# Patient Record
Sex: MALE | Race: WHITE | Employment: OTHER
[De-identification: names, ages, dates, MRNs, and addresses within clinical notes are randomized per-mention and may not be internally consistent; named-entity substitution may affect disease eponyms.]

---

## 2017-01-26 PROBLEM — S02.2XXA CLOSED FRACTURE OF NASAL BONE: Status: ACTIVE | Noted: 2017-01-26

## 2017-01-30 ENCOUNTER — TELEPHONE (OUTPATIENT)
Dept: INTERNAL MEDICINE | Facility: CLINIC | Age: 47
End: 2017-01-30

## 2017-04-27 RX ORDER — DIVALPROEX SODIUM 500 MG/1
TABLET, EXTENDED RELEASE ORAL
Qty: 30 TABLET | Refills: 3 | Status: ON HOLD | OUTPATIENT
Start: 2017-04-27 | End: 2018-09-04 | Stop reason: HOSPADM

## 2017-04-30 ENCOUNTER — APPOINTMENT (OUTPATIENT)
Dept: GENERAL RADIOLOGY | Age: 47
End: 2017-04-30
Payer: COMMERCIAL

## 2017-04-30 ENCOUNTER — HOSPITAL ENCOUNTER (EMERGENCY)
Age: 47
Discharge: HOME | End: 2017-04-30
Attending: EMERGENCY MEDICINE
Payer: COMMERCIAL

## 2017-04-30 VITALS
HEART RATE: 84 BPM | RESPIRATION RATE: 16 BRPM | TEMPERATURE: 97.4 F | DIASTOLIC BLOOD PRESSURE: 59 MMHG | SYSTOLIC BLOOD PRESSURE: 104 MMHG | BODY MASS INDEX: 18.65 KG/M2 | HEIGHT: 75 IN | OXYGEN SATURATION: 98 % | WEIGHT: 150 LBS

## 2017-04-30 DIAGNOSIS — S30.0XXA COCCYX CONTUSION, INITIAL ENCOUNTER: Primary | ICD-10-CM
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
## 2017-04-30 PROCEDURE — 99284 EMERGENCY DEPT VISIT MOD MDM: CPT

## 2017-04-30 PROCEDURE — 72100 X-RAY EXAM L-S SPINE 2/3 VWS: CPT

## 2017-04-30 PROCEDURE — 72220 X-RAY EXAM SACRUM TAILBONE: CPT

## 2017-04-30 PROCEDURE — 6370000000 HC RX 637 (ALT 250 FOR IP): Performed by: NURSE PRACTITIONER

## 2017-04-30 RX ORDER — ACETAMINOPHEN 500 MG
1000 TABLET ORAL ONCE
Status: COMPLETED | OUTPATIENT
Start: 2017-04-30 | End: 2017-04-30

## 2017-04-30 RX ORDER — TRAMADOL HYDROCHLORIDE 50 MG/1
50 TABLET ORAL EVERY 6 HOURS PRN
Status: ON HOLD | COMMUNITY
End: 2017-07-19 | Stop reason: HOSPADM

## 2017-04-30 RX ORDER — ACETAMINOPHEN 325 MG/1
650 TABLET ORAL EVERY 6 HOURS PRN
Qty: 20 TABLET | Refills: 0 | Status: ON HOLD | OUTPATIENT
Start: 2017-04-30 | End: 2017-07-18

## 2017-04-30 RX ADMIN — ACETAMINOPHEN 1000 MG: 500 TABLET, FILM COATED ORAL at 19:43

## 2017-04-30 ASSESSMENT — PAIN DESCRIPTION - ORIENTATION: ORIENTATION: OTHER (COMMENT)

## 2017-04-30 ASSESSMENT — PAIN SCALES - GENERAL
PAINLEVEL_OUTOF10: 10
PAINLEVEL_OUTOF10: 9
PAINLEVEL_OUTOF10: 10

## 2017-04-30 ASSESSMENT — PAIN DESCRIPTION - DESCRIPTORS: DESCRIPTORS: ACHING

## 2017-04-30 ASSESSMENT — ENCOUNTER SYMPTOMS
SHORTNESS OF BREATH: 0
NAUSEA: 0
COUGH: 0
TROUBLE SWALLOWING: 0
VOMITING: 0

## 2017-04-30 ASSESSMENT — PAIN DESCRIPTION - LOCATION: LOCATION: COCCYX

## 2017-06-15 ENCOUNTER — HOSPITAL ENCOUNTER (OUTPATIENT)
Age: 47
Discharge: HOME | End: 2017-06-15
Payer: COMMERCIAL

## 2017-06-15 ENCOUNTER — HOSPITAL ENCOUNTER (OUTPATIENT)
Dept: GENERAL RADIOLOGY | Age: 47
Discharge: HOME | End: 2017-06-15
Payer: COMMERCIAL

## 2017-06-15 ENCOUNTER — HOSPITAL ENCOUNTER (OUTPATIENT)
Dept: PAIN MANAGEMENT | Age: 47
Discharge: HOME | End: 2017-06-15
Payer: COMMERCIAL

## 2017-06-15 VITALS
SYSTOLIC BLOOD PRESSURE: 136 MMHG | RESPIRATION RATE: 20 BRPM | BODY MASS INDEX: 18.65 KG/M2 | TEMPERATURE: 98.6 F | DIASTOLIC BLOOD PRESSURE: 78 MMHG | OXYGEN SATURATION: 96 % | HEIGHT: 75 IN | HEART RATE: 77 BPM | WEIGHT: 150 LBS

## 2017-06-15 DIAGNOSIS — R52 PAIN: ICD-10-CM

## 2017-06-15 DIAGNOSIS — M50.30 DEGENERATIVE DISC DISEASE, CERVICAL: ICD-10-CM

## 2017-06-15 DIAGNOSIS — M47.816 OSTEOARTHRITIS OF LUMBAR SPINE, UNSPECIFIED SPINAL OSTEOARTHRITIS COMPLICATION STATUS: ICD-10-CM

## 2017-06-15 DIAGNOSIS — F14.10 COCAINE SUBSTANCE ABUSE (HCC): ICD-10-CM

## 2017-06-15 DIAGNOSIS — G40.409 GENERALIZED TONIC-CLONIC SEIZURE (HCC): Primary | ICD-10-CM

## 2017-06-15 DIAGNOSIS — M51.36 LUMBAR DEGENERATIVE DISC DISEASE: ICD-10-CM

## 2017-06-15 DIAGNOSIS — F11.10 OPIATE ABUSE, EPISODIC (HCC): ICD-10-CM

## 2017-06-15 PROCEDURE — 80307 DRUG TEST PRSMV CHEM ANLYZR: CPT

## 2017-06-15 PROCEDURE — 72220 X-RAY EXAM SACRUM TAILBONE: CPT

## 2017-06-15 PROCEDURE — 99244 OFF/OP CNSLTJ NEW/EST MOD 40: CPT | Performed by: PAIN MEDICINE

## 2017-06-15 PROCEDURE — 99203 OFFICE O/P NEW LOW 30 MIN: CPT

## 2017-06-15 ASSESSMENT — PAIN DESCRIPTION - FREQUENCY: FREQUENCY: CONTINUOUS

## 2017-06-15 ASSESSMENT — ENCOUNTER SYMPTOMS
BLURRED VISION: 0
SHORTNESS OF BREATH: 1
NAUSEA: 1
VOMITING: 1
DOUBLE VISION: 0
BACK PAIN: 1
SPUTUM PRODUCTION: 1

## 2017-06-15 ASSESSMENT — PAIN DESCRIPTION - ONSET: ONSET: ON-GOING

## 2017-06-15 ASSESSMENT — PAIN DESCRIPTION - ORIENTATION: ORIENTATION: RIGHT;LEFT

## 2017-06-15 ASSESSMENT — PAIN DESCRIPTION - PROGRESSION: CLINICAL_PROGRESSION: GRADUALLY WORSENING

## 2017-06-15 ASSESSMENT — PAIN DESCRIPTION - LOCATION: LOCATION: BACK;SHOULDER;COCCYX

## 2017-06-15 ASSESSMENT — PAIN DESCRIPTION - DESCRIPTORS: DESCRIPTORS: ACHING;BURNING;CONSTANT

## 2017-06-15 ASSESSMENT — PAIN SCALES - GENERAL: PAINLEVEL_OUTOF10: 10

## 2017-06-15 ASSESSMENT — PAIN DESCRIPTION - PAIN TYPE: TYPE: CHRONIC PAIN

## 2017-06-16 RX ORDER — DULOXETIN HYDROCHLORIDE 60 MG/1
CAPSULE, DELAYED RELEASE ORAL
Qty: 30 CAPSULE | Refills: 0 | Status: ON HOLD | OUTPATIENT
Start: 2017-06-16 | End: 2018-04-12 | Stop reason: HOSPADM

## 2017-06-19 LAB
6-ACETYLMORPHINE, UR: NOT DETECTED
7-AMINOCLONAZEPAM, URINE: NOT DETECTED
ALPHA-OH-ALPRAZ, URINE: NOT DETECTED
ALPRAZOLAM, URINE: NOT DETECTED
AMPHETAMINES, URINE: NOT DETECTED
BARBITURATES, URINE: NOT DETECTED
BENZOYLECGONINE, UR: PRESENT
BUPRENORPHINE URINE: NOT DETECTED
CARISOPRODOL, UR: NOT DETECTED
CLONAZEPAM, URINE: NOT DETECTED
CODEINE, URINE: PRESENT
CREATININE URINE: 208.1 MG/DL (ref 20–400)
DIAZEPAM, URINE: NOT DETECTED
DRUGS EXPECTED, UR: NORMAL
EER HI RES INTERP UR: NORMAL
ETHYL GLUCURONIDE UR: NOT DETECTED
FENTANYL URINE: NOT DETECTED
HYDROCODONE, URINE: PRESENT
HYDROMORPHONE, URINE: NOT DETECTED
LORAZEPAM, URINE: NOT DETECTED
MARIJUANA METAB, UR: PRESENT
MDA, UR: NOT DETECTED
MDEA, EVE, UR: NOT DETECTED
MDMA URINE: NOT DETECTED
MEPERIDINE METAB, UR: NOT DETECTED
METHADONE, URINE: NOT DETECTED
METHAMPHETAMINE, URINE: NOT DETECTED
METHYLPHENIDATE: NOT DETECTED
MIDAZOLAM, URINE: NOT DETECTED
MORPHINE URINE: PRESENT
NORBUPRENORPHINE, URINE: NOT DETECTED
NORDIAZEPAM, URINE: NOT DETECTED
NORFENTANYL, URINE: NOT DETECTED
NORHYDROCODONE, URINE: PRESENT
NOROXYCODONE, URINE: NOT DETECTED
NOROXYMORPHONE, URINE: NOT DETECTED
OXAZEPAM, URINE: NOT DETECTED
OXYCODONE URINE: NOT DETECTED
OXYMORPHONE, URINE: NOT DETECTED
PAIN MANAGEMENT DRUG PANEL INTERP, URINE: NORMAL
PAIN MGT DRUG PANEL, HI RES, UR: NORMAL
PCP,URINE: NOT DETECTED
PHENTERMINE, UR: NOT DETECTED
PROPOXYPHENE, URINE: NOT DETECTED
TAPENTADOL, URINE: NOT DETECTED
TAPENTADOL-O-SULFATE, URINE: NOT DETECTED
TEMAZEPAM, URINE: NOT DETECTED
TRAMADOL, URINE: NOT DETECTED
ZOLPIDEM, URINE: NOT DETECTED

## 2017-07-17 ENCOUNTER — HOSPITAL ENCOUNTER (OUTPATIENT)
Age: 47
Setting detail: OBSERVATION
Discharge: HOME HEALTH | End: 2017-07-19
Attending: EMERGENCY MEDICINE | Admitting: INTERNAL MEDICINE
Payer: COMMERCIAL

## 2017-07-17 DIAGNOSIS — T40.604A OPIATE OVERDOSE, UNDETERMINED INTENT, INITIAL ENCOUNTER (HCC): ICD-10-CM

## 2017-07-17 DIAGNOSIS — R41.82 ALTERED MENTAL STATUS, UNSPECIFIED: Primary | ICD-10-CM

## 2017-07-17 PROCEDURE — 99285 EMERGENCY DEPT VISIT HI MDM: CPT

## 2017-07-17 PROCEDURE — 93005 ELECTROCARDIOGRAM TRACING: CPT

## 2017-07-18 ENCOUNTER — APPOINTMENT (OUTPATIENT)
Dept: CT IMAGING | Age: 47
End: 2017-07-18
Payer: COMMERCIAL

## 2017-07-18 ENCOUNTER — APPOINTMENT (OUTPATIENT)
Dept: GENERAL RADIOLOGY | Age: 47
End: 2017-07-18
Payer: COMMERCIAL

## 2017-07-18 PROBLEM — F19.10 POLYSUBSTANCE ABUSE (HCC): Status: ACTIVE | Noted: 2017-07-18

## 2017-07-18 PROBLEM — T50.901A DRUG OVERDOSE: Status: ACTIVE | Noted: 2017-07-18

## 2017-07-18 LAB
ABSOLUTE EOS #: 0.2 K/UL (ref 0–0.4)
ABSOLUTE LYMPH #: 3 K/UL (ref 1–4.8)
ABSOLUTE MONO #: 0.6 K/UL (ref 0.1–1.3)
ACETAMINOPHEN LEVEL: <10 UG/ML (ref 10–30)
ALBUMIN SERPL-MCNC: 3.9 G/DL (ref 3.5–5.2)
ALBUMIN/GLOBULIN RATIO: ABNORMAL (ref 1–2.5)
ALP BLD-CCNC: 72 U/L (ref 40–129)
ALT SERPL-CCNC: 11 U/L (ref 5–41)
AMPHETAMINE SCREEN URINE: NEGATIVE
ANION GAP SERPL CALCULATED.3IONS-SCNC: 12 MMOL/L (ref 9–17)
AST SERPL-CCNC: 18 U/L
BARBITURATE SCREEN URINE: NEGATIVE
BASOPHILS # BLD: 1 %
BASOPHILS ABSOLUTE: 0.1 K/UL (ref 0–0.2)
BENZODIAZEPINE SCREEN, URINE: NEGATIVE
BILIRUB SERPL-MCNC: 0.25 MG/DL (ref 0.3–1.2)
BILIRUBIN URINE: NEGATIVE
BUN BLDV-MCNC: 19 MG/DL (ref 6–20)
BUN/CREAT BLD: ABNORMAL (ref 9–20)
BUPRENORPHINE URINE: ABNORMAL
CALCIUM SERPL-MCNC: 9.1 MG/DL (ref 8.6–10.4)
CANNABINOID SCREEN URINE: POSITIVE
CHLORIDE BLD-SCNC: 106 MMOL/L (ref 98–107)
CO2: 22 MMOL/L (ref 20–31)
COCAINE METABOLITE, URINE: NEGATIVE
COLOR: YELLOW
COMMENT UA: NORMAL
CREAT SERPL-MCNC: 0.72 MG/DL (ref 0.7–1.2)
DIFFERENTIAL TYPE: ABNORMAL
EKG ATRIAL RATE: 86 BPM
EKG P AXIS: 63 DEGREES
EKG P-R INTERVAL: 156 MS
EKG Q-T INTERVAL: 338 MS
EKG QRS DURATION: 98 MS
EKG QTC CALCULATION (BAZETT): 404 MS
EKG R AXIS: 73 DEGREES
EKG T AXIS: 55 DEGREES
EKG VENTRICULAR RATE: 86 BPM
EOSINOPHILS RELATIVE PERCENT: 2 %
ETHANOL PERCENT: <0.01 %
ETHANOL: <10 MG/DL
GFR AFRICAN AMERICAN: >60 ML/MIN
GFR NON-AFRICAN AMERICAN: >60 ML/MIN
GFR SERPL CREATININE-BSD FRML MDRD: ABNORMAL ML/MIN/{1.73_M2}
GFR SERPL CREATININE-BSD FRML MDRD: ABNORMAL ML/MIN/{1.73_M2}
GLUCOSE BLD-MCNC: 85 MG/DL (ref 75–110)
GLUCOSE BLD-MCNC: 92 MG/DL (ref 70–99)
GLUCOSE URINE: NEGATIVE
HCT VFR BLD CALC: 44.2 % (ref 41–53)
HEMOGLOBIN: 14.6 G/DL (ref 13.5–17.5)
KETONES, URINE: NEGATIVE
LEUKOCYTE ESTERASE, URINE: NEGATIVE
LYMPHOCYTES # BLD: 27 %
MCH RBC QN AUTO: 28.2 PG (ref 26–34)
MCHC RBC AUTO-ENTMCNC: 33 G/DL (ref 31–37)
MCV RBC AUTO: 85.5 FL (ref 80–100)
MDMA URINE: ABNORMAL
METHADONE SCREEN, URINE: NEGATIVE
METHAMPHETAMINE, URINE: ABNORMAL
MONOCYTES # BLD: 6 %
NITRITE, URINE: NEGATIVE
OPIATES, URINE: POSITIVE
OXYCODONE SCREEN URINE: NEGATIVE
PDW BLD-RTO: 16.2 % (ref 11.5–14.9)
PH UA: 7 (ref 5–8)
PHENCYCLIDINE, URINE: NEGATIVE
PLATELET # BLD: 203 K/UL (ref 150–450)
PLATELET ESTIMATE: ABNORMAL
PMV BLD AUTO: 8.7 FL (ref 6–12)
POTASSIUM SERPL-SCNC: 3.9 MMOL/L (ref 3.7–5.3)
PROPOXYPHENE, URINE: ABNORMAL
PROTEIN UA: NEGATIVE
RBC # BLD: 5.17 M/UL (ref 4.5–5.9)
RBC # BLD: ABNORMAL 10*6/UL
SALICYLATE LEVEL: <1 MG/DL (ref 3–10)
SEG NEUTROPHILS: 64 %
SEGMENTED NEUTROPHILS ABSOLUTE COUNT: 7 K/UL (ref 1.3–9.1)
SODIUM BLD-SCNC: 140 MMOL/L (ref 135–144)
SPECIFIC GRAVITY UA: 1.02 (ref 1–1.03)
TEST INFORMATION: ABNORMAL
TOTAL PROTEIN: 7.3 G/DL (ref 6.4–8.3)
TRICYCLIC ANTIDEP,URINE: POSITIVE
TRICYCLIC ANTIDEPRESSANTS, UR: ABNORMAL
TURBIDITY: CLEAR
URINE HGB: NEGATIVE
UROBILINOGEN, URINE: NORMAL
VALPROIC ACID LEVEL: 44 UG/ML (ref 50–100)
VALPROIC DATE LAST DOSE: ABNORMAL
VALPROIC DOSE AMOUNT: ABNORMAL
VALPROIC TIME LAST DOSE: ABNORMAL
WBC # BLD: 10.9 K/UL (ref 3.5–11)
WBC # BLD: ABNORMAL 10*3/UL

## 2017-07-18 PROCEDURE — G0378 HOSPITAL OBSERVATION PER HR: HCPCS

## 2017-07-18 PROCEDURE — 80307 DRUG TEST PRSMV CHEM ANLYZR: CPT

## 2017-07-18 PROCEDURE — 81003 URINALYSIS AUTO W/O SCOPE: CPT

## 2017-07-18 PROCEDURE — 6360000002 HC RX W HCPCS: Performed by: NURSE PRACTITIONER

## 2017-07-18 PROCEDURE — 85025 COMPLETE CBC W/AUTO DIFF WBC: CPT

## 2017-07-18 PROCEDURE — 96372 THER/PROPH/DIAG INJ SC/IM: CPT

## 2017-07-18 PROCEDURE — 36415 COLL VENOUS BLD VENIPUNCTURE: CPT

## 2017-07-18 PROCEDURE — 71010 XR CHEST PORTABLE: CPT

## 2017-07-18 PROCEDURE — 6370000000 HC RX 637 (ALT 250 FOR IP): Performed by: NURSE PRACTITIONER

## 2017-07-18 PROCEDURE — 99220 PR INITIAL OBSERVATION CARE/DAY 70 MINUTES: CPT | Performed by: INTERNAL MEDICINE

## 2017-07-18 PROCEDURE — 80053 COMPREHEN METABOLIC PANEL: CPT

## 2017-07-18 PROCEDURE — 70450 CT HEAD/BRAIN W/O DYE: CPT

## 2017-07-18 PROCEDURE — G0480 DRUG TEST DEF 1-7 CLASSES: HCPCS

## 2017-07-18 PROCEDURE — 82947 ASSAY GLUCOSE BLOOD QUANT: CPT

## 2017-07-18 PROCEDURE — 80164 ASSAY DIPROPYLACETIC ACD TOT: CPT

## 2017-07-18 PROCEDURE — 2580000003 HC RX 258: Performed by: NURSE PRACTITIONER

## 2017-07-18 PROCEDURE — 1200000000 HC SEMI PRIVATE

## 2017-07-18 RX ORDER — DIVALPROEX SODIUM 500 MG/1
500 TABLET, EXTENDED RELEASE ORAL 2 TIMES DAILY
Status: DISCONTINUED | OUTPATIENT
Start: 2017-07-18 | End: 2017-07-19 | Stop reason: HOSPADM

## 2017-07-18 RX ORDER — HYDROXYZINE HYDROCHLORIDE 25 MG/1
50 TABLET, FILM COATED ORAL NIGHTLY
Status: DISCONTINUED | OUTPATIENT
Start: 2017-07-18 | End: 2017-07-19 | Stop reason: HOSPADM

## 2017-07-18 RX ORDER — ONDANSETRON 2 MG/ML
4 INJECTION INTRAMUSCULAR; INTRAVENOUS EVERY 6 HOURS PRN
Status: DISCONTINUED | OUTPATIENT
Start: 2017-07-18 | End: 2017-07-19 | Stop reason: HOSPADM

## 2017-07-18 RX ORDER — ALBUTEROL SULFATE 2.5 MG/3ML
2.5 SOLUTION RESPIRATORY (INHALATION) EVERY 6 HOURS PRN
Status: DISCONTINUED | OUTPATIENT
Start: 2017-07-18 | End: 2017-07-19 | Stop reason: HOSPADM

## 2017-07-18 RX ORDER — SODIUM CHLORIDE 0.9 % (FLUSH) 0.9 %
10 SYRINGE (ML) INJECTION EVERY 12 HOURS SCHEDULED
Status: DISCONTINUED | OUTPATIENT
Start: 2017-07-18 | End: 2017-07-19 | Stop reason: HOSPADM

## 2017-07-18 RX ORDER — FLUTICASONE PROPIONATE 50 MCG
1 SPRAY, SUSPENSION (ML) NASAL NIGHTLY
Status: DISCONTINUED | OUTPATIENT
Start: 2017-07-18 | End: 2017-07-19 | Stop reason: HOSPADM

## 2017-07-18 RX ORDER — HYDROXYZINE HYDROCHLORIDE 25 MG/1
25 TABLET, FILM COATED ORAL 2 TIMES DAILY
Status: DISCONTINUED | OUTPATIENT
Start: 2017-07-18 | End: 2017-07-19 | Stop reason: HOSPADM

## 2017-07-18 RX ORDER — SODIUM CHLORIDE 0.9 % (FLUSH) 0.9 %
10 SYRINGE (ML) INJECTION PRN
Status: DISCONTINUED | OUTPATIENT
Start: 2017-07-18 | End: 2017-07-19 | Stop reason: HOSPADM

## 2017-07-18 RX ORDER — DULOXETIN HYDROCHLORIDE 30 MG/1
60 CAPSULE, DELAYED RELEASE ORAL DAILY
Status: DISCONTINUED | OUTPATIENT
Start: 2017-07-18 | End: 2017-07-19 | Stop reason: HOSPADM

## 2017-07-18 RX ORDER — SODIUM CHLORIDE 9 MG/ML
INJECTION, SOLUTION INTRAVENOUS CONTINUOUS
Status: DISCONTINUED | OUTPATIENT
Start: 2017-07-18 | End: 2017-07-19 | Stop reason: HOSPADM

## 2017-07-18 RX ORDER — QUETIAPINE FUMARATE 300 MG/1
300 TABLET, FILM COATED ORAL NIGHTLY
Status: DISCONTINUED | OUTPATIENT
Start: 2017-07-18 | End: 2017-07-19 | Stop reason: HOSPADM

## 2017-07-18 RX ORDER — SIMVASTATIN 40 MG
40 TABLET ORAL NIGHTLY
Status: DISCONTINUED | OUTPATIENT
Start: 2017-07-18 | End: 2017-07-19 | Stop reason: HOSPADM

## 2017-07-18 RX ORDER — PANTOPRAZOLE SODIUM 40 MG/1
40 TABLET, DELAYED RELEASE ORAL 2 TIMES DAILY
Status: DISCONTINUED | OUTPATIENT
Start: 2017-07-18 | End: 2017-07-19 | Stop reason: HOSPADM

## 2017-07-18 RX ADMIN — SODIUM CHLORIDE: 9 INJECTION, SOLUTION INTRAVENOUS at 06:58

## 2017-07-18 RX ADMIN — FLUTICASONE PROPIONATE 1 SPRAY: 50 SPRAY, METERED NASAL at 21:55

## 2017-07-18 RX ADMIN — TOPIRAMATE 150 MG: 100 TABLET, FILM COATED ORAL at 21:55

## 2017-07-18 RX ADMIN — HYDROXYZINE HYDROCHLORIDE 50 MG: 25 TABLET, FILM COATED ORAL at 21:54

## 2017-07-18 RX ADMIN — SIMVASTATIN 40 MG: 40 TABLET, FILM COATED ORAL at 21:54

## 2017-07-18 RX ADMIN — PANTOPRAZOLE SODIUM 40 MG: 40 TABLET, DELAYED RELEASE ORAL at 21:54

## 2017-07-18 RX ADMIN — QUETIAPINE FUMARATE 300 MG: 300 TABLET, FILM COATED ORAL at 21:54

## 2017-07-18 RX ADMIN — DIVALPROEX SODIUM 500 MG: 500 TABLET, EXTENDED RELEASE ORAL at 21:54

## 2017-07-18 RX ADMIN — SALINE NASAL SPRAY 1 SPRAY: 1.5 SOLUTION NASAL at 21:55

## 2017-07-18 RX ADMIN — ENOXAPARIN SODIUM 40 MG: 40 INJECTION SUBCUTANEOUS at 10:24

## 2017-07-18 ASSESSMENT — PAIN SCALES - GENERAL: PAINLEVEL_OUTOF10: 0

## 2017-07-19 VITALS
OXYGEN SATURATION: 100 % | SYSTOLIC BLOOD PRESSURE: 117 MMHG | HEIGHT: 75 IN | TEMPERATURE: 98.4 F | DIASTOLIC BLOOD PRESSURE: 64 MMHG | HEART RATE: 75 BPM | BODY MASS INDEX: 19.02 KG/M2 | WEIGHT: 153 LBS | RESPIRATION RATE: 16 BRPM

## 2017-07-19 LAB
ABSOLUTE EOS #: 0.3 K/UL (ref 0–0.4)
ABSOLUTE LYMPH #: 3.2 K/UL (ref 1–4.8)
ABSOLUTE MONO #: 0.6 K/UL (ref 0.1–1.3)
ALBUMIN SERPL-MCNC: 3.4 G/DL (ref 3.5–5.2)
ALBUMIN/GLOBULIN RATIO: ABNORMAL (ref 1–2.5)
ALP BLD-CCNC: 65 U/L (ref 40–129)
ALT SERPL-CCNC: 13 U/L (ref 5–41)
ANION GAP SERPL CALCULATED.3IONS-SCNC: 13 MMOL/L (ref 9–17)
AST SERPL-CCNC: 17 U/L
BASOPHILS # BLD: 2 %
BASOPHILS ABSOLUTE: 0.1 K/UL (ref 0–0.2)
BILIRUB SERPL-MCNC: 0.29 MG/DL (ref 0.3–1.2)
BUN BLDV-MCNC: 14 MG/DL (ref 6–20)
BUN/CREAT BLD: ABNORMAL (ref 9–20)
CALCIUM SERPL-MCNC: 8.4 MG/DL (ref 8.6–10.4)
CHLORIDE BLD-SCNC: 110 MMOL/L (ref 98–107)
CO2: 17 MMOL/L (ref 20–31)
CREAT SERPL-MCNC: 0.58 MG/DL (ref 0.7–1.2)
DIFFERENTIAL TYPE: ABNORMAL
EOSINOPHILS RELATIVE PERCENT: 4 %
GFR AFRICAN AMERICAN: >60 ML/MIN
GFR NON-AFRICAN AMERICAN: >60 ML/MIN
GFR SERPL CREATININE-BSD FRML MDRD: ABNORMAL ML/MIN/{1.73_M2}
GFR SERPL CREATININE-BSD FRML MDRD: ABNORMAL ML/MIN/{1.73_M2}
GLUCOSE BLD-MCNC: 72 MG/DL (ref 70–99)
GLUCOSE BLD-MCNC: 73 MG/DL (ref 75–110)
GLUCOSE BLD-MCNC: 74 MG/DL (ref 75–110)
HCT VFR BLD CALC: 40.2 % (ref 41–53)
HEMOGLOBIN: 12.9 G/DL (ref 13.5–17.5)
LYMPHOCYTES # BLD: 49 %
MCH RBC QN AUTO: 27.9 PG (ref 26–34)
MCHC RBC AUTO-ENTMCNC: 32.2 G/DL (ref 31–37)
MCV RBC AUTO: 86.8 FL (ref 80–100)
MONOCYTES # BLD: 9 %
PDW BLD-RTO: 16.3 % (ref 11.5–14.9)
PLATELET # BLD: 194 K/UL (ref 150–450)
PLATELET ESTIMATE: ABNORMAL
PMV BLD AUTO: 8.5 FL (ref 6–12)
POTASSIUM SERPL-SCNC: 3.7 MMOL/L (ref 3.7–5.3)
RBC # BLD: 4.64 M/UL (ref 4.5–5.9)
RBC # BLD: ABNORMAL 10*6/UL
SEG NEUTROPHILS: 36 %
SEGMENTED NEUTROPHILS ABSOLUTE COUNT: 2.3 K/UL (ref 1.3–9.1)
SODIUM BLD-SCNC: 140 MMOL/L (ref 135–144)
TOTAL PROTEIN: 6.1 G/DL (ref 6.4–8.3)
WBC # BLD: 6.4 K/UL (ref 3.5–11)
WBC # BLD: ABNORMAL 10*3/UL

## 2017-07-19 PROCEDURE — 82947 ASSAY GLUCOSE BLOOD QUANT: CPT

## 2017-07-19 PROCEDURE — 36415 COLL VENOUS BLD VENIPUNCTURE: CPT

## 2017-07-19 PROCEDURE — 6370000000 HC RX 637 (ALT 250 FOR IP): Performed by: NURSE PRACTITIONER

## 2017-07-19 PROCEDURE — 96372 THER/PROPH/DIAG INJ SC/IM: CPT

## 2017-07-19 PROCEDURE — 6360000002 HC RX W HCPCS: Performed by: NURSE PRACTITIONER

## 2017-07-19 PROCEDURE — 99217 PR OBSERVATION CARE DISCHARGE MANAGEMENT: CPT | Performed by: INTERNAL MEDICINE

## 2017-07-19 PROCEDURE — G0378 HOSPITAL OBSERVATION PER HR: HCPCS

## 2017-07-19 PROCEDURE — 2580000003 HC RX 258: Performed by: NURSE PRACTITIONER

## 2017-07-19 PROCEDURE — 85025 COMPLETE CBC W/AUTO DIFF WBC: CPT

## 2017-07-19 PROCEDURE — 80053 COMPREHEN METABOLIC PANEL: CPT

## 2017-07-19 RX ADMIN — PANTOPRAZOLE SODIUM 40 MG: 40 TABLET, DELAYED RELEASE ORAL at 09:58

## 2017-07-19 RX ADMIN — DULOXETINE HYDROCHLORIDE 60 MG: 30 CAPSULE, DELAYED RELEASE ORAL at 09:59

## 2017-07-19 RX ADMIN — HYDROXYZINE HYDROCHLORIDE 25 MG: 25 TABLET, FILM COATED ORAL at 09:59

## 2017-07-19 RX ADMIN — SODIUM CHLORIDE: 9 INJECTION, SOLUTION INTRAVENOUS at 07:20

## 2017-07-19 RX ADMIN — DIVALPROEX SODIUM 500 MG: 500 TABLET, EXTENDED RELEASE ORAL at 09:59

## 2017-07-19 RX ADMIN — ENOXAPARIN SODIUM 40 MG: 40 INJECTION SUBCUTANEOUS at 09:59

## 2017-07-19 RX ADMIN — TOPIRAMATE 150 MG: 100 TABLET, FILM COATED ORAL at 10:02

## 2017-08-30 ENCOUNTER — HOSPITAL ENCOUNTER (INPATIENT)
Age: 47
LOS: 2 days | Discharge: HOME | DRG: 750 | End: 2017-09-01
Attending: EMERGENCY MEDICINE | Admitting: PSYCHIATRY & NEUROLOGY
Payer: COMMERCIAL

## 2017-08-30 DIAGNOSIS — F19.10 POLYSUBSTANCE ABUSE (HCC): ICD-10-CM

## 2017-08-30 DIAGNOSIS — R45.851 SUICIDAL IDEATION: Primary | ICD-10-CM

## 2017-08-30 DIAGNOSIS — F31.9 BIPOLAR 1 DISORDER (HCC): ICD-10-CM

## 2017-08-30 DIAGNOSIS — F32.A DEPRESSION, UNSPECIFIED DEPRESSION TYPE: ICD-10-CM

## 2017-08-30 LAB
ABSOLUTE EOS #: 0.2 K/UL (ref 0–0.4)
ABSOLUTE LYMPH #: 3.1 K/UL (ref 1–4.8)
ABSOLUTE MONO #: 0.7 K/UL (ref 0.1–1.3)
ALBUMIN SERPL-MCNC: 4 G/DL (ref 3.5–5.2)
ALBUMIN/GLOBULIN RATIO: ABNORMAL (ref 1–2.5)
ALP BLD-CCNC: 82 U/L (ref 40–129)
ALT SERPL-CCNC: 20 U/L (ref 5–41)
AMPHETAMINE SCREEN URINE: NEGATIVE
ANION GAP SERPL CALCULATED.3IONS-SCNC: 11 MMOL/L (ref 9–17)
AST SERPL-CCNC: 24 U/L
BARBITURATE SCREEN URINE: NEGATIVE
BASOPHILS # BLD: 1 %
BASOPHILS ABSOLUTE: 0.1 K/UL (ref 0–0.2)
BENZODIAZEPINE SCREEN, URINE: NEGATIVE
BILIRUB SERPL-MCNC: 0.25 MG/DL (ref 0.3–1.2)
BUN BLDV-MCNC: 22 MG/DL (ref 6–20)
BUN/CREAT BLD: ABNORMAL (ref 9–20)
BUPRENORPHINE URINE: ABNORMAL
CALCIUM SERPL-MCNC: 8.8 MG/DL (ref 8.6–10.4)
CANNABINOID SCREEN URINE: POSITIVE
CHLORIDE BLD-SCNC: 107 MMOL/L (ref 98–107)
CO2: 23 MMOL/L (ref 20–31)
COCAINE METABOLITE, URINE: POSITIVE
CREAT SERPL-MCNC: 0.96 MG/DL (ref 0.7–1.2)
DIFFERENTIAL TYPE: ABNORMAL
EOSINOPHILS RELATIVE PERCENT: 2 %
ETHANOL PERCENT: <0.01 %
ETHANOL: <10 MG/DL
GFR AFRICAN AMERICAN: >60 ML/MIN
GFR NON-AFRICAN AMERICAN: >60 ML/MIN
GFR SERPL CREATININE-BSD FRML MDRD: ABNORMAL ML/MIN/{1.73_M2}
GFR SERPL CREATININE-BSD FRML MDRD: ABNORMAL ML/MIN/{1.73_M2}
GLUCOSE BLD-MCNC: 74 MG/DL (ref 75–110)
GLUCOSE BLD-MCNC: 77 MG/DL (ref 75–110)
GLUCOSE BLD-MCNC: 98 MG/DL (ref 70–99)
HCT VFR BLD CALC: 44 % (ref 41–53)
HEMOGLOBIN: 14.5 G/DL (ref 13.5–17.5)
LYMPHOCYTES # BLD: 35 %
MAGNESIUM: 2.4 MG/DL (ref 1.6–2.6)
MCH RBC QN AUTO: 28.5 PG (ref 26–34)
MCHC RBC AUTO-ENTMCNC: 33 G/DL (ref 31–37)
MCV RBC AUTO: 86.3 FL (ref 80–100)
MDMA URINE: ABNORMAL
METHADONE SCREEN, URINE: NEGATIVE
METHAMPHETAMINE, URINE: ABNORMAL
MONOCYTES # BLD: 7 %
OPIATES, URINE: POSITIVE
OXYCODONE SCREEN URINE: NEGATIVE
PDW BLD-RTO: 15.9 % (ref 11.5–14.9)
PHENCYCLIDINE, URINE: NEGATIVE
PLATELET # BLD: 213 K/UL (ref 150–450)
PLATELET ESTIMATE: ABNORMAL
PMV BLD AUTO: 7.9 FL (ref 6–12)
POTASSIUM SERPL-SCNC: 4.4 MMOL/L (ref 3.7–5.3)
PROPOXYPHENE, URINE: ABNORMAL
RBC # BLD: 5.09 M/UL (ref 4.5–5.9)
RBC # BLD: ABNORMAL 10*6/UL
SEG NEUTROPHILS: 55 %
SEGMENTED NEUTROPHILS ABSOLUTE COUNT: 5 K/UL (ref 1.3–9.1)
SODIUM BLD-SCNC: 141 MMOL/L (ref 135–144)
TEST INFORMATION: ABNORMAL
TOTAL PROTEIN: 7.3 G/DL (ref 6.4–8.3)
TRICYCLIC ANTIDEPRESSANTS, UR: ABNORMAL
VALPROIC ACID LEVEL: 67 UG/ML (ref 50–100)
VALPROIC DATE LAST DOSE: NORMAL
VALPROIC DOSE AMOUNT: NORMAL
VALPROIC TIME LAST DOSE: NORMAL
WBC # BLD: 9 K/UL (ref 3.5–11)
WBC # BLD: ABNORMAL 10*3/UL

## 2017-08-30 PROCEDURE — 1240000000 HC EMOTIONAL WELLNESS R&B

## 2017-08-30 PROCEDURE — 36415 COLL VENOUS BLD VENIPUNCTURE: CPT

## 2017-08-30 PROCEDURE — 80307 DRUG TEST PRSMV CHEM ANLYZR: CPT

## 2017-08-30 PROCEDURE — 80164 ASSAY DIPROPYLACETIC ACD TOT: CPT

## 2017-08-30 PROCEDURE — 85025 COMPLETE CBC W/AUTO DIFF WBC: CPT

## 2017-08-30 PROCEDURE — G0480 DRUG TEST DEF 1-7 CLASSES: HCPCS

## 2017-08-30 PROCEDURE — 83735 ASSAY OF MAGNESIUM: CPT

## 2017-08-30 PROCEDURE — 99285 EMERGENCY DEPT VISIT HI MDM: CPT

## 2017-08-30 PROCEDURE — 6370000000 HC RX 637 (ALT 250 FOR IP): Performed by: PSYCHIATRY & NEUROLOGY

## 2017-08-30 PROCEDURE — 80053 COMPREHEN METABOLIC PANEL: CPT

## 2017-08-30 PROCEDURE — 82947 ASSAY GLUCOSE BLOOD QUANT: CPT

## 2017-08-30 RX ORDER — QUETIAPINE FUMARATE 300 MG/1
300 TABLET, FILM COATED ORAL NIGHTLY
Status: DISCONTINUED | OUTPATIENT
Start: 2017-08-30 | End: 2017-09-01 | Stop reason: HOSPADM

## 2017-08-30 RX ORDER — HYDROXYZINE HYDROCHLORIDE 25 MG/1
50 TABLET, FILM COATED ORAL 3 TIMES DAILY PRN
Status: DISCONTINUED | OUTPATIENT
Start: 2017-08-30 | End: 2017-09-01 | Stop reason: HOSPADM

## 2017-08-30 RX ORDER — DEXTROSE MONOHYDRATE 25 G/50ML
12.5 INJECTION, SOLUTION INTRAVENOUS PRN
Status: DISCONTINUED | OUTPATIENT
Start: 2017-08-30 | End: 2017-09-01 | Stop reason: HOSPADM

## 2017-08-30 RX ORDER — LORAZEPAM 1 MG/1
1 TABLET ORAL 2 TIMES DAILY
Status: COMPLETED | OUTPATIENT
Start: 2017-08-30 | End: 2017-09-01

## 2017-08-30 RX ORDER — FLUTICASONE PROPIONATE 50 MCG
1 SPRAY, SUSPENSION (ML) NASAL DAILY
Status: DISCONTINUED | OUTPATIENT
Start: 2017-08-30 | End: 2017-09-01 | Stop reason: HOSPADM

## 2017-08-30 RX ORDER — DEXTROSE MONOHYDRATE 50 MG/ML
100 INJECTION, SOLUTION INTRAVENOUS PRN
Status: DISCONTINUED | OUTPATIENT
Start: 2017-08-30 | End: 2017-09-01 | Stop reason: HOSPADM

## 2017-08-30 RX ORDER — ALBUTEROL SULFATE 2.5 MG/3ML
2.5 SOLUTION RESPIRATORY (INHALATION) EVERY 6 HOURS PRN
Status: DISCONTINUED | OUTPATIENT
Start: 2017-08-30 | End: 2017-09-01 | Stop reason: HOSPADM

## 2017-08-30 RX ORDER — BENZTROPINE MESYLATE 1 MG/ML
2 INJECTION INTRAMUSCULAR; INTRAVENOUS DAILY PRN
Status: DISCONTINUED | OUTPATIENT
Start: 2017-08-30 | End: 2017-09-01 | Stop reason: HOSPADM

## 2017-08-30 RX ORDER — DIPHENOXYLATE HYDROCHLORIDE AND ATROPINE SULFATE 2.5; .025 MG/1; MG/1
1 TABLET ORAL 4 TIMES DAILY PRN
Status: DISCONTINUED | OUTPATIENT
Start: 2017-08-30 | End: 2017-09-01 | Stop reason: HOSPADM

## 2017-08-30 RX ORDER — HALOPERIDOL 5 MG/ML
10 INJECTION INTRAMUSCULAR EVERY 4 HOURS PRN
Status: DISCONTINUED | OUTPATIENT
Start: 2017-08-30 | End: 2017-09-01 | Stop reason: HOSPADM

## 2017-08-30 RX ORDER — NICOTINE 21 MG/24HR
1 PATCH, TRANSDERMAL 24 HOURS TRANSDERMAL DAILY
Status: DISCONTINUED | OUTPATIENT
Start: 2017-08-30 | End: 2017-09-01 | Stop reason: HOSPADM

## 2017-08-30 RX ORDER — ACETAMINOPHEN 325 MG/1
650 TABLET ORAL EVERY 6 HOURS PRN
Status: DISCONTINUED | OUTPATIENT
Start: 2017-08-30 | End: 2017-09-01 | Stop reason: HOSPADM

## 2017-08-30 RX ORDER — TRAMADOL HYDROCHLORIDE 50 MG/1
50 TABLET ORAL EVERY 6 HOURS PRN
Status: DISCONTINUED | OUTPATIENT
Start: 2017-08-30 | End: 2017-08-31

## 2017-08-30 RX ORDER — MAGNESIUM HYDROXIDE/ALUMINUM HYDROXICE/SIMETHICONE 120; 1200; 1200 MG/30ML; MG/30ML; MG/30ML
30 SUSPENSION ORAL 2 TIMES DAILY PRN
Status: DISCONTINUED | OUTPATIENT
Start: 2017-08-30 | End: 2017-09-01 | Stop reason: HOSPADM

## 2017-08-30 RX ORDER — OLANZAPINE 5 MG/1
5 TABLET ORAL
Status: ACTIVE | OUTPATIENT
Start: 2017-08-30 | End: 2017-08-30

## 2017-08-30 RX ORDER — ALBUTEROL SULFATE 90 UG/1
2 AEROSOL, METERED RESPIRATORY (INHALATION) EVERY 4 HOURS PRN
Status: DISCONTINUED | OUTPATIENT
Start: 2017-08-30 | End: 2017-09-01 | Stop reason: HOSPADM

## 2017-08-30 RX ORDER — DULOXETIN HYDROCHLORIDE 60 MG/1
60 CAPSULE, DELAYED RELEASE ORAL DAILY
Status: DISCONTINUED | OUTPATIENT
Start: 2017-08-30 | End: 2017-09-01 | Stop reason: HOSPADM

## 2017-08-30 RX ORDER — NICOTINE POLACRILEX 4 MG
15 LOZENGE BUCCAL PRN
Status: DISCONTINUED | OUTPATIENT
Start: 2017-08-30 | End: 2017-09-01 | Stop reason: HOSPADM

## 2017-08-30 RX ORDER — DIVALPROEX SODIUM 500 MG/1
500 TABLET, EXTENDED RELEASE ORAL 2 TIMES DAILY
Status: DISCONTINUED | OUTPATIENT
Start: 2017-08-30 | End: 2017-09-01 | Stop reason: HOSPADM

## 2017-08-30 RX ORDER — SIMVASTATIN 40 MG
40 TABLET ORAL NIGHTLY
Status: DISCONTINUED | OUTPATIENT
Start: 2017-08-30 | End: 2017-09-01 | Stop reason: HOSPADM

## 2017-08-30 RX ORDER — PANTOPRAZOLE SODIUM 40 MG/1
40 TABLET, DELAYED RELEASE ORAL DAILY
Status: DISCONTINUED | OUTPATIENT
Start: 2017-08-31 | End: 2017-08-31

## 2017-08-30 RX ORDER — TRAZODONE HYDROCHLORIDE 50 MG/1
50 TABLET ORAL NIGHTLY PRN
Status: DISCONTINUED | OUTPATIENT
Start: 2017-08-30 | End: 2017-09-01 | Stop reason: HOSPADM

## 2017-08-30 RX ADMIN — HYDROXYZINE HYDROCHLORIDE 50 MG: 25 TABLET, FILM COATED ORAL at 21:59

## 2017-08-30 RX ADMIN — FLUTICASONE PROPIONATE 1 SPRAY: 50 SPRAY, METERED NASAL at 22:08

## 2017-08-30 RX ADMIN — TOPIRAMATE 150 MG: 100 TABLET, FILM COATED ORAL at 22:00

## 2017-08-30 RX ADMIN — SIMVASTATIN 40 MG: 40 TABLET, FILM COATED ORAL at 22:00

## 2017-08-30 RX ADMIN — TRAMADOL HYDROCHLORIDE 50 MG: 50 TABLET, FILM COATED ORAL at 21:59

## 2017-08-30 RX ADMIN — LORAZEPAM 1 MG: 1 TABLET ORAL at 21:59

## 2017-08-30 RX ADMIN — DULOXETINE HYDROCHLORIDE 60 MG: 60 CAPSULE, DELAYED RELEASE ORAL at 21:59

## 2017-08-30 RX ADMIN — DIVALPROEX SODIUM 500 MG: 500 TABLET, EXTENDED RELEASE ORAL at 21:59

## 2017-08-30 RX ADMIN — QUETIAPINE FUMARATE 300 MG: 300 TABLET, FILM COATED ORAL at 21:59

## 2017-08-30 RX ADMIN — TRAZODONE HYDROCHLORIDE 50 MG: 50 TABLET ORAL at 22:00

## 2017-08-30 ASSESSMENT — PAIN DESCRIPTION - PAIN TYPE
TYPE: CHRONIC PAIN
TYPE: ACUTE PAIN

## 2017-08-30 ASSESSMENT — SLEEP AND FATIGUE QUESTIONNAIRES
DO YOU USE A SLEEP AID: YES
AVERAGE NUMBER OF SLEEP HOURS: 5
DIFFICULTY STAYING ASLEEP: YES
DIFFICULTY ARISING: NO
RESTFUL SLEEP: NO
SLEEP PATTERN: DIFFICULTY FALLING ASLEEP;DISTURBED/INTERRUPTED SLEEP
DIFFICULTY FALLING ASLEEP: YES
DO YOU HAVE DIFFICULTY SLEEPING: YES

## 2017-08-30 ASSESSMENT — PAIN DESCRIPTION - LOCATION
LOCATION: GENERALIZED
LOCATION: GENERALIZED

## 2017-08-30 ASSESSMENT — PAIN SCALES - GENERAL
PAINLEVEL_OUTOF10: 6
PAINLEVEL_OUTOF10: 3
PAINLEVEL_OUTOF10: 10

## 2017-08-30 ASSESSMENT — PAIN DESCRIPTION - DESCRIPTORS: DESCRIPTORS: ACHING;CONSTANT

## 2017-08-30 ASSESSMENT — PAIN DESCRIPTION - FREQUENCY
FREQUENCY: CONTINUOUS
FREQUENCY: CONTINUOUS

## 2017-08-30 ASSESSMENT — LIFESTYLE VARIABLES: HISTORY_ALCOHOL_USE: NO

## 2017-08-30 ASSESSMENT — PAIN DESCRIPTION - PROGRESSION: CLINICAL_PROGRESSION: NOT CHANGED

## 2017-08-30 ASSESSMENT — PAIN DESCRIPTION - ONSET: ONSET: ON-GOING

## 2017-08-30 ASSESSMENT — PATIENT HEALTH QUESTIONNAIRE - PHQ9: SUM OF ALL RESPONSES TO PHQ QUESTIONS 1-9: 25

## 2017-08-31 LAB
GLUCOSE BLD-MCNC: 85 MG/DL (ref 75–110)
GLUCOSE BLD-MCNC: 94 MG/DL (ref 75–110)
GLUCOSE BLD-MCNC: 95 MG/DL (ref 75–110)

## 2017-08-31 PROCEDURE — 99254 IP/OBS CNSLTJ NEW/EST MOD 60: CPT | Performed by: INTERNAL MEDICINE

## 2017-08-31 PROCEDURE — 6370000000 HC RX 637 (ALT 250 FOR IP): Performed by: PSYCHIATRY & NEUROLOGY

## 2017-08-31 PROCEDURE — 6360000002 HC RX W HCPCS: Performed by: PSYCHIATRY & NEUROLOGY

## 2017-08-31 PROCEDURE — 6370000000 HC RX 637 (ALT 250 FOR IP): Performed by: INTERNAL MEDICINE

## 2017-08-31 PROCEDURE — 82947 ASSAY GLUCOSE BLOOD QUANT: CPT

## 2017-08-31 PROCEDURE — 1240000000 HC EMOTIONAL WELLNESS R&B

## 2017-08-31 RX ORDER — PANTOPRAZOLE SODIUM 40 MG/1
40 TABLET, DELAYED RELEASE ORAL
Status: DISCONTINUED | OUTPATIENT
Start: 2017-08-31 | End: 2017-09-01

## 2017-08-31 RX ORDER — ONDANSETRON 4 MG/1
4 TABLET, ORALLY DISINTEGRATING ORAL EVERY 6 HOURS PRN
Status: DISCONTINUED | OUTPATIENT
Start: 2017-08-31 | End: 2017-09-01 | Stop reason: HOSPADM

## 2017-08-31 RX ORDER — TRAMADOL HYDROCHLORIDE 50 MG/1
50 TABLET ORAL 3 TIMES DAILY
Status: DISCONTINUED | OUTPATIENT
Start: 2017-08-31 | End: 2017-09-01 | Stop reason: HOSPADM

## 2017-08-31 RX ADMIN — TRAZODONE HYDROCHLORIDE 50 MG: 50 TABLET ORAL at 21:04

## 2017-08-31 RX ADMIN — TOPIRAMATE 150 MG: 100 TABLET, FILM COATED ORAL at 21:04

## 2017-08-31 RX ADMIN — ONDANSETRON 4 MG: 4 TABLET, ORALLY DISINTEGRATING ORAL at 17:07

## 2017-08-31 RX ADMIN — DIVALPROEX SODIUM 500 MG: 500 TABLET, EXTENDED RELEASE ORAL at 21:04

## 2017-08-31 RX ADMIN — FLUTICASONE PROPIONATE 1 SPRAY: 50 SPRAY, METERED NASAL at 09:17

## 2017-08-31 RX ADMIN — LORAZEPAM 1 MG: 1 TABLET ORAL at 09:16

## 2017-08-31 RX ADMIN — TRAMADOL HYDROCHLORIDE 50 MG: 50 TABLET, FILM COATED ORAL at 14:05

## 2017-08-31 RX ADMIN — SIMVASTATIN 40 MG: 40 TABLET, FILM COATED ORAL at 21:04

## 2017-08-31 RX ADMIN — QUETIAPINE FUMARATE 300 MG: 300 TABLET, FILM COATED ORAL at 21:04

## 2017-08-31 RX ADMIN — INSULIN LISPRO 2 UNITS: 100 INJECTION, SOLUTION INTRAVENOUS; SUBCUTANEOUS at 09:18

## 2017-08-31 RX ADMIN — TOPIRAMATE 150 MG: 100 TABLET, FILM COATED ORAL at 09:16

## 2017-08-31 RX ADMIN — PANTOPRAZOLE SODIUM 40 MG: 40 TABLET, DELAYED RELEASE ORAL at 06:04

## 2017-08-31 RX ADMIN — DIVALPROEX SODIUM 500 MG: 500 TABLET, EXTENDED RELEASE ORAL at 09:16

## 2017-08-31 RX ADMIN — DULOXETINE HYDROCHLORIDE 60 MG: 60 CAPSULE, DELAYED RELEASE ORAL at 09:16

## 2017-08-31 RX ADMIN — LORAZEPAM 1 MG: 1 TABLET ORAL at 21:04

## 2017-08-31 RX ADMIN — DIPHENOXYLATE HYDROCHLORIDE AND ATROPINE SULFATE 1 TABLET: 2.5; .025 TABLET ORAL at 09:24

## 2017-08-31 RX ADMIN — TRAMADOL HYDROCHLORIDE 50 MG: 50 TABLET, FILM COATED ORAL at 21:04

## 2017-08-31 ASSESSMENT — PAIN SCALES - GENERAL
PAINLEVEL_OUTOF10: 8
PAINLEVEL_OUTOF10: 10
PAINLEVEL_OUTOF10: 6
PAINLEVEL_OUTOF10: 2

## 2017-09-01 VITALS
HEIGHT: 75 IN | OXYGEN SATURATION: 96 % | TEMPERATURE: 98.6 F | SYSTOLIC BLOOD PRESSURE: 99 MMHG | BODY MASS INDEX: 19.89 KG/M2 | WEIGHT: 160 LBS | HEART RATE: 73 BPM | DIASTOLIC BLOOD PRESSURE: 57 MMHG | RESPIRATION RATE: 14 BRPM

## 2017-09-01 LAB
ESTIMATED AVERAGE GLUCOSE: 103 MG/DL
GLUCOSE BLD-MCNC: 77 MG/DL (ref 75–110)
GLUCOSE BLD-MCNC: 91 MG/DL (ref 75–110)
HBA1C MFR BLD: 5.2 % (ref 4–6)

## 2017-09-01 PROCEDURE — 6370000000 HC RX 637 (ALT 250 FOR IP): Performed by: PSYCHIATRY & NEUROLOGY

## 2017-09-01 PROCEDURE — 82947 ASSAY GLUCOSE BLOOD QUANT: CPT

## 2017-09-01 PROCEDURE — 36415 COLL VENOUS BLD VENIPUNCTURE: CPT

## 2017-09-01 PROCEDURE — 83036 HEMOGLOBIN GLYCOSYLATED A1C: CPT

## 2017-09-01 RX ORDER — PANTOPRAZOLE SODIUM 40 MG/1
40 TABLET, DELAYED RELEASE ORAL
Status: DISCONTINUED | OUTPATIENT
Start: 2017-09-01 | End: 2017-09-01 | Stop reason: HOSPADM

## 2017-09-01 RX ADMIN — FLUTICASONE PROPIONATE 1 SPRAY: 50 SPRAY, METERED NASAL at 08:35

## 2017-09-01 RX ADMIN — LORAZEPAM 1 MG: 1 TABLET ORAL at 08:34

## 2017-09-01 RX ADMIN — DIVALPROEX SODIUM 500 MG: 500 TABLET, EXTENDED RELEASE ORAL at 08:34

## 2017-09-01 RX ADMIN — PANTOPRAZOLE SODIUM 40 MG: 40 TABLET, DELAYED RELEASE ORAL at 08:34

## 2017-09-01 RX ADMIN — TOPIRAMATE 150 MG: 100 TABLET, FILM COATED ORAL at 08:34

## 2017-09-01 RX ADMIN — TRAMADOL HYDROCHLORIDE 50 MG: 50 TABLET, FILM COATED ORAL at 08:34

## 2017-09-01 RX ADMIN — DULOXETINE HYDROCHLORIDE 60 MG: 60 CAPSULE, DELAYED RELEASE ORAL at 08:34

## 2017-09-01 ASSESSMENT — PAIN SCALES - GENERAL
PAINLEVEL_OUTOF10: 10
PAINLEVEL_OUTOF10: 7

## 2017-09-01 ASSESSMENT — PAIN DESCRIPTION - LOCATION: LOCATION: GENERALIZED

## 2017-09-21 ENCOUNTER — HOSPITAL ENCOUNTER (EMERGENCY)
Age: 47
Discharge: HOME | End: 2017-09-21
Attending: EMERGENCY MEDICINE
Payer: COMMERCIAL

## 2017-09-21 ENCOUNTER — APPOINTMENT (OUTPATIENT)
Dept: CT IMAGING | Age: 47
End: 2017-09-21
Payer: COMMERCIAL

## 2017-09-21 ENCOUNTER — APPOINTMENT (OUTPATIENT)
Dept: GENERAL RADIOLOGY | Age: 47
End: 2017-09-21
Payer: COMMERCIAL

## 2017-09-21 VITALS
TEMPERATURE: 98.4 F | HEART RATE: 69 BPM | SYSTOLIC BLOOD PRESSURE: 104 MMHG | HEIGHT: 75 IN | BODY MASS INDEX: 17.28 KG/M2 | WEIGHT: 139 LBS | OXYGEN SATURATION: 100 % | DIASTOLIC BLOOD PRESSURE: 61 MMHG | RESPIRATION RATE: 16 BRPM

## 2017-09-21 DIAGNOSIS — S09.90XA HEAD INJURY, INITIAL ENCOUNTER: ICD-10-CM

## 2017-09-21 DIAGNOSIS — G89.29 CHRONIC PAIN OF BOTH LOWER EXTREMITIES: ICD-10-CM
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
## 2017-09-21 DIAGNOSIS — M79.604 CHRONIC PAIN OF BOTH LOWER EXTREMITIES: ICD-10-CM

## 2017-09-21 DIAGNOSIS — M79.605 CHRONIC PAIN OF BOTH LOWER EXTREMITIES: ICD-10-CM

## 2017-09-21 PROCEDURE — 99284 EMERGENCY DEPT VISIT MOD MDM: CPT

## 2017-09-21 PROCEDURE — 70450 CT HEAD/BRAIN W/O DYE: CPT

## 2017-09-21 PROCEDURE — 73521 X-RAY EXAM HIPS BI 2 VIEWS: CPT

## 2017-09-21 PROCEDURE — 93970 EXTREMITY STUDY: CPT

## 2017-09-21 RX ORDER — ACETAMINOPHEN 500 MG
1000 TABLET ORAL ONCE
Status: DISCONTINUED | OUTPATIENT
Start: 2017-09-21 | End: 2017-09-21 | Stop reason: HOSPADM

## 2017-09-21 RX ORDER — ACETAMINOPHEN 500 MG
500 TABLET ORAL EVERY 6 HOURS PRN
Qty: 20 TABLET | Refills: 0 | Status: ON HOLD | OUTPATIENT
Start: 2017-09-21 | End: 2017-11-20 | Stop reason: HOSPADM

## 2017-09-21 RX ORDER — TRAMADOL HYDROCHLORIDE 50 MG/1
50 TABLET ORAL ONCE
Status: DISCONTINUED | OUTPATIENT
Start: 2017-09-21 | End: 2017-09-21

## 2017-09-21 ASSESSMENT — PAIN DESCRIPTION - ORIENTATION: ORIENTATION: LEFT

## 2017-09-21 ASSESSMENT — PAIN DESCRIPTION - PAIN TYPE: TYPE: CHRONIC PAIN

## 2017-09-21 ASSESSMENT — PAIN SCALES - GENERAL: PAINLEVEL_OUTOF10: 8

## 2017-09-21 ASSESSMENT — PAIN DESCRIPTION - LOCATION: LOCATION: HIP

## 2017-09-28 ENCOUNTER — APPOINTMENT (OUTPATIENT)
Dept: GENERAL RADIOLOGY | Age: 47
End: 2017-09-28
Payer: COMMERCIAL

## 2017-09-28 ENCOUNTER — HOSPITAL ENCOUNTER (EMERGENCY)
Age: 47
Discharge: HOME | End: 2017-09-28
Attending: EMERGENCY MEDICINE
Payer: COMMERCIAL

## 2017-09-28 ENCOUNTER — APPOINTMENT (OUTPATIENT)
Dept: CT IMAGING | Age: 47
End: 2017-09-28
Payer: COMMERCIAL

## 2017-09-28 VITALS
RESPIRATION RATE: 11 BRPM | WEIGHT: 139 LBS | BODY MASS INDEX: 17.84 KG/M2 | HEART RATE: 70 BPM | OXYGEN SATURATION: 93 % | SYSTOLIC BLOOD PRESSURE: 93 MMHG | DIASTOLIC BLOOD PRESSURE: 61 MMHG | TEMPERATURE: 97.4 F | HEIGHT: 74 IN

## 2017-09-28 DIAGNOSIS — F12.10 MARIJUANA ABUSE: ICD-10-CM

## 2017-09-28 DIAGNOSIS — Z91.199 NONCOMPLIANCE: ICD-10-CM

## 2017-09-28 DIAGNOSIS — R56.9 SEIZURE (HCC): Primary | ICD-10-CM

## 2017-09-28 LAB
ABSOLUTE EOS #: 0.1 K/UL (ref 0–0.4)
ABSOLUTE LYMPH #: 1.7 K/UL (ref 1–4.8)
ABSOLUTE MONO #: 0.5 K/UL (ref 0.1–1.3)
ALBUMIN SERPL-MCNC: 4.1 G/DL (ref 3.5–5.2)
ALBUMIN/GLOBULIN RATIO: ABNORMAL (ref 1–2.5)
ALP BLD-CCNC: 99 U/L (ref 40–129)
ALT SERPL-CCNC: 25 U/L (ref 5–41)
ANION GAP SERPL CALCULATED.3IONS-SCNC: 21 MMOL/L (ref 9–17)
AST SERPL-CCNC: 29 U/L
BASOPHILS # BLD: 0 %
BASOPHILS ABSOLUTE: 0.1 K/UL (ref 0–0.2)
BILIRUB SERPL-MCNC: 0.21 MG/DL (ref 0.3–1.2)
BUN BLDV-MCNC: 10 MG/DL (ref 6–20)
BUN/CREAT BLD: ABNORMAL (ref 9–20)
CALCIUM SERPL-MCNC: 9.6 MG/DL (ref 8.6–10.4)
CHLORIDE BLD-SCNC: 105 MMOL/L (ref 98–107)
CO2: 16 MMOL/L (ref 20–31)
CREAT SERPL-MCNC: 0.82 MG/DL (ref 0.7–1.2)
DIFFERENTIAL TYPE: ABNORMAL
EOSINOPHILS RELATIVE PERCENT: 1 %
ETHANOL PERCENT: <0.01 %
ETHANOL: <10 MG/DL
GFR AFRICAN AMERICAN: >60 ML/MIN
GFR NON-AFRICAN AMERICAN: >60 ML/MIN
GFR SERPL CREATININE-BSD FRML MDRD: ABNORMAL ML/MIN/{1.73_M2}
GFR SERPL CREATININE-BSD FRML MDRD: ABNORMAL ML/MIN/{1.73_M2}
GLUCOSE BLD-MCNC: 132 MG/DL (ref 70–99)
HCT VFR BLD CALC: 49.5 % (ref 41–53)
HEMOGLOBIN: 16.3 G/DL (ref 13.5–17.5)
LYMPHOCYTES # BLD: 13 %
MAGNESIUM: 2.7 MG/DL (ref 1.6–2.6)
MCH RBC QN AUTO: 28.3 PG (ref 26–34)
MCHC RBC AUTO-ENTMCNC: 33 G/DL (ref 31–37)
MCV RBC AUTO: 85.9 FL (ref 80–100)
MONOCYTES # BLD: 4 %
PDW BLD-RTO: 15.9 % (ref 11.5–14.9)
PLATELET # BLD: 221 K/UL (ref 150–450)
PLATELET ESTIMATE: ABNORMAL
PMV BLD AUTO: 8 FL (ref 6–12)
POTASSIUM SERPL-SCNC: 4 MMOL/L (ref 3.7–5.3)
RBC # BLD: 5.76 M/UL (ref 4.5–5.9)
RBC # BLD: ABNORMAL 10*6/UL
SEG NEUTROPHILS: 82 %
SEGMENTED NEUTROPHILS ABSOLUTE COUNT: 10.4 K/UL (ref 1.3–9.1)
SODIUM BLD-SCNC: 142 MMOL/L (ref 135–144)
TOTAL PROTEIN: 8 G/DL (ref 6.4–8.3)
VALPROIC ACID LEVEL: 21 UG/ML (ref 50–100)
VALPROIC DATE LAST DOSE: ABNORMAL
VALPROIC DOSE AMOUNT: ABNORMAL
VALPROIC TIME LAST DOSE: ABNORMAL
WBC # BLD: 12.8 K/UL (ref 3.5–11)
WBC # BLD: ABNORMAL 10*3/UL

## 2017-09-28 PROCEDURE — 73562 X-RAY EXAM OF KNEE 3: CPT

## 2017-09-28 PROCEDURE — 99285 EMERGENCY DEPT VISIT HI MDM: CPT

## 2017-09-28 PROCEDURE — 85025 COMPLETE CBC W/AUTO DIFF WBC: CPT

## 2017-09-28 PROCEDURE — G0480 DRUG TEST DEF 1-7 CLASSES: HCPCS

## 2017-09-28 PROCEDURE — 72125 CT NECK SPINE W/O DYE: CPT

## 2017-09-28 PROCEDURE — 83735 ASSAY OF MAGNESIUM: CPT

## 2017-09-28 PROCEDURE — 80053 COMPREHEN METABOLIC PANEL: CPT

## 2017-09-28 PROCEDURE — 6370000000 HC RX 637 (ALT 250 FOR IP): Performed by: EMERGENCY MEDICINE

## 2017-09-28 PROCEDURE — 72170 X-RAY EXAM OF PELVIS: CPT

## 2017-09-28 PROCEDURE — 80164 ASSAY DIPROPYLACETIC ACD TOT: CPT

## 2017-09-28 PROCEDURE — 73502 X-RAY EXAM HIP UNI 2-3 VIEWS: CPT

## 2017-09-28 PROCEDURE — 36415 COLL VENOUS BLD VENIPUNCTURE: CPT

## 2017-09-28 RX ORDER — DIVALPROEX SODIUM 500 MG/1
500 TABLET, EXTENDED RELEASE ORAL ONCE
Status: COMPLETED | OUTPATIENT
Start: 2017-09-28 | End: 2017-09-28

## 2017-09-28 RX ADMIN — DIVALPROEX SODIUM 500 MG: 500 TABLET, FILM COATED, EXTENDED RELEASE ORAL at 19:50

## 2017-11-01 ENCOUNTER — HOSPITAL ENCOUNTER (INPATIENT)
Age: 47
LOS: 19 days | Discharge: HOME | DRG: 753 | End: 2017-11-20
Attending: EMERGENCY MEDICINE | Admitting: PSYCHIATRY & NEUROLOGY
Payer: COMMERCIAL

## 2017-11-01 DIAGNOSIS — R45.851 SUICIDAL IDEATION: ICD-10-CM

## 2017-11-01 DIAGNOSIS — R25.1 TREMOR: ICD-10-CM

## 2017-11-01 DIAGNOSIS — F32.2 SEVERE SINGLE CURRENT EPISODE OF MAJOR DEPRESSIVE DISORDER, WITHOUT PSYCHOTIC FEATURES (HCC): Primary | ICD-10-CM

## 2017-11-01 LAB
ABSOLUTE EOS #: 0.4 K/UL (ref 0–0.4)
ABSOLUTE IMMATURE GRANULOCYTE: ABNORMAL K/UL (ref 0–0.3)
ABSOLUTE LYMPH #: 3.5 K/UL (ref 1–4.8)
ABSOLUTE MONO #: 0.4 K/UL (ref 0.1–1.3)
ALBUMIN SERPL-MCNC: 3.8 G/DL (ref 3.5–5.2)
ALBUMIN/GLOBULIN RATIO: ABNORMAL (ref 1–2.5)
ALP BLD-CCNC: 94 U/L (ref 40–129)
ALT SERPL-CCNC: 20 U/L (ref 5–41)
ANION GAP SERPL CALCULATED.3IONS-SCNC: 14 MMOL/L (ref 9–17)
AST SERPL-CCNC: 23 U/L
BASOPHILS # BLD: 1 %
BASOPHILS ABSOLUTE: 0.1 K/UL (ref 0–0.2)
BILIRUB SERPL-MCNC: <0.15 MG/DL (ref 0.3–1.2)
BUN BLDV-MCNC: 20 MG/DL (ref 6–20)
BUN/CREAT BLD: ABNORMAL (ref 9–20)
CALCIUM SERPL-MCNC: 9.2 MG/DL (ref 8.6–10.4)
CHLORIDE BLD-SCNC: 106 MMOL/L (ref 98–107)
CO2: 21 MMOL/L (ref 20–31)
CREAT SERPL-MCNC: 0.85 MG/DL (ref 0.7–1.2)
DIFFERENTIAL TYPE: ABNORMAL
EOSINOPHILS RELATIVE PERCENT: 5 %
ETHANOL PERCENT: <0.01 %
ETHANOL: <10 MG/DL
GFR AFRICAN AMERICAN: >60 ML/MIN
GFR NON-AFRICAN AMERICAN: >60 ML/MIN
GFR SERPL CREATININE-BSD FRML MDRD: ABNORMAL ML/MIN/{1.73_M2}
GFR SERPL CREATININE-BSD FRML MDRD: ABNORMAL ML/MIN/{1.73_M2}
GLUCOSE BLD-MCNC: 86 MG/DL (ref 70–99)
HCT VFR BLD CALC: 44 % (ref 41–53)
HEMOGLOBIN: 14.8 G/DL (ref 13.5–17.5)
IMMATURE GRANULOCYTES: ABNORMAL %
LYMPHOCYTES # BLD: 40 %
MAGNESIUM: 2.2 MG/DL (ref 1.6–2.6)
MCH RBC QN AUTO: 28.8 PG (ref 26–34)
MCHC RBC AUTO-ENTMCNC: 33.7 G/DL (ref 31–37)
MCV RBC AUTO: 85.7 FL (ref 80–100)
MONOCYTES # BLD: 5 %
PDW BLD-RTO: 15.4 % (ref 11.5–14.9)
PLATELET # BLD: 229 K/UL (ref 150–450)
PLATELET ESTIMATE: ABNORMAL
PMV BLD AUTO: 7.6 FL (ref 6–12)
POTASSIUM SERPL-SCNC: 3.9 MMOL/L (ref 3.7–5.3)
RBC # BLD: 5.13 M/UL (ref 4.5–5.9)
RBC # BLD: ABNORMAL 10*6/UL
SEG NEUTROPHILS: 49 %
SEGMENTED NEUTROPHILS ABSOLUTE COUNT: 4.4 K/UL (ref 1.3–9.1)
SODIUM BLD-SCNC: 141 MMOL/L (ref 135–144)
TOTAL PROTEIN: 7.3 G/DL (ref 6.4–8.3)
VALPROIC ACID LEVEL: 55 UG/ML (ref 50–125)
VALPROIC DATE LAST DOSE: NORMAL
VALPROIC DOSE AMOUNT: NORMAL
VALPROIC TIME LAST DOSE: NORMAL
WBC # BLD: 8.9 K/UL (ref 3.5–11)
WBC # BLD: ABNORMAL 10*3/UL

## 2017-11-01 PROCEDURE — 80053 COMPREHEN METABOLIC PANEL: CPT

## 2017-11-01 PROCEDURE — 6370000000 HC RX 637 (ALT 250 FOR IP): Performed by: PSYCHIATRY & NEUROLOGY

## 2017-11-01 PROCEDURE — 36415 COLL VENOUS BLD VENIPUNCTURE: CPT

## 2017-11-01 PROCEDURE — 80307 DRUG TEST PRSMV CHEM ANLYZR: CPT

## 2017-11-01 PROCEDURE — 85025 COMPLETE CBC W/AUTO DIFF WBC: CPT

## 2017-11-01 PROCEDURE — G0480 DRUG TEST DEF 1-7 CLASSES: HCPCS

## 2017-11-01 PROCEDURE — 1240000000 HC EMOTIONAL WELLNESS R&B

## 2017-11-01 PROCEDURE — 83735 ASSAY OF MAGNESIUM: CPT

## 2017-11-01 PROCEDURE — 99285 EMERGENCY DEPT VISIT HI MDM: CPT

## 2017-11-01 PROCEDURE — 80164 ASSAY DIPROPYLACETIC ACD TOT: CPT

## 2017-11-01 PROCEDURE — 6370000000 HC RX 637 (ALT 250 FOR IP): Performed by: EMERGENCY MEDICINE

## 2017-11-01 RX ORDER — DIPHENOXYLATE HYDROCHLORIDE AND ATROPINE SULFATE 2.5; .025 MG/1; MG/1
1 TABLET ORAL 4 TIMES DAILY PRN
Status: DISCONTINUED | OUTPATIENT
Start: 2017-11-01 | End: 2017-11-20 | Stop reason: HOSPADM

## 2017-11-01 RX ORDER — HYDROXYZINE HYDROCHLORIDE 25 MG/1
50 TABLET, FILM COATED ORAL NIGHTLY
Status: DISCONTINUED | OUTPATIENT
Start: 2017-11-01 | End: 2017-11-08

## 2017-11-01 RX ORDER — VALPROIC ACID 250 MG/1
250 CAPSULE, LIQUID FILLED ORAL ONCE
Status: COMPLETED | OUTPATIENT
Start: 2017-11-01 | End: 2017-11-01

## 2017-11-01 RX ORDER — SIMVASTATIN 40 MG
40 TABLET ORAL NIGHTLY
Status: DISCONTINUED | OUTPATIENT
Start: 2017-11-01 | End: 2017-11-20 | Stop reason: HOSPADM

## 2017-11-01 RX ORDER — PANTOPRAZOLE SODIUM 40 MG/1
40 TABLET, DELAYED RELEASE ORAL
Status: DISCONTINUED | OUTPATIENT
Start: 2017-11-02 | End: 2017-11-02

## 2017-11-01 RX ORDER — BENZTROPINE MESYLATE 1 MG/ML
2 INJECTION INTRAMUSCULAR; INTRAVENOUS DAILY PRN
Status: DISCONTINUED | OUTPATIENT
Start: 2017-11-01 | End: 2017-11-20 | Stop reason: HOSPADM

## 2017-11-01 RX ORDER — FLUTICASONE PROPIONATE 50 MCG
1 SPRAY, SUSPENSION (ML) NASAL NIGHTLY
Status: DISCONTINUED | OUTPATIENT
Start: 2017-11-01 | End: 2017-11-20 | Stop reason: HOSPADM

## 2017-11-01 RX ORDER — DIVALPROEX SODIUM 500 MG/1
500 TABLET, EXTENDED RELEASE ORAL 2 TIMES DAILY
Status: DISCONTINUED | OUTPATIENT
Start: 2017-11-01 | End: 2017-11-20 | Stop reason: HOSPADM

## 2017-11-01 RX ORDER — NICOTINE 21 MG/24HR
1 PATCH, TRANSDERMAL 24 HOURS TRANSDERMAL DAILY
Status: DISCONTINUED | OUTPATIENT
Start: 2017-11-01 | End: 2017-11-20 | Stop reason: HOSPADM

## 2017-11-01 RX ORDER — HALOPERIDOL 5 MG/ML
10 INJECTION INTRAMUSCULAR EVERY 4 HOURS PRN
Status: DISCONTINUED | OUTPATIENT
Start: 2017-11-01 | End: 2017-11-20 | Stop reason: HOSPADM

## 2017-11-01 RX ORDER — ALBUTEROL SULFATE 90 UG/1
2 AEROSOL, METERED RESPIRATORY (INHALATION) EVERY 6 HOURS PRN
Status: DISCONTINUED | OUTPATIENT
Start: 2017-11-01 | End: 2017-11-20 | Stop reason: HOSPADM

## 2017-11-01 RX ORDER — TRAZODONE HYDROCHLORIDE 50 MG/1
50 TABLET ORAL NIGHTLY PRN
Status: DISCONTINUED | OUTPATIENT
Start: 2017-11-01 | End: 2017-11-09

## 2017-11-01 RX ORDER — ACETAMINOPHEN 325 MG/1
650 TABLET ORAL EVERY 6 HOURS PRN
Status: DISCONTINUED | OUTPATIENT
Start: 2017-11-01 | End: 2017-11-20 | Stop reason: HOSPADM

## 2017-11-01 RX ORDER — LORAZEPAM 1 MG/1
1 TABLET ORAL ONCE
Status: COMPLETED | OUTPATIENT
Start: 2017-11-01 | End: 2017-11-01

## 2017-11-01 RX ORDER — HYDROXYZINE HYDROCHLORIDE 25 MG/1
50 TABLET, FILM COATED ORAL 3 TIMES DAILY PRN
Status: DISCONTINUED | OUTPATIENT
Start: 2017-11-01 | End: 2017-11-20 | Stop reason: HOSPADM

## 2017-11-01 RX ORDER — ACETAMINOPHEN 325 MG/1
650 TABLET ORAL ONCE
Status: COMPLETED | OUTPATIENT
Start: 2017-11-01 | End: 2017-11-01

## 2017-11-01 RX ORDER — IPRATROPIUM BROMIDE AND ALBUTEROL SULFATE 2.5; .5 MG/3ML; MG/3ML
1 SOLUTION RESPIRATORY (INHALATION) EVERY 6 HOURS PRN
Status: DISCONTINUED | OUTPATIENT
Start: 2017-11-01 | End: 2017-11-20 | Stop reason: HOSPADM

## 2017-11-01 RX ORDER — DULOXETIN HYDROCHLORIDE 60 MG/1
60 CAPSULE, DELAYED RELEASE ORAL DAILY
Status: DISCONTINUED | OUTPATIENT
Start: 2017-11-01 | End: 2017-11-20 | Stop reason: HOSPADM

## 2017-11-01 RX ORDER — HYDROXYZINE HYDROCHLORIDE 25 MG/1
25 TABLET, FILM COATED ORAL DAILY
Status: DISCONTINUED | OUTPATIENT
Start: 2017-11-01 | End: 2017-11-08

## 2017-11-01 RX ORDER — QUETIAPINE FUMARATE 300 MG/1
300 TABLET, FILM COATED ORAL NIGHTLY
Status: DISCONTINUED | OUTPATIENT
Start: 2017-11-01 | End: 2017-11-20 | Stop reason: HOSPADM

## 2017-11-01 RX ORDER — MAGNESIUM HYDROXIDE/ALUMINUM HYDROXICE/SIMETHICONE 120; 1200; 1200 MG/30ML; MG/30ML; MG/30ML
30 SUSPENSION ORAL 2 TIMES DAILY PRN
Status: DISCONTINUED | OUTPATIENT
Start: 2017-11-01 | End: 2017-11-20 | Stop reason: HOSPADM

## 2017-11-01 RX ADMIN — HYDROXYZINE HYDROCHLORIDE 50 MG: 25 TABLET, FILM COATED ORAL at 21:16

## 2017-11-01 RX ADMIN — VALPROIC ACID 250 MG: 250 CAPSULE, LIQUID FILLED ORAL at 15:44

## 2017-11-01 RX ADMIN — DIVALPROEX SODIUM 500 MG: 500 TABLET, FILM COATED, EXTENDED RELEASE ORAL at 21:16

## 2017-11-01 RX ADMIN — LORAZEPAM 1 MG: 1 TABLET ORAL at 15:44

## 2017-11-01 RX ADMIN — ACETAMINOPHEN 650 MG: 325 TABLET ORAL at 15:43

## 2017-11-01 RX ADMIN — QUETIAPINE FUMARATE 300 MG: 300 TABLET, FILM COATED ORAL at 21:16

## 2017-11-01 RX ADMIN — SIMVASTATIN 40 MG: 40 TABLET, FILM COATED ORAL at 21:16

## 2017-11-01 RX ADMIN — TOPIRAMATE 150 MG: 100 TABLET, FILM COATED ORAL at 21:16

## 2017-11-01 RX ADMIN — DULOXETINE 60 MG: 60 CAPSULE, DELAYED RELEASE ORAL at 21:16

## 2017-11-01 ASSESSMENT — PAIN DESCRIPTION - PAIN TYPE
TYPE: CHRONIC PAIN
TYPE: CHRONIC PAIN

## 2017-11-01 ASSESSMENT — PAIN SCALES - GENERAL
PAINLEVEL_OUTOF10: 1
PAINLEVEL_OUTOF10: 9
PAINLEVEL_OUTOF10: 0

## 2017-11-01 ASSESSMENT — PAIN DESCRIPTION - LOCATION
LOCATION: NECK
LOCATION: GENERALIZED

## 2017-11-01 NOTE — ED PROVIDER NOTES
Myofacial muscle pain     Osteomyelitis (HCC)     Peripheral neuropathy (HCC)     bilateral feet    Pressure ulcer     Sch[facility]ia ([facility]     Seizures ([facility] [address] [facility]     Sleep apnea     no machine    Spinal stenosis     Substance abuse      SURGICAL HISTORY       Past Surgical History:   Procedure Laterality Date    APPENDECTOMY      FRACTURE SURGERY  01/28/2017    closed reduction nasal fracture    LUMBAR FUSION      SEPTOPLASTY  01/13/2017    STOMACH SURGERY      TURBINOPLASTIES  01/13/2017     CURRENT MEDICATIONS       Previous Medications    ACETAMINOPHEN (TYLENOL) 500 MG TABLET    Take 1 tablet by mouth every 6 hours as needed for Pain    ALBUTEROL (PROVENTIL) (2.5 MG/3ML) 0.083% NEBULIZER SOLUTION    Take 2.5 mg by nebulization every 6 hours as needed for Wheezing    ALBUTEROL SULFATE HFA (PROAIR HFA) 108 (90 BASE) MCG/ACT INHALER    Inhale 2 puffs into the lungs every 4 hours as needed for Wheezing or Shortness of Breath    DIPHENOXYLATE-ATROPINE (LOMOTIL) 2.5-0.025 MG PER TABLET    Take 1 tablet by mouth 4 times daily as needed for Diarrhea    DIVALPROEX (DEPAKOTE ER) 500 MG EXTENDED RELEASE TABLET    TAKE 1 TABLET TWICE A DAY    DULOXETINE (CYMBALTA) 60 MG EXTENDED RELEASE CAPSULE    TAKE 1 CAPSULE BY MOUTH DAILY    FLUTICASONE (FLONASE) 50 MCG/ACT NASAL SPRAY    1 spray by Nasal route nightly    HYDROXYZINE (VISTARIL) 25 MG CAPSULE    Take 25 mg by mouth 2 times daily Indications: (1 BID plus two at HS-entered separately for accuracy)    IPRATROPIUM (ATROVENT) 0.02 % NEBULIZER SOLUTION    Take 0.5 mg by nebulization every 6 hours as needed for Wheezing     NOVOLIN R 100 UNIT/ML INJECTION    Inject into the skin 3 times daily (before meals) Indications: per sliding scale     PANTOPRAZOLE (PROTONIX) 40 MG TABLET    Take 1 tablet by mouth Daily    QUETIAPINE (SEROQUEL) 300 MG TABLET    Take 300 mg by mouth nightly     SIMVASTATIN (ZOCOR) 40 MG TABLET    Take 1 tablet by mouth nightly    SODIUM CHLORIDE (SAM, BABY AYR) 0.65 % NASAL SPRAY    1 spray by Nasal route 4 times daily    TOPIRAMATE (TOPAMAX) 100 MG TABLET    Take 150 mg by mouth 2 times daily Indications: (pt takes one & one-half of a 100 mg tab = 150 mg BID)     ALLERGIES     is allergic to sulfa antibiotics; sulfasalazine; bactrim; levofloxacin; levofloxacin; phenobarbital; sulfamethoxazole-trimethoprim; and sulfur. FAMILY HISTORY     indicated that his mother is alive. He indicated that his father is alive. He indicated that the status of his sister is unknown. He indicated that the status of his maternal uncle is unknown. He indicated that the status of his maternal cousin is unknown. SOCIAL HISTORY      reports that he has been smoking Cigarettes. He has a 17.50 pack-year smoking history. He has never used smokeless tobacco. He reports that he uses drugs, including Marijuana, Cocaine, and Opiates . He reports that he does not drink alcohol. PHYSICAL EXAM     INITIAL VITALS: /80   Pulse 80   Temp 98.2 °F (36.8 °C)   Resp 16   Ht 5' 8\" (1.727 m)   Wt 150 lb (68 kg)   SpO2 99%   BMI 22.81 kg/m²    Physical Exam   Constitutional: He is oriented to person, place, and time. He appears well-developed and well-nourished. No distress. HENT:   Head: Normocephalic and atraumatic. Right Ear: External ear normal.   Left Ear: External ear normal.   Nose: Nose normal.   Eyes: Conjunctivae and EOM are normal. Pupils are equal, round, and reactive to light. Right eye exhibits no discharge. Left eye exhibits no discharge. Neck: Normal range of motion. Neck supple. No tracheal deviation present. Cardiovascular: Normal rate, regular rhythm, normal heart sounds and intact distal pulses. Pulmonary/Chest: Effort normal and breath sounds normal. No stridor. No respiratory distress. He has no wheezes. He has no rales. He exhibits no tenderness. Abdominal: Soft. Bowel sounds are normal. There is no tenderness.  There is no rebound and no guarding. Musculoskeletal: Normal range of motion. He exhibits no edema or tenderness. Neurological: He is alert and oriented to person, place, and time. No cranial nerve deficit. Coordination normal.   Mild paraplegia bilateral lower extremities, chronic per him. Muscle strength 4 out of 5 bilateral lower extremity. Muscle strength 5 out of 5 upper and lower cavities. Soheila Coma Score 15. He has some voluntary stuttering of his speech, with redirection it normalizes. I don't think he is having any stroke symptoms. Skin: Skin is warm and dry. No rash noted. He is not diaphoretic. No erythema. Psychiatric: He has a normal mood and affect. His behavior is normal. Judgment normal.   Depressed, states he is having suicidal thoughts everyday. MEDICAL DECISION MAKING:   MDM  I will give him a small dose of Ativan and tylenol for his irritability and muscle spasms. Ordered a Depakote level. He is medical clear for psychiatry evaluation and likely admission for suicidal thoughts and depression. It sounds like he is not happy with his living situation right now and is triggered an episode of depression and suicidal thoughts. I reviewed some basic labs, no evidence of acute pathology. He is stable for admission to psychiatric unit. Procedures    DIAGNOSTIC RESULTS     LABS: All lab results were reviewed by myself, and all abnormals are listed below. Labs Reviewed   CBC WITH AUTO DIFFERENTIAL - Abnormal; Notable for the following:        Result Value    RDW 15.4 (*)     All other components within normal limits   COMPREHENSIVE METABOLIC PANEL - Abnormal; Notable for the following:      Total Bilirubin <0.15 (*)     All other components within normal limits   VALPROIC ACID LEVEL, TOTAL   MAGNESIUM   ETHANOL   URINE DRUG SCREEN     EMERGENCY DEPARTMENT COURSE:     Vitals:    Vitals:    11/01/17 1440   BP: 127/80   Pulse: 80   Resp: 16   Temp: 98.2 °F (36.8 °C)   SpO2: 99%   Weight: 150 lb (68 kg)

## 2017-11-01 NOTE — BH NOTE
Patient given tobacco quitline number 67996811834 at this time, refusing to call at this time, states \" I just dont want to quit now\"- patient given information as to the dangers of long term tobacco use. Continue to reinforce the importance of tobacco cessation.

## 2017-11-01 NOTE — ED NOTES
Provisional Diagnosis:   Schizoaffective    Psychosocial and Contextual Factors:   Patient has poor social skills. Patient has history of substance abuse. C-SSRS Summary:      Patient: X  Family:   Agency: [facility]      Present Suicidal Behavior:  X    Verbal:  Patient reports suicidal ideations. Attempt: Patient denies attempt. Past Suicidal Behavior: X    Verbal: Patient reports a history of suicidal ideations. Attempt: Patient denies previous attempts. Self-Injurious/Self-Mutilation: Patient denies. Trauma Identified:   Patient denies. Protective Factors:     Patient has housing. Patient has income. Patient has insurance.      Risk Factors:    Patient has limited support system. Patient is non compliant with psych medications. Patient has poor judgment. Patient has legal issues. Clinical Summary:    Patient is 41year old  male that is brought to the ED today via his [address] Patient reports he is feeling suicidal. Patient states \"I feel suicidal every day. \" Patient reports increased stress at home. Patient reports \"my daughter and her boyfriend don't listen. They are pigs. \" Patient reports feeling frustrated and wanting to given up. Patient states he is compliant with his [facility] appointments and medications. Patient denies auditory or visual hallucinations at this time. It is noted patient has a history of poly substance abuse. Patient denies any current drug or alcohol use. Patient reports having an income from social security. Patient denies any legal issues at this time. Patient is voluntary. Level of Care Disposition:    Consulted Dr. Nikki Whitfield and patient to be admitted to the [facility] for safety and stabilization.

## 2017-11-02 LAB
GLUCOSE BLD-MCNC: 113 MG/DL (ref 75–110)
GLUCOSE BLD-MCNC: 124 MG/DL (ref 75–110)
GLUCOSE BLD-MCNC: 92 MG/DL (ref 75–110)

## 2017-11-02 PROCEDURE — 6370000000 HC RX 637 (ALT 250 FOR IP): Performed by: PSYCHIATRY & NEUROLOGY

## 2017-11-02 PROCEDURE — 1240000000 HC EMOTIONAL WELLNESS R&B

## 2017-11-02 PROCEDURE — 82947 ASSAY GLUCOSE BLOOD QUANT: CPT

## 2017-11-02 RX ORDER — PREGABALIN 50 MG/1
200 CAPSULE ORAL 2 TIMES DAILY
Status: DISCONTINUED | OUTPATIENT
Start: 2017-11-02 | End: 2017-11-16

## 2017-11-02 RX ORDER — PANTOPRAZOLE SODIUM 40 MG/1
40 TABLET, DELAYED RELEASE ORAL
Status: DISCONTINUED | OUTPATIENT
Start: 2017-11-02 | End: 2017-11-20 | Stop reason: HOSPADM

## 2017-11-02 RX ORDER — ACETAMINOPHEN AND CODEINE PHOSPHATE 300; 30 MG/1; MG/1
1 TABLET ORAL 3 TIMES DAILY PRN
Status: DISCONTINUED | OUTPATIENT
Start: 2017-11-02 | End: 2017-11-04

## 2017-11-02 RX ADMIN — SALINE NASAL SPRAY 1 SPRAY: 1.5 SOLUTION NASAL at 21:18

## 2017-11-02 RX ADMIN — DIVALPROEX SODIUM 500 MG: 500 TABLET, FILM COATED, EXTENDED RELEASE ORAL at 08:50

## 2017-11-02 RX ADMIN — SIMVASTATIN 40 MG: 40 TABLET, FILM COATED ORAL at 21:15

## 2017-11-02 RX ADMIN — HYDROXYZINE HYDROCHLORIDE 50 MG: 25 TABLET, FILM COATED ORAL at 21:14

## 2017-11-02 RX ADMIN — TOPIRAMATE 150 MG: 100 TABLET, FILM COATED ORAL at 08:50

## 2017-11-02 RX ADMIN — ACETAMINOPHEN AND CODEINE PHOSPHATE 1 TABLET: 300; 30 TABLET ORAL at 14:47

## 2017-11-02 RX ADMIN — FLUTICASONE PROPIONATE 1 SPRAY: 50 SPRAY, METERED NASAL at 21:20

## 2017-11-02 RX ADMIN — PREGABALIN 200 MG: 100 CAPSULE ORAL at 21:15

## 2017-11-02 RX ADMIN — PREGABALIN 200 MG: 100 CAPSULE ORAL at 15:03

## 2017-11-02 RX ADMIN — TOPIRAMATE 150 MG: 100 TABLET, FILM COATED ORAL at 21:15

## 2017-11-02 RX ADMIN — ACETAMINOPHEN AND CODEINE PHOSPHATE 1 TABLET: 300; 30 TABLET ORAL at 22:28

## 2017-11-02 RX ADMIN — HYDROXYZINE HYDROCHLORIDE 25 MG: 25 TABLET, FILM COATED ORAL at 08:51

## 2017-11-02 RX ADMIN — PANTOPRAZOLE SODIUM 40 MG: 40 TABLET, DELAYED RELEASE ORAL at 08:51

## 2017-11-02 RX ADMIN — QUETIAPINE FUMARATE 300 MG: 300 TABLET, FILM COATED ORAL at 21:15

## 2017-11-02 RX ADMIN — DULOXETINE 60 MG: 60 CAPSULE, DELAYED RELEASE ORAL at 08:51

## 2017-11-02 RX ADMIN — DIVALPROEX SODIUM 500 MG: 500 TABLET, FILM COATED, EXTENDED RELEASE ORAL at 21:15

## 2017-11-02 ASSESSMENT — PAIN SCALES - GENERAL
PAINLEVEL_OUTOF10: 2
PAINLEVEL_OUTOF10: 8
PAINLEVEL_OUTOF10: 10

## 2017-11-02 ASSESSMENT — SLEEP AND FATIGUE QUESTIONNAIRES
DO YOU HAVE DIFFICULTY SLEEPING: NO
AVERAGE NUMBER OF SLEEP HOURS: 8
DO YOU USE A SLEEP AID: NO

## 2017-11-02 ASSESSMENT — LIFESTYLE VARIABLES
HISTORY_ALCOHOL_USE: NO
HISTORY_ALCOHOL_USE: YES

## 2017-11-02 ASSESSMENT — PAIN DESCRIPTION - LOCATION: LOCATION: BACK;KNEE;HIP

## 2017-11-02 ASSESSMENT — PAIN DESCRIPTION - PAIN TYPE: TYPE: CHRONIC PAIN

## 2017-11-02 ASSESSMENT — PATIENT HEALTH QUESTIONNAIRE - PHQ9: SUM OF ALL RESPONSES TO PHQ QUESTIONS 1-9: 9

## 2017-11-02 ASSESSMENT — PAIN DESCRIPTION - ORIENTATION: ORIENTATION: LOWER

## 2017-11-02 NOTE — FLOWSHEET NOTE
11/02/17 1604   Encounter Summary   Services provided to: Patient   Referral/Consult From: [facility] (11-2-17)   Complexity of Encounter Low   Length of Encounter 15 minutes   Spiritual/Uatsdin   Type Ritual   Intervention Anointing   Sacraments   Sacrament of Sick-Anointing Anointed  (Fr [facility] 11-2-17)

## 2017-11-02 NOTE — PLAN OF CARE
Problem: Altered Mood, Depressive Behavior  Goal: LTG-Able to verbalize acceptance of life and situations over which he or she has no control  Outcome: Ongoing  Psychoeducation Group Note    Date: 11/2/17  Start Time: 1430  End Time: 1500    Number Participants in Group:  8    Goal:  Patient will demonstrate increased interpersonal interaction. Topic:  Walking/stretching    Discipline Responsible:   OT  AT  [facility]. X RT  Other       Participation Level:     None  Minimal   x Active Listener x Interactive    Monopolizing         Participation Quality:  x Appropriate  Inappropriate   x       Attentive        Intrusive   x       Sharing        Resistant          Supportive        Lethargic       Affective:   x Congruent  Incongruent  Blunted  Flat    Constricted  Anxious  Elated  Angry    Labile  Depressed  Other  Bright       Cognitive:  x Alert  Oriented PPTP     Concentration x G  F  P   Attention Span x G  F  P   Short-Term Memory  G  F  P   Long-Term Memory  G  F  P   ProblemSolving/  Decision Making x G  F  P   Ability to Process  Information x G  F  P      Contributing Factors             Delusional             Hallucinating             Flight of Ideas             Other:       Modes of Intervention:   Education  Support  Exploration    Clarifying x Problem Solving x Confrontation   x Socialization  Limit Setting  Reality Testing    Activity x Movement x Media    Other:            Response to Learning:  x Able to verbalize current knowledge/experience   x Able to verbalize/acknowledge new learning    Able to retain information    Capable of insight   x Able to change behavior   x Progressing to goal    Other:        Comments: pt attended group and participated.

## 2017-11-02 NOTE — PLAN OF CARE
Problem: Falls - Risk of  Goal: Absence of falls  Outcome: Ongoing  Pt remains free of falls and verbalizes understanding of individual fall risks. Pt wearing non skid footwear and encouraged to seek out staff for any assistance needed. Problem: Pain:  Goal: Pain level will decrease  Pain level will decrease   Outcome: Ongoing  Pain medications verified with Sandor Golden and ordered. Pt has chronic pain. Goal: Control of acute pain  Control of acute pain   Outcome: Ongoing      Problem: Altered Mood, Depressive Behavior  Goal: LTG-Able to verbalize acceptance of life and situations over which he or she has no control  Outcome: Ongoing  Pt is flat irritable at times. Improved mood over admission. More polite. Pt cooperative. Denies suicidal or homicidal ideations.    Goal: STG-Able to verbalize suicidal ideations  Outcome: Ongoing

## 2017-11-02 NOTE — PLAN OF CARE
Problem: Altered Mood, Depressive Behavior  Goal: LTG-Able to verbalize acceptance of life and situations over which he or she has no control  Outcome: Ongoing  Therapeutic Recreation Refusal  Pt did not participate in community meeting/goals at 0900 despite staff encouragement.

## 2017-11-02 NOTE — PLAN OF CARE
Problem: Falls - Risk of  Goal: Absence of falls  Outcome: Ongoing  Pt has been in bed this shift; very lethargic. Q 15 min checks per Unit Policy maintained for pt safety. Problem: Pain:  Goal: Pain level will decrease  Pain level will decrease   Outcome: Ongoing  Pt is very lethargic. Awakens for very brief intervals and refuses to answer any questions.

## 2017-11-02 NOTE — BH NOTE
PSYCHOEDUCATION GROUP NOTE    Date: 11/2/17  Start Time: 1600  End Time: 1630    Number Participants in Group:  4    Goal:  Patient will demonstrate increased interpersonal interaction   Topic: relaxation    Discipline Responsible:   OT  AT   X Nsg.  RT MHP Other       Participation Level:     None  Minimal   X Active Listener  Interactive    Monopolizing         Participation Quality:  X Appropriate  Inappropriate          Attentive        Intrusive          Sharing        Resistant          Supportive        Lethargic       Affective:   X Congruent  Incongruent  Blunted  Flat    Constricted  Anxious  Elated  Angry    Labile  Depressed  Other         Cognitive:  X Alert  Oriented PPTP     Concentration X G  F  P   Attention Span X G  F  P   Short-Term Memory X G  F  P   Long-Term Memory X G  F  P   ProblemSolving/  Decision Making X G  F  P   Ability to Process  Information X G  F  P      Contributing Factors             Delusional             Hallucinating             Flight of Ideas             Other:       Modes of Intervention:   Education  Support  Exploration    Clarifying  Problem Solving  Confrontation    Socialization  Limit Setting  Reality Testing   X Activity  Movement  Media    Other:            Response to Learning:  X Able to verbalize current knowledge/experience    Able to verbalize/acknowledge new learning    Able to retain information    Capable of insight    Able to change behavior    Progressing to goal    Other:        Comments:

## 2017-11-02 NOTE — PLAN OF CARE
Problem: Altered Mood, Depressive Behavior  Goal: LTG-Able to verbalize acceptance of life and situations over which he or she has no control  Outcome: Ongoing  [facility]  Initial Interdisciplinary Treatment Plan NO      Original treatment plan Date & Time: 8:02am 11/2/17    Admission Type:  Admission Type: Voluntary    Reason for admission:   Reason for Admission: Homicidal ideations towards daughter boyfriend    Estimated Length of Stay:  5-7days  Estimated Discharge Date: to be determined by physician    PATIENT STRENGTHS:  Patient Strengths:Strengths: Other (insurance)  Patient Strengths and Limitations:Limitations: Inappropriate/potentially harmful leisure interests  Addictive Behavior: Addictive Behavior  In the past 3 months, have you felt or has someone told you that you have a problem with:  : None  Do you have a history of Chemical Use?: No  Do you have a history of Alcohol Use?: No  Do you have a history of Street Drug Abuse?: Yes  Histroy of Prescripton Drug Abuse?: No  Medical Problems:  Past Medical History:   Diagnosis Date    Anxiety     Arthritis     osteoarthritis    Bipolar disorder ([address]     Bronchitis, chronic (HCC)     Chronic back pain     Chronic pain     Claustrophobia     COPD (chronic obstructive pulmonary disease) ([address]     Depression     Diabetes mellitus ([address]     Emphysema of lung ([address]     History of irregular heartbeat     Hyperlipidemia     Liver disease     MRSA (methicillin resistant staph aureus) culture positive     Myofacial muscle pain     Osteomyelitis ([address]     Peripheral neuropathy (HCC)     bilateral feet    Pressure ulcer     Schizophrenia ([address]     Seizures ([address]     Sleep apnea     no machine    Spinal stenosis     Substance abuse      Status EXAM:Status and Exam  Normal: No  Facial Expression: Avoids Gaze, Flat  Affect: Blunt  Level of Consciousness: Lethargic  Mood:Normal: No  Mood: Other (Comment) (lethargic)  Motor Activity:Normal: No  Motor Activity: Decreased, Unusual Posture/Gait (pt spent the shift resting quietly with eyes closed in bed)  Interview Behavior: Uncooperative/Withdrawn  Preception: [place] to Person, [place] to Place, [place] to Time, [place] to Situation  Attention:Normal:  (unable to assess d/t pt being very lethargic)  Thought Content:Normal:  (unable to assess d/t pt being very lethargic)  Hallucinations: Unable to assess  Delusions:  (unable to assess d/t pt being very lethargic)  Memory:Normal:  (unable to assess d/t pt being very lethargic)  Insight and Judgment: No  Insight and Judgment: Poor Judgment, Poor Insight  Present Suicidal Ideation: Other(See comment) (unable to assess d/t pt being very lethargic)  Present Homicidal Ideation: Other(See comment) (unable to assess d/t pt being very lethargic)    EDUCATION:   Learner Progress Toward Treatment Goals: reviewed group plans and strategies for care    Method:group therapy, medication compliance, individualized assessments and care planning    Outcome: needs reinforcement    PATIENT GOALS: to be discussed with patient within 72 hours    PLAN/TREATMENT RECOMMENDATIONS:     continue group therapy , medications compliance, goal setting, individualized assessments and care, continue to monitor pt on unit      SHORT-TERM GOALS:   Time frame for Short-Term Goals: 5-7 days    LONG-TERM GOALS:  Time frame for Long-Term Goals: 6 months  Members Present in Team Meeting: See Gabe Suarez, MSW, LSW    Goal: STG-Able to verbalize suicidal ideations  Outcome: Ongoing

## 2017-11-03 LAB
AMPHETAMINE SCREEN URINE: NEGATIVE
BARBITURATE SCREEN URINE: NEGATIVE
BENZODIAZEPINE SCREEN, URINE: NEGATIVE
BUPRENORPHINE URINE: ABNORMAL
CANNABINOID SCREEN URINE: POSITIVE
COCAINE METABOLITE, URINE: NEGATIVE
GLUCOSE BLD-MCNC: 69 MG/DL (ref 75–110)
GLUCOSE BLD-MCNC: 77 MG/DL (ref 75–110)
MDMA URINE: ABNORMAL
METHADONE SCREEN, URINE: NEGATIVE
METHAMPHETAMINE, URINE: ABNORMAL
OPIATES, URINE: POSITIVE
OXYCODONE SCREEN URINE: NEGATIVE
PHENCYCLIDINE, URINE: NEGATIVE
PROPOXYPHENE, URINE: ABNORMAL
TEST INFORMATION: ABNORMAL
TRICYCLIC ANTIDEPRESSANTS, UR: ABNORMAL

## 2017-11-03 PROCEDURE — 80307 DRUG TEST PRSMV CHEM ANLYZR: CPT

## 2017-11-03 PROCEDURE — 82947 ASSAY GLUCOSE BLOOD QUANT: CPT

## 2017-11-03 PROCEDURE — 1240000000 HC EMOTIONAL WELLNESS R&B

## 2017-11-03 PROCEDURE — 6370000000 HC RX 637 (ALT 250 FOR IP): Performed by: PSYCHIATRY & NEUROLOGY

## 2017-11-03 RX ORDER — BACLOFEN 20 MG/1
20 TABLET ORAL 2 TIMES DAILY
Status: ON HOLD | COMMUNITY
End: 2018-04-12 | Stop reason: HOSPADM

## 2017-11-03 RX ORDER — BACLOFEN 20 MG/1
40 TABLET ORAL NIGHTLY
Status: ON HOLD | COMMUNITY
End: 2017-11-20 | Stop reason: HOSPADM

## 2017-11-03 RX ADMIN — DULOXETINE 60 MG: 60 CAPSULE, DELAYED RELEASE ORAL at 08:21

## 2017-11-03 RX ADMIN — TOPIRAMATE 150 MG: 100 TABLET, FILM COATED ORAL at 22:35

## 2017-11-03 RX ADMIN — ACETAMINOPHEN AND CODEINE PHOSPHATE 1 TABLET: 300; 30 TABLET ORAL at 09:34

## 2017-11-03 RX ADMIN — DIVALPROEX SODIUM 500 MG: 500 TABLET, FILM COATED, EXTENDED RELEASE ORAL at 08:18

## 2017-11-03 RX ADMIN — ACETAMINOPHEN AND CODEINE PHOSPHATE 1 TABLET: 300; 30 TABLET ORAL at 22:41

## 2017-11-03 RX ADMIN — ACETAMINOPHEN 650 MG: 325 TABLET, FILM COATED ORAL at 12:39

## 2017-11-03 RX ADMIN — ALUMINUM HYDROXIDE, MAGNESIUM HYDROXIDE, AND SIMETHICONE 30 ML: 200; 200; 20 SUSPENSION ORAL at 20:06

## 2017-11-03 RX ADMIN — SIMVASTATIN 40 MG: 40 TABLET, FILM COATED ORAL at 22:36

## 2017-11-03 RX ADMIN — DIVALPROEX SODIUM 500 MG: 500 TABLET, FILM COATED, EXTENDED RELEASE ORAL at 22:36

## 2017-11-03 RX ADMIN — TOPIRAMATE 150 MG: 100 TABLET, FILM COATED ORAL at 08:36

## 2017-11-03 RX ADMIN — SALINE NASAL SPRAY 1 SPRAY: 1.5 SOLUTION NASAL at 08:37

## 2017-11-03 RX ADMIN — SALINE NASAL SPRAY 1 SPRAY: 1.5 SOLUTION NASAL at 22:35

## 2017-11-03 RX ADMIN — HYDROXYZINE HYDROCHLORIDE 25 MG: 25 TABLET, FILM COATED ORAL at 08:21

## 2017-11-03 RX ADMIN — HYDROXYZINE HYDROCHLORIDE 50 MG: 25 TABLET, FILM COATED ORAL at 22:36

## 2017-11-03 RX ADMIN — FLUTICASONE PROPIONATE 1 SPRAY: 50 SPRAY, METERED NASAL at 22:35

## 2017-11-03 RX ADMIN — TRAZODONE HYDROCHLORIDE 50 MG: 50 TABLET ORAL at 22:36

## 2017-11-03 RX ADMIN — PANTOPRAZOLE SODIUM 40 MG: 40 TABLET, DELAYED RELEASE ORAL at 08:21

## 2017-11-03 RX ADMIN — PREGABALIN 200 MG: 100 CAPSULE ORAL at 22:41

## 2017-11-03 RX ADMIN — QUETIAPINE FUMARATE 300 MG: 300 TABLET, FILM COATED ORAL at 22:36

## 2017-11-03 RX ADMIN — PREGABALIN 200 MG: 100 CAPSULE ORAL at 08:19

## 2017-11-03 RX ADMIN — ACETAMINOPHEN AND CODEINE PHOSPHATE 1 TABLET: 300; 30 TABLET ORAL at 17:32

## 2017-11-03 ASSESSMENT — PAIN DESCRIPTION - PAIN TYPE
TYPE: CHRONIC PAIN
TYPE: CHRONIC PAIN
TYPE: ACUTE PAIN
TYPE: ACUTE PAIN
TYPE: CHRONIC PAIN

## 2017-11-03 ASSESSMENT — PAIN DESCRIPTION - LOCATION
LOCATION: OTHER (COMMENT)
LOCATION: GENERALIZED

## 2017-11-03 ASSESSMENT — PAIN SCALES - GENERAL
PAINLEVEL_OUTOF10: 10
PAINLEVEL_OUTOF10: 2
PAINLEVEL_OUTOF10: 9
PAINLEVEL_OUTOF10: 10
PAINLEVEL_OUTOF10: 9
PAINLEVEL_OUTOF10: 8
PAINLEVEL_OUTOF10: 3
PAINLEVEL_OUTOF10: 10
PAINLEVEL_OUTOF10: 5
PAINLEVEL_OUTOF10: 8

## 2017-11-03 NOTE — H&P
apparent focal sensory deficits. No motor deficits, muscle strength equal Adarsh. No facial droop (CN VII), tongue protrudes centrally (CN XII), no slurring of the speech (CN X). CN I and IX not tested.             PROVISIONAL DIAGNOSES:      Principal Problem:    Bipolar disorder [facility])  Active Problems:    Polysubstance abuse      Consuelo Veloz PA-C on 11/3/2017 at 3:27 PM

## 2017-11-03 NOTE — PLAN OF CARE
Problem: Altered Mood, Depressive Behavior  Goal: LTG-Able to verbalize acceptance of life and situations over which he or she has no control  Outcome: Ongoing  PSYCHOEDUCATION GROUP NOTE    Date: 11/3/17  Start Time: 1430  End Time: 1520    Number Participants in Group:  8/14    Goal:  Patient will demonstrate increased interpersonal interaction   Topic: Self Awareness/Socialization/Goal Setting     Discipline Responsible:   OT  AT  [facility]. x RT MHP Other       Participation Level:     None  Minimal   x Active Listener x Interactive    Monopolizing         Participation Quality:  x Appropriate  Inappropriate   x       Attentive        Intrusive   x       Sharing        Resistant   x       Supportive        Lethargic       Affective:   x Congruent  Incongruent  Blunted  Flat    Constricted  Anxious  Elated  Angry    Labile  Depressed  Other         Cognitive:  x Alert x Oriented PPTP     Concentration x G  F  P   Attention Span x G  F  P   Short-Term Memory x G  F  P   Long-Term Memory x G  F  P   ProblemSolving/  Decision Making  G x F  P   Ability to Process  Information  G x F  P      Contributing Factors             Delusional             Hallucinating             Flight of Ideas             Other:       Modes of Intervention:  x Education x Support x Exploration   x Clarifying x Problem Solving  Confrontation   x Socialization  Limit Setting x Reality Testing    Activity  Movement  Media    Other:            Response to Learning:  x Able to verbalize current knowledge/experience   x Able to verbalize/acknowledge new learning    Able to retain information   x Capable of insight    Able to change behavior   x Progressing to goal    Other:        Comments:

## 2017-11-03 NOTE — PLAN OF CARE
Problem: Pain:  Goal: Pain level will decrease  Pain level will decrease   Outcome: Ongoing  PRN pain medication given per request. States pain is better when taking pain medications.

## 2017-11-03 NOTE — PLAN OF CARE
Problem: Altered Mood, Depressive Behavior  Goal: LTG-Able to verbalize acceptance of life and situations over which he or she has no control  Outcome: Ongoing  PSYCHOEDUCATION GROUP NOTE    Date: 11/3/17  Start Time: 1100  End Time: 1130    Number Participants in Group:  8/16    Goal:  Patient will demonstrate increased interpersonal interaction   Topic: Problem Solving/Socialization    Discipline Responsible:   OT  AT  [facility]. x RT MHP Other       Participation Level:     None  Minimal   x Active Listener x Interactive    Monopolizing         Participation Quality:  x Appropriate  Inappropriate   x       Attentive        Intrusive          Sharing        Resistant          Supportive        Lethargic       Affective:   x Congruent  Incongruent  Blunted  Flat    Constricted  Anxious  Elated  Angry    Labile  Depressed  Other         Cognitive:  x Alert x Oriented PPTP     Concentration x G  F  P   Attention Span x G  F  P   Short-Term Memory x G  F  P   Long-Term Memory x G  F  P   ProblemSolving/  Decision Making x G  F  P   Ability to Process  Information x G  F  P      Contributing Factors             Delusional             Hallucinating             Flight of Ideas             Other:       Modes of Intervention:  x Education x Support x Exploration    Clarifying x Problem Solving  Confrontation   x Socialization  Limit Setting x Reality Testing    Activity  Movement x Media    Other:            Response to Learning:  x Able to verbalize current knowledge/experience    Able to verbalize/acknowledge new learning    Able to retain information    Capable of insight    Able to change behavior   x Progressing to goal    Other:        Comments:

## 2017-11-03 NOTE — PLAN OF CARE
Problem: Altered Mood, Depressive Behavior  Goal: LTG-Able to verbalize acceptance of life and situations over which he or she has no control  Outcome: Ongoing  Psychotherapy Group Note    Date: 11/3/17  Start Time: 1000  End Time: 1045    Number Participants in Group:  9    Goal:  Patient will demonstrate increased interpersonal interaction   Topic: [facility]    Discipline Responsible:   OT  AT x SW  Nsg.  RT  Other       Participation Level:     None  Minimal   x Active Listener x Interactive    Monopolizing         Participation Quality:  x Appropriate  Inappropriate   x       Attentive        Intrusive   x       Sharing        Resistant   x       Supportive        Lethargic       Affective:   x Congruent  Incongruent  Blunted  Flat    Constricted  Anxious  Elated  Angry    Labile  Depressed  Other         Cognitive:  x Alert x Oriented PPTP     Concentration  G x F  P   Attention Span  G x F  P   Short-Term Memory  G x F  P   Long-Term Memory  G x F  P   ProblemSolving/  Decision Making  G x F  P   Ability to Process  Information  G x F  P      Contributing Factors             Delusional             Hallucinating             Flight of Ideas             Other:       Modes of Intervention:  x Education x Support  Exploration   x Clarifying x Problem Solving  Confrontation    Socialization x Limit Setting  Reality Testing    Activity  Movement  Media    Other:            Response to Learning:  x Able to verbalize current knowledge/experience   x Able to verbalize/acknowledge new learning    Able to retain information   x Capable of insight    Able to change behavior    Progressing to goal    Other:        Comments:

## 2017-11-04 LAB
ALBUMIN SERPL-MCNC: 4.1 G/DL (ref 3.5–5.2)
ALBUMIN/GLOBULIN RATIO: ABNORMAL (ref 1–2.5)
ALP BLD-CCNC: 81 U/L (ref 40–129)
ALT SERPL-CCNC: 18 U/L (ref 5–41)
AST SERPL-CCNC: 20 U/L
BILIRUB SERPL-MCNC: 0.24 MG/DL (ref 0.3–1.2)
BILIRUBIN DIRECT: 0.09 MG/DL
BILIRUBIN, INDIRECT: 0.15 MG/DL (ref 0–1)
GLOBULIN: ABNORMAL G/DL (ref 1.5–3.8)
GLUCOSE BLD-MCNC: 91 MG/DL (ref 75–110)
GLUCOSE BLD-MCNC: 94 MG/DL (ref 75–110)
TOTAL PROTEIN: 7.3 G/DL (ref 6.4–8.3)

## 2017-11-04 PROCEDURE — 99254 IP/OBS CNSLTJ NEW/EST MOD 60: CPT | Performed by: INTERNAL MEDICINE

## 2017-11-04 PROCEDURE — 6370000000 HC RX 637 (ALT 250 FOR IP): Performed by: PSYCHIATRY & NEUROLOGY

## 2017-11-04 PROCEDURE — 82947 ASSAY GLUCOSE BLOOD QUANT: CPT

## 2017-11-04 PROCEDURE — 80076 HEPATIC FUNCTION PANEL: CPT

## 2017-11-04 PROCEDURE — 83036 HEMOGLOBIN GLYCOSYLATED A1C: CPT

## 2017-11-04 PROCEDURE — 6360000002 HC RX W HCPCS: Performed by: PSYCHIATRY & NEUROLOGY

## 2017-11-04 PROCEDURE — 36415 COLL VENOUS BLD VENIPUNCTURE: CPT

## 2017-11-04 PROCEDURE — 1240000000 HC EMOTIONAL WELLNESS R&B

## 2017-11-04 RX ORDER — BUPRENORPHINE HYDROCHLORIDE AND NALOXONE HYDROCHLORIDE DIHYDRATE 2; .5 MG/1; MG/1
1 TABLET SUBLINGUAL 2 TIMES DAILY
Status: DISCONTINUED | OUTPATIENT
Start: 2017-11-04 | End: 2017-11-06

## 2017-11-04 RX ADMIN — HYDROXYZINE HYDROCHLORIDE 50 MG: 25 TABLET, FILM COATED ORAL at 21:16

## 2017-11-04 RX ADMIN — FLUTICASONE PROPIONATE 1 SPRAY: 50 SPRAY, METERED NASAL at 21:15

## 2017-11-04 RX ADMIN — PREGABALIN 200 MG: 100 CAPSULE ORAL at 21:17

## 2017-11-04 RX ADMIN — SALINE NASAL SPRAY 1 SPRAY: 1.5 SOLUTION NASAL at 13:03

## 2017-11-04 RX ADMIN — TOPIRAMATE 150 MG: 100 TABLET, FILM COATED ORAL at 21:16

## 2017-11-04 RX ADMIN — HYDROXYZINE HYDROCHLORIDE 50 MG: 25 TABLET, FILM COATED ORAL at 14:13

## 2017-11-04 RX ADMIN — HYDROXYZINE HYDROCHLORIDE 25 MG: 25 TABLET, FILM COATED ORAL at 08:48

## 2017-11-04 RX ADMIN — ACETAMINOPHEN 650 MG: 325 TABLET, FILM COATED ORAL at 21:25

## 2017-11-04 RX ADMIN — ALUMINUM HYDROXIDE, MAGNESIUM HYDROXIDE, AND SIMETHICONE 30 ML: 200; 200; 20 SUSPENSION ORAL at 12:18

## 2017-11-04 RX ADMIN — SIMVASTATIN 40 MG: 40 TABLET, FILM COATED ORAL at 21:16

## 2017-11-04 RX ADMIN — SALINE NASAL SPRAY 1 SPRAY: 1.5 SOLUTION NASAL at 21:15

## 2017-11-04 RX ADMIN — PREGABALIN 200 MG: 100 CAPSULE ORAL at 08:47

## 2017-11-04 RX ADMIN — PANTOPRAZOLE SODIUM 40 MG: 40 TABLET, DELAYED RELEASE ORAL at 08:48

## 2017-11-04 RX ADMIN — DULOXETINE 60 MG: 60 CAPSULE, DELAYED RELEASE ORAL at 08:48

## 2017-11-04 RX ADMIN — QUETIAPINE FUMARATE 300 MG: 300 TABLET, FILM COATED ORAL at 21:16

## 2017-11-04 RX ADMIN — TOPIRAMATE 150 MG: 100 TABLET, FILM COATED ORAL at 08:48

## 2017-11-04 RX ADMIN — DIVALPROEX SODIUM 500 MG: 500 TABLET, FILM COATED, EXTENDED RELEASE ORAL at 21:25

## 2017-11-04 RX ADMIN — BUPRENORPHINE AND NALOXONE 1 TABLET: 2; .5 TABLET SUBLINGUAL at 15:31

## 2017-11-04 RX ADMIN — DIVALPROEX SODIUM 500 MG: 500 TABLET, FILM COATED, EXTENDED RELEASE ORAL at 08:49

## 2017-11-04 RX ADMIN — TRAZODONE HYDROCHLORIDE 50 MG: 50 TABLET ORAL at 21:16

## 2017-11-04 RX ADMIN — ACETAMINOPHEN AND CODEINE PHOSPHATE 1 TABLET: 300; 30 TABLET ORAL at 08:48

## 2017-11-04 RX ADMIN — DIPHENOXYLATE HYDROCHLORIDE AND ATROPINE SULFATE 1 TABLET: 2.5; .025 TABLET ORAL at 18:16

## 2017-11-04 RX ADMIN — ACETAMINOPHEN 650 MG: 325 TABLET, FILM COATED ORAL at 14:15

## 2017-11-04 ASSESSMENT — PAIN SCALES - GENERAL
PAINLEVEL_OUTOF10: 4
PAINLEVEL_OUTOF10: 4
PAINLEVEL_OUTOF10: 8
PAINLEVEL_OUTOF10: 9
PAINLEVEL_OUTOF10: 9
PAINLEVEL_OUTOF10: 6
PAINLEVEL_OUTOF10: 2

## 2017-11-04 ASSESSMENT — PAIN DESCRIPTION - PAIN TYPE: TYPE: CHRONIC PAIN

## 2017-11-04 ASSESSMENT — PAIN DESCRIPTION - LOCATION: LOCATION: PELVIS;HIP;LEG

## 2017-11-04 NOTE — CARE COORDINATION
Discharge Planning:    [facility] -  Linked to [facility]. Doesn't know who cm is. D/C location -  Own Residence. Transport -   [employer]. Pharmacy - [address] [place] [address] -  \"Meds to beds. \"    Insurance -     PRIMARY INSURANCE   Payor: Scot Winchester Plan:     Group Number: [id number] Insurance Type: INDEMNITY   Subscriber Name: Weston Benedict Subscriber : 1970   Subscriber ID: [id number]       Pat.  Rel. to Subscriber: Self

## 2017-11-04 NOTE — PLAN OF CARE
Problem: Falls - Risk of  Goal: Absence of falls  Outcome: Ongoing      Problem: Altered Mood, Depressive Behavior  Goal: LTG-Able to verbalize acceptance of life and situations over which he or she has no control  Outcome: Ongoing    Goal: STG-Able to verbalize suicidal ideations  Outcome: Ongoing  Denies SI and remains free from harm att. Continues HI towards daughters BF, remains calm and cooperative on unit. Pt remains free from fall att, proper use of WC.

## 2017-11-04 NOTE — PLAN OF CARE
Problem: Altered Mood, Depressive Behavior  Goal: LTG-Able to verbalize acceptance of life and situations over which he or she has no control  Outcome: Ongoing  [facility]  Day 3 Interdisciplinary Treatment Plan NOTE    Review Date & Time: 9:52am 11/4/17    Admission Type:   Admission Type: Voluntary    Reason for admission:  Reason for Admission: Homicidal ideations towards daughter boyfriend  Estimated Length of Stay:  5-7 days  Estimated Discharge Date Update:   to be determined by physician    PATIENT STRENGTHS:  Patient Strengths:Strengths: Other (insurance)  Patient Strengths and Limitations:Limitations: Multiple barriers to leisure interests, External locus of control, Difficult relationships / poor social skills, Difficulty problem solving/relies on others to help solve problems, Inappropriate/potentially harmful leisure interests  Addictive Behavior:Addictive Behavior  In the past 3 months, have you felt or has someone told you that you have a problem with:  : None  Do you have a history of Chemical Use?: Yes  Do you have a history of Alcohol Use?: Yes  Do you have a history of Street Drug Abuse?: Yes  Histroy of Prescripton Drug Abuse?: Yes  Medical Problems:  Past Medical History:   Diagnosis Date    Anxiety     Arthritis     osteoarthritis    Bipolar disorder ([address]     Bronchitis, chronic (HCC)     Chronic back pain     Chronic pain     Claustrophobia     COPD (chronic obstructive pulmonary disease) ([address]     Depression     Diabetes mellitus ([address]     Emphysema of lung ([address]     History of irregular heartbeat     Hyperlipidemia     Liver disease     MRSA (methicillin resistant staph aureus) culture positive     Myofacial muscle pain     Osteomyelitis ([address]     Peripheral neuropathy ([address]     bilateral feet    Pressure ulcer     Schizophrenia ([address]     Seizures ([address]     Sleep apnea     no machine    Spinal stenosis     Substance abuse        Risk:  Fall RiskTotal: recommendations of this team, Reviewed group plan and strategies, Reviewed signs, symptoms and risk of self harm and violent behavior, Reviewed goals and plan of care    Method:  small group, individual verbal education    Outcome:   Verbalized by patient but needs reinforcement to obtain goals    PATIENT GOALS:  Short term: To learn better coping skills. Long term: To maintain suboxone treatment.     PLAN/TREATMENT RECOMMENDATIONS UPDATE:  continue with group therapies, increased socialization, continue planning for after discharge goals, continue with medication compliance    SHORT-TERM GOALS UPDATE:   Time frame for Short-Term Goals:  5-7 days    LONG-TERM GOALS UPDATE:   Time frame for Long-Term Goals:  6 months    Members Present in Team Meeting:   See signature sheet  TONYA Goncalves, LYDIA    Goal: STG-Able to verbalize suicidal ideations  Outcome: Ongoing

## 2017-11-04 NOTE — CONSULTS
Sulfur    Social History:   reports that he has been smoking Cigarettes. He has a 26.25 pack-year smoking history. He has never used smokeless tobacco. He reports that he uses drugs, including Marijuana and Opiates . He reports that he does not drink alcohol. Family History: family history includes Coronary Art Dis in his maternal uncle; Diabetes in his mother; Seizures in his maternal cousin and sister. REVIEW OF SYSTEMS:    · Constitutional: Negative for weight loss  · Eyes: Negative for visual changes, diplopia, scleral icterus. · ENT: Negative for Headaches, hearing loss, vertigo, mouth sores, sore throat. · Cardiovascular: Negative for lightheadedness/orthostatic symptoms ,chest pain, dyspnea on exertion, palpitations or loss of consciousness. · Respiratory: Negative for cough or wheezing, sputum production, hemoptysis, pleuritic pain. · Gastrointestinal: Negative for nausea/vomiting, change in bowel habits, abdominal pain, dysphagia/appetite loss, hematemesis, blood in stools. · Genitourinary:Negative for change in bladder habits, dysuria, trouble voiding, hematuria. · Musculoskeletal:pos Negative for gait disturbance, weakness, joint complaints. · Integumentary: Negative for rash, pruritis. · Neurological: Negative for headache, dizziness, change in muscle strength, numbness/tingling, change in gait, balance, coordination,   · Endocrine: negative for temperature intolerance, excessive thirst, fluid intake, or urination, tremor. · Hematologic/Lymphatic: negative for abnormal bruising or bleeding, blood clots, swollen lymph nodes. · Allergic/Immunologic: negative for nasal congestion, pruritis, hives. PHYSICAL EXAM:    /65   Pulse 89   Temp 98 °F (36.7 °C) (Oral)   Resp 14   Ht 5' 8\" (1.727 m)   Wt 150 lb (68 kg)   SpO2 100%   BMI 22.81 kg/m²      · General appearance: well nourished  · HEENT: Head: Normocephalic, no lesions, without obvious abnormality.   · Lungs: clear to Patient Active Problem List   Diagnosis    Anxiety    [facility]    Myofacial muscle pain    Chronic pain    Spinal stenosis    Hyperlipidemia    Depression    COPD (chronic obstructive pulmonary disease) (HCC)    Bronchitis, chronic (HCC)    Emphysema of lung (HCC)    Bipolar disorder (HCC)    Chronic back pain    Schizophrenia (HCC)    Fibromyalgia    Diabetes mellitus (HCC)    Onychomycosis    Pain in lower limb    Disc disease, degenerative, cervical    Cervical stenosis of spine    Generalized tonic-clonic seizure ([facility]    Superficial laceration of scalp    Altered mental status    Decubitus ulcer of coccyx, stage 2    Cocaine substance abuse    Opiate abuse, episodic    Closed fracture of nasal bone    Drug overdose    Polysubstance abuse    dm2      bs low at times     not on any meds        copd    baseline   cont same rx     PLAN:   check lft   peptide c  hba1c     cont home meds       MD ROSARIO Alvarado[address], [address] [place] [address].    Phone [phone]   Fax: [phone]  Answering Service: [phone]

## 2017-11-04 NOTE — PLAN OF CARE
Problem: Altered Mood, Depressive Behavior  Goal: LTG-Able to verbalize acceptance of life and situations over which he or she has no control  Outcome: Ongoing  PSYCHOTHERAPY GROUP NOTE    Date: 11/4/17  Start Time: 1000  End Time: 1040    Number Participants in Group:  10    Goal:  Patient will demonstrate increased interpersonal interaction   Topic: boundaries    Discipline Responsible:   OT  AT x SW  Nsg.  RT MHP Other       Participation Level:     None  Minimal   x Active Listener  Interactive    Monopolizing         Participation Quality:   Appropriate  Inappropriate          Attentive        Intrusive   x       Sharing        Resistant          Supportive        Lethargic       Affective:   x Congruent  Incongruent  Blunted  Flat    Constricted  Anxious  Elated  Angry    Labile  Depressed  Other         Cognitive:  x Alert x Oriented PPTP     Concentration  G x F  P   Attention Span  G x F  P   Short-Term Memory  G  F  P   Long-Term Memory  G  F  P   ProblemSolving/  Decision Making  G  F x P   Ability to Process  Information x G  F  P      Contributing Factors             Delusional             Hallucinating             Flight of Ideas             Other:       Modes of Intervention:  x Education x Support x Exploration    Clarifying  Problem Solving  Confrontation   x Socialization  Limit Setting  Reality Testing    Activity  Movement  Media    Other:            Response to Learning:   Able to verbalize current knowledge/experience    Able to verbalize/acknowledge new learning    Able to retain information    Capable of insight    Able to change behavior   x Progressing to goal    Other:        Comments:

## 2017-11-04 NOTE — PLAN OF CARE
Problem: Falls - Risk of  Goal: Absence of falls  Pt remains free of falls and verbalizes understanding of individual fall risks. Pt wearing non skid footwear and encouraged to seek out staff for any assistance needed. Problem: Altered Mood, Depressive Behavior  Goal: LTG-Able to verbalize acceptance of life and situations over which he or she has no control  Patient is admitting to depression, anxiety, and AH/VH/TH. Denies SI and HI at this time. Cooperative and medication compliant. Irritable at times, up to desk frequently for medication for pain. Patient safety maintained through Q15 min and random safety checks.    Goal: STG-Able to verbalize suicidal ideations  Outcome: Ongoing

## 2017-11-05 LAB
ESTIMATED AVERAGE GLUCOSE: 105 MG/DL
GLUCOSE BLD-MCNC: 108 MG/DL (ref 75–110)
GLUCOSE BLD-MCNC: 89 MG/DL (ref 75–110)
HBA1C MFR BLD: 5.3 % (ref 4–6)

## 2017-11-05 PROCEDURE — 1240000000 HC EMOTIONAL WELLNESS R&B

## 2017-11-05 PROCEDURE — 6360000002 HC RX W HCPCS: Performed by: PSYCHIATRY & NEUROLOGY

## 2017-11-05 PROCEDURE — 82947 ASSAY GLUCOSE BLOOD QUANT: CPT

## 2017-11-05 PROCEDURE — 6370000000 HC RX 637 (ALT 250 FOR IP): Performed by: PSYCHIATRY & NEUROLOGY

## 2017-11-05 PROCEDURE — 99232 SBSQ HOSP IP/OBS MODERATE 35: CPT | Performed by: INTERNAL MEDICINE

## 2017-11-05 RX ADMIN — SALINE NASAL SPRAY 1 SPRAY: 1.5 SOLUTION NASAL at 08:23

## 2017-11-05 RX ADMIN — DIVALPROEX SODIUM 500 MG: 500 TABLET, FILM COATED, EXTENDED RELEASE ORAL at 08:25

## 2017-11-05 RX ADMIN — TOPIRAMATE 150 MG: 100 TABLET, FILM COATED ORAL at 21:19

## 2017-11-05 RX ADMIN — BUPRENORPHINE AND NALOXONE 1 TABLET: 2; .5 TABLET SUBLINGUAL at 08:25

## 2017-11-05 RX ADMIN — PREGABALIN 200 MG: 100 CAPSULE ORAL at 08:25

## 2017-11-05 RX ADMIN — PANTOPRAZOLE SODIUM 40 MG: 40 TABLET, DELAYED RELEASE ORAL at 08:24

## 2017-11-05 RX ADMIN — DIVALPROEX SODIUM 500 MG: 500 TABLET, FILM COATED, EXTENDED RELEASE ORAL at 21:20

## 2017-11-05 RX ADMIN — QUETIAPINE FUMARATE 300 MG: 300 TABLET, FILM COATED ORAL at 21:19

## 2017-11-05 RX ADMIN — ACETAMINOPHEN 650 MG: 325 TABLET, FILM COATED ORAL at 16:45

## 2017-11-05 RX ADMIN — HYDROXYZINE HYDROCHLORIDE 25 MG: 25 TABLET, FILM COATED ORAL at 08:25

## 2017-11-05 RX ADMIN — TOPIRAMATE 150 MG: 100 TABLET, FILM COATED ORAL at 08:24

## 2017-11-05 RX ADMIN — SIMVASTATIN 40 MG: 40 TABLET, FILM COATED ORAL at 21:20

## 2017-11-05 RX ADMIN — BUPRENORPHINE AND NALOXONE 1 TABLET: 2; .5 TABLET SUBLINGUAL at 21:19

## 2017-11-05 RX ADMIN — SALINE NASAL SPRAY 1 SPRAY: 1.5 SOLUTION NASAL at 21:24

## 2017-11-05 RX ADMIN — FLUTICASONE PROPIONATE 1 SPRAY: 50 SPRAY, METERED NASAL at 21:24

## 2017-11-05 RX ADMIN — HYDROXYZINE HYDROCHLORIDE 50 MG: 25 TABLET, FILM COATED ORAL at 13:12

## 2017-11-05 RX ADMIN — TRAZODONE HYDROCHLORIDE 50 MG: 50 TABLET ORAL at 21:20

## 2017-11-05 RX ADMIN — ACETAMINOPHEN 650 MG: 325 TABLET, FILM COATED ORAL at 08:28

## 2017-11-05 RX ADMIN — PREGABALIN 200 MG: 100 CAPSULE ORAL at 21:24

## 2017-11-05 RX ADMIN — DULOXETINE 60 MG: 60 CAPSULE, DELAYED RELEASE ORAL at 08:24

## 2017-11-05 RX ADMIN — HYDROXYZINE HYDROCHLORIDE 50 MG: 25 TABLET, FILM COATED ORAL at 21:19

## 2017-11-05 ASSESSMENT — PAIN DESCRIPTION - LOCATION: LOCATION: GENERALIZED

## 2017-11-05 ASSESSMENT — PAIN SCALES - GENERAL
PAINLEVEL_OUTOF10: 10
PAINLEVEL_OUTOF10: 2
PAINLEVEL_OUTOF10: 4
PAINLEVEL_OUTOF10: 10
PAINLEVEL_OUTOF10: 3
PAINLEVEL_OUTOF10: 3
PAINLEVEL_OUTOF10: 4
PAINLEVEL_OUTOF10: 10

## 2017-11-05 ASSESSMENT — PAIN DESCRIPTION - PAIN TYPE: TYPE: ACUTE PAIN

## 2017-11-05 NOTE — PLAN OF CARE
Problem: Altered Mood, Depressive Behavior  Goal: LTG-Able to verbalize acceptance of life and situations over which he or she has no control  Outcome: Ongoing  PSYCHOEDUCATION GROUP NOTE    Date: 11/5/17  Start Time: 1000  End Time: 1040    Number Participants in Group:  10    Goal:  Patient will demonstrate increased interpersonal interaction   Topic: automatic thoughts    Discipline Responsible:   OT  AT x SW  Nsg.  RT MHP Other       Participation Level:     None  Minimal   x Active Listener x Interactive    Monopolizing         Participation Quality:   Appropriate  Inappropriate          Attentive        Intrusive   x       Sharing        Resistant          Supportive        Lethargic       Affective:   x Congruent  Incongruent  Blunted  Flat    Constricted  Anxious  Elated  Angry    Labile  Depressed  Other         Cognitive:  x Alert x Oriented PPTP     Concentration  G x F  P   Attention Span x G  F  P   Short-Term Memory  G  F  P   Long-Term Memory  G  F  P   ProblemSolving/  Decision Making  G x F  P   Ability to Process  Information  G x F  P      Contributing Factors             Delusional             Hallucinating             Flight of Ideas             Other:       Modes of Intervention:  x Education x Support x Exploration   x Clarifying x Problem Solving  Confrontation   x Socialization  Limit Setting x Reality Testing   x Activity  Movement  Media    Other:            Response to Learning:  x Able to verbalize current knowledge/experience   x Able to verbalize/acknowledge new learning    Able to retain information    Capable of insight    Able to change behavior    Progressing to goal    Other:        Comments:

## 2017-11-05 NOTE — PLAN OF CARE
Problem: Altered Mood, Depressive Behavior  Goal: LTG-Able to verbalize acceptance of life and situations over which he or she has no control  Outcome: Ongoing  Patient lack coping skills to increased stressors in life and is not accepting of life situations. Patient is MC/BC this shift.

## 2017-11-05 NOTE — BH NOTE
SAFETY GROUP NOTE     Date: 11/5/17              Start Time: 1600                    End Time: 1645     Number Participants in Group:  15/15     Goal:  Patient will demonstrate increased interpersonal interaction   Topic: [employer]      Discipline Responsible:    OT   AT   [facility]. x RT MHP Other         Participation Level:                   None   Minimal    Active Listener x Interactive     Monopolizing             Participation Quality:  x Appropriate   Inappropriate          Attentive         Intrusive           Sharing         Resistant           Supportive         Lethargic         Affective:          x Congruent   Incongruent   Blunted   Flat     Constricted   Anxious   Elated   Angry     Labile   Depressed   Other             Cognitive:  x Alert x Oriented PPTP      Concentration x G   F   P   Attention Span x G   F   P   Short-Term Memory x G   F   P   Long-Term Memory x G   F   P   ProblemSolving/  Decision Making x G   F   P   Ability to Process  Information x G   F   P        Contributing Factors              Delusional              Hallucinating              Flight of Ideas              Other:         Modes of Intervention:  x Education  Support  Exploration     Clarifying  Problem Solving   Confrontation    Socialization  x Limit Setting  Reality Testing     Activity   Movement   Media     Other:                  Response to Learning:    Able to verbalize current knowledge/experience     Able to verbalize/acknowledge new learning     Able to retain information     Capable of insight     Able to change behavior   x Progressing to goal     Other:          Comments:

## 2017-11-05 NOTE — PLAN OF CARE
Problem: Falls - Risk of  Goal: Absence of falls  Pt remains free of falls and verbalizes understanding of individual fall risks. Pt wearing non skid footwear and encouraged to seek out staff for any assistance needed. Problem: Altered Mood, Depressive Behavior  Goal: LTG-Able to verbalize acceptance of life and situations over which he or she has no control  Patient is admitting to depression, anxiety, and AH/VH/TH. Denies SI and HI at this time. Cooperative and medication compliant. Irritable at times, up to desk frequently for medication for pain. Poor ADLs, patient reports poor sleep. Appetite is normal. Patient safety maintained through Q15 min and random safety checks.    Goal: STG-Able to verbalize suicidal ideations  Outcome: Ongoing

## 2017-11-05 NOTE — PLAN OF CARE
Problem: Altered Mood, Depressive Behavior  Goal: LTG-Able to verbalize acceptance of life and situations over which he or she has no control  Outcome: Ongoing  Pt did not attend RT group at 1330 d/t resting in room despite staff invitation to attend.

## 2017-11-06 LAB
GLUCOSE BLD-MCNC: 108 MG/DL (ref 75–110)
GLUCOSE BLD-MCNC: 115 MG/DL (ref 75–110)
GLUCOSE BLD-MCNC: 82 MG/DL (ref 75–110)

## 2017-11-06 PROCEDURE — 6360000002 HC RX W HCPCS: Performed by: PSYCHIATRY & NEUROLOGY

## 2017-11-06 PROCEDURE — 6370000000 HC RX 637 (ALT 250 FOR IP): Performed by: PSYCHIATRY & NEUROLOGY

## 2017-11-06 PROCEDURE — 82947 ASSAY GLUCOSE BLOOD QUANT: CPT

## 2017-11-06 PROCEDURE — 1240000000 HC EMOTIONAL WELLNESS R&B

## 2017-11-06 RX ORDER — BUPRENORPHINE HYDROCHLORIDE AND NALOXONE HYDROCHLORIDE DIHYDRATE 2; .5 MG/1; MG/1
2 TABLET SUBLINGUAL 3 TIMES DAILY
Status: DISCONTINUED | OUTPATIENT
Start: 2017-11-06 | End: 2017-11-08

## 2017-11-06 RX ORDER — BUPRENORPHINE HYDROCHLORIDE AND NALOXONE HYDROCHLORIDE DIHYDRATE 2; .5 MG/1; MG/1
1 TABLET SUBLINGUAL ONCE
Status: DISCONTINUED | OUTPATIENT
Start: 2017-11-06 | End: 2017-11-10

## 2017-11-06 RX ADMIN — HYDROXYZINE HYDROCHLORIDE 50 MG: 25 TABLET, FILM COATED ORAL at 12:54

## 2017-11-06 RX ADMIN — PREGABALIN 200 MG: 100 CAPSULE ORAL at 08:29

## 2017-11-06 RX ADMIN — FLUTICASONE PROPIONATE 1 SPRAY: 50 SPRAY, METERED NASAL at 21:22

## 2017-11-06 RX ADMIN — DIVALPROEX SODIUM 500 MG: 500 TABLET, FILM COATED, EXTENDED RELEASE ORAL at 21:24

## 2017-11-06 RX ADMIN — QUETIAPINE FUMARATE 300 MG: 300 TABLET, FILM COATED ORAL at 21:23

## 2017-11-06 RX ADMIN — ACETAMINOPHEN 650 MG: 325 TABLET, FILM COATED ORAL at 08:47

## 2017-11-06 RX ADMIN — BUPRENORPHINE HYDROCHLORIDE AND NALOXONE HYDROCHLORIDE DIHYDRATE 2 TABLET: 2; .5 TABLET SUBLINGUAL at 14:37

## 2017-11-06 RX ADMIN — PANTOPRAZOLE SODIUM 40 MG: 40 TABLET, DELAYED RELEASE ORAL at 08:29

## 2017-11-06 RX ADMIN — PREGABALIN 200 MG: 100 CAPSULE ORAL at 21:22

## 2017-11-06 RX ADMIN — DIPHENOXYLATE HYDROCHLORIDE AND ATROPINE SULFATE 1 TABLET: 2.5; .025 TABLET ORAL at 08:47

## 2017-11-06 RX ADMIN — DIPHENOXYLATE HYDROCHLORIDE AND ATROPINE SULFATE 1 TABLET: 2.5; .025 TABLET ORAL at 21:27

## 2017-11-06 RX ADMIN — TOPIRAMATE 150 MG: 100 TABLET, FILM COATED ORAL at 21:22

## 2017-11-06 RX ADMIN — SALINE NASAL SPRAY 1 SPRAY: 1.5 SOLUTION NASAL at 08:31

## 2017-11-06 RX ADMIN — DULOXETINE 60 MG: 60 CAPSULE, DELAYED RELEASE ORAL at 08:30

## 2017-11-06 RX ADMIN — TRAZODONE HYDROCHLORIDE 50 MG: 50 TABLET ORAL at 21:24

## 2017-11-06 RX ADMIN — BUPRENORPHINE AND NALOXONE 1 TABLET: 2; .5 TABLET SUBLINGUAL at 08:31

## 2017-11-06 RX ADMIN — HYDROXYZINE HYDROCHLORIDE 25 MG: 25 TABLET, FILM COATED ORAL at 08:30

## 2017-11-06 RX ADMIN — DIVALPROEX SODIUM 500 MG: 500 TABLET, FILM COATED, EXTENDED RELEASE ORAL at 08:31

## 2017-11-06 RX ADMIN — BUPRENORPHINE HYDROCHLORIDE AND NALOXONE HYDROCHLORIDE DIHYDRATE 2 TABLET: 2; .5 TABLET SUBLINGUAL at 21:23

## 2017-11-06 RX ADMIN — SIMVASTATIN 40 MG: 40 TABLET, FILM COATED ORAL at 21:24

## 2017-11-06 RX ADMIN — ACETAMINOPHEN 650 MG: 325 TABLET, FILM COATED ORAL at 21:27

## 2017-11-06 RX ADMIN — TOPIRAMATE 150 MG: 100 TABLET, FILM COATED ORAL at 08:30

## 2017-11-06 RX ADMIN — HYDROXYZINE HYDROCHLORIDE 50 MG: 25 TABLET, FILM COATED ORAL at 21:23

## 2017-11-06 RX ADMIN — MAGNESIUM HYDROXIDE 10 ML: 400 SUSPENSION ORAL at 22:03

## 2017-11-06 RX ADMIN — SALINE NASAL SPRAY 1 SPRAY: 1.5 SOLUTION NASAL at 21:22

## 2017-11-06 ASSESSMENT — PAIN SCALES - GENERAL
PAINLEVEL_OUTOF10: 7
PAINLEVEL_OUTOF10: 3
PAINLEVEL_OUTOF10: 2
PAINLEVEL_OUTOF10: 1
PAINLEVEL_OUTOF10: 1
PAINLEVEL_OUTOF10: 9
PAINLEVEL_OUTOF10: 10
PAINLEVEL_OUTOF10: 2
PAINLEVEL_OUTOF10: 10
PAINLEVEL_OUTOF10: 2

## 2017-11-06 ASSESSMENT — PAIN DESCRIPTION - PAIN TYPE: TYPE: ACUTE PAIN

## 2017-11-06 ASSESSMENT — PAIN DESCRIPTION - LOCATION: LOCATION: GENERALIZED

## 2017-11-06 NOTE — PLAN OF CARE
Problem: Altered Mood, Depressive Behavior  Goal: LTG-Able to verbalize acceptance of life and situations over which he or she has no control  Outcome: Ongoing  PSYCHOEDUCATION GROUP NOTE    Date: November 6, 2017  Start Time: 0900  End Time: 0930    Number Participants in Group:  10/16    Goal:  Patient will demonstrate increased interpersonal interaction   Topic: Community Meeting/Stretching     Discipline Responsible:   OT  AT  [facility]. x RT MHP Other       Participation Level:     None  Minimal   x Active Listener x Interactive    Monopolizing         Participation Quality:  x Appropriate  Inappropriate   x       Attentive        Intrusive   x       Sharing        Resistant          Supportive        Lethargic       Affective:   x Congruent  Incongruent  Blunted  Flat    Constricted  Anxious  Elated  Angry    Labile  Depressed  Other         Cognitive:  x Alert x Oriented PPTP     Concentration x G  F  P   Attention Span x G  F  P   Short-Term Memory x G  F  P   Long-Term Memory x G  F  P   ProblemSolving/  Decision Making x G  F  P   Ability to Process  Information x G  F  P      Contributing Factors             Delusional             Hallucinating             Flight of Ideas             Other:       Modes of Intervention:  x Education  Support  Exploration   x Clarifying x Problem Solving  Confrontation   x Socialization  Limit Setting x Reality Testing   x Activity x Movement x Media    Other:            Response to Learning:   Able to verbalize current knowledge/experience    Able to verbalize/acknowledge new learning    Able to retain information    Capable of insight    Able to change behavior   x Progressing to goal    Other:        Comments:

## 2017-11-06 NOTE — PLAN OF CARE
Problem: Falls - Risk of  Goal: Absence of falls  Outcome: Ongoing  Pt remains free of falls and verbalizes understanding of individual fall risks. Wearing nonskid footwear and encouraged to seek out staff if assistance needed. Problem: Altered Mood, Depressive Behavior  Goal: LTG-Able to verbalize acceptance of life and situations over which he or she has no control  Outcome: Ongoing  Pt encouraged to attend unit programming and interact with peers and staff to increase acceptance.   Goal: STG-Able to verbalize suicidal ideations  Outcome: Ongoing  Pt denies SI.

## 2017-11-06 NOTE — PLAN OF CARE
Problem: Altered Mood, Depressive Behavior  Goal: LTG-Able to verbalize acceptance of life and situations over which he or she has no control  Outcome: Ongoing  PSYCHOEDUCATION GROUP NOTE    Date: November 6, 2017  Start Time: 1330  End Time: 1400    Number Participants in Group:  9/13    Goal:  Patient will demonstrate increased interpersonal interaction   Topic: Therapy Goals    Discipline Responsible:   OT  AT  [facility]. x RT MHP Other       Participation Level:     None  Minimal   x Active Listener x Interactive    Monopolizing         Participation Quality:  x Appropriate  Inappropriate   x       Attentive        Intrusive   x       Sharing        Resistant          Supportive        Lethargic       Affective:   x Congruent  Incongruent  Blunted  Flat    Constricted  Anxious  Elated  Angry    Labile  Depressed  Other         Cognitive:  x Alert x Oriented PPTP     Concentration x G  F  P   Attention Span x G  F  P   Short-Term Memory x G  F  P   Long-Term Memory x G  F  P   ProblemSolving/  Decision Making x G  F  P   Ability to Process  Information x G  F  P      Contributing Factors             Delusional             Hallucinating             Flight of Ideas             Other:       Modes of Intervention:  x Education  Support  Exploration    Clarifying x Problem Solving  Confrontation   x Socialization  Limit Setting x Reality Testing   x Activity  Movement  Media    Other:            Response to Learning:   Able to verbalize current knowledge/experience    Able to verbalize/acknowledge new learning    Able to retain information    Capable of insight    Able to change behavior   x Progressing to goal    Other:        Comments:

## 2017-11-06 NOTE — PLAN OF CARE
Problem: Altered Mood, Depressive Behavior  Goal: LTG-Able to verbalize acceptance of life and situations over which he or she has no control  Outcome: Ongoing  PSYCHOEDUCATION GROUP NOTE    Date: 11/6/17  Start Time: 1430  End Time: 1505    Number Participants in Group:  7/14    Goal:  Patient will demonstrate increased interpersonal interaction   Topic: Self Awareness/Socialization    Discipline Responsible:   OT  AT    Ns. x RT MHP Other       Participation Level:     None  Minimal   x Active Listener x Interactive    Monopolizing         Participation Quality:  x Appropriate  Inappropriate   x       Attentive        Intrusive          Sharing        Resistant          Supportive        Lethargic       Affective:   x Congruent  Incongruent  Blunted  Flat    Constricted  Anxious  Elated  Angry    Labile  Depressed  Other         Cognitive:  x Alert x Oriented PPTP     Concentration x G  F  P   Attention Span x G  F  P   Short-Term Memory x G  F  P   Long-Term Memory x G  F  P   ProblemSolving/  Decision Making x G  F  P   Ability to Process  Information x G  F  P      Contributing Factors             Delusional             Hallucinating             Flight of Ideas             Other:       Modes of Intervention:   Education x Support x Exploration    Clarifying x Problem Solving  Confrontation   x Socialization  Limit Setting x Reality Testing    Activity  Movement  Media    Other:            Response to Learning:   Able to verbalize current knowledge/experience    Able to verbalize/acknowledge new learning    Able to retain information    Capable of insight    Able to change behavior   x Progressing to goal    Other:        Comments:

## 2017-11-07 LAB
GLUCOSE BLD-MCNC: 74 MG/DL (ref 75–110)
GLUCOSE BLD-MCNC: 78 MG/DL (ref 75–110)

## 2017-11-07 PROCEDURE — 6360000002 HC RX W HCPCS: Performed by: PSYCHIATRY & NEUROLOGY

## 2017-11-07 PROCEDURE — 1240000000 HC EMOTIONAL WELLNESS R&B

## 2017-11-07 PROCEDURE — 82947 ASSAY GLUCOSE BLOOD QUANT: CPT

## 2017-11-07 PROCEDURE — 6370000000 HC RX 637 (ALT 250 FOR IP): Performed by: PSYCHIATRY & NEUROLOGY

## 2017-11-07 RX ADMIN — QUETIAPINE FUMARATE 300 MG: 300 TABLET, FILM COATED ORAL at 21:07

## 2017-11-07 RX ADMIN — HYDROXYZINE HYDROCHLORIDE 25 MG: 25 TABLET, FILM COATED ORAL at 08:54

## 2017-11-07 RX ADMIN — SALINE NASAL SPRAY 1 SPRAY: 1.5 SOLUTION NASAL at 08:53

## 2017-11-07 RX ADMIN — DIVALPROEX SODIUM 500 MG: 500 TABLET, FILM COATED, EXTENDED RELEASE ORAL at 08:55

## 2017-11-07 RX ADMIN — HYDROXYZINE HYDROCHLORIDE 50 MG: 25 TABLET, FILM COATED ORAL at 13:30

## 2017-11-07 RX ADMIN — PREGABALIN 200 MG: 100 CAPSULE ORAL at 08:54

## 2017-11-07 RX ADMIN — DIVALPROEX SODIUM 500 MG: 500 TABLET, FILM COATED, EXTENDED RELEASE ORAL at 21:07

## 2017-11-07 RX ADMIN — TOPIRAMATE 150 MG: 100 TABLET, FILM COATED ORAL at 08:54

## 2017-11-07 RX ADMIN — SIMVASTATIN 40 MG: 40 TABLET, FILM COATED ORAL at 21:07

## 2017-11-07 RX ADMIN — ACETAMINOPHEN 650 MG: 325 TABLET, FILM COATED ORAL at 09:00

## 2017-11-07 RX ADMIN — TRAZODONE HYDROCHLORIDE 50 MG: 50 TABLET ORAL at 21:07

## 2017-11-07 RX ADMIN — BUPRENORPHINE HYDROCHLORIDE AND NALOXONE HYDROCHLORIDE DIHYDRATE 2 TABLET: 2; .5 TABLET SUBLINGUAL at 21:07

## 2017-11-07 RX ADMIN — ACETAMINOPHEN 650 MG: 325 TABLET, FILM COATED ORAL at 21:10

## 2017-11-07 RX ADMIN — DULOXETINE 60 MG: 60 CAPSULE, DELAYED RELEASE ORAL at 08:54

## 2017-11-07 RX ADMIN — BUPRENORPHINE HYDROCHLORIDE AND NALOXONE HYDROCHLORIDE DIHYDRATE 2 TABLET: 2; .5 TABLET SUBLINGUAL at 08:54

## 2017-11-07 RX ADMIN — TOPIRAMATE 150 MG: 100 TABLET, FILM COATED ORAL at 21:07

## 2017-11-07 RX ADMIN — HYDROXYZINE HYDROCHLORIDE 50 MG: 25 TABLET, FILM COATED ORAL at 21:07

## 2017-11-07 RX ADMIN — FLUTICASONE PROPIONATE 1 SPRAY: 50 SPRAY, METERED NASAL at 21:17

## 2017-11-07 RX ADMIN — PANTOPRAZOLE SODIUM 40 MG: 40 TABLET, DELAYED RELEASE ORAL at 08:55

## 2017-11-07 RX ADMIN — BUPRENORPHINE HYDROCHLORIDE AND NALOXONE HYDROCHLORIDE DIHYDRATE 2 TABLET: 2; .5 TABLET SUBLINGUAL at 13:30

## 2017-11-07 RX ADMIN — PREGABALIN 200 MG: 100 CAPSULE ORAL at 21:10

## 2017-11-07 ASSESSMENT — PAIN DESCRIPTION - ONSET: ONSET: ON-GOING

## 2017-11-07 ASSESSMENT — PAIN SCALES - GENERAL
PAINLEVEL_OUTOF10: 5
PAINLEVEL_OUTOF10: 10
PAINLEVEL_OUTOF10: 2
PAINLEVEL_OUTOF10: 3
PAINLEVEL_OUTOF10: 3
PAINLEVEL_OUTOF10: 2
PAINLEVEL_OUTOF10: 10
PAINLEVEL_OUTOF10: 8
PAINLEVEL_OUTOF10: 5
PAINLEVEL_OUTOF10: 3

## 2017-11-07 ASSESSMENT — PAIN DESCRIPTION - PROGRESSION: CLINICAL_PROGRESSION: NOT CHANGED

## 2017-11-07 ASSESSMENT — PAIN DESCRIPTION - LOCATION: LOCATION: GENERALIZED

## 2017-11-07 ASSESSMENT — PAIN DESCRIPTION - PAIN TYPE: TYPE: CHRONIC PAIN

## 2017-11-07 ASSESSMENT — PAIN DESCRIPTION - DESCRIPTORS: DESCRIPTORS: CONSTANT;ACHING;SHARP;SHOOTING

## 2017-11-07 ASSESSMENT — PAIN DESCRIPTION - FREQUENCY: FREQUENCY: CONTINUOUS

## 2017-11-07 NOTE — PLAN OF CARE
Problem: Altered Mood, Depressive Behavior  Goal: LTG-Able to verbalize acceptance of life and situations over which he or she has no control  Outcome: Ongoing  PSYCHOEDUCATION GROUP NOTE    Date: November 7, 2017  Start Time: 1430  End Time: 1510    Number Participants in Group:  9/11    Goal:  Patient will demonstrate increased interpersonal interaction   Topic: Countering Negative Thoughts/Positive Thinking     Discipline Responsible:   OT  AT    Ns. x RT MHP Other       Participation Level:     None  Minimal   x Active Listener x Interactive    Monopolizing         Participation Quality:  x Appropriate  Inappropriate   x       Attentive        Intrusive   x       Sharing        Resistant          Supportive        Lethargic       Affective:   x Congruent  Incongruent  Blunted  Flat    Constricted  Anxious  Elated  Angry    Labile  Depressed  Other         Cognitive:  x Alert x Oriented PPTP     Concentration x G  F  P   Attention Span x G  F  P   Short-Term Memory x G  F  P   Long-Term Memory x G  F  P   ProblemSolving/  Decision Making x G  F  P   Ability to Process  Information x G  F  P      Contributing Factors             Delusional             Hallucinating             Flight of Ideas             Other:       Modes of Intervention:  x Education  Support  Exploration    Clarifying x Problem Solving  Confrontation   x Socialization  Limit Setting x Reality Testing   x Activity  Movement  Media    Other:            Response to Learning:   Able to verbalize current knowledge/experience    Able to verbalize/acknowledge new learning    Able to retain information    Capable of insight    Able to change behavior   x Progressing to goal    Other:        Comments:

## 2017-11-07 NOTE — PLAN OF CARE
Problem: Altered Mood, Depressive Behavior  Goal: LTG-Able to verbalize acceptance of life and situations over which he or she has no control  Outcome: Ongoing  PSYCHOEDUCATION GROUP NOTE    Date: November 7, 2017  Start Time: 0900  End Time: 0930    Number Participants in Group:  9/15    Goal:  Patient will demonstrate increased interpersonal interaction   Topic: Community Meeting/Stretching     Discipline Responsible:   OT  AT  [facility]. x RT MHP Other       Participation Level:     None x Minimal   x Active Listener  Interactive    Monopolizing         Participation Quality:  x Appropriate  Inappropriate          Attentive        Intrusive   x       Sharing        Resistant          Supportive        Lethargic       Affective:   x Congruent  Incongruent  Blunted  Flat    Constricted  Anxious  Elated  Angry    Labile  Depressed  Other         Cognitive:  x Alert x Oriented PPTP     Concentration x G  F  P   Attention Span x G  F  P   Short-Term Memory x G  F  P   Long-Term Memory x G  F  P   ProblemSolving/  Decision Making x G  F  P   Ability to Process  Information x G  F  P      Contributing Factors             Delusional             Hallucinating             Flight of Ideas             Other:       Modes of Intervention:  x Education  Support  Exploration   x Clarifying x Problem Solving  Confrontation   x Socialization  Limit Setting x Reality Testing   x Activity x Movement x Media    Other:            Response to Learning:   Able to verbalize current knowledge/experience    Able to verbalize/acknowledge new learning    Able to retain information    Capable of insight    Able to change behavior   x Progressing to goal    Other:        Comments:

## 2017-11-07 NOTE — PLAN OF CARE
Problem: Falls - Risk of  Goal: Absence of falls  Outcome: Ongoing  Pt remains free of falls and verbalizes understanding of individual fall risks. Pt wearing non skid footwear and encouraged to seek out staff for any assistance needed.

## 2017-11-07 NOTE — PLAN OF CARE
Problem: Pain:  Goal: Pain level will decrease  Pain level will decrease   Outcome: Met This Shift  PRN pain medication given per requested. Reports decreased pain levels with the administration of the pain medication.

## 2017-11-07 NOTE — PLAN OF CARE
Problem: Altered Mood, Depressive Behavior  Goal: LTG-Able to verbalize acceptance of life and situations over which he or she has no control  Outcome: Ongoing  Pt declined to attend psychotherapy at 1000 am despite encouragement.   Pt offered 1:1.

## 2017-11-08 PROCEDURE — 1240000000 HC EMOTIONAL WELLNESS R&B

## 2017-11-08 PROCEDURE — 6370000000 HC RX 637 (ALT 250 FOR IP): Performed by: PSYCHIATRY & NEUROLOGY

## 2017-11-08 PROCEDURE — 6360000002 HC RX W HCPCS: Performed by: PSYCHIATRY & NEUROLOGY

## 2017-11-08 RX ORDER — BUPRENORPHINE AND NALOXONE 2; .5 MG/1; MG/1
2 FILM, SOLUBLE BUCCAL; SUBLINGUAL 2 TIMES DAILY
Status: DISCONTINUED | OUTPATIENT
Start: 2017-11-08 | End: 2017-11-10

## 2017-11-08 RX ORDER — DIPHENHYDRAMINE HCL 25 MG
25 TABLET ORAL 3 TIMES DAILY PRN
Status: DISCONTINUED | OUTPATIENT
Start: 2017-11-08 | End: 2017-11-20 | Stop reason: HOSPADM

## 2017-11-08 RX ADMIN — FLUTICASONE PROPIONATE 1 SPRAY: 50 SPRAY, METERED NASAL at 20:49

## 2017-11-08 RX ADMIN — SIMVASTATIN 40 MG: 40 TABLET, FILM COATED ORAL at 20:48

## 2017-11-08 RX ADMIN — BUPRENORPHINE HYDROCHLORIDE AND NALOXONE HYDROCHLORIDE DIHYDRATE 2 TABLET: 2; .5 TABLET SUBLINGUAL at 09:00

## 2017-11-08 RX ADMIN — PREGABALIN 200 MG: 100 CAPSULE ORAL at 09:00

## 2017-11-08 RX ADMIN — DIVALPROEX SODIUM 500 MG: 500 TABLET, FILM COATED, EXTENDED RELEASE ORAL at 09:00

## 2017-11-08 RX ADMIN — ACETAMINOPHEN 650 MG: 325 TABLET, FILM COATED ORAL at 09:00

## 2017-11-08 RX ADMIN — HYDROXYZINE HYDROCHLORIDE 50 MG: 25 TABLET, FILM COATED ORAL at 20:47

## 2017-11-08 RX ADMIN — HYDROXYZINE HYDROCHLORIDE 25 MG: 25 TABLET, FILM COATED ORAL at 09:00

## 2017-11-08 RX ADMIN — QUETIAPINE FUMARATE 300 MG: 300 TABLET, FILM COATED ORAL at 20:48

## 2017-11-08 RX ADMIN — DIPHENHYDRAMINE HCL 25 MG: 25 TABLET ORAL at 13:15

## 2017-11-08 RX ADMIN — TOPIRAMATE 150 MG: 100 TABLET, FILM COATED ORAL at 20:48

## 2017-11-08 RX ADMIN — TOPIRAMATE 150 MG: 100 TABLET, FILM COATED ORAL at 09:00

## 2017-11-08 RX ADMIN — DULOXETINE 60 MG: 60 CAPSULE, DELAYED RELEASE ORAL at 09:00

## 2017-11-08 RX ADMIN — TRAZODONE HYDROCHLORIDE 50 MG: 50 TABLET ORAL at 20:48

## 2017-11-08 RX ADMIN — DIPHENHYDRAMINE HCL 25 MG: 25 TABLET ORAL at 20:51

## 2017-11-08 RX ADMIN — PANTOPRAZOLE SODIUM 40 MG: 40 TABLET, DELAYED RELEASE ORAL at 09:00

## 2017-11-08 RX ADMIN — BUPRENORPHINE HYDROCHLORIDE, NALOXONE HYDROCHLORIDE 4 MG: 2; .5 FILM, SOLUBLE BUCCAL; SUBLINGUAL at 21:39

## 2017-11-08 RX ADMIN — ACETAMINOPHEN 650 MG: 325 TABLET, FILM COATED ORAL at 17:28

## 2017-11-08 RX ADMIN — SALINE NASAL SPRAY 1 SPRAY: 1.5 SOLUTION NASAL at 08:59

## 2017-11-08 RX ADMIN — PREGABALIN 200 MG: 100 CAPSULE ORAL at 20:47

## 2017-11-08 RX ADMIN — DIVALPROEX SODIUM 500 MG: 500 TABLET, FILM COATED, EXTENDED RELEASE ORAL at 20:47

## 2017-11-08 ASSESSMENT — PAIN SCALES - GENERAL
PAINLEVEL_OUTOF10: 5
PAINLEVEL_OUTOF10: 2
PAINLEVEL_OUTOF10: 2
PAINLEVEL_OUTOF10: 9
PAINLEVEL_OUTOF10: 10

## 2017-11-08 ASSESSMENT — PAIN DESCRIPTION - LOCATION: LOCATION: HIP

## 2017-11-08 ASSESSMENT — PAIN DESCRIPTION - ORIENTATION: ORIENTATION: RIGHT;LEFT

## 2017-11-08 NOTE — PLAN OF CARE
Problem: Altered Mood, Depressive Behavior  Goal: LTG-Able to verbalize acceptance of life and situations over which he or she has no control  Outcome: Ongoing  PSYCHOEDUCATION GROUP NOTE    Date: November 8, 2017  Start Time: 0900  End Time: 0930    Number Participants in Group:  9/15    Goal:  Patient will demonstrate increased interpersonal interaction   Topic: Community Meeting/Stretching     Discipline Responsible:   OT  AT  [facility]. x RT MHP Other       Participation Level:     None  Minimal   x Active Listener x Interactive    Monopolizing         Participation Quality:  x Appropriate  Inappropriate   x       Attentive        Intrusive   x       Sharing        Resistant          Supportive        Lethargic       Affective:   x Congruent  Incongruent  Blunted  Flat    Constricted  Anxious  Elated  Angry    Labile  Depressed  Other         Cognitive:  x Alert x Oriented PPTP     Concentration x G  F  P   Attention Span x G  F  P   Short-Term Memory x G  F  P   Long-Term Memory x G  F  P   ProblemSolving/  Decision Making x G  F  P   Ability to Process  Information x G  F  P      Contributing Factors             Delusional             Hallucinating             Flight of Ideas             Other:       Modes of Intervention:  x Education  Support  Exploration   x Clarifying x Problem Solving  Confrontation   x Socialization  Limit Setting x Reality Testing   x Activity x Movement  Media    Other:            Response to Learning:   Able to verbalize current knowledge/experience    Able to verbalize/acknowledge new learning    Able to retain information    Capable of insight    Able to change behavior   x Progressing to goal    Other:        Comments:

## 2017-11-08 NOTE — PLAN OF CARE
Problem: Altered Mood, Depressive Behavior  Goal: LTG-Able to verbalize acceptance of life and situations over which he or she has no control  Outcome: Ongoing  PSYCHOEDUCATION GROUP NOTE    Date: November 8, 2017  Start Time: 1100  End Time: 1140    Number Participants in Group:  11/15    Goal:  Patient will demonstrate increased interpersonal interaction   Topic: Creative Expression/Relaxation     Discipline Responsible:   OT  AT    Ns. x RT MHP Other       Participation Level:     None x Minimal    Active Listener  Interactive    Monopolizing         Participation Quality:   Appropriate  Inappropriate          Attentive        Intrusive          Sharing x       Resistant          Supportive        Lethargic       Affective:    Congruent  Incongruent x Blunted  Flat    Constricted  Anxious  Elated  Angry    Labile  Depressed  Other         Cognitive:  x Alert x Oriented PPTP     Concentration  G x F  P   Attention Span  G x F  P   Short-Term Memory  G  F  P   Long-Term Memory  G  F  P   ProblemSolving/  Decision Making  G  F  P   Ability to Process  Information  G  F  P      Contributing Factors             Delusional             Hallucinating             Flight of Ideas             Other:       Modes of Intervention:   Education  Support  Exploration    Clarifying x Problem Solving  Confrontation   x Socialization  Limit Setting x Reality Testing   x Activity  Movement x Media    Other:            Response to Learning:   Able to verbalize current knowledge/experience    Able to verbalize/acknowledge new learning    Able to retain information    Capable of insight    Able to change behavior   x Progressing to goal    Other:        Comments:

## 2017-11-08 NOTE — CARE COORDINATION
PSYCHOEDUCATION GROUP NOTE    Date: 11/8/2017  Start Time: 10am  End Time: 1045am    Number Participants in Group:  10    Goal:  Patient will demonstrate increased interpersonal interaction   Topic: Psychotherapy/Defense Mechanisms    Discipline Responsible:   OT  AT X SW  Nsg.  RT MHP Other       Participation Level:     None  Minimal   X Active Listener X Interactive    Monopolizing         Participation Quality:  X Appropriate  Inappropriate   X       Attentive        Intrusive   X       Sharing        Resistant   X       Supportive        Lethargic       Affective:   X Congruent  Incongruent  Blunted X Flat    Constricted  Anxious  Elated  Angry    Labile X Depressed  Other         Cognitive:   Alert X Oriented PPTP     Concentration  G X F  P   Attention Span  G X F  P   Short-Term Memory  G X F  P   Long-Term Memory  G X F  P   ProblemSolving/  Decision Making  G X F  P   Ability to Process  Information  G X F  P      Contributing Factors             Delusional             Hallucinating             Flight of Ideas             Other:       Modes of Intervention:  X Education X Support X Exploration   X Clarifying  Problem Solving  Confrontation    Socialization  Limit Setting  Reality Testing    Activity  Movement  Media    Other:            Response to Learning:  X Able to verbalize current knowledge/experience    Able to verbalize/acknowledge new learning   X Able to retain information    Capable of insight    Able to change behavior   X Progressing to goal    Other:        Comments:

## 2017-11-09 PROCEDURE — 6370000000 HC RX 637 (ALT 250 FOR IP): Performed by: PSYCHIATRY & NEUROLOGY

## 2017-11-09 PROCEDURE — 1240000000 HC EMOTIONAL WELLNESS R&B

## 2017-11-09 PROCEDURE — 6360000002 HC RX W HCPCS: Performed by: PSYCHIATRY & NEUROLOGY

## 2017-11-09 RX ORDER — TRAZODONE HYDROCHLORIDE 50 MG/1
50 TABLET ORAL ONCE
Status: COMPLETED | OUTPATIENT
Start: 2017-11-09 | End: 2017-11-09

## 2017-11-09 RX ORDER — TRAZODONE HYDROCHLORIDE 100 MG/1
100 TABLET ORAL NIGHTLY PRN
Status: DISCONTINUED | OUTPATIENT
Start: 2017-11-10 | End: 2017-11-20 | Stop reason: HOSPADM

## 2017-11-09 RX ADMIN — TRAZODONE HYDROCHLORIDE 50 MG: 50 TABLET ORAL at 22:23

## 2017-11-09 RX ADMIN — PANTOPRAZOLE SODIUM 40 MG: 40 TABLET, DELAYED RELEASE ORAL at 08:26

## 2017-11-09 RX ADMIN — ACETAMINOPHEN 650 MG: 325 TABLET, FILM COATED ORAL at 15:02

## 2017-11-09 RX ADMIN — TOPIRAMATE 150 MG: 100 TABLET, FILM COATED ORAL at 21:05

## 2017-11-09 RX ADMIN — TRAZODONE HYDROCHLORIDE 50 MG: 50 TABLET ORAL at 21:05

## 2017-11-09 RX ADMIN — HYDROXYZINE HYDROCHLORIDE 50 MG: 25 TABLET, FILM COATED ORAL at 21:05

## 2017-11-09 RX ADMIN — DIPHENOXYLATE HYDROCHLORIDE AND ATROPINE SULFATE 1 TABLET: 2.5; .025 TABLET ORAL at 22:23

## 2017-11-09 RX ADMIN — PREGABALIN 200 MG: 100 CAPSULE ORAL at 21:05

## 2017-11-09 RX ADMIN — QUETIAPINE FUMARATE 300 MG: 300 TABLET, FILM COATED ORAL at 21:05

## 2017-11-09 RX ADMIN — DIPHENHYDRAMINE HCL 25 MG: 25 TABLET ORAL at 21:05

## 2017-11-09 RX ADMIN — BUPRENORPHINE HYDROCHLORIDE, NALOXONE HYDROCHLORIDE 4 MG: 2; .5 FILM, SOLUBLE BUCCAL; SUBLINGUAL at 21:05

## 2017-11-09 RX ADMIN — DIVALPROEX SODIUM 500 MG: 500 TABLET, FILM COATED, EXTENDED RELEASE ORAL at 08:26

## 2017-11-09 RX ADMIN — PREGABALIN 200 MG: 100 CAPSULE ORAL at 08:39

## 2017-11-09 RX ADMIN — SIMVASTATIN 40 MG: 40 TABLET, FILM COATED ORAL at 21:05

## 2017-11-09 RX ADMIN — DIVALPROEX SODIUM 500 MG: 500 TABLET, FILM COATED, EXTENDED RELEASE ORAL at 21:05

## 2017-11-09 RX ADMIN — TOPIRAMATE 150 MG: 100 TABLET, FILM COATED ORAL at 08:26

## 2017-11-09 RX ADMIN — DIPHENHYDRAMINE HCL 25 MG: 25 TABLET ORAL at 15:02

## 2017-11-09 RX ADMIN — DULOXETINE 60 MG: 60 CAPSULE, DELAYED RELEASE ORAL at 08:26

## 2017-11-09 RX ADMIN — FLUTICASONE PROPIONATE 1 SPRAY: 50 SPRAY, METERED NASAL at 21:05

## 2017-11-09 RX ADMIN — BUPRENORPHINE HYDROCHLORIDE, NALOXONE HYDROCHLORIDE 4 MG: 2; .5 FILM, SOLUBLE BUCCAL; SUBLINGUAL at 08:27

## 2017-11-09 ASSESSMENT — PAIN DESCRIPTION - DESCRIPTORS
DESCRIPTORS: CONSTANT
DESCRIPTORS: CONSTANT

## 2017-11-09 ASSESSMENT — PAIN DESCRIPTION - LOCATION
LOCATION: BACK
LOCATION: BACK

## 2017-11-09 ASSESSMENT — PAIN DESCRIPTION - FREQUENCY
FREQUENCY: CONTINUOUS
FREQUENCY: CONTINUOUS

## 2017-11-09 ASSESSMENT — PAIN SCALES - GENERAL
PAINLEVEL_OUTOF10: 6
PAINLEVEL_OUTOF10: 2
PAINLEVEL_OUTOF10: 5
PAINLEVEL_OUTOF10: 9
PAINLEVEL_OUTOF10: 4

## 2017-11-09 NOTE — BH NOTE
PSYCHOEDUCATION GROUP NOTE    Date: 11/09/17  Start Time: 4:00pm  End Time: 4:35pm    Number Participants in Group:  12    Goal:  Patient will demonstrate increased interpersonal interaction   Topic: Self Sabotage    Discipline Responsible:   OT  AT     X Nsg.  RT MHP Other       Participation Level:     None  Minimal     X Active Listener   X Interactive    Monopolizing         Participation Quality:    X Appropriate  Inappropriate          Attentive        Intrusive     X       Sharing        Resistant     X       Supportive        Lethargic       Affective:    Congruent  Incongruent  Blunted  Flat    Constricted  Anxious  Elated  Angry    Labile  Depressed  Other         Cognitive:    X Alert  Oriented PPTP     Concentration   X G  F  P   Attention Span   X G  F  P   Short-Term Memory   X G  F  P   Long-Term Memory   X G  F  P   ProblemSolving/  Decision Making  G   X F  P   Ability to Process  Information  G   X F  P      Contributing Factors             Delusional             Hallucinating             Flight of Ideas             Other:       Modes of Intervention:    X Education  Support  Exploration    Clarifying   X Problem Solving  Confrontation     X Socialization  Limit Setting  Reality Testing    Activity  Movement  Media    Other:            Response to Learning:    X Able to verbalize current knowledge/experience     X Able to verbalize/acknowledge new learning    Able to retain information     X Capable of insight    Able to change behavior     X Progressing to goal    Other:        Comments: progressing toward goal.  Active participant and listener.

## 2017-11-09 NOTE — FLOWSHEET NOTE
*Patient participated in the [facility]       11/09/17 1415   Encounter Summary   Services provided to: Patient   Referral/Consult From: Rounding   Continue Visiting (11/9/17)   Complexity of Encounter Low   Length of Encounter 30 minutes   Routine   Type Follow up   Assessment Calm   Intervention Active listening   Outcome Receptive

## 2017-11-09 NOTE — PLAN OF CARE
RiskTotal: 62  Jack Scale Jack Scale Score: 20  BVC Total: 0    Change in scores:  No. Changes to plan of Care:  No    Status EXAM:   Status and Exam  Normal: No  Facial Expression: Expressionless, Flat  Affect: Blunt  Level of Consciousness: Alert  Mood:Normal: No  Mood: Depressed, Anxious  Motor Activity:Normal: No  Motor Activity: Unusual Posture/Gait  Interview Behavior: Cooperative  Preception: [place] to Person, Gaylin Maser to Time, [place] to Place, [place] to Situation  Attention:Normal: No  Attention: Distractible  Thought Processes: Blocking  Thought Content:Normal: No  Thought Content: Poverty of Content, Preoccupations  Hallucinations: Auditory (Comment)  Delusions: No  Memory:Normal: Yes  Memory: Poor Recent, Poor Remote  Insight and Judgment: No  Insight and Judgment: Poor Judgment, Poor Insight  Present Suicidal Ideation: Yes (no plan)  Present Homicidal Ideation: No    Daily Assessment Last Entry:   Daily Sleep (WDL): Within Defined Limits         Patient Currently in Pain: Yes  Daily Nutrition (WDL): Within Defined Limits    Patient Monitoring:  Frequency of Checks: 4 times per hour, close    Psychiatric Symptoms:   Depression Symptoms  Depression Symptoms: Feelings of helplessness, Feelings of hopelessess  Anxiety Symptoms  Anxiety Symptoms: Generalized  [place] Symptoms  [place] Symptoms: No problems reported or observed. Psychosis Symptoms  Delusion Type: No problems reported or observed.     Suicide Risk CSSR-S:  1) Within the past month, have you wished you were dead or wished you could go to sleep and not wake up? : YES  2) Within the past month, have you actually had any thoughts of killing yourself? : NO  6)  Have you ever done anything, started to do anything, or prepared to do anything to end your life?: NO  Change in result:  none  Change in plan of care:  none    EDUCATION:   Learner Progress Toward Treatment Goals:   Reviewed results and recommendations of this team, reviewed group plan and strategies, reviewed signs, symptoms and risk of self harm and violent behavior, reviewed goals and plan of care. Method:  individual education, small group, verbal education. Outcome:    Pt verbalized understanding, but needs reinforcement to obtain goals. PATIENT GOALS:  Short term: To learn how to control emotions. Long term: To adhere to AOD and Mental health treatment. PLAN/TREATMENT RECOMMENDATIONS UPDATE:   Continue with group therapies, education of coping skills   Continue to monitor patient on unit. Medications provided/medication compliance by patient. Continue for plans to obtain long term goals after discharge.     SHORT-TERM GOALS UPDATE:  Time frame for Short-Term Goals:  8-14days     LONG-TERM GOALS UPDATE:  Time frame for Long-Term Goals:  6 months    Members Present in Team Meeting:   See signature sheet  Magui Garcia, TONYA, LYDIA    Goal: STG-Able to verbalize suicidal ideations  Outcome: Ongoing

## 2017-11-09 NOTE — PLAN OF CARE
Problem: Altered Mood, Depressive Behavior  Goal: LTG-Able to verbalize acceptance of life and situations over which he or she has no control  Outcome: Ongoing  Pt did not attend RT group at 1430 d/t meeting with the .

## 2017-11-09 NOTE — PLAN OF CARE
Problem: Altered Mood, Depressive Behavior  Goal: LTG-Able to verbalize acceptance of life and situations over which he or she has no control  Outcome: Ongoing  PSYCHOEDUCATION GROUP NOTE    Date: 11/9/17  Start Time: 1000  End Time: 1040    Number Participants in Group:  12    Goal:  Patient will demonstrate increased interpersonal interaction   Topic: Goal setting, behavior modification, internal vs external locus of control    Discipline Responsible:   OT  AT x SW  Nsg.  RT MHP Other       Participation Level:     None  Minimal   x Active Listener x Interactive    Monopolizing         Participation Quality:   Appropriate  Inappropriate          Attentive        Intrusive   x       Sharing        Resistant          Supportive        Lethargic       Affective:   x Congruent  Incongruent  Blunted  Flat    Constricted  Anxious  Elated  Angry    Labile  Depressed  Other         Cognitive:  x Alert x Oriented PPTP     Concentration  G x F  P   Attention Span x G  F  P   Short-Term Memory  G  F  P   Long-Term Memory  G  F  P   ProblemSolving/  Decision Making  G x F  P   Ability to Process  Information  G x F  P      Contributing Factors             Delusional             Hallucinating             Flight of Ideas             Other:       Modes of Intervention:  x Education x Support x Exploration   x Clarifying  Problem Solving  Confrontation   x Socialization  Limit Setting  Reality Testing    Activity  Movement  Media    Other:            Response to Learning:  x Able to verbalize current knowledge/experience   x Able to verbalize/acknowledge new learning    Able to retain information    Capable of insight   x Able to change behavior   x Progressing to goal    Other:        Comments:

## 2017-11-09 NOTE — PLAN OF CARE
Problem: Altered Mood, Depressive Behavior  Goal: LTG-Able to verbalize acceptance of life and situations over which he or she has no control  Outcome: Ongoing  PSYCHOEDUCATION GROUP NOTE    Date: 11/9/17  Start Time: 1100  End Time: 1130    Number Participants in Group:  10/16    Goal:  Patient will demonstrate increased interpersonal interaction   Topic: Problem Solving/Socialization    Discipline Responsible:   OT  AT  [facility]. x RT MHP Other       Participation Level:     None  Minimal   x Active Listener x Interactive    Monopolizing         Participation Quality:  x Appropriate  Inappropriate   x       Attentive        Intrusive          Sharing        Resistant          Supportive        Lethargic       Affective:   x Congruent  Incongruent  Blunted  Flat    Constricted  Anxious  Elated  Angry    Labile  Depressed  Other         Cognitive:  x Alert x Oriented PPTP     Concentration x G  F  P   Attention Span x G  F  P   Short-Term Memory x G  F  P   Long-Term Memory x G  F  P   ProblemSolving/  Decision Making x G  F  P   Ability to Process  Information x G  F  P      Contributing Factors             Delusional             Hallucinating             Flight of Ideas             Other:       Modes of Intervention:  x Education x Support x Exploration    Clarifying x Problem Solving  Confrontation   x Socialization  Limit Setting x Reality Testing    Activity  Movement x Media    Other:            Response to Learning:  x Able to verbalize current knowledge/experience   x Able to verbalize/acknowledge new learning    Able to retain information    Capable of insight    Able to change behavior   x Progressing to goal    Other:        Comments:

## 2017-11-09 NOTE — PLAN OF CARE
Problem: Falls - Risk of  Goal: Absence of falls  Outcome: Ongoing  Patient remains free from falls, using wheelchair appropriately at this time. Problem: Pain:  Goal: Pain level will decrease  Pain level will decrease   Outcome: Ongoing  Patient denies pain at this time. Problem: Altered Mood, Depressive Behavior  Goal: LTG-Able to verbalize acceptance of life and situations over which he or she has no control  Outcome: Ongoing  Patient admits to si \"off and on\", admits to voices \"off and on\", medication compliant, pleasant with staff, out in dayroom, remains safe on 15 min checks. Goal: STG-Able to verbalize suicidal ideations  Outcome: Ongoing  Patient admits to si \"off and on\", admits to voices \"off and on\", medication compliant, pleasant with staff, out in dayroom, remains safe on 15 min checks.

## 2017-11-10 LAB — GLUCOSE BLD-MCNC: 84 MG/DL (ref 75–110)

## 2017-11-10 PROCEDURE — 6360000002 HC RX W HCPCS: Performed by: PSYCHIATRY & NEUROLOGY

## 2017-11-10 PROCEDURE — 6370000000 HC RX 637 (ALT 250 FOR IP): Performed by: PSYCHIATRY & NEUROLOGY

## 2017-11-10 PROCEDURE — 82947 ASSAY GLUCOSE BLOOD QUANT: CPT

## 2017-11-10 PROCEDURE — 1240000000 HC EMOTIONAL WELLNESS R&B

## 2017-11-10 RX ORDER — BUPRENORPHINE AND NALOXONE 2; .5 MG/1; MG/1
2 FILM, SOLUBLE BUCCAL; SUBLINGUAL 3 TIMES DAILY
Status: DISCONTINUED | OUTPATIENT
Start: 2017-11-10 | End: 2017-11-16

## 2017-11-10 RX ADMIN — PANTOPRAZOLE SODIUM 40 MG: 40 TABLET, DELAYED RELEASE ORAL at 09:13

## 2017-11-10 RX ADMIN — DIVALPROEX SODIUM 500 MG: 500 TABLET, FILM COATED, EXTENDED RELEASE ORAL at 09:13

## 2017-11-10 RX ADMIN — BUPRENORPHINE HYDROCHLORIDE, NALOXONE HYDROCHLORIDE 4 MG: 2; .5 FILM, SOLUBLE BUCCAL; SUBLINGUAL at 20:49

## 2017-11-10 RX ADMIN — SALINE NASAL SPRAY 1 SPRAY: 1.5 SOLUTION NASAL at 09:12

## 2017-11-10 RX ADMIN — ACETAMINOPHEN 650 MG: 325 TABLET, FILM COATED ORAL at 20:49

## 2017-11-10 RX ADMIN — TRAZODONE HYDROCHLORIDE 100 MG: 100 TABLET ORAL at 20:49

## 2017-11-10 RX ADMIN — BUPRENORPHINE HYDROCHLORIDE, NALOXONE HYDROCHLORIDE 4 MG: 2; .5 FILM, SOLUBLE BUCCAL; SUBLINGUAL at 09:12

## 2017-11-10 RX ADMIN — HYDROXYZINE HYDROCHLORIDE 50 MG: 25 TABLET, FILM COATED ORAL at 20:49

## 2017-11-10 RX ADMIN — DIVALPROEX SODIUM 500 MG: 500 TABLET, FILM COATED, EXTENDED RELEASE ORAL at 20:49

## 2017-11-10 RX ADMIN — ACETAMINOPHEN 650 MG: 325 TABLET, FILM COATED ORAL at 09:18

## 2017-11-10 RX ADMIN — QUETIAPINE FUMARATE 300 MG: 300 TABLET, FILM COATED ORAL at 20:50

## 2017-11-10 RX ADMIN — TOPIRAMATE 150 MG: 100 TABLET, FILM COATED ORAL at 09:13

## 2017-11-10 RX ADMIN — TOPIRAMATE 150 MG: 100 TABLET, FILM COATED ORAL at 20:49

## 2017-11-10 RX ADMIN — DULOXETINE 60 MG: 60 CAPSULE, DELAYED RELEASE ORAL at 09:13

## 2017-11-10 RX ADMIN — SIMVASTATIN 40 MG: 40 TABLET, FILM COATED ORAL at 20:54

## 2017-11-10 RX ADMIN — PREGABALIN 200 MG: 100 CAPSULE ORAL at 09:12

## 2017-11-10 RX ADMIN — PREGABALIN 200 MG: 100 CAPSULE ORAL at 20:53

## 2017-11-10 RX ADMIN — DIPHENHYDRAMINE HCL 25 MG: 25 TABLET ORAL at 20:50

## 2017-11-10 ASSESSMENT — PAIN SCALES - GENERAL
PAINLEVEL_OUTOF10: 10
PAINLEVEL_OUTOF10: 3
PAINLEVEL_OUTOF10: 3
PAINLEVEL_OUTOF10: 1
PAINLEVEL_OUTOF10: 2

## 2017-11-10 ASSESSMENT — PAIN DESCRIPTION - LOCATION
LOCATION: GENERALIZED
LOCATION: GENERALIZED

## 2017-11-10 ASSESSMENT — PAIN DESCRIPTION - PAIN TYPE
TYPE: CHRONIC PAIN
TYPE: CHRONIC PAIN

## 2017-11-10 NOTE — PLAN OF CARE
Problem: Falls - Risk of  Goal: Absence of falls  Outcome: Ongoing  Patient remains free from falls, uses wheelchair to ambulate. Problem: Pain:  Goal: Pain level will decrease  Pain level will decrease   Outcome: Ongoing  Patient denies pain at this time. Problem: Altered Mood, Depressive Behavior  Goal: LTG-Able to verbalize acceptance of life and situations over which he or she has no control  Outcome: Ongoing  Patient admits to SI and voices, states its getting worse now because he hasnt been sleeping well, medication compliant, remains safe on 15 min checks. Goal: STG-Able to verbalize suicidal ideations  Outcome: Ongoing  Patient admits to SI and voices, states its getting worse now because he hasnt been sleeping well, medication compliant, remains safe on 15 min checks.

## 2017-11-11 LAB — GLUCOSE BLD-MCNC: 108 MG/DL (ref 75–110)

## 2017-11-11 PROCEDURE — 6360000002 HC RX W HCPCS: Performed by: PSYCHIATRY & NEUROLOGY

## 2017-11-11 PROCEDURE — 82947 ASSAY GLUCOSE BLOOD QUANT: CPT

## 2017-11-11 PROCEDURE — 6370000000 HC RX 637 (ALT 250 FOR IP): Performed by: PSYCHIATRY & NEUROLOGY

## 2017-11-11 PROCEDURE — 1240000000 HC EMOTIONAL WELLNESS R&B

## 2017-11-11 RX ADMIN — QUETIAPINE FUMARATE 300 MG: 300 TABLET, FILM COATED ORAL at 20:54

## 2017-11-11 RX ADMIN — DULOXETINE 60 MG: 60 CAPSULE, DELAYED RELEASE ORAL at 09:10

## 2017-11-11 RX ADMIN — FLUTICASONE PROPIONATE 1 SPRAY: 50 SPRAY, METERED NASAL at 20:54

## 2017-11-11 RX ADMIN — PREGABALIN 200 MG: 100 CAPSULE ORAL at 20:55

## 2017-11-11 RX ADMIN — SALINE NASAL SPRAY 1 SPRAY: 1.5 SOLUTION NASAL at 09:09

## 2017-11-11 RX ADMIN — TRAZODONE HYDROCHLORIDE 100 MG: 100 TABLET ORAL at 20:55

## 2017-11-11 RX ADMIN — DIPHENHYDRAMINE HCL 25 MG: 25 TABLET ORAL at 14:31

## 2017-11-11 RX ADMIN — PREGABALIN 200 MG: 100 CAPSULE ORAL at 09:10

## 2017-11-11 RX ADMIN — DIPHENOXYLATE HYDROCHLORIDE AND ATROPINE SULFATE 1 TABLET: 2.5; .025 TABLET ORAL at 14:31

## 2017-11-11 RX ADMIN — BUPRENORPHINE HYDROCHLORIDE, NALOXONE HYDROCHLORIDE 4 MG: 2; .5 FILM, SOLUBLE BUCCAL; SUBLINGUAL at 09:11

## 2017-11-11 RX ADMIN — DIVALPROEX SODIUM 500 MG: 500 TABLET, FILM COATED, EXTENDED RELEASE ORAL at 20:54

## 2017-11-11 RX ADMIN — DIVALPROEX SODIUM 500 MG: 500 TABLET, FILM COATED, EXTENDED RELEASE ORAL at 09:10

## 2017-11-11 RX ADMIN — SIMVASTATIN 40 MG: 40 TABLET, FILM COATED ORAL at 20:55

## 2017-11-11 RX ADMIN — SALINE NASAL SPRAY 1 SPRAY: 1.5 SOLUTION NASAL at 14:34

## 2017-11-11 RX ADMIN — TOPIRAMATE 150 MG: 100 TABLET, FILM COATED ORAL at 11:40

## 2017-11-11 RX ADMIN — TOPIRAMATE 150 MG: 100 TABLET, FILM COATED ORAL at 20:55

## 2017-11-11 RX ADMIN — BUPRENORPHINE HYDROCHLORIDE, NALOXONE HYDROCHLORIDE 4 MG: 2; .5 FILM, SOLUBLE BUCCAL; SUBLINGUAL at 20:54

## 2017-11-11 RX ADMIN — ACETAMINOPHEN 650 MG: 325 TABLET, FILM COATED ORAL at 14:31

## 2017-11-11 RX ADMIN — PANTOPRAZOLE SODIUM 40 MG: 40 TABLET, DELAYED RELEASE ORAL at 09:10

## 2017-11-11 RX ADMIN — DIPHENOXYLATE HYDROCHLORIDE AND ATROPINE SULFATE 1 TABLET: 2.5; .025 TABLET ORAL at 20:55

## 2017-11-11 RX ADMIN — DIPHENHYDRAMINE HCL 25 MG: 25 TABLET ORAL at 20:55

## 2017-11-11 RX ADMIN — BUPRENORPHINE HYDROCHLORIDE, NALOXONE HYDROCHLORIDE 4 MG: 2; .5 FILM, SOLUBLE BUCCAL; SUBLINGUAL at 14:21

## 2017-11-11 ASSESSMENT — PAIN DESCRIPTION - LOCATION
LOCATION: GENERALIZED
LOCATION: GENERALIZED

## 2017-11-11 ASSESSMENT — PAIN DESCRIPTION - PAIN TYPE
TYPE: CHRONIC PAIN
TYPE: CHRONIC PAIN

## 2017-11-11 ASSESSMENT — PAIN DESCRIPTION - DESCRIPTORS: DESCRIPTORS: CONSTANT

## 2017-11-11 ASSESSMENT — PAIN SCALES - GENERAL
PAINLEVEL_OUTOF10: 10
PAINLEVEL_OUTOF10: 8
PAINLEVEL_OUTOF10: 10
PAINLEVEL_OUTOF10: 3
PAINLEVEL_OUTOF10: 5

## 2017-11-11 ASSESSMENT — PAIN DESCRIPTION - FREQUENCY: FREQUENCY: CONTINUOUS

## 2017-11-11 ASSESSMENT — PAIN DESCRIPTION - ONSET: ONSET: ON-GOING

## 2017-11-11 NOTE — PLAN OF CARE
Problem: Altered Mood, Depressive Behavior  Goal: STG-Able to verbalize suicidal ideations  Outcome: Ongoing  Continues with fleeting SI agrees to seek out staff if feels he will harm self. Pt remains  free from harm att. Out to day room and social with select few. Remains  free from falls att.

## 2017-11-11 NOTE — BH NOTE
PSYCHOEDUCATION GROUP NOTE    Date: 11/11/17   Start Time: 4:00  End Time: 4:30    Number Participants in Group:  6    Goal:  Patient will demonstrate increased interpersonal interaction   Topic: relaxation    Discipline Responsible:   OT  AT   x Nsg.  RT MHP Other       Participation Level:     None  Minimal   x Active Listener  Interactive    Monopolizing         Participation Quality:  x Appropriate  Inappropriate          Attentive        Intrusive          Sharing        Resistant          Supportive        Lethargic       Affective:   x Congruent  Incongruent  Blunted  Flat    Constricted  Anxious  Elated  Angry    Labile  Depressed  Other         Cognitive:   Alert  Oriented PPTP   x  Concentration x G  F  P   Attention Span x G  F  P   Short-Term Memory x G  F  P   Long-Term Memory x G  F  P   ProblemSolving/  Decision Making x G  F  P   Ability to Process  Information x G  F  P      Contributing Factors             Delusional             Hallucinating             Flight of Ideas             Other:       Modes of Intervention:  x Education  Support  Exploration    Clarifying  Problem Solving  Confrontation    Socialization  Limit Setting  Reality Testing    Activity  Movement  Media    Other:            Response to Learning:  x Able to verbalize current knowledge/experience    Able to verbalize/acknowledge new learning    Able to retain information    Capable of insight    Able to change behavior    Progressing to goal    Other:        Comments:

## 2017-11-11 NOTE — PLAN OF CARE
Problem: Altered Mood, Depressive Behavior  Goal: LTG-Able to verbalize acceptance of life and situations over which he or she has no control  Outcome: Ongoing  Psycho Education Group Note    Date: 11/11/17  Start Time: 1000  End Time: 1045    Number Participants in Group:  7    Goal:  Patient will demonstrate increased interpersonal interaction   Topic: [place] [facility]    Discipline Responsible:   OT  AT x SW  Nsg.  RT  Other       Participation Level:     None  Minimal   x Active Listener x Interactive    Monopolizing         Participation Quality:  x Appropriate  Inappropriate   x       Attentive        Intrusive   x       Sharing        Resistant   x       Supportive        Lethargic       Affective:   x Congruent  Incongruent  Blunted  Flat    Constricted  Anxious  Elated  Angry    Labile  Depressed  Other         Cognitive:  x Alert x Oriented PPTP     Concentration  G x F  P   Attention Span  G x F  P   Short-Term Memory  G x F  P   Long-Term Memory  G x F  P   ProblemSolving/  Decision Making  G x F  P   Ability to Process  Information  G x F  P      Contributing Factors             Delusional             Hallucinating             Flight of Ideas             Other:       Modes of Intervention:   Education x Support  Exploration    Clarifying  Problem Solving  Confrontation    Socialization  Limit Setting  Reality Testing    Activity  Movement  Media    Other:            Response to Learning:  x Able to verbalize current knowledge/experience    Able to verbalize/acknowledge new learning    Able to retain information    Capable of insight    Able to change behavior    Progressing to goal    Other:        Comments:

## 2017-11-12 PROCEDURE — 6370000000 HC RX 637 (ALT 250 FOR IP): Performed by: PSYCHIATRY & NEUROLOGY

## 2017-11-12 PROCEDURE — 1240000000 HC EMOTIONAL WELLNESS R&B

## 2017-11-12 PROCEDURE — 6360000002 HC RX W HCPCS: Performed by: PSYCHIATRY & NEUROLOGY

## 2017-11-12 RX ADMIN — FLUTICASONE PROPIONATE 1 SPRAY: 50 SPRAY, METERED NASAL at 21:11

## 2017-11-12 RX ADMIN — DIPHENOXYLATE HYDROCHLORIDE AND ATROPINE SULFATE 1 TABLET: 2.5; .025 TABLET ORAL at 09:03

## 2017-11-12 RX ADMIN — QUETIAPINE FUMARATE 300 MG: 300 TABLET, FILM COATED ORAL at 21:12

## 2017-11-12 RX ADMIN — PREGABALIN 200 MG: 100 CAPSULE ORAL at 21:11

## 2017-11-12 RX ADMIN — ACETAMINOPHEN 650 MG: 325 TABLET, FILM COATED ORAL at 15:15

## 2017-11-12 RX ADMIN — HYDROXYZINE HYDROCHLORIDE 50 MG: 25 TABLET, FILM COATED ORAL at 19:25

## 2017-11-12 RX ADMIN — SALINE NASAL SPRAY 1 SPRAY: 1.5 SOLUTION NASAL at 09:02

## 2017-11-12 RX ADMIN — DIVALPROEX SODIUM 500 MG: 500 TABLET, FILM COATED, EXTENDED RELEASE ORAL at 09:03

## 2017-11-12 RX ADMIN — DIVALPROEX SODIUM 500 MG: 500 TABLET, FILM COATED, EXTENDED RELEASE ORAL at 21:13

## 2017-11-12 RX ADMIN — ALUMINUM HYDROXIDE, MAGNESIUM HYDROXIDE, AND SIMETHICONE 30 ML: 200; 200; 20 SUSPENSION ORAL at 19:25

## 2017-11-12 RX ADMIN — TRAZODONE HYDROCHLORIDE 100 MG: 100 TABLET ORAL at 21:12

## 2017-11-12 RX ADMIN — PANTOPRAZOLE SODIUM 40 MG: 40 TABLET, DELAYED RELEASE ORAL at 09:03

## 2017-11-12 RX ADMIN — BUPRENORPHINE HYDROCHLORIDE, NALOXONE HYDROCHLORIDE 4 MG: 2; .5 FILM, SOLUBLE BUCCAL; SUBLINGUAL at 21:13

## 2017-11-12 RX ADMIN — DIPHENOXYLATE HYDROCHLORIDE AND ATROPINE SULFATE 1 TABLET: 2.5; .025 TABLET ORAL at 21:11

## 2017-11-12 RX ADMIN — TOPIRAMATE 150 MG: 100 TABLET, FILM COATED ORAL at 21:12

## 2017-11-12 RX ADMIN — BUPRENORPHINE HYDROCHLORIDE, NALOXONE HYDROCHLORIDE 4 MG: 2; .5 FILM, SOLUBLE BUCCAL; SUBLINGUAL at 15:09

## 2017-11-12 RX ADMIN — PREGABALIN 200 MG: 100 CAPSULE ORAL at 09:03

## 2017-11-12 RX ADMIN — SIMVASTATIN 40 MG: 40 TABLET, FILM COATED ORAL at 21:12

## 2017-11-12 RX ADMIN — TOPIRAMATE 150 MG: 100 TABLET, FILM COATED ORAL at 09:03

## 2017-11-12 RX ADMIN — BUPRENORPHINE HYDROCHLORIDE, NALOXONE HYDROCHLORIDE 4 MG: 2; .5 FILM, SOLUBLE BUCCAL; SUBLINGUAL at 09:05

## 2017-11-12 RX ADMIN — DULOXETINE 60 MG: 60 CAPSULE, DELAYED RELEASE ORAL at 09:18

## 2017-11-12 RX ADMIN — DIPHENHYDRAMINE HCL 25 MG: 25 TABLET ORAL at 21:13

## 2017-11-12 ASSESSMENT — PAIN DESCRIPTION - LOCATION
LOCATION: BACK
LOCATION: BACK

## 2017-11-12 ASSESSMENT — PAIN DESCRIPTION - PAIN TYPE: TYPE: CHRONIC PAIN

## 2017-11-12 ASSESSMENT — PAIN SCALES - GENERAL
PAINLEVEL_OUTOF10: 3
PAINLEVEL_OUTOF10: 7
PAINLEVEL_OUTOF10: 10
PAINLEVEL_OUTOF10: 3
PAINLEVEL_OUTOF10: 3
PAINLEVEL_OUTOF10: 7
PAINLEVEL_OUTOF10: 9

## 2017-11-12 ASSESSMENT — PAIN DESCRIPTION - DESCRIPTORS: DESCRIPTORS: CONSTANT

## 2017-11-12 ASSESSMENT — PAIN DESCRIPTION - ORIENTATION: ORIENTATION: RIGHT;LEFT

## 2017-11-12 NOTE — PLAN OF CARE
Problem: Falls - Risk of  Goal: Absence of falls  Outcome: Ongoing  Patient remains free from falls at this time, uses wheelchair appropriately, lethargic at this time. Problem: Pain:  Goal: Pain level will decrease  Pain level will decrease   Outcome: Ongoing  Patient admits to pain all over, will give prn pain medication with night meds. Problem: Altered Mood, Depressive Behavior  Goal: LTG-Able to verbalize acceptance of life and situations over which he or she has no control  Outcome: Ongoing  Patient admits to SI off and on, voices off and on, lethargic upon approach d/t new medication, patient states sleep is improving but he doesn't feel any better, remains safe on 15 min checks. Goal: STG-Able to verbalize suicidal ideations  Outcome: Ongoing  Patient admits to SI off and on, voices off and on, lethargic upon approach d/t new medication, patient states sleep is improving but he doesn't feel any better, remains safe on 15 min checks.

## 2017-11-12 NOTE — PLAN OF CARE
Problem: Altered Mood, Depressive Behavior  Goal: LTG-Able to verbalize acceptance of life and situations over which he or she has no control  Outcome: Ongoing  PSYCHOEDUCATION GROUP NOTE    Date: November 12, 2017  Start Time: 0900  End Time: 0945    Number Participants in Group:  7/10    Goal:  Patient will demonstrate increased interpersonal interaction   Topic: Community Meeting/Trivia     Discipline Responsible:   OT  AT  [facility]. x RT MHP Other       Participation Level:     None x Minimal    Active Listener  Interactive    Monopolizing         Participation Quality:   Appropriate  Inappropriate          Attentive        Intrusive          Sharing x       Resistant          Supportive        Lethargic       Affective:    Congruent  Incongruent  Blunted x Flat    Constricted  Anxious  Elated  Angry    Labile  Depressed  Other         Cognitive:  x Alert x Oriented PPTP     Concentration  G  F x P   Attention Span  G  F x P   Short-Term Memory  G x F  P   Long-Term Memory  G x F  P   ProblemSolving/  Decision Making  G x F  P   Ability to Process  Information  G x F  P      Contributing Factors             Delusional             Hallucinating             Flight of Ideas             Other:       Modes of Intervention:  x Education  Support  Exploration   x Clarifying x Problem Solving  Confrontation   x Socialization  Limit Setting x Reality Testing   x Activity  Movement  Media    Other:            Response to Learning:   Able to verbalize current knowledge/experience    Able to verbalize/acknowledge new learning    Able to retain information    Capable of insight    Able to change behavior   x Progressing to goal    Other:        Comments:

## 2017-11-12 NOTE — PLAN OF CARE
Problem: Altered Mood, Depressive Behavior  Goal: LTG-Able to verbalize acceptance of life and situations over which he or she has no control  Outcome: Ongoing  PSYCHOEDUCATION GROUP NOTE    Date: November 12, 2017  Start Time: 1330  End Time: 7987    Number Participants in Group:  6/10    Goal:  Patient will demonstrate increased interpersonal interaction   Topic: Maslow's Hierarchy of Needs/Creative Expression     Discipline Responsible:   OT  AT  [facility]. x RT MHP Other       Participation Level:     None  Minimal   x Active Listener x Interactive    Monopolizing         Participation Quality:  x Appropriate  Inappropriate   x       Attentive        Intrusive   x       Sharing        Resistant          Supportive        Lethargic       Affective:   x Congruent  Incongruent  Blunted  Flat    Constricted  Anxious  Elated  Angry    Labile  Depressed  Other         Cognitive:  x Alert x Oriented PPTP     Concentration x G  F  P   Attention Span x G  F  P   Short-Term Memory x G  F  P   Long-Term Memory x G  F  P   ProblemSolving/  Decision Making x G  F  P   Ability to Process  Information x G  F  P      Contributing Factors             Delusional             Hallucinating             Flight of Ideas             Other:       Modes of Intervention:  x Education  Support  Exploration    Clarifying x Problem Solving  Confrontation   x Socialization  Limit Setting x Reality Testing   x Activity  Movement  Media    Other:            Response to Learning:   Able to verbalize current knowledge/experience    Able to verbalize/acknowledge new learning    Able to retain information    Capable of insight    Able to change behavior   x Progressing to goal    Other:        Comments:

## 2017-11-12 NOTE — PLAN OF CARE
Problem: Falls - Risk of  Goal: Absence of falls  Outcome: Ongoing  Pt remains free of falls and verbalizes understanding of individual fall risks. Pt wearing non skid footwear and encouraged to seek out staff for any assistance needed. Problem: Pain:  Goal: Pain level will decrease  Pain level will decrease   Outcome: Ongoing  Utilize ordered medications for pain. Goal: Control of acute pain  Control of acute pain   Outcome: Ongoing      Problem: Altered Mood, Depressive Behavior  Goal: LTG-Able to verbalize acceptance of life and situations over which he or she has no control  Outcome: Ongoing  Pt is very flat admits to suicidal ideations. Admits to voices states \" always\" when asked. No self harm noted. Pt is evasive and does not elaborate on his issues.    Goal: STG-Able to verbalize suicidal ideations  Outcome: Ongoing

## 2017-11-12 NOTE — PLAN OF CARE
Problem: Altered Mood, Depressive Behavior  Goal: LTG-Able to verbalize acceptance of life and situations over which he or she has no control  Outcome: Ongoing  Psycho Education Group Note    Date: 11/12/17  Start Time: 1000  End Time: 1045    Number Participants in Group:  6    Goal:  Patient will demonstrate increased interpersonal interaction   Topic: [place] Psycho Education Group    Discipline Responsible:   OT  AT X SW  Nsg.  RT  Other       Participation Level:     None  Minimal    Active Listener x Interactive    Monopolizing         Participation Quality:  x Appropriate  Inappropriate   x       Attentive        Intrusive          Sharing        Resistant          Supportive        Lethargic       Affective:   x Congruent  Incongruent  Blunted  Flat    Constricted  Anxious  Elated  Angry    Labile  Depressed  Other         Cognitive:  x Alert x Oriented PPTP     Concentration  G x F  P   Attention Span  G x F  P   Short-Term Memory  G x F  P   Long-Term Memory  G x F  P   ProblemSolving/  Decision Making  G x F  P   Ability to Process  Information  G x F  P      Contributing Factors             Delusional             Hallucinating             Flight of Ideas             Other:       Modes of Intervention:   Education  Support  Exploration    Clarifying  Problem Solving  Confrontation    Socialization  Limit Setting  Reality Testing   x Activity: Question based game  Movement  Media    Other:            Response to Learning:  x Able to verbalize current knowledge/experience    Able to verbalize/acknowledge new learning   x Able to retain information   x Capable of insight    Able to change behavior    Progressing to goal    Other:        Comments:

## 2017-11-13 LAB — GLUCOSE BLD-MCNC: 97 MG/DL (ref 75–110)

## 2017-11-13 PROCEDURE — 1240000000 HC EMOTIONAL WELLNESS R&B

## 2017-11-13 PROCEDURE — 6360000002 HC RX W HCPCS: Performed by: PSYCHIATRY & NEUROLOGY

## 2017-11-13 PROCEDURE — 82947 ASSAY GLUCOSE BLOOD QUANT: CPT

## 2017-11-13 PROCEDURE — 6370000000 HC RX 637 (ALT 250 FOR IP): Performed by: PSYCHIATRY & NEUROLOGY

## 2017-11-13 RX ADMIN — BUPRENORPHINE HYDROCHLORIDE, NALOXONE HYDROCHLORIDE 4 MG: 2; .5 FILM, SOLUBLE BUCCAL; SUBLINGUAL at 09:15

## 2017-11-13 RX ADMIN — QUETIAPINE FUMARATE 300 MG: 300 TABLET, FILM COATED ORAL at 21:26

## 2017-11-13 RX ADMIN — DIVALPROEX SODIUM 500 MG: 500 TABLET, FILM COATED, EXTENDED RELEASE ORAL at 09:15

## 2017-11-13 RX ADMIN — PREGABALIN 200 MG: 100 CAPSULE ORAL at 09:14

## 2017-11-13 RX ADMIN — SALINE NASAL SPRAY 1 SPRAY: 1.5 SOLUTION NASAL at 15:47

## 2017-11-13 RX ADMIN — BUPRENORPHINE HYDROCHLORIDE, NALOXONE HYDROCHLORIDE 4 MG: 2; .5 FILM, SOLUBLE BUCCAL; SUBLINGUAL at 15:46

## 2017-11-13 RX ADMIN — BUPRENORPHINE HYDROCHLORIDE, NALOXONE HYDROCHLORIDE 4 MG: 2; .5 FILM, SOLUBLE BUCCAL; SUBLINGUAL at 21:26

## 2017-11-13 RX ADMIN — SIMVASTATIN 40 MG: 40 TABLET, FILM COATED ORAL at 21:26

## 2017-11-13 RX ADMIN — ACETAMINOPHEN 650 MG: 325 TABLET, FILM COATED ORAL at 21:28

## 2017-11-13 RX ADMIN — TOPIRAMATE 150 MG: 100 TABLET, FILM COATED ORAL at 09:14

## 2017-11-13 RX ADMIN — DIPHENHYDRAMINE HCL 25 MG: 25 TABLET ORAL at 21:26

## 2017-11-13 RX ADMIN — SALINE NASAL SPRAY 1 SPRAY: 1.5 SOLUTION NASAL at 09:14

## 2017-11-13 RX ADMIN — TOPIRAMATE 150 MG: 100 TABLET, FILM COATED ORAL at 21:25

## 2017-11-13 RX ADMIN — DULOXETINE 60 MG: 60 CAPSULE, DELAYED RELEASE ORAL at 09:15

## 2017-11-13 RX ADMIN — TRAZODONE HYDROCHLORIDE 100 MG: 100 TABLET ORAL at 21:26

## 2017-11-13 RX ADMIN — PANTOPRAZOLE SODIUM 40 MG: 40 TABLET, DELAYED RELEASE ORAL at 09:15

## 2017-11-13 RX ADMIN — HYDROXYZINE HYDROCHLORIDE 50 MG: 25 TABLET, FILM COATED ORAL at 21:26

## 2017-11-13 RX ADMIN — DIVALPROEX SODIUM 500 MG: 500 TABLET, FILM COATED, EXTENDED RELEASE ORAL at 21:26

## 2017-11-13 RX ADMIN — PREGABALIN 200 MG: 100 CAPSULE ORAL at 21:26

## 2017-11-13 RX ADMIN — FLUTICASONE PROPIONATE 1 SPRAY: 50 SPRAY, METERED NASAL at 21:25

## 2017-11-13 RX ADMIN — DIPHENOXYLATE HYDROCHLORIDE AND ATROPINE SULFATE 1 TABLET: 2.5; .025 TABLET ORAL at 22:46

## 2017-11-13 ASSESSMENT — PAIN DESCRIPTION - PAIN TYPE: TYPE: CHRONIC PAIN

## 2017-11-13 ASSESSMENT — PAIN DESCRIPTION - LOCATION: LOCATION: BACK

## 2017-11-13 ASSESSMENT — PAIN SCALES - GENERAL
PAINLEVEL_OUTOF10: 10
PAINLEVEL_OUTOF10: 7
PAINLEVEL_OUTOF10: 6
PAINLEVEL_OUTOF10: 10

## 2017-11-13 NOTE — PLAN OF CARE
Problem: Altered Mood, Depressive Behavior  Goal: LTG-Able to verbalize acceptance of life and situations over which he or she has no control  Outcome: Ongoing  Pt did not attend RT group at 1100 d/t resting in room despite staff invitation to attend.

## 2017-11-14 LAB — GLUCOSE BLD-MCNC: 112 MG/DL (ref 75–110)

## 2017-11-14 PROCEDURE — 6370000000 HC RX 637 (ALT 250 FOR IP): Performed by: PSYCHIATRY & NEUROLOGY

## 2017-11-14 PROCEDURE — 82947 ASSAY GLUCOSE BLOOD QUANT: CPT

## 2017-11-14 PROCEDURE — 6360000002 HC RX W HCPCS: Performed by: PSYCHIATRY & NEUROLOGY

## 2017-11-14 PROCEDURE — 1240000000 HC EMOTIONAL WELLNESS R&B

## 2017-11-14 RX ADMIN — ACETAMINOPHEN 650 MG: 325 TABLET, FILM COATED ORAL at 21:10

## 2017-11-14 RX ADMIN — SIMVASTATIN 40 MG: 40 TABLET, FILM COATED ORAL at 21:11

## 2017-11-14 RX ADMIN — DIVALPROEX SODIUM 500 MG: 500 TABLET, FILM COATED, EXTENDED RELEASE ORAL at 21:10

## 2017-11-14 RX ADMIN — DIPHENHYDRAMINE HCL 25 MG: 25 TABLET ORAL at 21:12

## 2017-11-14 RX ADMIN — HYDROXYZINE HYDROCHLORIDE 50 MG: 25 TABLET, FILM COATED ORAL at 08:32

## 2017-11-14 RX ADMIN — SALINE NASAL SPRAY 1 SPRAY: 1.5 SOLUTION NASAL at 21:12

## 2017-11-14 RX ADMIN — DIVALPROEX SODIUM 500 MG: 500 TABLET, FILM COATED, EXTENDED RELEASE ORAL at 08:33

## 2017-11-14 RX ADMIN — TRAZODONE HYDROCHLORIDE 100 MG: 100 TABLET ORAL at 21:12

## 2017-11-14 RX ADMIN — BUPRENORPHINE HYDROCHLORIDE, NALOXONE HYDROCHLORIDE 4 MG: 2; .5 FILM, SOLUBLE BUCCAL; SUBLINGUAL at 21:10

## 2017-11-14 RX ADMIN — QUETIAPINE FUMARATE 300 MG: 300 TABLET, FILM COATED ORAL at 21:12

## 2017-11-14 RX ADMIN — TOPIRAMATE 150 MG: 100 TABLET, FILM COATED ORAL at 21:10

## 2017-11-14 RX ADMIN — TOPIRAMATE 150 MG: 100 TABLET, FILM COATED ORAL at 08:32

## 2017-11-14 RX ADMIN — PANTOPRAZOLE SODIUM 40 MG: 40 TABLET, DELAYED RELEASE ORAL at 08:32

## 2017-11-14 RX ADMIN — HYDROXYZINE HYDROCHLORIDE 50 MG: 25 TABLET, FILM COATED ORAL at 21:10

## 2017-11-14 RX ADMIN — DULOXETINE 60 MG: 60 CAPSULE, DELAYED RELEASE ORAL at 08:32

## 2017-11-14 RX ADMIN — BUPRENORPHINE HYDROCHLORIDE, NALOXONE HYDROCHLORIDE 4 MG: 2; .5 FILM, SOLUBLE BUCCAL; SUBLINGUAL at 08:34

## 2017-11-14 RX ADMIN — PREGABALIN 200 MG: 100 CAPSULE ORAL at 21:09

## 2017-11-14 RX ADMIN — FLUTICASONE PROPIONATE 1 SPRAY: 50 SPRAY, METERED NASAL at 21:13

## 2017-11-14 RX ADMIN — BUPRENORPHINE HYDROCHLORIDE, NALOXONE HYDROCHLORIDE 4 MG: 2; .5 FILM, SOLUBLE BUCCAL; SUBLINGUAL at 14:36

## 2017-11-14 RX ADMIN — PREGABALIN 200 MG: 100 CAPSULE ORAL at 08:33

## 2017-11-14 ASSESSMENT — PAIN SCALES - GENERAL
PAINLEVEL_OUTOF10: 3
PAINLEVEL_OUTOF10: 0
PAINLEVEL_OUTOF10: 0
PAINLEVEL_OUTOF10: 10
PAINLEVEL_OUTOF10: 2

## 2017-11-14 ASSESSMENT — PAIN DESCRIPTION - PAIN TYPE: TYPE: CHRONIC PAIN

## 2017-11-14 ASSESSMENT — PAIN DESCRIPTION - LOCATION: LOCATION: GENERALIZED

## 2017-11-14 NOTE — PLAN OF CARE
Problem: Falls - Risk of  Goal: Absence of falls  Outcome: Ongoing  This patient remains free from falls, non- skid socks on and room is free from clutter. Problem: Altered Mood, Depressive Behavior  Goal: STG-Able to verbalize suicidal ideations  Outcome: Ongoing  During one on one conversation this patient denies having any thoughts of wanting to harm self or others at this time. This patient admits that he always has voices that are quiet, doesn't understand them. This patient is attending select groups through out the day and is social with select peers. This patient has no plans for discharge as of yet. This patient has been sleeping well at night and is having no issues with eating meals. Until discharge this patient will remain on 15 min checks per unit policy to maintain safety.

## 2017-11-14 NOTE — PLAN OF CARE
Problem: Altered Mood, Depressive Behavior  Goal: LTG-Able to verbalize acceptance of life and situations over which he or she has no control  Outcome: Ongoing  PSYCHOEDUCATION GROUP NOTE    Date: 11/14/17  Start Time: 1000  End Time: 1045    Number Participants in Group:  6    Goal:  Patient will demonstrate increased interpersonal interaction   Topic: Strengths base    Discipline Responsible:   OT  AT x SW  Nsg.  RT MHP Other       Participation Level:     None  Minimal   x Active Listener x Interactive    Monopolizing         Participation Quality:   Appropriate  Inappropriate   x       Attentive        Intrusive   x       Sharing        Resistant   x       Supportive        Lethargic       Affective:   x Congruent  Incongruent  Blunted  Flat    Constricted  Anxious  Elated  Angry    Labile  Depressed  Other         Cognitive:  x Alert x Oriented PPTP     Concentration x G  F  P   Attention Span x G  F  P   Short-Term Memory  G  F  P   Long-Term Memory  G  F  P   ProblemSolving/  Decision Making x G  F  P   Ability to Process  Information x G  F  P      Contributing Factors             Delusional             Hallucinating             Flight of Ideas             Other:       Modes of Intervention:  x Education x Support x Exploration   x Clarifying x Problem Solving  Confrontation   x Socialization  Limit Setting  Reality Testing    Activity  Movement  Media    Other:            Response to Learning:  x Able to verbalize current knowledge/experience   x Able to verbalize/acknowledge new learning    Able to retain information   x Capable of insight    Able to change behavior   x Progressing to goal    Other:        Comments:

## 2017-11-14 NOTE — FLOWSHEET NOTE
*patient participated in spirituality group       11/14/17 4019   Encounter Summary   Services provided to: Patient   Referral/Consult From: Rounding   Continue Visiting (11/14/17)   Complexity of Encounter Low   Length of Encounter 30 minutes   Routine   Type Follow up   Assessment Calm   Intervention Active listening   Outcome Receptive

## 2017-11-14 NOTE — PLAN OF CARE
Problem: Altered Mood, Depressive Behavior  Goal: LTG-Able to verbalize acceptance of life and situations over which he or she has no control  Outcome: Ongoing  PSYCHOEDUCATION GROUP NOTE    Date: 11/14/17  Start Time: 1100  End Time: 1135    Number Participants in Group:  9/13    Goal:  Patient will demonstrate increased interpersonal interaction   Topic: Leisure Awareness/Socialization     Discipline Responsible:   OT  AT    Ns. x RT MHP Other       Participation Level:     None  Minimal   x Active Listener x Interactive    Monopolizing         Participation Quality:  x Appropriate  Inappropriate   x       Attentive        Intrusive          Sharing        Resistant          Supportive        Lethargic       Affective:    Congruent  Incongruent  Blunted  Flat   x Constricted  Anxious  Elated  Angry    Labile  Depressed  Other         Cognitive:  x Alert x Oriented PPTP     Concentration x G  F  P   Attention Span x G  F  P   Short-Term Memory x G  F  P   Long-Term Memory x G  F  P   ProblemSolving/  Decision Making  G x F  P   Ability to Process  Information  G x F  P      Contributing Factors             Delusional             Hallucinating             Flight of Ideas             Other:       Modes of Intervention:   Education x Support x Exploration    Clarifying x Problem Solving  Confrontation   x Socialization  Limit Setting x Reality Testing   x Activity  Movement  Media    Other:            Response to Learning:   Able to verbalize current knowledge/experience    Able to verbalize/acknowledge new learning    Able to retain information    Capable of insight    Able to change behavior   x Progressing to goal    Other:        Comments:

## 2017-11-14 NOTE — PLAN OF CARE
Problem: Altered Mood, Depressive Behavior  Goal: LTG-Able to verbalize acceptance of life and situations over which he or she has no control  Outcome: Ongoing  1:1 with pt x ten minutes. Pt encouraged to attend unit programming and interact with peers and staff. Pt also encouraged to tend to hygiene and ADLs. Pt encouraged to discuss feelings with staff and feedback and reassurance provided. Patient admits to hearing voices. Patient does not appear to be responding to internal stimuli at this time. Goal: STG-Able to verbalize suicidal ideations  Outcome: Ongoing  Pt denies thoughts of self harm and is agreeable to seeking out should thoughts of self harm arise. Safe environment maintained. Q15 minute checks for safety cont per unit policy. Will cont to monitor for safety and provides support and reassurance as needed.

## 2017-11-15 LAB — GLUCOSE BLD-MCNC: 81 MG/DL (ref 75–110)

## 2017-11-15 PROCEDURE — 6370000000 HC RX 637 (ALT 250 FOR IP): Performed by: PSYCHIATRY & NEUROLOGY

## 2017-11-15 PROCEDURE — 82947 ASSAY GLUCOSE BLOOD QUANT: CPT

## 2017-11-15 PROCEDURE — 6360000002 HC RX W HCPCS: Performed by: PSYCHIATRY & NEUROLOGY

## 2017-11-15 PROCEDURE — 1240000000 HC EMOTIONAL WELLNESS R&B

## 2017-11-15 RX ADMIN — DIVALPROEX SODIUM 500 MG: 500 TABLET, FILM COATED, EXTENDED RELEASE ORAL at 20:55

## 2017-11-15 RX ADMIN — DIVALPROEX SODIUM 500 MG: 500 TABLET, FILM COATED, EXTENDED RELEASE ORAL at 09:29

## 2017-11-15 RX ADMIN — PREGABALIN 200 MG: 100 CAPSULE ORAL at 09:29

## 2017-11-15 RX ADMIN — DULOXETINE 60 MG: 60 CAPSULE, DELAYED RELEASE ORAL at 09:29

## 2017-11-15 RX ADMIN — HYDROXYZINE HYDROCHLORIDE 50 MG: 25 TABLET, FILM COATED ORAL at 21:16

## 2017-11-15 RX ADMIN — BUPRENORPHINE HYDROCHLORIDE, NALOXONE HYDROCHLORIDE 4 MG: 2; .5 FILM, SOLUBLE BUCCAL; SUBLINGUAL at 14:57

## 2017-11-15 RX ADMIN — SALINE NASAL SPRAY 1 SPRAY: 1.5 SOLUTION NASAL at 20:54

## 2017-11-15 RX ADMIN — QUETIAPINE FUMARATE 300 MG: 300 TABLET, FILM COATED ORAL at 20:55

## 2017-11-15 RX ADMIN — TOPIRAMATE 150 MG: 100 TABLET, FILM COATED ORAL at 09:29

## 2017-11-15 RX ADMIN — FLUTICASONE PROPIONATE 1 SPRAY: 50 SPRAY, METERED NASAL at 20:54

## 2017-11-15 RX ADMIN — TOPIRAMATE 150 MG: 100 TABLET, FILM COATED ORAL at 20:55

## 2017-11-15 RX ADMIN — SIMVASTATIN 40 MG: 40 TABLET, FILM COATED ORAL at 20:55

## 2017-11-15 RX ADMIN — BUPRENORPHINE HYDROCHLORIDE, NALOXONE HYDROCHLORIDE 4 MG: 2; .5 FILM, SOLUBLE BUCCAL; SUBLINGUAL at 09:29

## 2017-11-15 RX ADMIN — SALINE NASAL SPRAY 1 SPRAY: 1.5 SOLUTION NASAL at 09:28

## 2017-11-15 RX ADMIN — PANTOPRAZOLE SODIUM 40 MG: 40 TABLET, DELAYED RELEASE ORAL at 09:29

## 2017-11-15 RX ADMIN — SALINE NASAL SPRAY 1 SPRAY: 1.5 SOLUTION NASAL at 14:57

## 2017-11-15 RX ADMIN — BUPRENORPHINE HYDROCHLORIDE, NALOXONE HYDROCHLORIDE 4 MG: 2; .5 FILM, SOLUBLE BUCCAL; SUBLINGUAL at 20:55

## 2017-11-15 RX ADMIN — PREGABALIN 200 MG: 100 CAPSULE ORAL at 20:55

## 2017-11-15 RX ADMIN — DIPHENHYDRAMINE HCL 25 MG: 25 TABLET ORAL at 21:16

## 2017-11-15 ASSESSMENT — PAIN DESCRIPTION - PAIN TYPE
TYPE: CHRONIC PAIN
TYPE: CHRONIC PAIN

## 2017-11-15 ASSESSMENT — PAIN DESCRIPTION - LOCATION
LOCATION: GENERALIZED
LOCATION: GENERALIZED

## 2017-11-15 ASSESSMENT — PAIN SCALES - GENERAL
PAINLEVEL_OUTOF10: 10
PAINLEVEL_OUTOF10: 0
PAINLEVEL_OUTOF10: 3
PAINLEVEL_OUTOF10: 0

## 2017-11-15 NOTE — PLAN OF CARE
Problem: Altered Mood, Depressive Behavior  Goal: LTG-Able to verbalize acceptance of life and situations over which he or she has no control  Outcome: Ongoing  Patient admits to feelings of depression and anxiety, as well as audio hallucinations of lots of voices. Denies any suicidal/homicidal ideations or visual hallucinations. Patient attends select groups but stayed mostly in his room.  Spontaneous safety and 15 minute checks maintained  Goal: STG-Able to verbalize suicidal ideations  Outcome: Ongoing

## 2017-11-15 NOTE — PLAN OF CARE
Problem: Altered Mood, Depressive Behavior  Goal: LTG-Able to verbalize acceptance of life and situations over which he or she has no control  Outcome: Ongoing  Pt did not participate in cognitive skills group at 1330 despite staff encouragement to attend.

## 2017-11-15 NOTE — PLAN OF CARE
Problem: Falls - Risk of  Goal: Absence of falls  Outcome: Ongoing  Pt able to operate assistive device properly; pt is aware of limitations and calls for help when needed; staff continues to provide safety, checking on pt per protocol and randomly; mc + bc. Problem: Pain:  Goal: Pain level will decrease  Pain level will decrease   Outcome: Ongoing  Pt admits to pain at level 3 and manageable; pt will remain pain free or manageable as staff continues to monitor and car, medicating when necessary; pt is med compliant. Problem: Altered Mood, Depressive Behavior  Goal: STG-Able to verbalize suicidal ideations  Outcome: Ongoing  Pt denies SI, admits only to A/H, which was not present at time of assessment; pt stated not feeling too well today, but cannot elaborate; pt is pleasant, calm, cooperative and med compliant.

## 2017-11-16 PROCEDURE — 1240000000 HC EMOTIONAL WELLNESS R&B

## 2017-11-16 PROCEDURE — 6370000000 HC RX 637 (ALT 250 FOR IP): Performed by: PSYCHIATRY & NEUROLOGY

## 2017-11-16 RX ORDER — BUPRENORPHINE AND NALOXONE 4; 1 MG/1; MG/1
1 FILM, SOLUBLE BUCCAL; SUBLINGUAL 2 TIMES DAILY
Qty: 30 EACH | Refills: 1 | Status: SHIPPED | OUTPATIENT
Start: 2017-11-16 | End: 2017-11-17 | Stop reason: HOSPADM

## 2017-11-16 RX ORDER — BUPRENORPHINE AND NALOXONE 4; 1 MG/1; MG/1
1 FILM, SOLUBLE BUCCAL; SUBLINGUAL 2 TIMES DAILY
Status: DISCONTINUED | OUTPATIENT
Start: 2017-11-16 | End: 2017-11-17

## 2017-11-16 RX ADMIN — DULOXETINE 60 MG: 60 CAPSULE, DELAYED RELEASE ORAL at 08:36

## 2017-11-16 RX ADMIN — PREGABALIN 200 MG: 100 CAPSULE ORAL at 08:38

## 2017-11-16 RX ADMIN — DIPHENHYDRAMINE HCL 25 MG: 25 TABLET ORAL at 21:21

## 2017-11-16 RX ADMIN — FLUTICASONE PROPIONATE 1 SPRAY: 50 SPRAY, METERED NASAL at 21:22

## 2017-11-16 RX ADMIN — TRAZODONE HYDROCHLORIDE 100 MG: 100 TABLET ORAL at 21:22

## 2017-11-16 RX ADMIN — QUETIAPINE FUMARATE 300 MG: 300 TABLET, FILM COATED ORAL at 21:22

## 2017-11-16 RX ADMIN — PANTOPRAZOLE SODIUM 40 MG: 40 TABLET, DELAYED RELEASE ORAL at 08:36

## 2017-11-16 RX ADMIN — HYDROXYZINE HYDROCHLORIDE 50 MG: 25 TABLET, FILM COATED ORAL at 21:22

## 2017-11-16 RX ADMIN — DIVALPROEX SODIUM 500 MG: 500 TABLET, FILM COATED, EXTENDED RELEASE ORAL at 08:36

## 2017-11-16 RX ADMIN — TOPIRAMATE 150 MG: 100 TABLET, FILM COATED ORAL at 08:36

## 2017-11-16 RX ADMIN — DIPHENOXYLATE HYDROCHLORIDE AND ATROPINE SULFATE 1 TABLET: 2.5; .025 TABLET ORAL at 21:21

## 2017-11-16 RX ADMIN — TOPIRAMATE 150 MG: 100 TABLET, FILM COATED ORAL at 21:21

## 2017-11-16 RX ADMIN — SIMVASTATIN 40 MG: 40 TABLET, FILM COATED ORAL at 21:22

## 2017-11-16 RX ADMIN — BUPRENORPHINE HYDROCHLORIDE, NALOXONE HYDROCHLORIDE 1 FILM: 4; 1 FILM, SOLUBLE BUCCAL; SUBLINGUAL at 21:22

## 2017-11-16 RX ADMIN — DIVALPROEX SODIUM 500 MG: 500 TABLET, FILM COATED, EXTENDED RELEASE ORAL at 21:22

## 2017-11-16 ASSESSMENT — PAIN SCALES - GENERAL
PAINLEVEL_OUTOF10: 9
PAINLEVEL_OUTOF10: 2

## 2017-11-16 NOTE — PLAN OF CARE
Problem: Altered Mood, Depressive Behavior  Goal: LTG-Able to verbalize acceptance of life and situations over which he or she has no control  Outcome: Ongoing  Pt did not attend RT group at 1430 d/t resting in room despite staff invitation to attend.

## 2017-11-17 PROCEDURE — 6360000002 HC RX W HCPCS: Performed by: PSYCHIATRY & NEUROLOGY

## 2017-11-17 PROCEDURE — 6370000000 HC RX 637 (ALT 250 FOR IP): Performed by: PSYCHIATRY & NEUROLOGY

## 2017-11-17 PROCEDURE — 1240000000 HC EMOTIONAL WELLNESS R&B

## 2017-11-17 RX ORDER — BUPRENORPHINE HYDROCHLORIDE AND NALOXONE HYDROCHLORIDE DIHYDRATE 2; .5 MG/1; MG/1
2 TABLET SUBLINGUAL 2 TIMES DAILY
Status: DISCONTINUED | OUTPATIENT
Start: 2017-11-17 | End: 2017-11-17

## 2017-11-17 RX ORDER — BUPRENORPHINE HYDROCHLORIDE AND NALOXONE HYDROCHLORIDE DIHYDRATE 2; .5 MG/1; MG/1
2 TABLET SUBLINGUAL 2 TIMES DAILY
Status: DISCONTINUED | OUTPATIENT
Start: 2017-11-17 | End: 2017-11-20 | Stop reason: HOSPADM

## 2017-11-17 RX ORDER — BUPRENORPHINE HYDROCHLORIDE AND NALOXONE HYDROCHLORIDE DIHYDRATE 2; .5 MG/1; MG/1
2 TABLET SUBLINGUAL 2 TIMES DAILY
Qty: 28 TABLET | Refills: 1 | Status: ON HOLD | OUTPATIENT
Start: 2017-11-17 | End: 2019-04-23 | Stop reason: HOSPADM

## 2017-11-17 RX ADMIN — DIVALPROEX SODIUM 500 MG: 500 TABLET, FILM COATED, EXTENDED RELEASE ORAL at 20:44

## 2017-11-17 RX ADMIN — PANTOPRAZOLE SODIUM 40 MG: 40 TABLET, DELAYED RELEASE ORAL at 09:13

## 2017-11-17 RX ADMIN — TOPIRAMATE 150 MG: 100 TABLET, FILM COATED ORAL at 09:13

## 2017-11-17 RX ADMIN — HYDROXYZINE HYDROCHLORIDE 50 MG: 25 TABLET, FILM COATED ORAL at 20:45

## 2017-11-17 RX ADMIN — TOPIRAMATE 150 MG: 100 TABLET, FILM COATED ORAL at 20:45

## 2017-11-17 RX ADMIN — SIMVASTATIN 40 MG: 40 TABLET, FILM COATED ORAL at 20:45

## 2017-11-17 RX ADMIN — SALINE NASAL SPRAY 1 SPRAY: 1.5 SOLUTION NASAL at 09:12

## 2017-11-17 RX ADMIN — DIVALPROEX SODIUM 500 MG: 500 TABLET, FILM COATED, EXTENDED RELEASE ORAL at 09:13

## 2017-11-17 RX ADMIN — TRAZODONE HYDROCHLORIDE 100 MG: 100 TABLET ORAL at 20:45

## 2017-11-17 RX ADMIN — BUPRENORPHINE HYDROCHLORIDE AND NALOXONE HYDROCHLORIDE DIHYDRATE 2 TABLET: 2; .5 TABLET SUBLINGUAL at 20:45

## 2017-11-17 RX ADMIN — FLUTICASONE PROPIONATE 1 SPRAY: 50 SPRAY, METERED NASAL at 20:52

## 2017-11-17 RX ADMIN — DULOXETINE 60 MG: 60 CAPSULE, DELAYED RELEASE ORAL at 09:13

## 2017-11-17 RX ADMIN — HYDROXYZINE HYDROCHLORIDE 50 MG: 25 TABLET, FILM COATED ORAL at 09:13

## 2017-11-17 RX ADMIN — DIPHENHYDRAMINE HCL 25 MG: 25 TABLET ORAL at 20:45

## 2017-11-17 RX ADMIN — SALINE NASAL SPRAY 1 SPRAY: 1.5 SOLUTION NASAL at 14:29

## 2017-11-17 RX ADMIN — SALINE NASAL SPRAY 1 SPRAY: 1.5 SOLUTION NASAL at 17:59

## 2017-11-17 RX ADMIN — BUPRENORPHINE HYDROCHLORIDE, NALOXONE HYDROCHLORIDE 1 FILM: 4; 1 FILM, SOLUBLE BUCCAL; SUBLINGUAL at 09:14

## 2017-11-17 RX ADMIN — QUETIAPINE FUMARATE 300 MG: 300 TABLET, FILM COATED ORAL at 20:44

## 2017-11-17 RX ADMIN — DIPHENHYDRAMINE HCL 25 MG: 25 TABLET ORAL at 09:22

## 2017-11-17 ASSESSMENT — PAIN SCALES - GENERAL: PAINLEVEL_OUTOF10: 10

## 2017-11-17 NOTE — PLAN OF CARE
Problem: Altered Mood, Depressive Behavior  Goal: LTG-Able to verbalize acceptance of life and situations over which he or she has no control  Outcome: Ongoing  PSYCHOEDUCATION GROUP NOTE    Date: November 17, 2017  Start Time: 1430  End Time: 0966    Number Participants in Group:  10/16    Goal:  Patient will demonstrate increased interpersonal interaction   Topic: Self-Esteem    Discipline Responsible:   OT  AT  [facility]. x RT MHP Other       Participation Level:     None  Minimal   x Active Listener x Interactive    Monopolizing         Participation Quality:  x Appropriate  Inappropriate   x       Attentive        Intrusive   x       Sharing        Resistant          Supportive        Lethargic       Affective:   x Congruent  Incongruent  Blunted  Flat    Constricted  Anxious  Elated  Angry    Labile  Depressed  Other         Cognitive:  x Alert x Oriented PPTP     Concentration x G  F  P   Attention Span x G  F  P   Short-Term Memory x G  F  P   Long-Term Memory x G  F  P   ProblemSolving/  Decision Making x G  F  P   Ability to Process  Information x G  F  P      Contributing Factors             Delusional             Hallucinating             Flight of Ideas             Other:       Modes of Intervention:  x Education  Support  Exploration    Clarifying x Problem Solving  Confrontation   x Socialization  Limit Setting x Reality Testing   x Activity  Movement  Media    Other:            Response to Learning:   Able to verbalize current knowledge/experience    Able to verbalize/acknowledge new learning    Able to retain information    Capable of insight    Able to change behavior   x Progressing to goal    Other:        Comments:

## 2017-11-17 NOTE — PLAN OF CARE
Problem: Altered Mood, Depressive Behavior  Goal: LTG-Able to verbalize acceptance of life and situations over which he or she has no control  Outcome: Ongoing  Pt did not attend Therapeutic Recreation at 1100 d/t resting in room despite staff invitation to attend.

## 2017-11-17 NOTE — PLAN OF CARE
Problem: Falls - Risk of  Goal: Absence of falls  Outcome: Ongoing  Pt has remained safe and free from harm. Pt has been without falls. Problem: Altered Mood, Depressive Behavior  Goal: STG-Able to verbalize suicidal ideations  Outcome: Ongoing  Pt denies any thoughts of harmful thoughts at this time. Pt has been med compliant. Out in milieu for brief intervals. Safety checks Q 15 min and at random intervals.

## 2017-11-17 NOTE — PLAN OF CARE
Problem: Altered Mood, Depressive Behavior  Goal: LTG-Able to verbalize acceptance of life and situations over which he or she has no control  Outcome: Ongoing  PSYCHOEDUCATION GROUP NOTE    Date: November 17, 2017  Start Time: 1330  End Time: 4744    Number Participants in Group:  9/14    Goal:  Patient will demonstrate increased interpersonal interaction   Topic: Happiness/Coping Skills/Self-Awareness     Discipline Responsible:   OT  AT    Ns. x RT MHP Other       Participation Level:     None  Minimal   x Active Listener x Interactive    Monopolizing         Participation Quality:  x Appropriate  Inappropriate   x       Attentive        Intrusive   x       Sharing        Resistant          Supportive        Lethargic       Affective:   x Congruent  Incongruent  Blunted  Flat    Constricted  Anxious  Elated  Angry    Labile  Depressed  Other         Cognitive:  x Alert x Oriented PPTP     Concentration  G x F  P   Attention Span  G x F  P   Short-Term Memory  G x F  P   Long-Term Memory  G x F  P   ProblemSolving/  Decision Making  G x F  P   Ability to Process  Information  G x F  P      Contributing Factors             Delusional             Hallucinating             Flight of Ideas             Other:       Modes of Intervention:   Education  Support  Exploration    Clarifying x Problem Solving  Confrontation   x Socialization  Limit Setting x Reality Testing   x Activity  Movement  Media    Other:            Response to Learning:   Able to verbalize current knowledge/experience    Able to verbalize/acknowledge new learning    Able to retain information    Capable of insight    Able to change behavior   x Progressing to goal    Other:        Comments:

## 2017-11-18 PROCEDURE — 1240000000 HC EMOTIONAL WELLNESS R&B

## 2017-11-18 PROCEDURE — 6370000000 HC RX 637 (ALT 250 FOR IP): Performed by: PSYCHIATRY & NEUROLOGY

## 2017-11-18 PROCEDURE — 6360000002 HC RX W HCPCS: Performed by: PSYCHIATRY & NEUROLOGY

## 2017-11-18 RX ADMIN — QUETIAPINE FUMARATE 300 MG: 300 TABLET, FILM COATED ORAL at 22:03

## 2017-11-18 RX ADMIN — TOPIRAMATE 150 MG: 100 TABLET, FILM COATED ORAL at 22:03

## 2017-11-18 RX ADMIN — HYDROXYZINE HYDROCHLORIDE 50 MG: 25 TABLET, FILM COATED ORAL at 22:02

## 2017-11-18 RX ADMIN — BUPRENORPHINE HYDROCHLORIDE AND NALOXONE HYDROCHLORIDE DIHYDRATE 2 TABLET: 2; .5 TABLET SUBLINGUAL at 09:25

## 2017-11-18 RX ADMIN — FLUTICASONE PROPIONATE 1 SPRAY: 50 SPRAY, METERED NASAL at 22:01

## 2017-11-18 RX ADMIN — DULOXETINE 60 MG: 60 CAPSULE, DELAYED RELEASE ORAL at 09:19

## 2017-11-18 RX ADMIN — PANTOPRAZOLE SODIUM 40 MG: 40 TABLET, DELAYED RELEASE ORAL at 09:19

## 2017-11-18 RX ADMIN — BUPRENORPHINE HYDROCHLORIDE AND NALOXONE HYDROCHLORIDE DIHYDRATE 2 TABLET: 2; .5 TABLET SUBLINGUAL at 22:04

## 2017-11-18 RX ADMIN — DIPHENHYDRAMINE HCL 25 MG: 25 TABLET ORAL at 22:04

## 2017-11-18 RX ADMIN — TRAZODONE HYDROCHLORIDE 100 MG: 100 TABLET ORAL at 22:04

## 2017-11-18 RX ADMIN — DIVALPROEX SODIUM 500 MG: 500 TABLET, FILM COATED, EXTENDED RELEASE ORAL at 09:19

## 2017-11-18 RX ADMIN — SALINE NASAL SPRAY 1 SPRAY: 1.5 SOLUTION NASAL at 14:00

## 2017-11-18 RX ADMIN — SALINE NASAL SPRAY 1 SPRAY: 1.5 SOLUTION NASAL at 17:30

## 2017-11-18 RX ADMIN — SIMVASTATIN 40 MG: 40 TABLET, FILM COATED ORAL at 22:04

## 2017-11-18 RX ADMIN — TOPIRAMATE 150 MG: 100 TABLET, FILM COATED ORAL at 09:19

## 2017-11-18 RX ADMIN — SALINE NASAL SPRAY 1 SPRAY: 1.5 SOLUTION NASAL at 09:19

## 2017-11-18 RX ADMIN — SALINE NASAL SPRAY 1 SPRAY: 1.5 SOLUTION NASAL at 22:00

## 2017-11-18 RX ADMIN — DIVALPROEX SODIUM 500 MG: 500 TABLET, FILM COATED, EXTENDED RELEASE ORAL at 22:03

## 2017-11-18 ASSESSMENT — PAIN SCALES - GENERAL
PAINLEVEL_OUTOF10: 9
PAINLEVEL_OUTOF10: 3
PAINLEVEL_OUTOF10: 3

## 2017-11-19 VITALS
WEIGHT: 150 LBS | RESPIRATION RATE: 14 BRPM | DIASTOLIC BLOOD PRESSURE: 61 MMHG | HEIGHT: 68 IN | BODY MASS INDEX: 22.73 KG/M2 | SYSTOLIC BLOOD PRESSURE: 101 MMHG | OXYGEN SATURATION: 97 % | HEART RATE: 64 BPM | TEMPERATURE: 98.3 F

## 2017-11-19 PROCEDURE — 1240000000 HC EMOTIONAL WELLNESS R&B

## 2017-11-19 PROCEDURE — 6370000000 HC RX 637 (ALT 250 FOR IP): Performed by: PSYCHIATRY & NEUROLOGY

## 2017-11-19 PROCEDURE — 6360000002 HC RX W HCPCS: Performed by: PSYCHIATRY & NEUROLOGY

## 2017-11-19 RX ADMIN — TOPIRAMATE 150 MG: 100 TABLET, FILM COATED ORAL at 09:38

## 2017-11-19 RX ADMIN — DIPHENHYDRAMINE HCL 25 MG: 25 TABLET ORAL at 21:07

## 2017-11-19 RX ADMIN — TOPIRAMATE 150 MG: 100 TABLET, FILM COATED ORAL at 21:07

## 2017-11-19 RX ADMIN — DIVALPROEX SODIUM 500 MG: 500 TABLET, FILM COATED, EXTENDED RELEASE ORAL at 21:07

## 2017-11-19 RX ADMIN — SIMVASTATIN 40 MG: 40 TABLET, FILM COATED ORAL at 21:07

## 2017-11-19 RX ADMIN — BUPRENORPHINE HYDROCHLORIDE AND NALOXONE HYDROCHLORIDE DIHYDRATE 2 TABLET: 2; .5 TABLET SUBLINGUAL at 09:38

## 2017-11-19 RX ADMIN — PANTOPRAZOLE SODIUM 40 MG: 40 TABLET, DELAYED RELEASE ORAL at 09:38

## 2017-11-19 RX ADMIN — SALINE NASAL SPRAY 1 SPRAY: 1.5 SOLUTION NASAL at 14:51

## 2017-11-19 RX ADMIN — DIVALPROEX SODIUM 500 MG: 500 TABLET, FILM COATED, EXTENDED RELEASE ORAL at 09:38

## 2017-11-19 RX ADMIN — QUETIAPINE FUMARATE 300 MG: 300 TABLET, FILM COATED ORAL at 21:07

## 2017-11-19 RX ADMIN — SALINE NASAL SPRAY 1 SPRAY: 1.5 SOLUTION NASAL at 09:41

## 2017-11-19 RX ADMIN — DIPHENOXYLATE HYDROCHLORIDE AND ATROPINE SULFATE 1 TABLET: 2.5; .025 TABLET ORAL at 21:17

## 2017-11-19 RX ADMIN — BUPRENORPHINE HYDROCHLORIDE AND NALOXONE HYDROCHLORIDE DIHYDRATE 2 TABLET: 2; .5 TABLET SUBLINGUAL at 21:17

## 2017-11-19 RX ADMIN — FLUTICASONE PROPIONATE 1 SPRAY: 50 SPRAY, METERED NASAL at 21:10

## 2017-11-19 RX ADMIN — HYDROXYZINE HYDROCHLORIDE 50 MG: 25 TABLET, FILM COATED ORAL at 09:38

## 2017-11-19 RX ADMIN — SALINE NASAL SPRAY 1 SPRAY: 1.5 SOLUTION NASAL at 21:10

## 2017-11-19 RX ADMIN — DULOXETINE 60 MG: 60 CAPSULE, DELAYED RELEASE ORAL at 09:38

## 2017-11-19 RX ADMIN — TRAZODONE HYDROCHLORIDE 100 MG: 100 TABLET ORAL at 21:07

## 2017-11-19 ASSESSMENT — PAIN - FUNCTIONAL ASSESSMENT
PAIN_FUNCTIONAL_ASSESSMENT: 0-10
PAIN_FUNCTIONAL_ASSESSMENT: 0-10

## 2017-11-19 ASSESSMENT — PAIN DESCRIPTION - DESCRIPTORS
DESCRIPTORS: CONSTANT;ACHING;DISCOMFORT;NAGGING
DESCRIPTORS: ACHING;CONSTANT;DISCOMFORT;NAGGING

## 2017-11-19 ASSESSMENT — PAIN SCALES - GENERAL
PAINLEVEL_OUTOF10: 10
PAINLEVEL_OUTOF10: 9

## 2017-11-20 PROBLEM — F11.20 OPIOID DEPENDENCE (HCC): Status: ACTIVE | Noted: 2017-07-18

## 2017-11-20 PROCEDURE — 6360000002 HC RX W HCPCS: Performed by: PSYCHIATRY & NEUROLOGY

## 2017-11-20 PROCEDURE — 6370000000 HC RX 637 (ALT 250 FOR IP): Performed by: PSYCHIATRY & NEUROLOGY

## 2017-11-20 RX ORDER — PANTOPRAZOLE SODIUM 40 MG/1
40 TABLET, DELAYED RELEASE ORAL DAILY
Qty: 30 TABLET | Refills: 3 | Status: ON HOLD | OUTPATIENT
Start: 2017-11-20 | End: 2018-04-12 | Stop reason: HOSPADM

## 2017-11-20 RX ORDER — TRAZODONE HYDROCHLORIDE 100 MG/1
100 TABLET ORAL NIGHTLY PRN
Qty: 15 TABLET | Refills: 0 | Status: ON HOLD | OUTPATIENT
Start: 2017-11-20 | End: 2018-04-12 | Stop reason: HOSPADM

## 2017-11-20 RX ADMIN — PANTOPRAZOLE SODIUM 40 MG: 40 TABLET, DELAYED RELEASE ORAL at 08:27

## 2017-11-20 RX ADMIN — DULOXETINE 60 MG: 60 CAPSULE, DELAYED RELEASE ORAL at 08:26

## 2017-11-20 RX ADMIN — HYDROXYZINE HYDROCHLORIDE 50 MG: 25 TABLET, FILM COATED ORAL at 08:26

## 2017-11-20 RX ADMIN — SALINE NASAL SPRAY 1 SPRAY: 1.5 SOLUTION NASAL at 08:31

## 2017-11-20 RX ADMIN — TOPIRAMATE 150 MG: 100 TABLET, FILM COATED ORAL at 08:26

## 2017-11-20 RX ADMIN — DIVALPROEX SODIUM 500 MG: 500 TABLET, FILM COATED, EXTENDED RELEASE ORAL at 08:26

## 2017-11-20 RX ADMIN — BUPRENORPHINE HYDROCHLORIDE AND NALOXONE HYDROCHLORIDE DIHYDRATE 2 TABLET: 2; .5 TABLET SUBLINGUAL at 08:26

## 2017-11-20 ASSESSMENT — PAIN - FUNCTIONAL ASSESSMENT: PAIN_FUNCTIONAL_ASSESSMENT: 0-10

## 2017-11-20 ASSESSMENT — PAIN SCALES - GENERAL: PAINLEVEL_OUTOF10: 9

## 2017-11-20 NOTE — BH NOTE
Patient given tobacco quitline number of [id number] at this time, but refuses to call. Stated \"I'm not ready to quit\". Patient given information as to the dangers of long term tobacco use. Educated and encouraged tobacco cessation.

## 2017-11-20 NOTE — BH NOTE
[facility]  Discharge Note    Pt discharged with followings belongings:   Dentures: None  Vision - Corrective Lenses: Glasses  Hearing Aid: None  Jewelry: None  Body Piercings Removed: N/A  Clothing: Pants, Shirt, Sweater, Footwear, Jacket / coat  Were All Patient Medications Collected?: Not Applicable  Other Valuables: Cell phone, Money (Comment), Wallet, Wheelchair ($5.00)   Valuables sent home with patient. Valuables retrieved from safe, Security envelope number:  [id number] and returned to patient. Patient left department with Departure Mode: By self, In ambulance via Mobility at Departure: Wheelchair, discharged to Discharged to: Private Residence. Patient education on aftercare instructions: yes  Information faxed to [facility] by RN Patient verbalize understanding of AVS:  yes. Status EXAM upon discharge:  Status and Exam  Normal: No  Facial Expression: Flat  Affect: Blunt  Level of Consciousness: Alert  Mood:Normal: No  Mood: Anxious, Depressed, Helpless  Motor Activity:Normal: No  Motor Activity: Decreased  Interview Behavior: Cooperative, Evasive  Preception: [place] to Person, [place] to Time, [place] to Place, [place] to Situation  Attention:Normal: No  Attention: Distractible  Thought Processes: Blocking  Thought Content:Normal: No  Thought Content: Preoccupations  Hallucinations:  Auditory (Comment)  Delusions: No  Memory:Normal: No  Memory: Poor Recent, Poor Remote  Insight and Judgment: No  Insight and Judgment: Poor Judgment, Unmotivated, Poor Insight  Present Suicidal Ideation: No  Present Homicidal Ideation: No    Dion Bae RN

## 2017-11-20 NOTE — PLAN OF CARE
Problem: Altered Mood, Depressive Behavior  Goal: LTG-Able to verbalize acceptance of life and situations over which he or she has no control  Outcome: Ongoing  Pt did not attend [employer] d/t resting in room despite staff invitation to attend.

## 2017-11-20 NOTE — DISCHARGE INSTR - PHARMACY
Filled and sent with patient; continued/resumed meds called into [facility] for delivery

## 2017-11-20 NOTE — PLAN OF CARE
Problem: Altered Mood, Depressive Behavior  Goal: LTG-Able to verbalize acceptance of life and situations over which he or she has no control  Outcome: Ongoing  Patient observed with sad/depressed mood and flat affect. Withdrawn and aloof of peers. Anxious and med focused. 1:1 interaction provided. Patient observed thought blocking and response lag. Denies SI, but admits to non specific audio hallucinations. Discussed ways to work through hallucinations. Patient receptive. Unkempt appearance. ADL's encouraged. Writer educated patient on positive coping skills and relaxation techniques. Encouraged to utilize in times of need. Med education provided. Discussed the benefits of inpatient treatment and programming expectations. Encouraged to attend groups and socialize with peers, but patient declines. Discussed the importance of compliance upon discharge. Patient agrees. Patient is med and meal compliant. Remains in behavior control. Remains free from harm and safety maintained. Will continue to monitor and intervene as needed.     Goal: STG-Able to verbalize suicidal ideations  Outcome: Ongoing

## 2017-11-20 NOTE — CARE COORDINATION
Name: Buzz Barrera    : 1970    Discharge Date: 2017    Primary Auth/Cert #: [id number]    Discharge Medications:      Medication List      START taking these medications    buprenorphine-naloxone 2-0.5 MG Subl  Commonly known as:  SUBOXONE  Place 2 tablets under the tongue 2 times daily . Notes to patient:  For opiate addiction     traZODone 100 MG tablet  Commonly known as:  DESYREL  Take 1 tablet by mouth nightly as needed for Sleep  Notes to patient:  For sleep        CHANGE how you take these medications    albuterol (2.5 MG/3ML) 0.083% nebulizer solution  Commonly known as:  PROVENTIL  What changed:  Another medication with the same name was removed. Continue taking this medication, and follow the directions you see here. Notes to patient: For wheezing     baclofen 20 MG tablet  Commonly known as:  LIORESAL  What changed:  Another medication with the same name was removed. Continue taking this medication, and follow the directions you see here. Notes to patient: For muscle spasms      pantoprazole 40 MG tablet  Commonly known as:  PROTONIX  Take 1 tablet by mouth Daily  What changed:  when to take this  Notes to patient:  For acid reflux        CONTINUE taking these medications    diphenoxylate-atropine 2.5-0.025 MG per tablet  Commonly known as:  LOMOTIL  Notes to patient:  For diarrhea     divalproex 500 MG extended release tablet  Commonly known as:  DEPAKOTE ER  TAKE 1 TABLET TWICE A DAY  Notes to patient: For mood/seizure      DULoxetine 60 MG extended release capsule  Commonly known as:  CYMBALTA  TAKE 1 CAPSULE BY MOUTH DAILY  Notes to patient: For mood     fluticasone 50 MCG/ACT nasal spray  Commonly known as:  FLONASE  Notes to patient:  For allergies     ipratropium 0.02 % nebulizer solution  Commonly known as:  ATROVENT  Notes to patient:   For wheezing     NOVOLIN R 100 UNIT/ML injection  Generic drug:  insulin regular  Notes to patient:  For diabetes control     QUEtiapine 300 MG tablet  Commonly known as:  SEROQUEL  Notes to patient: For mood     simvastatin 40 MG tablet  Commonly known as:  ZOCOR  Take 1 tablet by mouth nightly  Notes to patient:  For high cholesterol      sodium chloride 0.65 % nasal spray  Commonly known as:  OCEAN, BABY AYR  Notes to patient:  For allergies      topiramate 100 MG tablet  Commonly known as:  TOPAMAX  Notes to patient:   For mood        STOP taking these medications    acetaminophen 500 MG tablet  Commonly known as:  AMINOFEN  Notes to patient:  For pain     hydrOXYzine 25 MG capsule  Commonly known as:  VISTARIL  Notes to patient:  For anxiety           Where to Get Your Medications      These medications were sent to [facility], [address] [facility], [place] [address]    Phone:  [phone]   · pantoprazole 40 MG tablet     You can get these medications from any pharmacy    Bring a paper prescription for each of these medications  · buprenorphine-naloxone 2-0.5 MG Subl  · traZODone 100 MG tablet      Pharmacy Instructions:      Filled and sent with patient; continued/resumed meds called into [facility] for delivery                     Follow Up Appointment: Kenji Colorado CNP  [address] [facility] [address]  [id number] [place] [address] [address]  [phone]    Go on 11/22/2017  To begin/continue services with the physical health clinic at 10:00am.    [address]  [phone]    Go on 11/29/2017  To begin/continue services with Nurse Reba Martinez at 12:00pm.    [facility]  [facility] [address] [facility]  [phone]  Go on 12/5/2017  To begin/continue services with the therapist at [address]  [phone]    Go on 11/21/2017  To begin/continue services for the walk in suboxone clinic at

## 2017-11-20 NOTE — DISCHARGE SUMMARY
Psychiatry Discharge Summary        DSM- DIAGNOSIS:     (Axis I): Bipolar disorder ([address]   Opioid  Dependence     Axis II:  none  Axis III:  Past Medical History:   Diagnosis Date    Anxiety     Arthritis     osteoarthritis    Bipolar disorder (HCC)     Bronchitis, chronic (HCC)     Chronic back pain     Chronic pain     Claustrophobia     COPD (chronic obstructive pulmonary disease) (HCC)     Depression     Diabetes mellitus (HCC)     Emphysema of lung (HCC)     History of irregular heartbeat     Hyperlipidemia     Liver disease     MRSA (methicillin resistant staph aureus) culture positive     Myofacial muscle pain     Osteomyelitis (HCC)     Peripheral neuropathy (HCC)     bilateral feet    Pressure ulcer     Schizophrenia (HCC)     Seizures (HCC)     Sleep apnea     no machine    Spinal stenosis     Substance abuse       Stressors (Axis IV):  Severity of stressors is moderate  Source of stressors:  health issues and other psychological and environmental problems        Procedures: none    Hospital Course and significant findings      Steven Gregory is a 41 y.o. male. was admitted for inpatient psychiatric treatment with symptoms including hopelessness,depressed mood and suicide ideations, racing thoughts and   mood swings. Patient admitted to - no hallucinations. During this hospitalization acute psychiatric symptoms were stabilized with supportive psychotherapy and medication management. Pt was also involved in therapeutic activities and psychotherapy  groups to improve coping skills. At the time of discharge, patient denied  suicidal and homicidal ideations,depressed mood and hopelessness, no evidence of delusions. Patient denied - auditory and visual hallucinations.       MENTAL STATUS EXAMINATION AT TIME OF DISCHARGE    Behavior and Cooperation: Cooperative  Appearance: stated age     Eye Contact: good  Mood:  stable,  Thought process Organized  Suicide ideation: other care provider to review them with you. Where to Get Your Medications      You can get these medications from any pharmacy    Bring a paper prescription for each of these medications  · buprenorphine-naloxone 2-0.5 MG Subl             Follow Up: Follow up with [facility]/PCP/PSYCHIATRIST within 2 weeks.   Patrice Conrad CNP  [facility] [address]          [address]  [phone]    Go on 11/22/2017  To begin/continue services with the physical health clinic at 10:00am.    [address] [address]  [phone]    Go on 11/20/2017  To begin/continue services with Erin Caro the Nurse at 10:00am.    [address]  [phone]  Go on 12/5/2017  To begin/continue services with the therapist at [facility]  [phone]    Go on 11/21/2017  To begin/continue services for the walk in suboxone clinic at 12:30pm.      Jb Mosqueda MD  Psychiatrist

## 2017-11-20 NOTE — PROGRESS NOTES
No PRN aerosol treatment requested
Psychiatric Admission Note    Referred by ED       Chief Complaint;  suicidal intentions and thoughts of hurting his daughters boy friend, brought him to ED    HX of present illness[de-identified]    This   41year old  male that is brought to the ED today via his [address] Patient reports he is feeling suicidal. Patient states \"I feel suicidal every day. \" Patient reports increased stress at home. Patient reports \"my daughter and her boyfriend don't listen. They are pigs. \" Patient reports feeling frustrated and wanting to given up. Patient states he is compliant with his [facility] appointments and medications. Patient denies auditory or visual hallucinations at this time. It is noted patient has a history of poly substance abuse. Patient denies any current drug or alcohol use. Patient reports having an income from social security. Patient denies any legal issues          Ojse Miguel Claudia is a 41 y.o. male who presented with general plan to harm self. . The patient also presents with feelings of being down, depressed or hopelessness. , sleep disturbance difficulty getting to sleep and psychomotor changes  agitation. racing thoughts and mood swings, and no hallucinations were present. Allergies:  Sulfa antibiotics; Sulfasalazine; Bactrim; Levofloxacin; Levofloxacin; Phenobarbital; Sulfamethoxazole-trimethoprim; and Sulfur       History of Substance Abuse:    Patient did not  present with substance use, being negative for  alcohol, marijuana, cocaine and narcotics . Past Psychiatric History:     Patient admits to past psychiatric treatment. Family History of psychiatric disorders:    Family history: negative mood disorders.         Medical History:     Past Medical History:   Diagnosis Date    Anxiety     Arthritis     osteoarthritis    Bipolar disorder (HCC)     Bronchitis, chronic (HCC)     Chronic back pain     Chronic pain     Claustrophobia     COPD (chronic obstructive pulmonary
Psychiatry Progress  Note     CHIEF  COMPLAINT     Bipolar disorder ([facility]        SUBJECT MILDRED AND OBJECT MILDRED: Pt is working on relapse prevention and sobriety and exploring options with SW for sober living    Romaine Valente is presenting with persistant depressed moods and less depressed mood than yesterday moods fluctuating. with no hallucinations and thoughts are goal directed. The symptoms still   present are marked in severity    . ASSESSMENT AND PLAN    Symptoms of illness and medications  discussed with pt and support provided. Progress of treatment discussed with staff     Encouraged to participate in activies and groups. His   Bipolar disorder ([facility]  is gradually improving.  And thinking about going back to living with daughter      Luis Moon Buprenorphine HCl-Naloxone HCl  2 Film Sublingual TID    pantoprazole  40 mg Oral Daily with breakfast    pregabalin  200 mg Oral BID    nicotine  1 patch Transdermal Daily    QUEtiapine  300 mg Oral Nightly    divalproex  500 mg Oral BID    DULoxetine  60 mg Oral Daily    fluticasone  1 spray Each Nare Nightly    simvastatin  40 mg Oral Nightly    sodium chloride  1 spray Each Nare 4x Daily    topiramate  150 mg Oral BID       traZODone, diphenhydrAMINE, acetaminophen, haloperidol lactate, benztropine mesylate, magnesium hydroxide, aluminum & magnesium hydroxide-simethicone, hydrOXYzine, ipratropium-albuterol, albuterol sulfate HFA, diphenoxylate-atropine, ipratropium       Giovanni Byrne MD  Psychiatry
Psychiatry Progress  Note     CHIEF  COMPLAINT     Bipolar disorder ([facility]        SUBJECT MILDRED AND OBJECT MILDRED: pt reports has not talked to his daughter yet with whome he lives,admits to feeling hopeless,low energy, suicidal thoughts    Kayden Troy is presenting with persistant depressed moods moods fluctuating. with no hallucinations and thoughts are goal directed. The symptoms still   present are marked in severity    . ASSESSMENT AND PLAN    Symptoms of illness and medications  discussed with pt and support provided. Progress of treatment discussed with staff     Encouraged to participate in activies and groups. His   Bipolar disorder ([facility]  is gradually improving Pt is working on relapse prevention and sobriety and exploring options with SW for sober living.        Buprenorphine HCl-Naloxone HCl  2 Film Sublingual TID    pantoprazole  40 mg Oral Daily with breakfast    pregabalin  200 mg Oral BID    nicotine  1 patch Transdermal Daily    QUEtiapine  300 mg Oral Nightly    divalproex  500 mg Oral BID    DULoxetine  60 mg Oral Daily    fluticasone  1 spray Each Nare Nightly    simvastatin  40 mg Oral Nightly    sodium chloride  1 spray Each Nare 4x Daily    topiramate  150 mg Oral BID       traZODone, diphenhydrAMINE, acetaminophen, haloperidol lactate, benztropine mesylate, magnesium hydroxide, aluminum & magnesium hydroxide-simethicone, hydrOXYzine, ipratropium-albuterol, albuterol sulfate HFA, diphenoxylate-atropine, ipratropium       Tamica Valles MD  Psychiatry
Psychiatry Progress  Note     CHIEF  COMPLAINT     Bipolar disorder ([facility]        SUBJECT MILDRED AND OBJECT MILDRED:    Titus Schilling is presenting with persistant depressed moods and less agitation than yesterday moods fluctuating. with no hallucinations and thoughts are circumstantial. The symptoms still   present are marked in severity    . ASSESSMENT AND PLAN    Symptoms of illness and medications  discussed with pt and support provided. Progress of treatment discussed with staff     Encouraged to participate in activies and groups. His   Bipolar disorder ([facility]  is gradually improving.        buprenorphine-naloxone  1 tablet Sublingual Once    buprenorphine-naloxone  2 tablet Sublingual TID    pantoprazole  40 mg Oral Daily with breakfast    pregabalin  200 mg Oral BID    nicotine  1 patch Transdermal Daily    QUEtiapine  300 mg Oral Nightly    divalproex  500 mg Oral BID    DULoxetine  60 mg Oral Daily    fluticasone  1 spray Each Nare Nightly    hydrOXYzine  25 mg Oral Daily    hydrOXYzine  50 mg Oral Nightly    simvastatin  40 mg Oral Nightly    sodium chloride  1 spray Each Nare 4x Daily    topiramate  150 mg Oral BID       acetaminophen, haloperidol lactate, traZODone, benztropine mesylate, magnesium hydroxide, aluminum & magnesium hydroxide-simethicone, hydrOXYzine, ipratropium-albuterol, albuterol sulfate HFA, diphenoxylate-atropine, ipratropium       Denver Batista MD  Psychiatry
Psychiatry Progress  Note     CHIEF  COMPLAINT     Bipolar disorder ([facility]        SUBJECT MILDRED AND OBJECT MILDRED:    Dahlia Ballard is presenting with persistant depressed moods and less depressed mood than yesterday moods fluctuating. with no hallucinations and thoughts are goal directed. The symptoms still   present are marked in severity    . ASSESSMENT AND PLAN    Symptoms of illness and medications  discussed with pt and support provided. Progress of treatment discussed with staff     Encouraged to participate in activies and groups. His   Bipolar disorder ([facility]  is gradually improving. Pt is working on relapse prevention and sobriety and exploring options with SW for sober living       Buprenorphine HCl-Naloxone HCl  2 Film Sublingual TID    pantoprazole  40 mg Oral Daily with breakfast    pregabalin  200 mg Oral BID    nicotine  1 patch Transdermal Daily    QUEtiapine  300 mg Oral Nightly    divalproex  500 mg Oral BID    DULoxetine  60 mg Oral Daily    fluticasone  1 spray Each Nare Nightly    simvastatin  40 mg Oral Nightly    sodium chloride  1 spray Each Nare 4x Daily    topiramate  150 mg Oral BID       traZODone, diphenhydrAMINE, acetaminophen, haloperidol lactate, benztropine mesylate, magnesium hydroxide, aluminum & magnesium hydroxide-simethicone, hydrOXYzine, ipratropium-albuterol, albuterol sulfate HFA, diphenoxylate-atropine, ipratropium       Jb Mosqueda MD  Psychiatry
Psychiatry Progress  Note     CHIEF  COMPLAINT     Bipolar disorder ([facility] [address]        SUBJECT MILDRED AND OBJECT MILDRED:     Delta Sin is presenting with persistant depressed moods and less depressed mood than yesterday moods fluctuating. with no hallucinations and thoughts are goal directed. The symptoms still   present are moderate in severity    . ASSESSMENT AND PLAN    Symptoms of illness and medications  discussed with pt and support provided. Progress of treatment discussed with staff     Encouraged to participate in activies and groups. His   Bipolar disorder ([facility]  is gradually improving Pt is working on relapse prevention and sobriety .        buprenorphine-naloxone  2 tablet Sublingual BID    pantoprazole  40 mg Oral Daily with breakfast    nicotine  1 patch Transdermal Daily    QUEtiapine  300 mg Oral Nightly    divalproex  500 mg Oral BID    DULoxetine  60 mg Oral Daily    fluticasone  1 spray Each Nare Nightly    simvastatin  40 mg Oral Nightly    sodium chloride  1 spray Each Nare 4x Daily    topiramate  150 mg Oral BID       traZODone, diphenhydrAMINE, acetaminophen, haloperidol lactate, benztropine mesylate, magnesium hydroxide, aluminum & magnesium hydroxide-simethicone, hydrOXYzine, ipratropium-albuterol, albuterol sulfate HFA, diphenoxylate-atropine, ipratropium       Michael Montgomery MD  Psychiatry
Psychiatry Progress  Note     CHIEF  COMPLAINT     B[facility] ([facility]        SUBJECT MILDRED AND OBJECT MILDRED:    Dahlia Ballard is presenting with  no suicidal thoughts and less depressed mood than yesterday moods stabilizing. with no hallucinations and thoughts are goal directed. The symptoms still   present are moderate in severity    . ASSESSMENT AND PLAN    Symptoms of illness and medications  discussed with pt and support provided. Progress of treatment discussed with staff     Encouraged to participate in activies and groups. His   Bipolar disorder ([facility]  is stable. Plan to discharge tomorrow discussed.        buprenorphine-naloxone  2 tablet Sublingual BID    pantoprazole  40 mg Oral Daily with breakfast    nicotine  1 patch Transdermal Daily    QUEtiapine  300 mg Oral Nightly    divalproex  500 mg Oral BID    DULoxetine  60 mg Oral Daily    fluticasone  1 spray Each Nare Nightly    simvastatin  40 mg Oral Nightly    sodium chloride  1 spray Each Nare 4x Daily    topiramate  150 mg Oral BID       traZODone, diphenhydrAMINE, acetaminophen, haloperidol lactate, benztropine mesylate, magnesium hydroxide, aluminum & magnesium hydroxide-simethicone, hydrOXYzine, ipratropium-albuterol, albuterol sulfate HFA, diphenoxylate-atropine, ipratropium       Jb Mosqueda MD  Psychiatry
Psychiatry Progress  Note     CHIEF  COMPLAINT     [facility] ([facility]        SUBJECT MILDRED AND OBJECT MILDRED:  Still has withdrawals from opioids,will increase dose of suboxone    Tracey Pickering is presenting with persistant depressed moods and less agitation than yesterday moods fluctuating. with no hallucinations and thoughts are goal directed. The symptoms still   present are marked in severity    . ASSESSMENT AND PLAN    Symptoms of illness and medications  discussed with pt and support provided. Progress of treatment discussed with staff     Encouraged to participate in activies and groups. His   Bipolar disorder ([facility]  is unchanged.        buprenorphine-naloxone  1 tablet Sublingual Once    buprenorphine-naloxone  2 tablet Sublingual TID    pantoprazole  40 mg Oral Daily with breakfast    pregabalin  200 mg Oral BID    nicotine  1 patch Transdermal Daily    QUEtiapine  300 mg Oral Nightly    divalproex  500 mg Oral BID    DULoxetine  60 mg Oral Daily    fluticasone  1 spray Each Nare Nightly    hydrOXYzine  25 mg Oral Daily    hydrOXYzine  50 mg Oral Nightly    simvastatin  40 mg Oral Nightly    sodium chloride  1 spray Each Nare 4x Daily    topiramate  150 mg Oral BID       acetaminophen, haloperidol lactate, traZODone, benztropine mesylate, magnesium hydroxide, aluminum & magnesium hydroxide-simethicone, hydrOXYzine, ipratropium-albuterol, albuterol sulfate HFA, diphenoxylate-atropine, ipratropium       Kenrick Jose MD  Psychiatry
Psychiatry Progress  Note     CHIEF  COMPLAINT     [facility] ([facility]        SUBJECT MILDRED AND OBJECT MILDRED:    Alfredo Trejo is presenting with persistant depressed moods and less depressed mood than yesterday moods fluctuating. with no hallucinations and thoughts are goal directed. The symptoms still   present are marked in severity    . ASSESSMENT AND PLAN    Symptoms of illness and medications  discussed with pt and support provided. Progress of treatment discussed with staff     Encouraged to participate in activies and groups. His   Bipolar disorder ([facility]  is gradually improving.        Buprenorphine HCl-Naloxone HCl  2 Film Sublingual TID    pantoprazole  40 mg Oral Daily with breakfast    pregabalin  200 mg Oral BID    nicotine  1 patch Transdermal Daily    QUEtiapine  300 mg Oral Nightly    divalproex  500 mg Oral BID    DULoxetine  60 mg Oral Daily    fluticasone  1 spray Each Nare Nightly    simvastatin  40 mg Oral Nightly    sodium chloride  1 spray Each Nare 4x Daily    topiramate  150 mg Oral BID       traZODone, diphenhydrAMINE, acetaminophen, haloperidol lactate, benztropine mesylate, magnesium hydroxide, aluminum & magnesium hydroxide-simethicone, hydrOXYzine, ipratropium-albuterol, albuterol sulfate HFA, diphenoxylate-atropine, ipratropium       Jimmy MD Stephanie  Psychiatry
Pt BS clear no aerosol rx given
RT will be notified if Aerosol Tx or Assessment of pt is needed at anytime throughout shift. MDI can be ordered, if needed, per RT and will be given at RN discretion.
(unable to assess d/t pt being very lethargic)  Hallucinations: Unable to assess  Delusions:  (unable to assess d/t pt being very lethargic)  Memory:Normal:  (unable to assess d/t pt being very lethargic)  Insight and Judgment: No  Insight and Judgment: Poor Judgment, Poor Insight  Present Suicidal Ideation: Other(See comment) (unable to assess d/t pt being very lethargic)  Present Homicidal Ideation: Other(See comment) (unable to assess d/t pt being very lethargic)    Tobacco Screening:  Practical Counseling, on admission, rachel X, if applicable and completed (first 3 are required if patient doesn't refuse):            ( )  Recognizing danger situations (included triggers and roadblocks)                    ( )  Coping skills (new ways to manage stress, exercise, relaxation techniques, changing routine, distraction)                                                           ( )  Basic information about quitting (benefits of quitting, techniques in how to quit, available resources  ( ) Referral for counseling faxed to [facility]                                           (x ) Patient refused counseling  ( ) Patient has not smoked in the last 30 days      Pt admitted with followings belongings:  Dentures: None  Vision - Corrective Lenses: Glasses  Hearing Aid: None  Jewelry: None  Body Piercings Removed: N/A  Clothing: Pants, Shirt, Sweater, Footwear, Jacket / coat  Were All Patient Medications Collected?: Not Applicable  Other Valuables: Cell phone, Money (Comment), Wallet, Wheelchair ($5.00)     Valuables placed in safe in security envelope, number: [id number]. Patient's home medications were reviewed by MD.  Patient oriented to surroundings and program expectations and copy of patient rights given. Received admission packet: yes. Consents reviewed, signed in only. Patient verbalize understanding: yes.     Patient education on precautions: yes                    Solomon Stafford, RN
Sulfamethoxazole-trimethoprim; and Sulfur    Social History:   reports that he has been smoking Cigarettes. He has a 26.25 pack-year smoking history. He has never used smokeless tobacco. He reports that he uses drugs, including Marijuana and Opiates . He reports that he does not drink alcohol. Family History: family history includes Coronary Art Dis in his maternal uncle; Diabetes in his mother; Seizures in his maternal cousin and sister. REVIEW OF SYSTEMS:    · Constitutional: Negative for weight loss  · Eyes: Negative for visual changes, diplopia, scleral icterus. · ENT: Negative for Headaches, hearing loss, vertigo, mouth sores, sore throat. · Cardiovascular: Negative for lightheadedness/orthostatic symptoms ,chest pain, dyspnea on exertion, palpitations or loss of consciousness. · Respiratory: Negative for cough or wheezing, sputum production, hemoptysis, pleuritic pain. · Gastrointestinal: Negative for nausea/vomiting, change in bowel habits, abdominal pain, dysphagia/appetite loss, hematemesis, blood in stools. · Genitourinary:Negative for change in bladder habits, dysuria, trouble voiding, hematuria. · Musculoskeletal:pos Negative for gait disturbance, weakness, joint complaints. · Integumentary: Negative for rash, pruritis. · Neurological: Negative for headache, dizziness, change in muscle strength, numbness/tingling, change in gait, balance, coordination,   · Endocrine: negative for temperature intolerance, excessive thirst, fluid intake, or urination, tremor. · Hematologic/Lymphatic: negative for abnormal bruising or bleeding, blood clots, swollen lymph nodes. · Allergic/Immunologic: negative for nasal congestion, pruritis, hives.       PHYSICAL EXAM:    BP (!) 97/54   Pulse 68   Temp 98.2 °F (36.8 °C) (Oral)   Resp 14   Ht 5' 8\" (1.727 m)   Wt 150 lb (68 kg)   SpO2 100%   BMI 22.81 kg/m²      · General appearance: well nourished  · HEENT: Head: Normocephalic, no lesions, without

## 2017-12-07 ENCOUNTER — HOSPITAL ENCOUNTER (OUTPATIENT)
Age: 47
Discharge: HOME | End: 2017-12-07
Payer: COMMERCIAL

## 2017-12-07 LAB
ABSOLUTE EOS #: 0.38 K/UL (ref 0–0.44)
ABSOLUTE IMMATURE GRANULOCYTE: <0.03 K/UL (ref 0–0.3)
ABSOLUTE LYMPH #: 3.97 K/UL (ref 1.1–3.7)
ABSOLUTE MONO #: 0.81 K/UL (ref 0.1–1.2)
ALBUMIN SERPL-MCNC: 3.7 G/DL (ref 3.5–5.2)
ALBUMIN/GLOBULIN RATIO: 1.2 (ref 1–2.5)
ALP BLD-CCNC: 80 U/L (ref 40–129)
ALT SERPL-CCNC: 16 U/L (ref 5–41)
ANION GAP SERPL CALCULATED.3IONS-SCNC: 12 MMOL/L (ref 9–17)
AST SERPL-CCNC: 16 U/L
BASOPHILS # BLD: 1 % (ref 0–2)
BASOPHILS ABSOLUTE: 0.08 K/UL (ref 0–0.2)
BILIRUB SERPL-MCNC: 0.18 MG/DL (ref 0.3–1.2)
BUN BLDV-MCNC: 9 MG/DL (ref 6–20)
BUN/CREAT BLD: ABNORMAL (ref 9–20)
CALCIUM SERPL-MCNC: 8.7 MG/DL (ref 8.6–10.4)
CHLORIDE BLD-SCNC: 104 MMOL/L (ref 98–107)
CHOLESTEROL/HDL RATIO: 3.7
CHOLESTEROL: 119 MG/DL
CO2: 26 MMOL/L (ref 20–31)
CREAT SERPL-MCNC: 0.88 MG/DL (ref 0.7–1.2)
DIFFERENTIAL TYPE: ABNORMAL
EKG ATRIAL RATE: 72 BPM
EKG P AXIS: 66 DEGREES
EKG P-R INTERVAL: 154 MS
EKG Q-T INTERVAL: 372 MS
EKG QRS DURATION: 94 MS
EKG QTC CALCULATION (BAZETT): 407 MS
EKG R AXIS: 80 DEGREES
EKG T AXIS: 70 DEGREES
EKG VENTRICULAR RATE: 72 BPM
EOSINOPHILS RELATIVE PERCENT: 4 % (ref 1–4)
GFR AFRICAN AMERICAN: >60 ML/MIN
GFR NON-AFRICAN AMERICAN: >60 ML/MIN
GFR SERPL CREATININE-BSD FRML MDRD: ABNORMAL ML/MIN/{1.73_M2}
GFR SERPL CREATININE-BSD FRML MDRD: ABNORMAL ML/MIN/{1.73_M2}
GLUCOSE BLD-MCNC: 75 MG/DL (ref 70–99)
HCT VFR BLD CALC: 44.1 % (ref 40.7–50.3)
HDLC SERPL-MCNC: 32 MG/DL
HEMOGLOBIN: 14.9 G/DL (ref 13–17)
IMMATURE GRANULOCYTES: 0 %
LDL CHOLESTEROL: 61 MG/DL (ref 0–130)
LYMPHOCYTES # BLD: 42 % (ref 24–43)
MCH RBC QN AUTO: 29 PG (ref 25.2–33.5)
MCHC RBC AUTO-ENTMCNC: 33.8 G/DL (ref 28.4–34.8)
MCV RBC AUTO: 86 FL (ref 82.6–102.9)
MONOCYTES # BLD: 9 % (ref 3–12)
PDW BLD-RTO: 14.7 % (ref 11.8–14.4)
PLATELET # BLD: 221 K/UL (ref 138–453)
PLATELET ESTIMATE: ABNORMAL
PMV BLD AUTO: 9.9 FL (ref 8.1–13.5)
POTASSIUM SERPL-SCNC: 3.4 MMOL/L (ref 3.7–5.3)
RBC # BLD: 5.13 M/UL (ref 4.21–5.77)
RBC # BLD: ABNORMAL 10*6/UL
SEG NEUTROPHILS: 44 % (ref 36–65)
SEGMENTED NEUTROPHILS ABSOLUTE COUNT: 4.18 K/UL (ref 1.5–8.1)
SODIUM BLD-SCNC: 142 MMOL/L (ref 135–144)
TOTAL PROTEIN: 6.9 G/DL (ref 6.4–8.3)
TRIGL SERPL-MCNC: 128 MG/DL
VLDLC SERPL CALC-MCNC: ABNORMAL MG/DL (ref 1–30)
WBC # BLD: 9.4 K/UL (ref 3.5–11.3)
WBC # BLD: ABNORMAL 10*3/UL

## 2017-12-07 PROCEDURE — 80061 LIPID PANEL: CPT

## 2017-12-07 PROCEDURE — 80053 COMPREHEN METABOLIC PANEL: CPT

## 2017-12-07 PROCEDURE — 36415 COLL VENOUS BLD VENIPUNCTURE: CPT

## 2017-12-07 PROCEDURE — 85025 COMPLETE CBC W/AUTO DIFF WBC: CPT

## 2017-12-07 PROCEDURE — 84481 FREE ASSAY (FT-3): CPT

## 2017-12-07 PROCEDURE — 93005 ELECTROCARDIOGRAM TRACING: CPT

## 2017-12-07 PROCEDURE — 87389 HIV-1 AG W/HIV-1&-2 AB AG IA: CPT

## 2017-12-07 PROCEDURE — 84443 ASSAY THYROID STIM HORMONE: CPT

## 2017-12-07 PROCEDURE — 84439 ASSAY OF FREE THYROXINE: CPT

## 2017-12-07 PROCEDURE — 80074 ACUTE HEPATITIS PANEL: CPT

## 2017-12-08 LAB
HAV IGM SER IA-ACNC: NONREACTIVE
HEPATITIS B CORE IGM ANTIBODY: NONREACTIVE
HEPATITIS B SURFACE ANTIGEN: NONREACTIVE
HEPATITIS C ANTIBODY: REACTIVE
HIV AG/AB: NONREACTIVE
T3 FREE: 3.17 PG/ML (ref 2.02–4.43)
THYROXINE, FREE: 1.4 NG/DL (ref 0.93–1.7)
TSH SERPL DL<=0.05 MIU/L-ACNC: 1.12 MIU/L (ref 0.3–5)

## 2017-12-12 ENCOUNTER — HOSPITAL ENCOUNTER (EMERGENCY)
Age: 47
Discharge: HOME | End: 2017-12-12
Attending: EMERGENCY MEDICINE
Payer: COMMERCIAL

## 2017-12-12 ENCOUNTER — APPOINTMENT (OUTPATIENT)
Dept: CT IMAGING | Age: 47
End: 2017-12-12
Payer: COMMERCIAL

## 2017-12-12 ENCOUNTER — APPOINTMENT (OUTPATIENT)
Dept: GENERAL RADIOLOGY | Age: 47
End: 2017-12-12
Payer: COMMERCIAL

## 2017-12-12 VITALS
SYSTOLIC BLOOD PRESSURE: 92 MMHG | OXYGEN SATURATION: 93 % | TEMPERATURE: 97.2 F | WEIGHT: 150 LBS | DIASTOLIC BLOOD PRESSURE: 63 MMHG | RESPIRATION RATE: 16 BRPM | BODY MASS INDEX: 22.73 KG/M2 | HEIGHT: 68 IN | HEART RATE: 76 BPM

## 2017-12-12 DIAGNOSIS — R41.82 ALTERED MENTAL STATUS, UNSPECIFIED ALTERED MENTAL STATUS TYPE: Primary | ICD-10-CM

## 2017-12-12 LAB
ABSOLUTE EOS #: 0.46 K/UL (ref 0–0.44)
ABSOLUTE IMMATURE GRANULOCYTE: <0.03 K/UL (ref 0–0.3)
ABSOLUTE LYMPH #: 3.22 K/UL (ref 1.1–3.7)
ABSOLUTE MONO #: 0.56 K/UL (ref 0.1–1.2)
ACETAMINOPHEN LEVEL: <10 UG/ML (ref 10–30)
ALBUMIN SERPL-MCNC: 3.4 G/DL (ref 3.5–5.2)
ALBUMIN/GLOBULIN RATIO: 1 (ref 1–2.5)
ALLEN TEST: ABNORMAL
ALP BLD-CCNC: 79 U/L (ref 40–129)
ALT SERPL-CCNC: 19 U/L (ref 5–41)
ANION GAP SERPL CALCULATED.3IONS-SCNC: 11 MMOL/L (ref 9–17)
ANION GAP: 7 MMOL/L (ref 7–16)
AST SERPL-CCNC: 23 U/L
BASOPHILS # BLD: 1 % (ref 0–2)
BASOPHILS ABSOLUTE: 0.08 K/UL (ref 0–0.2)
BILIRUB SERPL-MCNC: 0.24 MG/DL (ref 0.3–1.2)
BUN BLDV-MCNC: 8 MG/DL (ref 6–20)
BUN/CREAT BLD: ABNORMAL (ref 9–20)
CALCIUM SERPL-MCNC: 8.2 MG/DL (ref 8.6–10.4)
CHLORIDE BLD-SCNC: 104 MMOL/L (ref 98–107)
CO2: 25 MMOL/L (ref 20–31)
CREAT SERPL-MCNC: 0.93 MG/DL (ref 0.7–1.2)
DIFFERENTIAL TYPE: ABNORMAL
EKG ATRIAL RATE: 73 BPM
EKG P AXIS: 66 DEGREES
EKG P-R INTERVAL: 152 MS
EKG Q-T INTERVAL: 376 MS
EKG QRS DURATION: 92 MS
EKG QTC CALCULATION (BAZETT): 414 MS
EKG R AXIS: 77 DEGREES
EKG T AXIS: 70 DEGREES
EKG VENTRICULAR RATE: 73 BPM
EOSINOPHILS RELATIVE PERCENT: 6 % (ref 1–4)
ETHANOL PERCENT: <0.01 %
ETHANOL: <10 MG/DL
FIO2: ABNORMAL
GFR AFRICAN AMERICAN: >60 ML/MIN
GFR NON-AFRICAN AMERICAN: >60 ML/MIN
GFR NON-AFRICAN AMERICAN: >60 ML/MIN
GFR SERPL CREATININE-BSD FRML MDRD: >60 ML/MIN
GFR SERPL CREATININE-BSD FRML MDRD: ABNORMAL ML/MIN/{1.73_M2}
GFR SERPL CREATININE-BSD FRML MDRD: ABNORMAL ML/MIN/{1.73_M2}
GFR SERPL CREATININE-BSD FRML MDRD: NORMAL ML/MIN/{1.73_M2}
GLUCOSE BLD-MCNC: 90 MG/DL (ref 74–100)
GLUCOSE BLD-MCNC: 92 MG/DL (ref 70–99)
HCO3 VENOUS: 27.8 MMOL/L (ref 22–29)
HCT VFR BLD CALC: 44.9 % (ref 40.7–50.3)
HEMOGLOBIN: 14.2 G/DL (ref 13–17)
IMMATURE GRANULOCYTES: 0 %
LIPASE: 10 U/L (ref 13–60)
LYMPHOCYTES # BLD: 44 % (ref 24–43)
MCH RBC QN AUTO: 28.2 PG (ref 25.2–33.5)
MCHC RBC AUTO-ENTMCNC: 31.6 G/DL (ref 28.4–34.8)
MCV RBC AUTO: 89.1 FL (ref 82.6–102.9)
MODE: ABNORMAL
MONOCYTES # BLD: 8 % (ref 3–12)
NEGATIVE BASE EXCESS, VEN: ABNORMAL (ref 0–2)
O2 DEVICE/FLOW/%: ABNORMAL
O2 SAT, VEN: 40 % (ref 60–85)
PATIENT TEMP: ABNORMAL
PCO2, VEN: 55 MM HG (ref 41–51)
PDW BLD-RTO: 14.7 % (ref 11.8–14.4)
PH VENOUS: 7.31 (ref 7.32–7.43)
PLATELET # BLD: 165 K/UL (ref 138–453)
PLATELET ESTIMATE: ABNORMAL
PMV BLD AUTO: 9.5 FL (ref 8.1–13.5)
PO2, VEN: 25.6 MM HG (ref 30–50)
POC CHLORIDE: 108 MMOL/L (ref 98–107)
POC CREATININE: 0.94 MG/DL (ref 0.51–1.19)
POC HEMATOCRIT: 45 % (ref 41–53)
POC HEMOGLOBIN: 15.2 G/DL (ref 13.5–17.5)
POC IONIZED CALCIUM: 1.14 MMOL/L (ref 1.15–1.33)
POC LACTIC ACID: 1.01 MMOL/L (ref 0.56–1.39)
POC PCO2 TEMP: ABNORMAL MM HG
POC PH TEMP: ABNORMAL
POC PO2 TEMP: ABNORMAL MM HG
POC POTASSIUM: 3 MMOL/L (ref 3.5–4.5)
POC SODIUM: 143 MMOL/L (ref 138–146)
POSITIVE BASE EXCESS, VEN: 0 (ref 0–3)
POTASSIUM SERPL-SCNC: 3.1 MMOL/L (ref 3.7–5.3)
RBC # BLD: 5.04 M/UL (ref 4.21–5.77)
RBC # BLD: ABNORMAL 10*6/UL
SALICYLATE LEVEL: <1 MG/DL (ref 3–10)
SAMPLE SITE: ABNORMAL
SEG NEUTROPHILS: 41 % (ref 36–65)
SEGMENTED NEUTROPHILS ABSOLUTE COUNT: 3.06 K/UL (ref 1.5–8.1)
SODIUM BLD-SCNC: 140 MMOL/L (ref 135–144)
TOTAL CO2, VENOUS: 30 MMOL/L (ref 23–30)
TOTAL PROTEIN: 6.7 G/DL (ref 6.4–8.3)
TOXIC TRICYCLIC SC,BLOOD: NEGATIVE
VALPROIC ACID LEVEL: 34 UG/ML (ref 50–125)
VALPROIC DATE LAST DOSE: ABNORMAL
VALPROIC DOSE AMOUNT: ABNORMAL
VALPROIC TIME LAST DOSE: ABNORMAL
WBC # BLD: 7.4 K/UL (ref 3.5–11.3)
WBC # BLD: ABNORMAL 10*3/UL

## 2017-12-12 PROCEDURE — 80307 DRUG TEST PRSMV CHEM ANLYZR: CPT

## 2017-12-12 PROCEDURE — 85025 COMPLETE CBC W/AUTO DIFF WBC: CPT

## 2017-12-12 PROCEDURE — 82435 ASSAY OF BLOOD CHLORIDE: CPT

## 2017-12-12 PROCEDURE — 82565 ASSAY OF CREATININE: CPT

## 2017-12-12 PROCEDURE — 71020 XR CHEST STANDARD TWO VW: CPT

## 2017-12-12 PROCEDURE — 93005 ELECTROCARDIOGRAM TRACING: CPT

## 2017-12-12 PROCEDURE — 85014 HEMATOCRIT: CPT

## 2017-12-12 PROCEDURE — G0384 LEV 5 HOSP TYPE B ED VISIT: HCPCS

## 2017-12-12 PROCEDURE — 84295 ASSAY OF SERUM SODIUM: CPT

## 2017-12-12 PROCEDURE — 80053 COMPREHEN METABOLIC PANEL: CPT

## 2017-12-12 PROCEDURE — 83605 ASSAY OF LACTIC ACID: CPT

## 2017-12-12 PROCEDURE — 82947 ASSAY GLUCOSE BLOOD QUANT: CPT

## 2017-12-12 PROCEDURE — 84132 ASSAY OF SERUM POTASSIUM: CPT

## 2017-12-12 PROCEDURE — 6370000000 HC RX 637 (ALT 250 FOR IP): Performed by: EMERGENCY MEDICINE

## 2017-12-12 PROCEDURE — 82803 BLOOD GASES ANY COMBINATION: CPT

## 2017-12-12 PROCEDURE — 83690 ASSAY OF LIPASE: CPT

## 2017-12-12 PROCEDURE — 80201 ASSAY OF TOPIRAMATE: CPT

## 2017-12-12 PROCEDURE — G0480 DRUG TEST DEF 1-7 CLASSES: HCPCS

## 2017-12-12 PROCEDURE — 82330 ASSAY OF CALCIUM: CPT

## 2017-12-12 PROCEDURE — 70450 CT HEAD/BRAIN W/O DYE: CPT

## 2017-12-12 PROCEDURE — 80164 ASSAY DIPROPYLACETIC ACD TOT: CPT

## 2017-12-12 RX ORDER — POTASSIUM CHLORIDE 20 MEQ/1
40 TABLET, EXTENDED RELEASE ORAL ONCE
Status: COMPLETED | OUTPATIENT
Start: 2017-12-12 | End: 2017-12-12

## 2017-12-12 RX ORDER — 0.9 % SODIUM CHLORIDE 0.9 %
1000 INTRAVENOUS SOLUTION INTRAVENOUS ONCE
Status: DISCONTINUED | OUTPATIENT
Start: 2017-12-12 | End: 2017-12-12

## 2017-12-12 RX ORDER — DIVALPROEX SODIUM 500 MG/1
1000 TABLET, EXTENDED RELEASE ORAL ONCE
Status: COMPLETED | OUTPATIENT
Start: 2017-12-12 | End: 2017-12-12

## 2017-12-12 RX ADMIN — POTASSIUM CHLORIDE 40 MEQ: 20 TABLET, EXTENDED RELEASE ORAL at 13:04

## 2017-12-12 RX ADMIN — DIVALPROEX SODIUM 1000 MG: 500 TABLET, EXTENDED RELEASE ORAL at 13:04

## 2017-12-12 NOTE — ED PROVIDER NOTES
[facility]  ED  Emergency Department Encounter  Emergency Medicine Resident     Pt Name: Dain Fitzpatrick MRN: [id number]  Birthdate 1970  Date of evaluation: 12/12/17  PCP:  Sophie Dailey       Chief Complaint   Patient presents with    Altered Mental Status       HISTORY OF PRESENT ILLNESS  (Location/Symptom, Timing/Onset, Context/Setting, Quality, Duration, Modifying Factors, Severity.)      Dain Fitzpatrick is a 41 y.o. male who presents Via EMS from a detox center for altered mentation. Patient is not cooperative and answering questions, and becomes agitated when continue to ask questions. Per nursing will stop acting out periodically and then resume when healthcare providers in the room. Patient has a history of drug abuse, which  Patient is currently in detox. Patient is alert to time, place, and person, is in no acute distress maintaining adequate oxygenation with normal vital signs, except for slight hypotension. Patient does appear malnourished and cachectic. Patient does have a significant past medical history for anxiety, seizures, COPD, and several psychiatric disorders, as well as drug abuse. PAST MEDICAL / SURGICAL / SOCIAL / FAMILY HISTORY      has a past medical history of Anxiety; Arthritis; Bipolar disorder ([address] Bronchitis, chronic ([address] Chronic back pain; Chronic pain; Claustrophobia; COPD (chronic obstructive pulmonary disease) ([address] Depression; Diabetes mellitus ([address] Emphysema of lung ([address] History of irregular heartbeat; Hyperlipidemia; Liver disease; MRSA (methicillin resistant staph aureus) culture positive; Myofacial muscle pain; Osteomyelitis ([address] Peripheral neuropathy ([address] Pressure ulcer; Schizophrenia ([address] Seizures ([address] Sleep apnea; Spinal stenosis; and Substance abuse.     has a past surgical history that includes Appendectomy; Stomach surgery; lumbar fusion; Septoplasty (01/13/2017);  Turbinoplasties (01/13/2017); and fracture surgery (01/28/2017). Social History     Social History    Marital status:      Spouse name: N/A    Number of children: N/A    Years of education: N/A     Occupational History    Not on file. Social History Main Topics    Smoking status: Current Every Day Smoker     Packs/day: 0.75     Years: 35.00     Types: Cigarettes    Smokeless tobacco: Never Used    Alcohol use No    Drug use: Yes     Types: Marijuana, Opiates     Sexual activity: No     Other Topics Concern    Not on file     Social History Narrative    No narrative on file       Family History   Problem Relation Age of Onset    Diabetes Mother      many family members with DM    Seizures Sister     Coronary Art Dis Maternal Uncle     Seizures Maternal Cousin        Allergies:  Sulfa antibiotics; Sulfasalazine; Bactrim; Levofloxacin; Levofloxacin; Phenobarbital; Sulfamethoxazole-trimethoprim; and Sulfur    Home Medications:  Prior to Admission medications    Medication Sig Start Date End Date Taking? Authorizing Provider   traZODone (DESYREL) 100 MG tablet Take 1 tablet by mouth nightly as needed for Sleep 11/20/17   Jena Johns MD   pantoprazole (PROTONIX) 40 MG tablet Take 1 tablet by mouth Daily 11/20/17   Jena Johns MD   buprenorphine-naloxone (SUBOXONE) 2-0.5 MG SUBL Place 2 tablets under the tongue 2 times daily .  11/17/17   Kolby Werner MD   baclofen (LIORESAL) 20 MG tablet Take 20 mg by mouth 2 times daily at 0800 and 1400    Historical Provider, MD   DULoxetine (CYMBALTA) 60 MG extended release capsule TAKE 1 CAPSULE BY MOUTH DAILY 6/16/17   Colten Foster MD   divalproex (DEPAKOTE ER) 500 MG extended release tablet TAKE 1 TABLET TWICE A DAY 4/27/17   Colten Foster MD   diphenoxylate-atropine (LOMOTIL) 2.5-0.025 MG per tablet Take 1 tablet by mouth 4 times daily as needed for Diarrhea    Historical Provider, MD   albuterol (PROVENTIL) (2.5 MG/3ML) 0.083% nebulizer solution Take 2.5 mg by Time last dose NOT REPORTED    Hemoglobin and hematocrit, blood   Result Value Ref Range    POC Hemoglobin 15.2 13.5 - 17.5 g/dL    POC Hematocrit 45 41 - 53 %   SODIUM (POC)   Result Value Ref Range    POC Sodium 143 138 - 146 mmol/L   POTASSIUM (POC)   Result Value Ref Range    POC Potassium 3.0 (L) 3.5 - 4.5 mmol/L   CHLORIDE (POC)   Result Value Ref Range    POC Chloride 108 (H) 98 - 107 mmol/L   CALCIUM, IONIC (POC)   Result Value Ref Range    POC Ionized Calcium 1.14 (L) 1.15 - 1.33 mmol/L   Venous Blood Gas, POC   Result Value Ref Range    pH, Bra[place] 7.312 (L) 7.320 - 7.430    pCO2, [place] 55.0 (H) 41.0 - 51.0 mm Hg    pO2, [place] 25.6 (L) 30.0 - 50.0 mm Hg    HCO3, Venous 27.8 22.0 - 29.0 mmol/L    Total CO2, Venous 30 23.0 - 30.0 mmol/L    Negative Base Excess, Bra[place] NOT REPORTED 0.0 - 2.0    Positive Base Excess, Braeden 0 0.0 - 3.0    O2 Sat, [place] 40 (L) 60.0 - 85.0 %    O2 Device/Flow/% NOT REPORTED     Jhoan Test NOT REPORTED     Sample Site NOT REPORTED     Mode NOT REPORTED     FIO2 NOT REPORTED     Pt Temp NOT REPORTED     POC pH Temp NOT REPORTED     POC pCO2 Temp NOT REPORTED mm Hg    POC pO2 Temp NOT REPORTED mm Hg   Creatinine W/GFR Point of Care   Result Value Ref Range    POC Creatinine 0.94 0.51 - 1.19 mg/dL    GFR Comment >60 >60 mL/min    GFR Non-African American >60 >60 mL/min    GFR Comment         Lactic Acid, POC   Result Value Ref Range    POC Lactic Acid 1.01 0.56 - 1.39 mmol/L   POCT Glucose   Result Value Ref Range    POC Glucose 90 74 - 100 mg/dL   Anion Gap (Calc) POC   Result Value Ref Range    Anion Gap 7 7 - 16 mmol/L   EKG 12 Lead   Result Value Ref Range    Ventricular Rate 73 BPM    Atrial Rate 73 BPM    P-R Interval 152 ms    QRS Duration 92 ms    Q-T Interval 376 ms    QTc Calculation (Bazett) 414 ms    P Axis 66 degrees    R Axis 77 degrees    T Axis 70 degrees       IMPRESSION/EMERGENCY DEPARTMENT COURSE:    Plan is to do an altered mental status workup including CT head, chest x-ray, CBC, CMP, lipase, serum tox screen, JA, urinalysis, i-STAT, and administer fluids and reevaluate. Will also obtain Depakote and topiramate levels. Initial laboratory workup demonstrated hypokalemia, we will replace with oral potassium. Patient's Depakote level was low, we will also send here in the emergency department. Remaining lab work was unremarkable, including no leukocytosis, no lactic acidosis, normal glucose, mild respiratory acidosis due to patient's underlying COPD. Patient refuses/was unable to urinate and refused Valderrama/straight cath. Head CT and cxr unremarkable. On reevaluation patient is alert and continues to remain oriented and is not altered. Patient to be discharged back to the detox facility and will need follow-up with his primary care physician and be sure to take his antiepileptic medications as instructed. Patient stable for discharge at this time back to detox. ED Course        RADIOLOGY:  Xr Chest Standard (2 Vw)    Result Date: 12/12/2017  EXAMINATION: TWO VIEWS OF THE CHEST 12/12/2017 11:56 am COMPARISON: 07/18/2017 HISTORY: ORDERING SYSTEM PROVIDED HISTORY: ams TECHNOLOGIST PROVIDED HISTORY: Reason for exam:->ams FINDINGS: Frontal and lateral views of the chest are submitted for review. The cardiac silhouette is normal in size. Lung parenchyma is clear without focal airspace consolidation, sizeable pleural effusion, or pneumothorax. Trachea is midline. Osseous structures and soft tissues are grossly intact. No acute cardiopulmonary pathology. Ct Head Wo Contrast    Result Date: 12/12/2017  EXAMINATION: CT OF THE HEAD WITHOUT CONTRAST  12/12/2017 12:35 pm TECHNIQUE: CT of the head was performed without the administration of intravenous contrast. Dose modulation, iterative reconstruction, and/or weight based adjustment of the mA/kV was utilized to reduce the radiation dose to as low as reasonably achievable.  COMPARISON: Head CT from 09/21/2017 HISTORY: ORDERING SYSTEM PROVIDED HISTORY: ams TECHNOLOGIST PROVIDED HISTORY: Has a \"code stroke\" or \"stroke alert\" been called? ->No FINDINGS: BRAIN/VENTRICLES: There is no acute intracranial hemorrhage, mass effect or midline shift. No abnormal extra-axial fluid collection. The gray-white differentiation is maintained without evidence of an acute infarct. There is no evidence of hydrocephalus. ORBITS: The visualized portion of the orbits demonstrate no acute abnormality. SINUSES: The visualized paranasal sinuses and mastoid air cells demonstrate no acute abnormality. SOFT TISSUES/SKULL:  No acute abnormality of the visualized skull or soft tissues. No CT evidence for acute intracranial abnormality. EKG  EKG Interpretation    Interpreted by me    Rhythm: normal sinus   Rate: normal-73  Axis: normal  Ectopy: none  Conduction: normal  ST Segments: Nonspecific tenderness  T Waves: no acute change  Q Waves: none    Clinical Impression: Nonspecific EKG, no definite signs of ischemia, no significant change when compared EKG done on December 7, 2017    All EKG's are interpreted by the Emergency Department Physician who either signs or Co-signs this chart in the absence of a cardiologist.      PROCEDURES:  None    CONSULTS:  IP CONSULT TO SOCIAL WORK    CRITICAL CARE:  None    FINAL IMPRESSION      1.  Altered mental status, unspecified altered mental status type          DISPOSITION / PLAN     DISPOSITION     PATIENT REFERRED TO:  Jihan Moseley  [address] [facility]  [phone]    Schedule an appointment as soon as possible for a visit       [facility]  [address] [facility] [address] [phone]  Go to   If symptoms worsen      DISCHARGE MEDICATIONS:  Discharge Medication List as of 12/12/2017  1:52 PM          Letty Gallo DO  Emergency Medicine Resident    (Please note that portions of this note were completed with a voice recognition program.  Efforts were made to edit the dictations but occasionally words are mis-transcribed.)     Emma Nash,   12/12/17 1949

## 2017-12-12 NOTE — ED NOTES
Pt to ED via EMS alert and oriented x4 from detox center. Pt was reported to have not been \"acting himself\". Upon arrival pt is answering all questions appropriately. When asked the same questions again pt started acting like he didn't know the answer to the questions despite already answering them correctly. When asked if pt was HI or SI pt stated \"not yet\". Pt asked to give yes or no answer and stated \"no\" to both SI HI. Pt states he has generalized body pains, and then when asked to rate pain he asked \"what pain\". For resident pt refused to answer questions. Writer back to bedside for work up. Pt resting with eyes closed and not responding to verbal stimuli. Pt given sternal rub and immediately woke and began cussing at [facility]. Pt asked why he refused to answer or respond to Resident's assessment. Pt stated \"because he was asking a lot of questions and I didn't feel like answering him\". NAD. Pt denies drug or alcohol use.  Will continue to monitor      Errol Diaz RN  12/12/17 [id number]

## 2017-12-12 NOTE — ED NOTES
Bed: 38  Expected date:   Expected time:   Means of arrival:   Comments:  [facility] [address] Kamille Lundy, RN  12/12/17 [id number]

## 2017-12-13 LAB — TOPIRAMATE LEVEL: 8.4 UG/ML (ref 5–20)

## 2017-12-21 ENCOUNTER — HOSPITAL ENCOUNTER (OUTPATIENT)
Age: 47
Setting detail: SPECIMEN
Discharge: HOME | End: 2017-12-21
Payer: COMMERCIAL

## 2017-12-21 LAB
ALBUMIN SERPL-MCNC: 3.8 G/DL (ref 3.5–5.2)
ALBUMIN/GLOBULIN RATIO: 1 (ref 1–2.5)
ALP BLD-CCNC: 81 U/L (ref 40–129)
ALT SERPL-CCNC: 15 U/L (ref 5–41)
ANION GAP SERPL CALCULATED.3IONS-SCNC: 14 MMOL/L (ref 9–17)
AST SERPL-CCNC: 21 U/L
BILIRUB SERPL-MCNC: 0.23 MG/DL (ref 0.3–1.2)
BUN BLDV-MCNC: 13 MG/DL (ref 6–20)
BUN/CREAT BLD: ABNORMAL (ref 9–20)
CALCIUM SERPL-MCNC: 8.9 MG/DL (ref 8.6–10.4)
CHLORIDE BLD-SCNC: 107 MMOL/L (ref 98–107)
CO2: 22 MMOL/L (ref 20–31)
CREAT SERPL-MCNC: 0.83 MG/DL (ref 0.7–1.2)
GFR AFRICAN AMERICAN: >60 ML/MIN
GFR NON-AFRICAN AMERICAN: >60 ML/MIN
GFR SERPL CREATININE-BSD FRML MDRD: ABNORMAL ML/MIN/{1.73_M2}
GFR SERPL CREATININE-BSD FRML MDRD: ABNORMAL ML/MIN/{1.73_M2}
GLUCOSE BLD-MCNC: 75 MG/DL (ref 70–99)
MAGNESIUM: 2.2 MG/DL (ref 1.6–2.6)
POTASSIUM SERPL-SCNC: 4.2 MMOL/L (ref 3.7–5.3)
SODIUM BLD-SCNC: 143 MMOL/L (ref 135–144)
TOTAL PROTEIN: 7.5 G/DL (ref 6.4–8.3)

## 2017-12-27 LAB
DIRECT EXAM: 12
DIRECT EXAM: ABNORMAL
Lab: ABNORMAL
SPECIMEN DESCRIPTION: ABNORMAL
STATUS: ABNORMAL

## 2018-02-17 ENCOUNTER — HOSPITAL ENCOUNTER (EMERGENCY)
Age: 48
Discharge: HOME | End: 2018-02-17
Attending: EMERGENCY MEDICINE
Payer: COMMERCIAL

## 2018-02-17 ENCOUNTER — APPOINTMENT (OUTPATIENT)
Dept: CT IMAGING | Age: 48
End: 2018-02-17
Payer: COMMERCIAL

## 2018-02-17 ENCOUNTER — APPOINTMENT (OUTPATIENT)
Dept: GENERAL RADIOLOGY | Age: 48
End: 2018-02-17
Payer: COMMERCIAL

## 2018-02-17 VITALS
OXYGEN SATURATION: 95 % | HEART RATE: 72 BPM | DIASTOLIC BLOOD PRESSURE: 71 MMHG | SYSTOLIC BLOOD PRESSURE: 112 MMHG | RESPIRATION RATE: 16 BRPM | HEIGHT: 72 IN | BODY MASS INDEX: 20.32 KG/M2 | TEMPERATURE: 98.7 F | WEIGHT: 150 LBS

## 2018-02-17 DIAGNOSIS — S01.112A LACERATION OF LEFT EYEBROW, INITIAL ENCOUNTER: Primary | ICD-10-CM

## 2018-02-17 DIAGNOSIS — S09.90XA INJURY OF HEAD, INITIAL ENCOUNTER: ICD-10-CM

## 2018-02-17 LAB
ABSOLUTE EOS #: 0.3 K/UL (ref 0–0.4)
ABSOLUTE IMMATURE GRANULOCYTE: ABNORMAL K/UL (ref 0–0.3)
ABSOLUTE LYMPH #: 2.4 K/UL (ref 1–4.8)
ABSOLUTE MONO #: 0.6 K/UL (ref 0.1–1.3)
ALBUMIN SERPL-MCNC: 3.8 G/DL (ref 3.5–5.2)
ALBUMIN/GLOBULIN RATIO: ABNORMAL (ref 1–2.5)
ALP BLD-CCNC: 86 U/L (ref 40–129)
ALT SERPL-CCNC: 18 U/L (ref 5–41)
AMPHETAMINE SCREEN URINE: NEGATIVE
ANION GAP SERPL CALCULATED.3IONS-SCNC: 12 MMOL/L
AST SERPL-CCNC: 24 U/L
BARBITURATE SCREEN URINE: NEGATIVE
BASOPHILS # BLD: 1 % (ref 0–2)
BASOPHILS ABSOLUTE: 0.1 K/UL (ref 0–0.2)
BENZODIAZEPINE SCREEN, URINE: NEGATIVE
BILIRUB SERPL-MCNC: 0.21 MG/DL (ref 0.3–1.2)
BILIRUBIN DIRECT: 0.09 MG/DL
BILIRUBIN, INDIRECT: 0.12 MG/DL (ref 0–1)
BUN BLDV-MCNC: 15 MG/DL (ref 6–20)
BUN/CREAT BLD: ABNORMAL (ref 9–20)
BUPRENORPHINE URINE: NORMAL
CALCIUM SERPL-MCNC: 8.9 MG/DL (ref 8.6–10.4)
CANNABINOID SCREEN URINE: NEGATIVE
CHLORIDE BLD-SCNC: 108 MMOL/L (ref 98–107)
CO2: 25 MMOL/L (ref 20–31)
COCAINE METABOLITE, URINE: NEGATIVE
CREAT SERPL-MCNC: 0.78 MG/DL (ref 0.7–1.2)
DIFFERENTIAL TYPE: ABNORMAL
EOSINOPHILS RELATIVE PERCENT: 4 % (ref 0–4)
ETHANOL PERCENT: <0.01 %
ETHANOL: <10 MG/DL
GFR AFRICAN AMERICAN: >60 ML/MIN
GFR NON-AFRICAN AMERICAN: >60 ML/MIN
GFR SERPL CREATININE-BSD FRML MDRD: ABNORMAL ML/MIN/{1.73_M2}
GFR SERPL CREATININE-BSD FRML MDRD: ABNORMAL ML/MIN/{1.73_M2}
GLOBULIN: ABNORMAL G/DL (ref 1.5–3.8)
GLUCOSE BLD-MCNC: 78 MG/DL (ref 70–99)
HCT VFR BLD CALC: 43.3 % (ref 41–53)
HEMOGLOBIN: 14 G/DL (ref 13.5–17.5)
IMMATURE GRANULOCYTES: ABNORMAL %
LIPASE: 16 U/L (ref 13–60)
LYMPHOCYTES # BLD: 32 % (ref 24–44)
MCH RBC QN AUTO: 28.9 PG (ref 26–34)
MCHC RBC AUTO-ENTMCNC: 32.4 G/DL (ref 31–37)
MCV RBC AUTO: 89 FL (ref 80–100)
MDMA URINE: NORMAL
METHADONE SCREEN, URINE: NEGATIVE
METHAMPHETAMINE, URINE: NORMAL
MONOCYTES # BLD: 9 % (ref 1–7)
NRBC AUTOMATED: ABNORMAL PER 100 WBC
OPIATES, URINE: NEGATIVE
OXYCODONE SCREEN URINE: NEGATIVE
PDW BLD-RTO: 14.8 % (ref 11.5–14.9)
PHENCYCLIDINE, URINE: NEGATIVE
PLATELET # BLD: 200 K/UL (ref 150–450)
PLATELET ESTIMATE: ABNORMAL
PMV BLD AUTO: 8.1 FL (ref 6–12)
POTASSIUM SERPL-SCNC: 4.3 MMOL/L (ref 3.7–5.3)
PROPOXYPHENE, URINE: NORMAL
RBC # BLD: 4.86 M/UL (ref 4.5–5.9)
RBC # BLD: ABNORMAL 10*6/UL
SEG NEUTROPHILS: 54 % (ref 36–66)
SEGMENTED NEUTROPHILS ABSOLUTE COUNT: 4.1 K/UL (ref 1.3–9.1)
SODIUM BLD-SCNC: 145 MMOL/L (ref 135–144)
TEST INFORMATION: NORMAL
TOTAL CK: 102 U/L (ref 39–308)
TOTAL PROTEIN: 6.8 G/DL (ref 6.4–8.3)
TRICYCLIC ANTIDEPRESSANTS, UR: NORMAL
WBC # BLD: 7.5 K/UL (ref 3.5–11)
WBC # BLD: ABNORMAL 10*3/UL

## 2018-02-17 PROCEDURE — 82550 ASSAY OF CK (CPK): CPT

## 2018-02-17 PROCEDURE — 2580000003 HC RX 258: Performed by: EMERGENCY MEDICINE

## 2018-02-17 PROCEDURE — 80048 BASIC METABOLIC PNL TOTAL CA: CPT

## 2018-02-17 PROCEDURE — G0480 DRUG TEST DEF 1-7 CLASSES: HCPCS

## 2018-02-17 PROCEDURE — 80076 HEPATIC FUNCTION PANEL: CPT

## 2018-02-17 PROCEDURE — 72125 CT NECK SPINE W/O DYE: CPT

## 2018-02-17 PROCEDURE — 85025 COMPLETE CBC W/AUTO DIFF WBC: CPT

## 2018-02-17 PROCEDURE — 70450 CT HEAD/BRAIN W/O DYE: CPT

## 2018-02-17 PROCEDURE — 12013 RPR F/E/E/N/L/M 2.6-5.0 CM: CPT

## 2018-02-17 PROCEDURE — 83690 ASSAY OF LIPASE: CPT

## 2018-02-17 PROCEDURE — 36415 COLL VENOUS BLD VENIPUNCTURE: CPT

## 2018-02-17 PROCEDURE — 99285 EMERGENCY DEPT VISIT HI MDM: CPT

## 2018-02-17 PROCEDURE — 2500000003 HC RX 250 WO HCPCS: Performed by: EMERGENCY MEDICINE

## 2018-02-17 PROCEDURE — 80307 DRUG TEST PRSMV CHEM ANLYZR: CPT

## 2018-02-17 PROCEDURE — 71045 X-RAY EXAM CHEST 1 VIEW: CPT

## 2018-02-17 RX ORDER — 0.9 % SODIUM CHLORIDE 0.9 %
1000 INTRAVENOUS SOLUTION INTRAVENOUS ONCE
Status: COMPLETED | OUTPATIENT
Start: 2018-02-17 | End: 2018-02-17

## 2018-02-17 RX ORDER — ACETAMINOPHEN 325 MG/1
650 TABLET ORAL EVERY 6 HOURS PRN
Qty: 41 TABLET | Refills: 0 | Status: SHIPPED | OUTPATIENT
Start: 2018-02-17 | End: 2018-07-09 | Stop reason: ALTCHOICE

## 2018-02-17 RX ORDER — LIDOCAINE HYDROCHLORIDE 10 MG/ML
20 INJECTION, SOLUTION INFILTRATION; PERINEURAL ONCE
Status: COMPLETED | OUTPATIENT
Start: 2018-02-17 | End: 2018-02-17

## 2018-02-17 RX ORDER — ACETAMINOPHEN 325 MG/1
650 TABLET ORAL EVERY 6 HOURS PRN
Qty: 40 TABLET | Refills: 0 | Status: SHIPPED | OUTPATIENT
Start: 2018-02-17 | End: 2018-02-17

## 2018-02-17 RX ADMIN — SODIUM CHLORIDE 1000 ML: 9 INJECTION, SOLUTION INTRAVENOUS at 15:20

## 2018-02-17 RX ADMIN — LIDOCAINE HYDROCHLORIDE 20 ML: 10 INJECTION, SOLUTION INFILTRATION; PERINEURAL at 17:00

## 2018-02-17 RX ADMIN — SODIUM CHLORIDE 1000 ML: 9 INJECTION, SOLUTION INTRAVENOUS at 14:13

## 2018-02-17 NOTE — ED NOTES
Pt is brought to this ER by [facility] with c/o AMS and laceration to his lt eyebrow. Pt has chronic twitches, and he states he fell when he had a twitch. FSBS = 119 per [facility]. Pt reportedly came from a group home where he is a resident. Pt arrives PMS x 4 intact, eupneic, and PWD. Lt eyebrow lac is not bleeding.      Lisa Pickering RN  02/17/18 5722

## 2018-02-18 NOTE — ED NOTES
Patient discharged by ambulance, [facility], for transportation to private residence.        Jairo Chisholm RN  02/17/18 0799

## 2018-04-06 ENCOUNTER — APPOINTMENT (OUTPATIENT)
Dept: GENERAL RADIOLOGY | Age: 48
DRG: 750 | End: 2018-04-06
Payer: COMMERCIAL

## 2018-04-06 ENCOUNTER — HOSPITAL ENCOUNTER (OUTPATIENT)
Age: 48
Setting detail: OBSERVATION
Discharge: PSYCHIATRIC OR SUBSTANCE USE FACILITY | DRG: 750 | End: 2018-04-07
Attending: EMERGENCY MEDICINE | Admitting: INTERNAL MEDICINE
Payer: COMMERCIAL

## 2018-04-06 ENCOUNTER — APPOINTMENT (OUTPATIENT)
Dept: CT IMAGING | Age: 48
DRG: 750 | End: 2018-04-06
Payer: COMMERCIAL

## 2018-04-06 DIAGNOSIS — E72.20 HYPERAMMONEMIA (HCC): ICD-10-CM

## 2018-04-06 DIAGNOSIS — R41.82 ALTERED MENTAL STATUS, UNSPECIFIED ALTERED MENTAL STATUS TYPE: Primary | ICD-10-CM

## 2018-04-06 LAB
EKG ATRIAL RATE: 67 BPM
EKG P AXIS: 66 DEGREES
EKG P-R INTERVAL: 156 MS
EKG Q-T INTERVAL: 368 MS
EKG QRS DURATION: 92 MS
EKG QTC CALCULATION (BAZETT): 388 MS
EKG R AXIS: 75 DEGREES
EKG T AXIS: 58 DEGREES
EKG VENTRICULAR RATE: 67 BPM

## 2018-04-06 PROCEDURE — 71045 X-RAY EXAM CHEST 1 VIEW: CPT

## 2018-04-06 PROCEDURE — 99285 EMERGENCY DEPT VISIT HI MDM: CPT

## 2018-04-06 RX ORDER — 0.9 % SODIUM CHLORIDE 0.9 %
1000 INTRAVENOUS SOLUTION INTRAVENOUS ONCE
Status: COMPLETED | OUTPATIENT
Start: 2018-04-07 | End: 2018-04-07

## 2018-04-07 ENCOUNTER — APPOINTMENT (OUTPATIENT)
Dept: CT IMAGING | Age: 48
DRG: 750 | End: 2018-04-07
Payer: COMMERCIAL

## 2018-04-07 ENCOUNTER — HOSPITAL ENCOUNTER (INPATIENT)
Age: 48
LOS: 5 days | Discharge: HOME | DRG: 750 | End: 2018-04-12
Attending: PSYCHIATRY & NEUROLOGY | Admitting: PSYCHIATRY & NEUROLOGY
Payer: COMMERCIAL

## 2018-04-07 VITALS
TEMPERATURE: 98.3 F | HEIGHT: 75 IN | DIASTOLIC BLOOD PRESSURE: 62 MMHG | OXYGEN SATURATION: 99 % | BODY MASS INDEX: 20.7 KG/M2 | SYSTOLIC BLOOD PRESSURE: 94 MMHG | HEART RATE: 77 BPM | WEIGHT: 166.45 LBS | RESPIRATION RATE: 17 BRPM

## 2018-04-07 PROBLEM — F33.3 MAJOR PSYCHOTIC DEPRESSION, RECURRENT (HCC): Status: ACTIVE | Noted: 2018-04-07

## 2018-04-07 PROBLEM — T88.7XXA NON-DOSE-RELATED ADVERSE REACTION TO MEDICATION: Status: ACTIVE | Noted: 2018-04-07

## 2018-04-07 PROBLEM — F25.9 SCHIZOAFFECTIVE DISORDER (HCC): Status: ACTIVE | Noted: 2018-04-07

## 2018-04-07 PROBLEM — G82.20 PARAPLEGIA (HCC): Status: ACTIVE | Noted: 2018-04-07

## 2018-04-07 LAB
-: ABNORMAL
ABSOLUTE EOS #: 0.3 K/UL (ref 0–0.4)
ABSOLUTE IMMATURE GRANULOCYTE: ABNORMAL K/UL (ref 0–0.3)
ABSOLUTE LYMPH #: 3.1 K/UL (ref 1–4.8)
ABSOLUTE MONO #: 0.7 K/UL (ref 0.1–1.3)
ACETAMINOPHEN LEVEL: <10 UG/ML (ref 10–30)
ACETAMINOPHEN LEVEL: NORMAL UG/ML
ALBUMIN SERPL-MCNC: 3.1 G/DL (ref 3.5–5.2)
ALBUMIN SERPL-MCNC: 3.6 G/DL (ref 3.5–5.2)
ALBUMIN/GLOBULIN RATIO: ABNORMAL (ref 1–2.5)
ALBUMIN/GLOBULIN RATIO: ABNORMAL (ref 1–2.5)
ALP BLD-CCNC: 56 U/L (ref 40–129)
ALP BLD-CCNC: 66 U/L (ref 40–129)
ALT SERPL-CCNC: 16 U/L (ref 5–41)
ALT SERPL-CCNC: 18 U/L (ref 5–41)
AMMONIA: 76 UMOL/L (ref 16–60)
AMMONIA: 85 UMOL/L (ref 16–60)
AMORPHOUS: ABNORMAL
AMPHETAMINE SCREEN URINE: NEGATIVE
ANION GAP SERPL CALCULATED.3IONS-SCNC: 11 MMOL/L (ref 9–17)
AST SERPL-CCNC: 21 U/L
AST SERPL-CCNC: 22 U/L
BACTERIA: ABNORMAL
BARBITURATE SCREEN URINE: NEGATIVE
BASOPHILS # BLD: 1 % (ref 0–2)
BASOPHILS ABSOLUTE: 0.1 K/UL (ref 0–0.2)
BENZODIAZEPINE SCREEN, URINE: NEGATIVE
BILIRUB SERPL-MCNC: 0.19 MG/DL (ref 0.3–1.2)
BILIRUB SERPL-MCNC: 0.23 MG/DL (ref 0.3–1.2)
BILIRUBIN DIRECT: 0.08 MG/DL
BILIRUBIN URINE: ABNORMAL
BILIRUBIN, INDIRECT: 0.11 MG/DL (ref 0–1)
BUN BLDV-MCNC: 21 MG/DL (ref 6–20)
BUN/CREAT BLD: ABNORMAL (ref 9–20)
BUPRENORPHINE URINE: NORMAL
CALCIUM SERPL-MCNC: 8.9 MG/DL (ref 8.6–10.4)
CANNABINOID SCREEN URINE: NEGATIVE
CASTS UA: ABNORMAL /LPF
CHLORIDE BLD-SCNC: 106 MMOL/L (ref 98–107)
CHP ED QC CHECK: NORMAL
CO2: 24 MMOL/L (ref 20–31)
COCAINE METABOLITE, URINE: NEGATIVE
COLOR: ABNORMAL
COMMENT UA: ABNORMAL
CREAT SERPL-MCNC: 0.88 MG/DL (ref 0.7–1.2)
CRYSTALS, UA: ABNORMAL /HPF
DIFFERENTIAL TYPE: ABNORMAL
EOSINOPHILS RELATIVE PERCENT: 4 % (ref 0–4)
EPITHELIAL CELLS UA: ABNORMAL /HPF
ETHANOL PERCENT: <0.01 %
ETHANOL PERCENT: NORMAL %
ETHANOL: <10 MG/DL
ETHANOL: NORMAL MG/DL
GFR AFRICAN AMERICAN: >60 ML/MIN
GFR NON-AFRICAN AMERICAN: >60 ML/MIN
GFR SERPL CREATININE-BSD FRML MDRD: ABNORMAL ML/MIN/{1.73_M2}
GFR SERPL CREATININE-BSD FRML MDRD: ABNORMAL ML/MIN/{1.73_M2}
GLOBULIN: ABNORMAL G/DL (ref 1.5–3.8)
GLUCOSE BLD-MCNC: 111 MG/DL
GLUCOSE BLD-MCNC: 111 MG/DL (ref 75–110)
GLUCOSE BLD-MCNC: 81 MG/DL (ref 75–110)
GLUCOSE BLD-MCNC: 83 MG/DL (ref 75–110)
GLUCOSE BLD-MCNC: 88 MG/DL (ref 70–99)
GLUCOSE URINE: NEGATIVE
HCT VFR BLD CALC: 44.2 % (ref 41–53)
HEMOGLOBIN: 14.4 G/DL (ref 13.5–17.5)
IMMATURE GRANULOCYTES: ABNORMAL %
KETONES, URINE: ABNORMAL
LEUKOCYTE ESTERASE, URINE: ABNORMAL
LYMPHOCYTES # BLD: 37 % (ref 24–44)
MAGNESIUM: 2.2 MG/DL (ref 1.6–2.6)
MCH RBC QN AUTO: 28.9 PG (ref 26–34)
MCHC RBC AUTO-ENTMCNC: 32.6 G/DL (ref 31–37)
MCV RBC AUTO: 88.5 FL (ref 80–100)
MDMA URINE: NORMAL
METHADONE SCREEN, URINE: NEGATIVE
METHAMPHETAMINE, URINE: NORMAL
MONOCYTES # BLD: 8 % (ref 1–7)
MUCUS: ABNORMAL
NITRITE, URINE: NEGATIVE
NRBC AUTOMATED: ABNORMAL PER 100 WBC
OPIATES, URINE: NEGATIVE
OTHER OBSERVATIONS UA: ABNORMAL
OXYCODONE SCREEN URINE: NEGATIVE
PDW BLD-RTO: 15.3 % (ref 11.5–14.9)
PH UA: 8 (ref 5–8)
PHENCYCLIDINE, URINE: NEGATIVE
PLATELET # BLD: 194 K/UL (ref 150–450)
PLATELET ESTIMATE: ABNORMAL
PMV BLD AUTO: 7.8 FL (ref 6–12)
POTASSIUM SERPL-SCNC: 4 MMOL/L (ref 3.7–5.3)
PREALBUMIN: 18 MG/DL (ref 20–40)
PROPOXYPHENE, URINE: NORMAL
PROTEIN UA: POSITIVE
RBC # BLD: 5 M/UL (ref 4.5–5.9)
RBC # BLD: ABNORMAL 10*6/UL
RBC UA: ABNORMAL /HPF
RENAL EPITHELIAL, UA: ABNORMAL /HPF
SALICYLATE LEVEL: <1 MG/DL (ref 3–10)
SALICYLATE LEVEL: NORMAL MG/DL
SEG NEUTROPHILS: 50 % (ref 36–66)
SEGMENTED NEUTROPHILS ABSOLUTE COUNT: 4.3 K/UL (ref 1.3–9.1)
SODIUM BLD-SCNC: 141 MMOL/L (ref 135–144)
SPECIFIC GRAVITY UA: 1.02 (ref 1–1.03)
TEST INFORMATION: NORMAL
TOTAL PROTEIN: 5.9 G/DL (ref 6.4–8.3)
TOTAL PROTEIN: 6.4 G/DL (ref 6.4–8.3)
TRICHOMONAS: ABNORMAL
TRICYCLIC ANTIDEP,URINE: NEGATIVE
TRICYCLIC ANTIDEPRESSANTS, UR: NORMAL
TURBIDITY: ABNORMAL
URINE HGB: NEGATIVE
UROBILINOGEN, URINE: NORMAL
VALPROIC ACID LEVEL: 35 UG/ML (ref 50–125)
VALPROIC DATE LAST DOSE: ABNORMAL
VALPROIC DOSE AMOUNT: ABNORMAL
VALPROIC TIME LAST DOSE: ABNORMAL
WBC # BLD: 8.5 K/UL (ref 3.5–11)
WBC # BLD: ABNORMAL 10*3/UL
WBC UA: ABNORMAL /HPF
YEAST: ABNORMAL

## 2018-04-07 PROCEDURE — 6370000000 HC RX 637 (ALT 250 FOR IP): Performed by: PSYCHIATRY & NEUROLOGY

## 2018-04-07 PROCEDURE — 1240000000 HC EMOTIONAL WELLNESS R&B

## 2018-04-07 PROCEDURE — 83036 HEMOGLOBIN GLYCOSYLATED A1C: CPT

## 2018-04-07 PROCEDURE — 85025 COMPLETE CBC W/AUTO DIFF WBC: CPT

## 2018-04-07 PROCEDURE — 83735 ASSAY OF MAGNESIUM: CPT

## 2018-04-07 PROCEDURE — 82140 ASSAY OF AMMONIA: CPT

## 2018-04-07 PROCEDURE — G0378 HOSPITAL OBSERVATION PER HR: HCPCS

## 2018-04-07 PROCEDURE — 84134 ASSAY OF PREALBUMIN: CPT

## 2018-04-07 PROCEDURE — 6370000000 HC RX 637 (ALT 250 FOR IP): Performed by: INTERNAL MEDICINE

## 2018-04-07 PROCEDURE — 99223 1ST HOSP IP/OBS HIGH 75: CPT | Performed by: INTERNAL MEDICINE

## 2018-04-07 PROCEDURE — 2580000003 HC RX 258: Performed by: EMERGENCY MEDICINE

## 2018-04-07 PROCEDURE — 81001 URINALYSIS AUTO W/SCOPE: CPT

## 2018-04-07 PROCEDURE — 80307 DRUG TEST PRSMV CHEM ANLYZR: CPT

## 2018-04-07 PROCEDURE — 87086 URINE CULTURE/COLONY COUNT: CPT

## 2018-04-07 PROCEDURE — 70450 CT HEAD/BRAIN W/O DYE: CPT

## 2018-04-07 PROCEDURE — 6360000002 HC RX W HCPCS: Performed by: INTERNAL MEDICINE

## 2018-04-07 PROCEDURE — 6360000002 HC RX W HCPCS: Performed by: PSYCHIATRY & NEUROLOGY

## 2018-04-07 PROCEDURE — 80053 COMPREHEN METABOLIC PANEL: CPT

## 2018-04-07 PROCEDURE — 93005 ELECTROCARDIOGRAM TRACING: CPT

## 2018-04-07 PROCEDURE — 80076 HEPATIC FUNCTION PANEL: CPT

## 2018-04-07 PROCEDURE — 82947 ASSAY GLUCOSE BLOOD QUANT: CPT

## 2018-04-07 PROCEDURE — 80164 ASSAY DIPROPYLACETIC ACD TOT: CPT

## 2018-04-07 PROCEDURE — 96372 THER/PROPH/DIAG INJ SC/IM: CPT

## 2018-04-07 PROCEDURE — G0480 DRUG TEST DEF 1-7 CLASSES: HCPCS

## 2018-04-07 PROCEDURE — 36415 COLL VENOUS BLD VENIPUNCTURE: CPT

## 2018-04-07 RX ORDER — ACETAMINOPHEN 325 MG/1
650 TABLET ORAL EVERY 4 HOURS PRN
Status: DISCONTINUED | OUTPATIENT
Start: 2018-04-07 | End: 2018-04-12 | Stop reason: HOSPADM

## 2018-04-07 RX ORDER — BUPRENORPHINE HYDROCHLORIDE AND NALOXONE HYDROCHLORIDE DIHYDRATE 2; .5 MG/1; MG/1
2 TABLET SUBLINGUAL 2 TIMES DAILY
Status: CANCELLED | OUTPATIENT
Start: 2018-04-07

## 2018-04-07 RX ORDER — SODIUM CHLORIDE 0.9 % (FLUSH) 0.9 %
10 SYRINGE (ML) INJECTION EVERY 12 HOURS SCHEDULED
Status: DISCONTINUED | OUTPATIENT
Start: 2018-04-07 | End: 2018-04-07 | Stop reason: HOSPADM

## 2018-04-07 RX ORDER — DIVALPROEX SODIUM 500 MG/1
500 TABLET, EXTENDED RELEASE ORAL 2 TIMES DAILY
Status: CANCELLED | OUTPATIENT
Start: 2018-04-07

## 2018-04-07 RX ORDER — TRAZODONE HYDROCHLORIDE 50 MG/1
50 TABLET ORAL NIGHTLY PRN
Status: DISCONTINUED | OUTPATIENT
Start: 2018-04-07 | End: 2018-04-09

## 2018-04-07 RX ORDER — ACETAMINOPHEN 325 MG/1
650 TABLET ORAL EVERY 6 HOURS PRN
Status: CANCELLED | OUTPATIENT
Start: 2018-04-07

## 2018-04-07 RX ORDER — TRAZODONE HYDROCHLORIDE 100 MG/1
100 TABLET ORAL NIGHTLY PRN
Status: DISCONTINUED | OUTPATIENT
Start: 2018-04-07 | End: 2018-04-07 | Stop reason: HOSPADM

## 2018-04-07 RX ORDER — NICOTINE POLACRILEX 4 MG
15 LOZENGE BUCCAL PRN
Status: DISCONTINUED | OUTPATIENT
Start: 2018-04-07 | End: 2018-04-07 | Stop reason: HOSPADM

## 2018-04-07 RX ORDER — ALBUTEROL SULFATE 2.5 MG/3ML
2.5 SOLUTION RESPIRATORY (INHALATION) EVERY 6 HOURS PRN
Status: DISCONTINUED | OUTPATIENT
Start: 2018-04-07 | End: 2018-04-07 | Stop reason: HOSPADM

## 2018-04-07 RX ORDER — ACETAMINOPHEN 325 MG/1
650 TABLET ORAL EVERY 4 HOURS PRN
Status: DISCONTINUED | OUTPATIENT
Start: 2018-04-07 | End: 2018-04-07

## 2018-04-07 RX ORDER — SODIUM CHLORIDE 0.9 % (FLUSH) 0.9 %
10 SYRINGE (ML) INJECTION PRN
Status: DISCONTINUED | OUTPATIENT
Start: 2018-04-07 | End: 2018-04-07 | Stop reason: HOSPADM

## 2018-04-07 RX ORDER — DIVALPROEX SODIUM 500 MG/1
500 TABLET, EXTENDED RELEASE ORAL 2 TIMES DAILY
Status: DISCONTINUED | OUTPATIENT
Start: 2018-04-07 | End: 2018-04-07 | Stop reason: HOSPADM

## 2018-04-07 RX ORDER — LORAZEPAM 2 MG/ML
1 INJECTION INTRAMUSCULAR EVERY 4 HOURS PRN
Status: DISCONTINUED | OUTPATIENT
Start: 2018-04-07 | End: 2018-04-12 | Stop reason: HOSPADM

## 2018-04-07 RX ORDER — NICOTINE 21 MG/24HR
1 PATCH, TRANSDERMAL 24 HOURS TRANSDERMAL DAILY
Status: DISCONTINUED | OUTPATIENT
Start: 2018-04-07 | End: 2018-04-07 | Stop reason: HOSPADM

## 2018-04-07 RX ORDER — DULOXETIN HYDROCHLORIDE 60 MG/1
60 CAPSULE, DELAYED RELEASE ORAL DAILY
Status: CANCELLED | OUTPATIENT
Start: 2018-04-08

## 2018-04-07 RX ORDER — DEXTROSE AND SODIUM CHLORIDE 5; .45 G/100ML; G/100ML
INJECTION, SOLUTION INTRAVENOUS CONTINUOUS
Status: DISCONTINUED | OUTPATIENT
Start: 2018-04-07 | End: 2018-04-07 | Stop reason: HOSPADM

## 2018-04-07 RX ORDER — DEXTROSE MONOHYDRATE 50 MG/ML
100 INJECTION, SOLUTION INTRAVENOUS PRN
Status: DISCONTINUED | OUTPATIENT
Start: 2018-04-07 | End: 2018-04-07 | Stop reason: HOSPADM

## 2018-04-07 RX ORDER — BUPRENORPHINE AND NALOXONE 8; 2 MG/1; MG/1
1 FILM, SOLUBLE BUCCAL; SUBLINGUAL DAILY
Status: DISCONTINUED | OUTPATIENT
Start: 2018-04-07 | End: 2018-04-12 | Stop reason: HOSPADM

## 2018-04-07 RX ORDER — NICOTINE POLACRILEX 4 MG
15 LOZENGE BUCCAL PRN
Status: CANCELLED | OUTPATIENT
Start: 2018-04-07

## 2018-04-07 RX ORDER — DEXTROSE MONOHYDRATE 25 G/50ML
12.5 INJECTION, SOLUTION INTRAVENOUS PRN
Status: DISCONTINUED | OUTPATIENT
Start: 2018-04-07 | End: 2018-04-07 | Stop reason: HOSPADM

## 2018-04-07 RX ORDER — ALBUTEROL SULFATE 2.5 MG/3ML
2.5 SOLUTION RESPIRATORY (INHALATION) EVERY 6 HOURS PRN
Status: CANCELLED | OUTPATIENT
Start: 2018-04-07

## 2018-04-07 RX ORDER — DULOXETIN HYDROCHLORIDE 60 MG/1
60 CAPSULE, DELAYED RELEASE ORAL DAILY
Status: DISCONTINUED | OUTPATIENT
Start: 2018-04-07 | End: 2018-04-07 | Stop reason: HOSPADM

## 2018-04-07 RX ORDER — HYDROXYZINE HYDROCHLORIDE 25 MG/1
50 TABLET, FILM COATED ORAL 3 TIMES DAILY PRN
Status: DISCONTINUED | OUTPATIENT
Start: 2018-04-07 | End: 2018-04-12 | Stop reason: HOSPADM

## 2018-04-07 RX ORDER — DIVALPROEX SODIUM 500 MG/1
500 TABLET, EXTENDED RELEASE ORAL ONCE
Status: COMPLETED | OUTPATIENT
Start: 2018-04-07 | End: 2018-04-07

## 2018-04-07 RX ORDER — TRAZODONE HYDROCHLORIDE 100 MG/1
100 TABLET ORAL NIGHTLY PRN
Status: CANCELLED | OUTPATIENT
Start: 2018-04-07

## 2018-04-07 RX ORDER — NICOTINE 21 MG/24HR
1 PATCH, TRANSDERMAL 24 HOURS TRANSDERMAL DAILY
Status: CANCELLED | OUTPATIENT
Start: 2018-04-08

## 2018-04-07 RX ORDER — NICOTINE 21 MG/24HR
1 PATCH, TRANSDERMAL 24 HOURS TRANSDERMAL DAILY
Status: DISCONTINUED | OUTPATIENT
Start: 2018-04-07 | End: 2018-04-12 | Stop reason: HOSPADM

## 2018-04-07 RX ORDER — BUPRENORPHINE HYDROCHLORIDE AND NALOXONE HYDROCHLORIDE DIHYDRATE 2; .5 MG/1; MG/1
2 TABLET SUBLINGUAL 2 TIMES DAILY
Status: DISCONTINUED | OUTPATIENT
Start: 2018-04-07 | End: 2018-04-07 | Stop reason: HOSPADM

## 2018-04-07 RX ORDER — RISPERIDONE 1 MG/1
1 TABLET, FILM COATED ORAL 2 TIMES DAILY
Status: DISCONTINUED | OUTPATIENT
Start: 2018-04-07 | End: 2018-04-08

## 2018-04-07 RX ORDER — DULOXETIN HYDROCHLORIDE 60 MG/1
60 CAPSULE, DELAYED RELEASE ORAL DAILY
Status: DISCONTINUED | OUTPATIENT
Start: 2018-04-07 | End: 2018-04-12 | Stop reason: HOSPADM

## 2018-04-07 RX ORDER — ACETAMINOPHEN 325 MG/1
650 TABLET ORAL EVERY 6 HOURS PRN
Status: DISCONTINUED | OUTPATIENT
Start: 2018-04-07 | End: 2018-04-07 | Stop reason: HOSPADM

## 2018-04-07 RX ADMIN — DIVALPROEX SODIUM 500 MG: 500 TABLET, EXTENDED RELEASE ORAL at 13:17

## 2018-04-07 RX ADMIN — RISPERIDONE 1 MG: 1 TABLET ORAL at 21:09

## 2018-04-07 RX ADMIN — BUPRENORPHINE HYDROCHLORIDE, NALOXONE HYDROCHLORIDE 1 FILM: 8; 2 FILM, SOLUBLE BUCCAL; SUBLINGUAL at 21:09

## 2018-04-07 RX ADMIN — DEXTROSE AND SODIUM CHLORIDE: 5; 450 INJECTION, SOLUTION INTRAVENOUS at 02:16

## 2018-04-07 RX ADMIN — BUPRENORPHINE HYDROCHLORIDE AND NALOXONE HYDROCHLORIDE DIHYDRATE 2 TABLET: 2; .5 TABLET SUBLINGUAL at 13:17

## 2018-04-07 RX ADMIN — DIVALPROEX SODIUM 500 MG: 500 TABLET, EXTENDED RELEASE ORAL at 16:54

## 2018-04-07 RX ADMIN — TRAZODONE HYDROCHLORIDE 50 MG: 50 TABLET ORAL at 21:08

## 2018-04-07 RX ADMIN — TOPIRAMATE 150 MG: 100 TABLET, FILM COATED ORAL at 13:21

## 2018-04-07 RX ADMIN — DULOXETINE HYDROCHLORIDE 60 MG: 60 CAPSULE, DELAYED RELEASE ORAL at 13:19

## 2018-04-07 RX ADMIN — SODIUM CHLORIDE 1000 ML: 9 INJECTION, SOLUTION INTRAVENOUS at 00:40

## 2018-04-07 RX ADMIN — ENOXAPARIN SODIUM 40 MG: 40 INJECTION SUBCUTANEOUS at 08:25

## 2018-04-07 ASSESSMENT — LIFESTYLE VARIABLES: HISTORY_ALCOHOL_USE: NO

## 2018-04-07 ASSESSMENT — SLEEP AND FATIGUE QUESTIONNAIRES
AVERAGE NUMBER OF SLEEP HOURS: 5
DIFFICULTY ARISING: YES
DO YOU HAVE DIFFICULTY SLEEPING: YES
SLEEP PATTERN: DIFFICULTY FALLING ASLEEP;DIFFICULTY ARISING
DIFFICULTY STAYING ASLEEP: YES
RESTFUL SLEEP: YES
DO YOU USE A SLEEP AID: YES
DIFFICULTY FALLING ASLEEP: YES

## 2018-04-07 ASSESSMENT — PAIN SCALES - GENERAL
PAINLEVEL_OUTOF10: 0
PAINLEVEL_OUTOF10: 3
PAINLEVEL_OUTOF10: 8
PAINLEVEL_OUTOF10: 5
PAINLEVEL_OUTOF10: 8
PAINLEVEL_OUTOF10: 8

## 2018-04-07 ASSESSMENT — PAIN DESCRIPTION - LOCATION: LOCATION: FOOT

## 2018-04-07 ASSESSMENT — PAIN DESCRIPTION - PAIN TYPE: TYPE: CHRONIC PAIN

## 2018-04-07 ASSESSMENT — PATIENT HEALTH QUESTIONNAIRE - PHQ9: SUM OF ALL RESPONSES TO PHQ QUESTIONS 1-9: 0

## 2018-04-08 PROBLEM — F33.3 MAJOR PSYCHOTIC DEPRESSION, RECURRENT (HCC): Status: RESOLVED | Noted: 2018-04-07 | Resolved: 2018-04-08

## 2018-04-08 PROBLEM — F12.10 CANNABIS ABUSE: Status: ACTIVE | Noted: 2018-04-08

## 2018-04-08 LAB
CULTURE: NO GROWTH
CULTURE: NORMAL
ESTIMATED AVERAGE GLUCOSE: 103 MG/DL
GLUCOSE BLD-MCNC: 117 MG/DL (ref 75–110)
GLUCOSE BLD-MCNC: 182 MG/DL (ref 75–110)
GLUCOSE BLD-MCNC: 83 MG/DL (ref 75–110)
HBA1C MFR BLD: 5.2 % (ref 4–6)
Lab: NORMAL
SPECIMEN DESCRIPTION: NORMAL
SPECIMEN DESCRIPTION: NORMAL
STATUS: NORMAL

## 2018-04-08 PROCEDURE — 6370000000 HC RX 637 (ALT 250 FOR IP): Performed by: PSYCHIATRY & NEUROLOGY

## 2018-04-08 PROCEDURE — 1240000000 HC EMOTIONAL WELLNESS R&B

## 2018-04-08 PROCEDURE — 6370000000 HC RX 637 (ALT 250 FOR IP): Performed by: INTERNAL MEDICINE

## 2018-04-08 PROCEDURE — 82947 ASSAY GLUCOSE BLOOD QUANT: CPT

## 2018-04-08 PROCEDURE — 90792 PSYCH DIAG EVAL W/MED SRVCS: CPT | Performed by: PSYCHIATRY & NEUROLOGY

## 2018-04-08 PROCEDURE — 6360000002 HC RX W HCPCS: Performed by: PSYCHIATRY & NEUROLOGY

## 2018-04-08 PROCEDURE — 94664 DEMO&/EVAL PT USE INHALER: CPT

## 2018-04-08 RX ORDER — ALBUTEROL SULFATE 90 UG/1
2 AEROSOL, METERED RESPIRATORY (INHALATION) EVERY 6 HOURS PRN
Status: DISCONTINUED | OUTPATIENT
Start: 2018-04-08 | End: 2018-04-12 | Stop reason: HOSPADM

## 2018-04-08 RX ORDER — IPRATROPIUM BROMIDE AND ALBUTEROL SULFATE 2.5; .5 MG/3ML; MG/3ML
1 SOLUTION RESPIRATORY (INHALATION) EVERY 6 HOURS PRN
Status: DISCONTINUED | OUTPATIENT
Start: 2018-04-08 | End: 2018-04-12 | Stop reason: HOSPADM

## 2018-04-08 RX ORDER — ALBUTEROL SULFATE 2.5 MG/3ML
2.5 SOLUTION RESPIRATORY (INHALATION) EVERY 6 HOURS PRN
Status: DISCONTINUED | OUTPATIENT
Start: 2018-04-08 | End: 2018-04-08

## 2018-04-08 RX ORDER — NICOTINE POLACRILEX 4 MG
15 LOZENGE BUCCAL PRN
Status: DISCONTINUED | OUTPATIENT
Start: 2018-04-08 | End: 2018-04-11

## 2018-04-08 RX ORDER — QUETIAPINE FUMARATE 300 MG/1
300 TABLET, FILM COATED ORAL NIGHTLY
Status: DISCONTINUED | OUTPATIENT
Start: 2018-04-08 | End: 2018-04-10

## 2018-04-08 RX ORDER — DIVALPROEX SODIUM 500 MG/1
500 TABLET, EXTENDED RELEASE ORAL 2 TIMES DAILY
Status: DISCONTINUED | OUTPATIENT
Start: 2018-04-08 | End: 2018-04-12 | Stop reason: HOSPADM

## 2018-04-08 RX ADMIN — INSULIN LISPRO 2 UNITS: 100 INJECTION, SOLUTION INTRAVENOUS; SUBCUTANEOUS at 17:57

## 2018-04-08 RX ADMIN — BUPRENORPHINE HYDROCHLORIDE, NALOXONE HYDROCHLORIDE 1 FILM: 8; 2 FILM, SOLUBLE BUCCAL; SUBLINGUAL at 09:37

## 2018-04-08 RX ADMIN — QUETIAPINE FUMARATE 300 MG: 300 TABLET ORAL at 22:46

## 2018-04-08 RX ADMIN — RISPERIDONE 1 MG: 1 TABLET ORAL at 09:36

## 2018-04-08 RX ADMIN — DULOXETINE HYDROCHLORIDE 60 MG: 60 CAPSULE, DELAYED RELEASE ORAL at 09:36

## 2018-04-08 RX ADMIN — TRAZODONE HYDROCHLORIDE 50 MG: 50 TABLET ORAL at 22:46

## 2018-04-08 RX ADMIN — HYDROXYZINE HYDROCHLORIDE 50 MG: 25 TABLET, FILM COATED ORAL at 22:46

## 2018-04-08 RX ADMIN — DIVALPROEX SODIUM 500 MG: 500 TABLET, EXTENDED RELEASE ORAL at 12:33

## 2018-04-08 RX ADMIN — TOPIRAMATE 150 MG: 100 TABLET, FILM COATED ORAL at 12:33

## 2018-04-08 RX ADMIN — DIVALPROEX SODIUM 500 MG: 500 TABLET, EXTENDED RELEASE ORAL at 22:46

## 2018-04-08 RX ADMIN — TOPIRAMATE 150 MG: 100 TABLET, FILM COATED ORAL at 22:46

## 2018-04-08 ASSESSMENT — PAIN SCALES - GENERAL
PAINLEVEL_OUTOF10: 0
PAINLEVEL_OUTOF10: 0

## 2018-04-08 ASSESSMENT — SLEEP AND FATIGUE QUESTIONNAIRES
SLEEP PATTERN: DIFFICULTY FALLING ASLEEP;INSOMNIA
DIFFICULTY STAYING ASLEEP: YES
DIFFICULTY ARISING: NO
DO YOU USE A SLEEP AID: YES
DO YOU HAVE DIFFICULTY SLEEPING: YES
DIFFICULTY FALLING ASLEEP: YES
RESTFUL SLEEP: NO
AVERAGE NUMBER OF SLEEP HOURS: 5

## 2018-04-08 ASSESSMENT — ENCOUNTER SYMPTOMS
SHORTNESS OF BREATH: 0
ABDOMINAL PAIN: 0

## 2018-04-08 ASSESSMENT — LIFESTYLE VARIABLES: HISTORY_ALCOHOL_USE: NO

## 2018-04-09 LAB
GLUCOSE BLD-MCNC: 76 MG/DL (ref 75–110)
GLUCOSE BLD-MCNC: 82 MG/DL (ref 75–110)
GLUCOSE BLD-MCNC: 95 MG/DL (ref 75–110)

## 2018-04-09 PROCEDURE — 6360000002 HC RX W HCPCS: Performed by: PSYCHIATRY & NEUROLOGY

## 2018-04-09 PROCEDURE — 99232 SBSQ HOSP IP/OBS MODERATE 35: CPT | Performed by: NURSE PRACTITIONER

## 2018-04-09 PROCEDURE — 99232 SBSQ HOSP IP/OBS MODERATE 35: CPT | Performed by: INTERNAL MEDICINE

## 2018-04-09 PROCEDURE — 6370000000 HC RX 637 (ALT 250 FOR IP): Performed by: INTERNAL MEDICINE

## 2018-04-09 PROCEDURE — 6370000000 HC RX 637 (ALT 250 FOR IP): Performed by: PSYCHIATRY & NEUROLOGY

## 2018-04-09 PROCEDURE — 1240000000 HC EMOTIONAL WELLNESS R&B

## 2018-04-09 PROCEDURE — 82947 ASSAY GLUCOSE BLOOD QUANT: CPT

## 2018-04-09 PROCEDURE — 6370000000 HC RX 637 (ALT 250 FOR IP): Performed by: NURSE PRACTITIONER

## 2018-04-09 RX ORDER — TRAZODONE HYDROCHLORIDE 100 MG/1
100 TABLET ORAL NIGHTLY PRN
Status: DISCONTINUED | OUTPATIENT
Start: 2018-04-09 | End: 2018-04-10

## 2018-04-09 RX ADMIN — TOPIRAMATE 150 MG: 100 TABLET, FILM COATED ORAL at 08:44

## 2018-04-09 RX ADMIN — TRAZODONE HYDROCHLORIDE 100 MG: 100 TABLET ORAL at 21:20

## 2018-04-09 RX ADMIN — BUPRENORPHINE HYDROCHLORIDE, NALOXONE HYDROCHLORIDE 1 FILM: 8; 2 FILM, SOLUBLE BUCCAL; SUBLINGUAL at 08:44

## 2018-04-09 RX ADMIN — TOPIRAMATE 150 MG: 100 TABLET, FILM COATED ORAL at 21:20

## 2018-04-09 RX ADMIN — DIVALPROEX SODIUM 500 MG: 500 TABLET, EXTENDED RELEASE ORAL at 21:20

## 2018-04-09 RX ADMIN — DIVALPROEX SODIUM 500 MG: 500 TABLET, EXTENDED RELEASE ORAL at 08:44

## 2018-04-09 RX ADMIN — QUETIAPINE FUMARATE 300 MG: 300 TABLET ORAL at 21:20

## 2018-04-09 RX ADMIN — DULOXETINE HYDROCHLORIDE 60 MG: 60 CAPSULE, DELAYED RELEASE ORAL at 08:44

## 2018-04-09 ASSESSMENT — PAIN SCALES - GENERAL: PAINLEVEL_OUTOF10: 0

## 2018-04-10 LAB
GLUCOSE BLD-MCNC: 69 MG/DL (ref 75–110)
GLUCOSE BLD-MCNC: 85 MG/DL (ref 75–110)
GLUCOSE BLD-MCNC: 86 MG/DL (ref 75–110)
GLUCOSE BLD-MCNC: 93 MG/DL (ref 75–110)

## 2018-04-10 PROCEDURE — 6370000000 HC RX 637 (ALT 250 FOR IP): Performed by: INTERNAL MEDICINE

## 2018-04-10 PROCEDURE — 99231 SBSQ HOSP IP/OBS SF/LOW 25: CPT | Performed by: INTERNAL MEDICINE

## 2018-04-10 PROCEDURE — 1240000000 HC EMOTIONAL WELLNESS R&B

## 2018-04-10 PROCEDURE — 99232 SBSQ HOSP IP/OBS MODERATE 35: CPT | Performed by: NURSE PRACTITIONER

## 2018-04-10 PROCEDURE — 6360000002 HC RX W HCPCS: Performed by: PSYCHIATRY & NEUROLOGY

## 2018-04-10 PROCEDURE — 6370000000 HC RX 637 (ALT 250 FOR IP): Performed by: NURSE PRACTITIONER

## 2018-04-10 PROCEDURE — 6370000000 HC RX 637 (ALT 250 FOR IP): Performed by: PSYCHIATRY & NEUROLOGY

## 2018-04-10 PROCEDURE — 82947 ASSAY GLUCOSE BLOOD QUANT: CPT

## 2018-04-10 RX ORDER — TRAZODONE HYDROCHLORIDE 150 MG/1
150 TABLET ORAL NIGHTLY PRN
Status: DISCONTINUED | OUTPATIENT
Start: 2018-04-10 | End: 2018-04-12 | Stop reason: HOSPADM

## 2018-04-10 RX ORDER — QUETIAPINE FUMARATE 200 MG/1
400 TABLET, FILM COATED ORAL NIGHTLY
Status: DISCONTINUED | OUTPATIENT
Start: 2018-04-10 | End: 2018-04-12 | Stop reason: HOSPADM

## 2018-04-10 RX ORDER — DIAPER,BRIEF,INFANT-TODD,DISP
EACH MISCELLANEOUS 2 TIMES DAILY PRN
Status: DISCONTINUED | OUTPATIENT
Start: 2018-04-10 | End: 2018-04-12 | Stop reason: HOSPADM

## 2018-04-10 RX ADMIN — TOPIRAMATE 150 MG: 100 TABLET, FILM COATED ORAL at 08:49

## 2018-04-10 RX ADMIN — TOPIRAMATE 150 MG: 100 TABLET, FILM COATED ORAL at 21:36

## 2018-04-10 RX ADMIN — ACETAMINOPHEN 650 MG: 325 TABLET, FILM COATED ORAL at 08:49

## 2018-04-10 RX ADMIN — BUPRENORPHINE HYDROCHLORIDE, NALOXONE HYDROCHLORIDE 1 FILM: 8; 2 FILM, SOLUBLE BUCCAL; SUBLINGUAL at 08:50

## 2018-04-10 RX ADMIN — ACETAMINOPHEN 650 MG: 325 TABLET, FILM COATED ORAL at 21:36

## 2018-04-10 RX ADMIN — DIVALPROEX SODIUM 500 MG: 500 TABLET, EXTENDED RELEASE ORAL at 21:37

## 2018-04-10 RX ADMIN — TRAZODONE HYDROCHLORIDE 150 MG: 150 TABLET ORAL at 21:37

## 2018-04-10 RX ADMIN — DULOXETINE HYDROCHLORIDE 60 MG: 60 CAPSULE, DELAYED RELEASE ORAL at 08:49

## 2018-04-10 RX ADMIN — DIVALPROEX SODIUM 500 MG: 500 TABLET, EXTENDED RELEASE ORAL at 08:49

## 2018-04-10 RX ADMIN — QUETIAPINE FUMARATE 400 MG: 200 TABLET ORAL at 21:36

## 2018-04-10 RX ADMIN — HYDROCORTISONE: 1 CREAM TOPICAL at 14:30

## 2018-04-10 ASSESSMENT — PAIN SCALES - GENERAL
PAINLEVEL_OUTOF10: 9
PAINLEVEL_OUTOF10: 0
PAINLEVEL_OUTOF10: 9
PAINLEVEL_OUTOF10: 9
PAINLEVEL_OUTOF10: 10

## 2018-04-11 PROCEDURE — 6370000000 HC RX 637 (ALT 250 FOR IP): Performed by: NURSE PRACTITIONER

## 2018-04-11 PROCEDURE — 6370000000 HC RX 637 (ALT 250 FOR IP): Performed by: INTERNAL MEDICINE

## 2018-04-11 PROCEDURE — 6370000000 HC RX 637 (ALT 250 FOR IP): Performed by: PSYCHIATRY & NEUROLOGY

## 2018-04-11 PROCEDURE — 6360000002 HC RX W HCPCS: Performed by: PSYCHIATRY & NEUROLOGY

## 2018-04-11 PROCEDURE — 99232 SBSQ HOSP IP/OBS MODERATE 35: CPT | Performed by: NURSE PRACTITIONER

## 2018-04-11 PROCEDURE — 1240000000 HC EMOTIONAL WELLNESS R&B

## 2018-04-11 RX ADMIN — HYDROXYZINE HYDROCHLORIDE 50 MG: 25 TABLET, FILM COATED ORAL at 21:05

## 2018-04-11 RX ADMIN — DIVALPROEX SODIUM 500 MG: 500 TABLET, EXTENDED RELEASE ORAL at 09:06

## 2018-04-11 RX ADMIN — TOPIRAMATE 150 MG: 100 TABLET, FILM COATED ORAL at 21:04

## 2018-04-11 RX ADMIN — QUETIAPINE FUMARATE 400 MG: 200 TABLET ORAL at 21:05

## 2018-04-11 RX ADMIN — DIVALPROEX SODIUM 500 MG: 500 TABLET, EXTENDED RELEASE ORAL at 21:04

## 2018-04-11 RX ADMIN — DULOXETINE HYDROCHLORIDE 60 MG: 60 CAPSULE, DELAYED RELEASE ORAL at 09:06

## 2018-04-11 RX ADMIN — TRAZODONE HYDROCHLORIDE 150 MG: 150 TABLET ORAL at 21:04

## 2018-04-11 RX ADMIN — BUPRENORPHINE HYDROCHLORIDE, NALOXONE HYDROCHLORIDE 1 FILM: 8; 2 FILM, SOLUBLE BUCCAL; SUBLINGUAL at 09:06

## 2018-04-11 RX ADMIN — TOPIRAMATE 150 MG: 100 TABLET, FILM COATED ORAL at 09:06

## 2018-04-11 ASSESSMENT — PAIN SCALES - GENERAL
PAINLEVEL_OUTOF10: 9
PAINLEVEL_OUTOF10: 8

## 2018-04-12 VITALS
DIASTOLIC BLOOD PRESSURE: 57 MMHG | RESPIRATION RATE: 14 BRPM | BODY MASS INDEX: 20.64 KG/M2 | SYSTOLIC BLOOD PRESSURE: 111 MMHG | TEMPERATURE: 98.2 F | OXYGEN SATURATION: 97 % | HEART RATE: 60 BPM | WEIGHT: 166 LBS | HEIGHT: 75 IN

## 2018-04-12 PROCEDURE — 6370000000 HC RX 637 (ALT 250 FOR IP): Performed by: INTERNAL MEDICINE

## 2018-04-12 PROCEDURE — 6370000000 HC RX 637 (ALT 250 FOR IP): Performed by: PSYCHIATRY & NEUROLOGY

## 2018-04-12 PROCEDURE — 6360000002 HC RX W HCPCS: Performed by: PSYCHIATRY & NEUROLOGY

## 2018-04-12 PROCEDURE — 5130000000 HC BRIDGE APPOINTMENT

## 2018-04-12 PROCEDURE — 99239 HOSP IP/OBS DSCHRG MGMT >30: CPT | Performed by: NURSE PRACTITIONER

## 2018-04-12 RX ORDER — QUETIAPINE FUMARATE 400 MG/1
400 TABLET, FILM COATED ORAL NIGHTLY
Qty: 14 TABLET | Refills: 0 | Status: ON HOLD | OUTPATIENT
Start: 2018-04-12 | End: 2018-09-04 | Stop reason: HOSPADM

## 2018-04-12 RX ORDER — TRAZODONE HYDROCHLORIDE 150 MG/1
150 TABLET ORAL NIGHTLY PRN
Qty: 14 TABLET | Refills: 0 | Status: ON HOLD | OUTPATIENT
Start: 2018-04-12 | End: 2019-04-23 | Stop reason: HOSPADM

## 2018-04-12 RX ORDER — DULOXETIN HYDROCHLORIDE 60 MG/1
60 CAPSULE, DELAYED RELEASE ORAL DAILY
Qty: 14 CAPSULE | Refills: 0 | Status: ON HOLD | OUTPATIENT
Start: 2018-04-13 | End: 2018-09-04 | Stop reason: HOSPADM

## 2018-04-12 RX ADMIN — TOPIRAMATE 150 MG: 100 TABLET, FILM COATED ORAL at 09:45

## 2018-04-12 RX ADMIN — BUPRENORPHINE HYDROCHLORIDE, NALOXONE HYDROCHLORIDE 1 FILM: 8; 2 FILM, SOLUBLE BUCCAL; SUBLINGUAL at 09:46

## 2018-04-12 RX ADMIN — DIVALPROEX SODIUM 500 MG: 500 TABLET, EXTENDED RELEASE ORAL at 09:45

## 2018-04-12 RX ADMIN — DULOXETINE HYDROCHLORIDE 60 MG: 60 CAPSULE, DELAYED RELEASE ORAL at 09:45

## 2018-04-12 ASSESSMENT — PAIN SCALES - GENERAL
PAINLEVEL_OUTOF10: 8
PAINLEVEL_OUTOF10: 5
PAINLEVEL_OUTOF10: 8

## 2018-04-12 ASSESSMENT — PAIN DESCRIPTION - LOCATION: LOCATION: BACK;NECK

## 2018-07-09 ENCOUNTER — OFFICE VISIT (OUTPATIENT)
Dept: INTERNAL MEDICINE | Age: 48
End: 2018-07-09
Payer: COMMERCIAL

## 2018-07-09 VITALS
SYSTOLIC BLOOD PRESSURE: 88 MMHG | WEIGHT: 146 LBS | HEIGHT: 75 IN | BODY MASS INDEX: 18.15 KG/M2 | HEART RATE: 90 BPM | OXYGEN SATURATION: 97 % | RESPIRATION RATE: 12 BRPM | DIASTOLIC BLOOD PRESSURE: 63 MMHG

## 2018-07-09 DIAGNOSIS — R56.9 SEIZURE (HCC): ICD-10-CM

## 2018-07-09 DIAGNOSIS — G40.409 GENERALIZED TONIC-CLONIC SEIZURE (HCC): ICD-10-CM

## 2018-07-09 DIAGNOSIS — F25.9 SCHIZOAFFECTIVE DISORDER, UNSPECIFIED TYPE (HCC): ICD-10-CM

## 2018-07-09 DIAGNOSIS — E11.42 TYPE 2 DIABETES MELLITUS WITH DIABETIC POLYNEUROPATHY, WITH LONG-TERM CURRENT USE OF INSULIN (HCC): Primary | ICD-10-CM

## 2018-07-09 DIAGNOSIS — J43.1 PANLOBULAR EMPHYSEMA (HCC): ICD-10-CM

## 2018-07-09 DIAGNOSIS — Z79.4 TYPE 2 DIABETES MELLITUS WITH DIABETIC POLYNEUROPATHY, WITH LONG-TERM CURRENT USE OF INSULIN (HCC): Primary | ICD-10-CM

## 2018-07-09 DIAGNOSIS — G82.20 PARAPLEGIA (HCC): ICD-10-CM

## 2018-07-09 DIAGNOSIS — F11.20 UNCOMPLICATED OPIOID DEPENDENCE (HCC): ICD-10-CM

## 2018-07-09 PROCEDURE — 4004F PT TOBACCO SCREEN RCVD TLK: CPT | Performed by: INTERNAL MEDICINE

## 2018-07-09 PROCEDURE — G8419 CALC BMI OUT NRM PARAM NOF/U: HCPCS | Performed by: INTERNAL MEDICINE

## 2018-07-09 PROCEDURE — 3044F HG A1C LEVEL LT 7.0%: CPT | Performed by: INTERNAL MEDICINE

## 2018-07-09 PROCEDURE — G8427 DOCREV CUR MEDS BY ELIG CLIN: HCPCS | Performed by: INTERNAL MEDICINE

## 2018-07-09 PROCEDURE — 3023F SPIROM DOC REV: CPT | Performed by: INTERNAL MEDICINE

## 2018-07-09 PROCEDURE — G8926 SPIRO NO PERF OR DOC: HCPCS | Performed by: INTERNAL MEDICINE

## 2018-07-09 PROCEDURE — 99213 OFFICE O/P EST LOW 20 MIN: CPT | Performed by: INTERNAL MEDICINE

## 2018-07-09 PROCEDURE — 2022F DILAT RTA XM EVC RTNOPTHY: CPT | Performed by: INTERNAL MEDICINE

## 2018-07-09 RX ORDER — GLUCOSAMINE HCL/CHONDROITIN SU 500-400 MG
1 CAPSULE ORAL 3 TIMES DAILY
Qty: 100 STRIP | Refills: 11 | Status: ON HOLD | OUTPATIENT
Start: 2018-07-09 | End: 2018-09-04 | Stop reason: HOSPADM

## 2018-07-09 RX ORDER — PREGABALIN 200 MG/1
200 CAPSULE ORAL 4 TIMES DAILY
COMMUNITY
End: 2018-08-07 | Stop reason: SDUPTHER

## 2018-07-09 RX ORDER — LANCETS 30 GAUGE
1 EACH MISCELLANEOUS 3 TIMES DAILY
Qty: 100 EACH | Refills: 11 | Status: ON HOLD | OUTPATIENT
Start: 2018-07-09 | End: 2018-09-04 | Stop reason: HOSPADM

## 2018-07-09 RX ORDER — ONDANSETRON 4 MG/1
4 TABLET, FILM COATED ORAL EVERY 8 HOURS PRN
Status: ON HOLD | COMMUNITY
End: 2018-09-04 | Stop reason: HOSPADM

## 2018-07-09 RX ORDER — BACLOFEN 20 MG/1
20 TABLET ORAL 2 TIMES DAILY
Status: ON HOLD | COMMUNITY
End: 2018-09-04 | Stop reason: HOSPADM

## 2018-07-09 RX ORDER — UBIQUINOL 100 MG
CAPSULE ORAL
Qty: 100 EACH | Refills: 11 | Status: ON HOLD | OUTPATIENT
Start: 2018-07-09 | End: 2018-09-04 | Stop reason: HOSPADM

## 2018-07-09 RX ORDER — HYDROXYZINE HYDROCHLORIDE 25 MG/1
25 TABLET, FILM COATED ORAL EVERY 8 HOURS PRN
Qty: 90 TABLET | Refills: 11 | Status: SHIPPED | OUTPATIENT
Start: 2018-07-09 | End: 2018-07-19

## 2018-07-09 RX ORDER — BLOOD-GLUCOSE METER
1 KIT MISCELLANEOUS 3 TIMES DAILY
Qty: 1 KIT | Refills: 0 | Status: ON HOLD | OUTPATIENT
Start: 2018-07-09 | End: 2018-09-04 | Stop reason: HOSPADM

## 2018-07-09 RX ORDER — SYRING-NEEDL,DISP,INSUL,0.3 ML 30 GX5/16"
1 SYRINGE, EMPTY DISPOSABLE MISCELLANEOUS 3 TIMES DAILY
Qty: 1 EACH | Refills: 0 | Status: ON HOLD | OUTPATIENT
Start: 2018-07-09 | End: 2018-09-04 | Stop reason: HOSPADM

## 2018-07-09 ASSESSMENT — COPD QUESTIONNAIRES: COPD: 1

## 2018-07-09 ASSESSMENT — ENCOUNTER SYMPTOMS
DIFFICULTY BREATHING: 1
SHORTNESS OF BREATH: 1
WHEEZING: 1

## 2018-07-09 NOTE — PROGRESS NOTES
his initial diabetic visit. He has type 2 diabetes mellitus. His disease course has been stable. Hypoglycemia symptoms include seizures. Symptoms are stable. Risk factors for coronary artery disease include diabetes mellitus, dyslipidemia, hypertension and tobacco exposure. Current diabetic treatment includes oral agent (monotherapy) and insulin injections. He is compliant with treatment all of the time. COPD   He complains of difficulty breathing, shortness of breath and wheezing. The problem occurs daily. The problem has been unchanged. His symptoms are aggravated by exposure to smoke. His symptoms are alleviated by beta-agonist and ipratropium. He reports moderate improvement on treatment. Risk factors for lung disease include smoking/tobacco exposure. His past medical history is significant for emphysema. Seizures   This is a chronic problem. The current episode started more than 1 year ago. The problem has been unchanged. He has tried relaxation for the symptoms. Mental Health Problem   The primary symptoms include dysphoric mood and hallucinations. This is a chronic problem. The degree of incapacity that he is experiencing as a consequence of his illness is severe. Sequelae of the illness include an inability to work. Additional symptoms of the illness include seizures. He does not admit to suicidal ideas. He does not contemplate harming himself. Risk factors that are present for mental illness include a history of mental illness and substance abuse. Paraplegic  Has [id number]. Spine damaged from metal in spine and he had an MRI. Takes insulin. novolin sliding scale. Needs a glucometer. Poor historian. Seems to be partly due to his psychiatric diagnoses. Request gummy antacids. Is edentulous. Unable to find on [employer] formulary--patient told. Also told him I have no other ideas for the pain he has. His prior PCP tried multiple modalities.  Offered him referral, but he presently doesn't

## 2018-07-20 ENCOUNTER — TELEPHONE (OUTPATIENT)
Dept: INTERNAL MEDICINE | Age: 48
End: 2018-07-20

## 2018-07-20 NOTE — TELEPHONE ENCOUNTER
PT requesting letter for a free air conditioner. Please advise if it is okay to type up. If approved call PT when ready for .

## 2018-07-23 ENCOUNTER — TELEPHONE (OUTPATIENT)
Dept: INTERNAL MEDICINE | Age: 48
End: 2018-07-23

## 2018-07-25 ENCOUNTER — TELEPHONE (OUTPATIENT)
Dept: INTERNAL MEDICINE | Age: 48
End: 2018-07-25

## 2018-07-25 DIAGNOSIS — J43.1 PANLOBULAR EMPHYSEMA (HCC): ICD-10-CM

## 2018-07-25 DIAGNOSIS — G82.20 PARAPLEGIA (HCC): ICD-10-CM

## 2018-07-25 NOTE — TELEPHONE ENCOUNTER
Patient requesting a Hard copy Rx to be faxed to [facility] fax # [phone] for strawberry lactose free Boost Nutritional  Drinks            Health Maintenance   Topic Date Due    Diabetic retinal exam  08/13/1982    Diabetic microalbuminuria test  08/13/1990    Pneumococcal med risk (1 of 1 - PPSV23) 08/13/1991    Flu vaccine (1) 09/01/2018    Diabetic foot exam  11/08/2018    Lipid screen  12/07/2018    A1C test (Diabetic or Prediabetic)  04/07/2019    DTaP/Tdap/Td vaccine (2 - Td) 01/26/2027    HIV screen  Completed             (applicable per patient's age: Cancer Screenings, Depression Screening, Fall Risk Screening, Immunizations)    Hemoglobin A1C (%)   Date Value   04/07/2018 5.2   11/04/2017 5.3   09/01/2017 5.2     LDL Cholesterol (mg/dL)   Date Value   12/07/2017 61     AST (U/L)   Date Value   04/07/2018 21     ALT (U/L)   Date Value   04/07/2018 16     BUN (mg/dL)   Date Value   04/07/2018 21 (H)      (goal A1C is < 7)   (goal LDL is <100) need 30-50% reduction from baseline     BP Readings from Last 3 Encounters:   07/09/18 88/63   04/07/18 94/62   02/17/18 112/71    (goal /80)      All Future Testing planned in [facility]:      Next Visit Date:  Future Appointments  Date Time Provider Leora Trujillo   10/10/2018 1:20 PM [facility]            Patient Active Problem List:     Anxiety     Seizure [facility])     Myofacial muscle pain     Chronic pain     Spinal stenosis     Hyperlipidemia     Depression     Panlobular emphysema ([address]     Bipolar disorder ([address]     Chronic back pain     Schizophrenia ([address]     Fibromyalgia     Type 2 diabetes mellitus with diabetic polyneuropathy, with long-term current use of insulin ([facility])     Onychomycosis     Pain in lower limb     Disc disease, degenerative, cervical     Cervical stenosis of spine     Generalized tonic-clonic seizure ([address]     Superficial laceration of scalp     Altered mental status     Decubitus ulcer of coccyx,

## 2018-07-26 RX ORDER — LACTOSE-REDUCED FOOD
LIQUID (ML) ORAL
Qty: 60 CAN | Refills: 5 | Status: SHIPPED | OUTPATIENT
Start: 2018-07-26 | End: 2018-12-20 | Stop reason: SDUPTHER

## 2018-08-07 DIAGNOSIS — G62.9 PERIPHERAL POLYNEUROPATHY: Primary | ICD-10-CM

## 2018-08-07 RX ORDER — PREGABALIN 200 MG/1
200 CAPSULE ORAL 4 TIMES DAILY
Qty: 120 CAPSULE | Refills: 11 | Status: SHIPPED | OUTPATIENT
Start: 2018-08-07 | End: 2019-04-12 | Stop reason: SDUPTHER

## 2018-08-07 NOTE — TELEPHONE ENCOUNTER
Next Visit Date:  Future Appointments  Date Time Provider Leora Palmeri   10/10/2018 1:20 PM Leland Manzanares MD [facility] [address] Maintenance   Topic Date Due    Diabetic retinal exam  08/13/1982    Diabetic microalbuminuria test  08/13/1990    Pneumococcal med risk (1 of 1 - PPSV23) 08/13/1991    Flu vaccine (1) 09/01/2018    Diabetic foot exam  11/08/2018    Lipid screen  12/07/2018    A1C test (Diabetic or Prediabetic)  04/07/2019    DTaP/Tdap/Td vaccine (2 - Td) 01/26/2027    HIV screen  Completed       Hemoglobin A1C (%)   Date Value   04/07/2018 5.2   11/04/2017 5.3   09/01/2017 5.2             ( goal A1C is < 7)   No results found for: LABMICR  LDL Cholesterol (mg/dL)   Date Value   12/07/2017 61   01/20/2017 75       (goal LDL is <100)   AST (U/L)   Date Value   04/07/2018 21     ALT (U/L)   Date Value   04/07/2018 16     BUN (mg/dL)   Date Value   04/07/2018 21 (H)     BP Readings from Last 3 Encounters:   07/09/18 88/63   04/07/18 94/62   02/17/18 112/71          (goal 120/80)    All Future Testing planned in [facility]              Patient Active Problem List:     Anxiety     Seizure ([address]     Myofacial muscle pain     Chronic pain     Spinal stenosis     Hyperlipidemia     Depression     Panlobular emphysema (HCC)     Bipolar disorder (HCC)     Chronic back pain     Schizophrenia ([address]     Fibromyalgia     Type 2 diabetes mellitus with diabetic polyneuropathy, with long-term current use of insulin (HCC)     Onychomycosis     Pain in lower limb     Disc disease, degenerative, cervical     Cervical stenosis of spine     Generalized tonic-clonic seizure (HCC)     Superficial laceration of scalp     Altered mental status     Decubitus ulcer of coccyx, stage 2     Cocaine substance abuse     Opiate abuse, episodic     Closed fracture of nasal bone     Drug overdose     Uncomplicated opioid dependence ([address]     Paraplegia (HCC)     Schizoaffective disorder (HCC)     Non-dose-related

## 2018-09-01 ENCOUNTER — HOSPITAL ENCOUNTER (INPATIENT)
Age: 48
LOS: 2 days | Discharge: HOME HEALTH | DRG: 254 | End: 2018-09-04
Attending: EMERGENCY MEDICINE | Admitting: INTERNAL MEDICINE
Payer: COMMERCIAL

## 2018-09-01 DIAGNOSIS — R10.9 ABDOMINAL PAIN, UNSPECIFIED ABDOMINAL LOCATION: Primary | ICD-10-CM

## 2018-09-01 PROCEDURE — 99285 EMERGENCY DEPT VISIT HI MDM: CPT

## 2018-09-01 ASSESSMENT — PAIN DESCRIPTION - DESCRIPTORS: DESCRIPTORS: JABBING

## 2018-09-01 ASSESSMENT — PAIN DESCRIPTION - LOCATION: LOCATION: ABDOMEN

## 2018-09-01 ASSESSMENT — PAIN DESCRIPTION - FREQUENCY: FREQUENCY: CONTINUOUS

## 2018-09-01 ASSESSMENT — PAIN DESCRIPTION - PAIN TYPE: TYPE: ACUTE PAIN

## 2018-09-01 ASSESSMENT — PAIN SCALES - GENERAL: PAINLEVEL_OUTOF10: 10

## 2018-09-02 ENCOUNTER — APPOINTMENT (OUTPATIENT)
Dept: GENERAL RADIOLOGY | Age: 48
DRG: 254 | End: 2018-09-02
Payer: COMMERCIAL

## 2018-09-02 ENCOUNTER — APPOINTMENT (OUTPATIENT)
Dept: CT IMAGING | Age: 48
DRG: 254 | End: 2018-09-02
Payer: COMMERCIAL

## 2018-09-02 PROBLEM — E72.20 HYPERAMMONEMIA (HCC): Status: ACTIVE | Noted: 2018-09-02

## 2018-09-02 PROBLEM — G93.40 ENCEPHALOPATHY: Status: ACTIVE | Noted: 2018-09-02

## 2018-09-02 PROBLEM — K59.00 CONSTIPATION: Status: ACTIVE | Noted: 2018-09-02

## 2018-09-02 PROBLEM — E86.0 DEHYDRATION: Status: ACTIVE | Noted: 2018-09-02

## 2018-09-02 LAB
ABSOLUTE EOS #: 0 K/UL (ref 0–0.4)
ABSOLUTE EOS #: 0.22 K/UL (ref 0–0.4)
ABSOLUTE IMMATURE GRANULOCYTE: 0 K/UL (ref 0–0.3)
ABSOLUTE IMMATURE GRANULOCYTE: 0 K/UL (ref 0–0.3)
ABSOLUTE LYMPH #: 3.86 K/UL (ref 1–4.8)
ABSOLUTE LYMPH #: 4.25 K/UL (ref 1–4.8)
ABSOLUTE MONO #: 0.16 K/UL (ref 0.1–0.8)
ABSOLUTE MONO #: 0.22 K/UL (ref 0.1–0.8)
ACETAMINOPHEN LEVEL: <5 UG/ML (ref 10–30)
ALBUMIN SERPL-MCNC: 3 G/DL (ref 3.5–5.2)
ALBUMIN SERPL-MCNC: 3.8 G/DL (ref 3.5–5.2)
ALBUMIN/GLOBULIN RATIO: 1 (ref 1–2.5)
ALBUMIN/GLOBULIN RATIO: 1.1 (ref 1–2.5)
ALP BLD-CCNC: 68 U/L (ref 40–129)
ALP BLD-CCNC: 94 U/L (ref 40–129)
ALT SERPL-CCNC: 26 U/L (ref 5–41)
ALT SERPL-CCNC: 33 U/L (ref 5–41)
AMMONIA: 94 UMOL/L (ref 16–60)
ANION GAP SERPL CALCULATED.3IONS-SCNC: 11 MMOL/L (ref 9–17)
ANION GAP SERPL CALCULATED.3IONS-SCNC: 15 MMOL/L (ref 9–17)
AST SERPL-CCNC: 28 U/L
AST SERPL-CCNC: 41 U/L
ATYPICAL LYMPHOCYTE ABSOLUTE COUNT: 0.98 K/UL
ATYPICAL LYMPHOCYTES: 9 %
BASOPHILS # BLD: 0 % (ref 0–2)
BASOPHILS # BLD: 0 % (ref 0–2)
BASOPHILS ABSOLUTE: 0 K/UL (ref 0–0.2)
BASOPHILS ABSOLUTE: 0 K/UL (ref 0–0.2)
BILIRUB SERPL-MCNC: 0.16 MG/DL (ref 0.3–1.2)
BILIRUB SERPL-MCNC: 0.24 MG/DL (ref 0.3–1.2)
BILIRUBIN URINE: NEGATIVE
BUN BLDV-MCNC: 12 MG/DL (ref 6–20)
BUN BLDV-MCNC: 9 MG/DL (ref 6–20)
BUN/CREAT BLD: ABNORMAL (ref 9–20)
BUN/CREAT BLD: ABNORMAL (ref 9–20)
CALCIUM SERPL-MCNC: 8 MG/DL (ref 8.6–10.4)
CALCIUM SERPL-MCNC: 9 MG/DL (ref 8.6–10.4)
CHLORIDE BLD-SCNC: 108 MMOL/L (ref 98–107)
CHLORIDE BLD-SCNC: 113 MMOL/L (ref 98–107)
CO2: 18 MMOL/L (ref 20–31)
CO2: 20 MMOL/L (ref 20–31)
COLOR: YELLOW
COMMENT UA: NORMAL
CREAT SERPL-MCNC: 0.62 MG/DL (ref 0.7–1.2)
CREAT SERPL-MCNC: 0.79 MG/DL (ref 0.7–1.2)
DIFFERENTIAL TYPE: ABNORMAL
DIFFERENTIAL TYPE: ABNORMAL
EKG ATRIAL RATE: 90 BPM
EKG P AXIS: 47 DEGREES
EKG P-R INTERVAL: 150 MS
EKG Q-T INTERVAL: 348 MS
EKG QRS DURATION: 88 MS
EKG QTC CALCULATION (BAZETT): 425 MS
EKG R AXIS: 61 DEGREES
EKG T AXIS: 58 DEGREES
EKG VENTRICULAR RATE: 90 BPM
EOSINOPHILS RELATIVE PERCENT: 0 % (ref 1–4)
EOSINOPHILS RELATIVE PERCENT: 2 % (ref 1–4)
ETHANOL PERCENT: <0.01 %
ETHANOL: <10 MG/DL
GFR AFRICAN AMERICAN: >60 ML/MIN
GFR AFRICAN AMERICAN: >60 ML/MIN
GFR NON-AFRICAN AMERICAN: >60 ML/MIN
GFR NON-AFRICAN AMERICAN: >60 ML/MIN
GFR SERPL CREATININE-BSD FRML MDRD: ABNORMAL ML/MIN/{1.73_M2}
GLUCOSE BLD-MCNC: 105 MG/DL (ref 70–99)
GLUCOSE BLD-MCNC: 80 MG/DL (ref 70–99)
GLUCOSE URINE: NEGATIVE
HCT VFR BLD CALC: 41.6 % (ref 40.7–50.3)
HCT VFR BLD CALC: 49.1 % (ref 40.7–50.3)
HEMOGLOBIN: 13 G/DL (ref 13–17)
HEMOGLOBIN: 15.6 G/DL (ref 13–17)
IMMATURE GRANULOCYTES: 0 %
IMMATURE GRANULOCYTES: 0 %
KETONES, URINE: NEGATIVE
LACTIC ACID, WHOLE BLOOD: 1.6 MMOL/L (ref 0.7–2.1)
LACTIC ACID, WHOLE BLOOD: 2.4 MMOL/L (ref 0.7–2.1)
LACTIC ACID: ABNORMAL MMOL/L
LEUKOCYTE ESTERASE, URINE: NEGATIVE
LIPASE: 25 U/L (ref 13–60)
LYMPHOCYTES # BLD: 24 % (ref 24–44)
LYMPHOCYTES # BLD: 39 % (ref 24–44)
MCH RBC QN AUTO: 28 PG (ref 25.2–33.5)
MCH RBC QN AUTO: 28.3 PG (ref 25.2–33.5)
MCHC RBC AUTO-ENTMCNC: 31.3 G/DL (ref 28.4–34.8)
MCHC RBC AUTO-ENTMCNC: 31.8 G/DL (ref 28.4–34.8)
MCV RBC AUTO: 89.1 FL (ref 82.6–102.9)
MCV RBC AUTO: 89.5 FL (ref 82.6–102.9)
MONOCYTES # BLD: 1 % (ref 1–7)
MONOCYTES # BLD: 2 % (ref 1–7)
MORPHOLOGY: ABNORMAL
MORPHOLOGY: ABNORMAL
NITRITE, URINE: NEGATIVE
NRBC AUTOMATED: 0 PER 100 WBC
NRBC AUTOMATED: 0 PER 100 WBC
PDW BLD-RTO: 15.5 % (ref 11.8–14.4)
PDW BLD-RTO: 15.5 % (ref 11.8–14.4)
PH UA: 7.5 (ref 5–8)
PLATELET # BLD: 235 K/UL (ref 138–453)
PLATELET # BLD: 295 K/UL (ref 138–453)
PLATELET ESTIMATE: ABNORMAL
PLATELET ESTIMATE: ABNORMAL
PMV BLD AUTO: 9.5 FL (ref 8.1–13.5)
PMV BLD AUTO: 9.7 FL (ref 8.1–13.5)
POC TROPONIN I: 0 NG/ML (ref 0–0.1)
POC TROPONIN I: 0 NG/ML (ref 0–0.1)
POC TROPONIN INTERP: NORMAL
POC TROPONIN INTERP: NORMAL
POTASSIUM SERPL-SCNC: 3.6 MMOL/L (ref 3.7–5.3)
POTASSIUM SERPL-SCNC: 3.6 MMOL/L (ref 3.7–5.3)
PROTEIN UA: NEGATIVE
RBC # BLD: 4.65 M/UL (ref 4.21–5.77)
RBC # BLD: 5.51 M/UL (ref 4.21–5.77)
RBC # BLD: ABNORMAL 10*6/UL
RBC # BLD: ABNORMAL 10*6/UL
SALICYLATE LEVEL: <1 MG/DL (ref 3–10)
SEG NEUTROPHILS: 48 % (ref 36–66)
SEG NEUTROPHILS: 75 % (ref 36–66)
SEGMENTED NEUTROPHILS ABSOLUTE COUNT: 12.08 K/UL (ref 1.8–7.7)
SEGMENTED NEUTROPHILS ABSOLUTE COUNT: 5.23 K/UL (ref 1.8–7.7)
SODIUM BLD-SCNC: 141 MMOL/L (ref 135–144)
SODIUM BLD-SCNC: 144 MMOL/L (ref 135–144)
SPECIFIC GRAVITY UA: 1.02 (ref 1–1.03)
TOTAL PROTEIN: 5.7 G/DL (ref 6.4–8.3)
TOTAL PROTEIN: 7.5 G/DL (ref 6.4–8.3)
TOXIC TRICYCLIC SC,BLOOD: NEGATIVE
TURBIDITY: CLEAR
URINE HGB: NEGATIVE
UROBILINOGEN, URINE: NORMAL
VALPROIC ACID % FREE: 3.5 % (ref 5–18.4)
VALPROIC ACID LEVEL: 26 UG/ML (ref 50–125)
VALPROIC ACID, FREE: 0.9 UG/ML (ref 7–23)
VALPROIC DATE LAST DOSE: ABNORMAL
VALPROIC DOSE AMOUNT: ABNORMAL
VALPROIC TIME LAST DOSE: ABNORMAL
WBC # BLD: 10.9 K/UL (ref 3.5–11.3)
WBC # BLD: 16.1 K/UL (ref 3.5–11.3)
WBC # BLD: ABNORMAL 10*3/UL
WBC # BLD: ABNORMAL 10*3/UL

## 2018-09-02 PROCEDURE — 80165 DIPROPYLACETIC ACID FREE: CPT

## 2018-09-02 PROCEDURE — 80053 COMPREHEN METABOLIC PANEL: CPT

## 2018-09-02 PROCEDURE — 80307 DRUG TEST PRSMV CHEM ANLYZR: CPT

## 2018-09-02 PROCEDURE — 6370000000 HC RX 637 (ALT 250 FOR IP): Performed by: STUDENT IN AN ORGANIZED HEALTH CARE EDUCATION/TRAINING PROGRAM

## 2018-09-02 PROCEDURE — G0480 DRUG TEST DEF 1-7 CLASSES: HCPCS

## 2018-09-02 PROCEDURE — 71275 CT ANGIOGRAPHY CHEST: CPT

## 2018-09-02 PROCEDURE — 83690 ASSAY OF LIPASE: CPT

## 2018-09-02 PROCEDURE — 93005 ELECTROCARDIOGRAM TRACING: CPT

## 2018-09-02 PROCEDURE — 85025 COMPLETE CBC W/AUTO DIFF WBC: CPT

## 2018-09-02 PROCEDURE — 80164 ASSAY DIPROPYLACETIC ACD TOT: CPT

## 2018-09-02 PROCEDURE — 73030 X-RAY EXAM OF SHOULDER: CPT

## 2018-09-02 PROCEDURE — 84484 ASSAY OF TROPONIN QUANT: CPT

## 2018-09-02 PROCEDURE — 6360000002 HC RX W HCPCS: Performed by: STUDENT IN AN ORGANIZED HEALTH CARE EDUCATION/TRAINING PROGRAM

## 2018-09-02 PROCEDURE — 96360 HYDRATION IV INFUSION INIT: CPT

## 2018-09-02 PROCEDURE — 70110 X-RAY EXAM OF JAW 4/> VIEWS: CPT

## 2018-09-02 PROCEDURE — 81003 URINALYSIS AUTO W/O SCOPE: CPT

## 2018-09-02 PROCEDURE — 36415 COLL VENOUS BLD VENIPUNCTURE: CPT

## 2018-09-02 PROCEDURE — 2580000003 HC RX 258: Performed by: STUDENT IN AN ORGANIZED HEALTH CARE EDUCATION/TRAINING PROGRAM

## 2018-09-02 PROCEDURE — 74174 CTA ABD&PLVS W/CONTRAST: CPT

## 2018-09-02 PROCEDURE — 96361 HYDRATE IV INFUSION ADD-ON: CPT

## 2018-09-02 PROCEDURE — 6370000000 HC RX 637 (ALT 250 FOR IP): Performed by: HOSPITALIST

## 2018-09-02 PROCEDURE — 73070 X-RAY EXAM OF ELBOW: CPT

## 2018-09-02 PROCEDURE — 74018 RADEX ABDOMEN 1 VIEW: CPT

## 2018-09-02 PROCEDURE — 83605 ASSAY OF LACTIC ACID: CPT

## 2018-09-02 PROCEDURE — 1200000000 HC SEMI PRIVATE

## 2018-09-02 PROCEDURE — 82140 ASSAY OF AMMONIA: CPT

## 2018-09-02 PROCEDURE — 6370000000 HC RX 637 (ALT 250 FOR IP): Performed by: INTERNAL MEDICINE

## 2018-09-02 PROCEDURE — 6360000004 HC RX CONTRAST MEDICATION: Performed by: STUDENT IN AN ORGANIZED HEALTH CARE EDUCATION/TRAINING PROGRAM

## 2018-09-02 RX ORDER — 0.9 % SODIUM CHLORIDE 0.9 %
1000 INTRAVENOUS SOLUTION INTRAVENOUS ONCE
Status: COMPLETED | OUTPATIENT
Start: 2018-09-02 | End: 2018-09-02

## 2018-09-02 RX ORDER — TIZANIDINE 4 MG/1
4 TABLET ORAL ONCE
Status: COMPLETED | OUTPATIENT
Start: 2018-09-02 | End: 2018-09-02

## 2018-09-02 RX ORDER — ACETAMINOPHEN 325 MG/1
650 TABLET ORAL ONCE
Status: COMPLETED | OUTPATIENT
Start: 2018-09-02 | End: 2018-09-02

## 2018-09-02 RX ORDER — GLUCOSAMINE HCL/CHONDROITIN SU 500-400 MG
1 CAPSULE ORAL 3 TIMES DAILY
Status: DISCONTINUED | OUTPATIENT
Start: 2018-09-02 | End: 2018-09-02

## 2018-09-02 RX ORDER — PANTOPRAZOLE SODIUM 20 MG/1
20 TABLET, DELAYED RELEASE ORAL
Status: DISCONTINUED | OUTPATIENT
Start: 2018-09-02 | End: 2018-09-04 | Stop reason: HOSPADM

## 2018-09-02 RX ORDER — DIVALPROEX SODIUM 500 MG/1
500 TABLET, EXTENDED RELEASE ORAL DAILY
Status: DISCONTINUED | OUTPATIENT
Start: 2018-09-02 | End: 2018-09-04

## 2018-09-02 RX ORDER — LACTULOSE 10 G/15ML
10 SOLUTION ORAL 3 TIMES DAILY
Status: DISCONTINUED | OUTPATIENT
Start: 2018-09-02 | End: 2018-09-03

## 2018-09-02 RX ORDER — ONDANSETRON 4 MG/1
4 TABLET, FILM COATED ORAL 2 TIMES DAILY
Status: DISCONTINUED | OUTPATIENT
Start: 2018-09-02 | End: 2018-09-02

## 2018-09-02 RX ORDER — SODIUM CHLORIDE 0.9 % (FLUSH) 0.9 %
10 SYRINGE (ML) INJECTION PRN
Status: DISCONTINUED | OUTPATIENT
Start: 2018-09-02 | End: 2018-09-04 | Stop reason: HOSPADM

## 2018-09-02 RX ORDER — BACLOFEN 20 MG/1
40 TABLET ORAL NIGHTLY
Status: ON HOLD | COMMUNITY
End: 2018-09-04 | Stop reason: HOSPADM

## 2018-09-02 RX ORDER — BACLOFEN 10 MG/1
20 TABLET ORAL 4 TIMES DAILY
Status: DISCONTINUED | OUTPATIENT
Start: 2018-09-02 | End: 2018-09-04

## 2018-09-02 RX ORDER — POLYETHYLENE GLYCOL 3350 17 G/17G
17 POWDER, FOR SOLUTION ORAL DAILY
Status: DISCONTINUED | OUTPATIENT
Start: 2018-09-02 | End: 2018-09-02

## 2018-09-02 RX ORDER — SODIUM CHLORIDE 9 MG/ML
INJECTION, SOLUTION INTRAVENOUS CONTINUOUS
Status: DISCONTINUED | OUTPATIENT
Start: 2018-09-02 | End: 2018-09-04

## 2018-09-02 RX ORDER — DULOXETIN HYDROCHLORIDE 30 MG/1
60 CAPSULE, DELAYED RELEASE ORAL DAILY
Status: DISCONTINUED | OUTPATIENT
Start: 2018-09-02 | End: 2018-09-04

## 2018-09-02 RX ORDER — POLYETHYLENE GLYCOL 3350 17 G/17G
17 POWDER, FOR SOLUTION ORAL DAILY PRN
COMMUNITY
End: 2019-04-30

## 2018-09-02 RX ORDER — DIPHENOXYLATE HYDROCHLORIDE AND ATROPINE SULFATE 2.5; .025 MG/1; MG/1
1 TABLET ORAL 4 TIMES DAILY
Status: DISCONTINUED | OUTPATIENT
Start: 2018-09-02 | End: 2018-09-02

## 2018-09-02 RX ORDER — LANCETS 30 GAUGE
1 EACH MISCELLANEOUS 3 TIMES DAILY
Status: DISCONTINUED | OUTPATIENT
Start: 2018-09-02 | End: 2018-09-02

## 2018-09-02 RX ORDER — SODIUM CHLORIDE 0.9 % (FLUSH) 0.9 %
10 SYRINGE (ML) INJECTION EVERY 12 HOURS SCHEDULED
Status: DISCONTINUED | OUTPATIENT
Start: 2018-09-02 | End: 2018-09-04 | Stop reason: HOSPADM

## 2018-09-02 RX ORDER — BLOOD-GLUCOSE METER
1 KIT MISCELLANEOUS 3 TIMES DAILY
Status: DISCONTINUED | OUTPATIENT
Start: 2018-09-02 | End: 2018-09-02

## 2018-09-02 RX ORDER — BUPRENORPHINE HYDROCHLORIDE AND NALOXONE HYDROCHLORIDE DIHYDRATE 8; 2 MG/1; MG/1
0.5 TABLET SUBLINGUAL 2 TIMES DAILY
Status: DISCONTINUED | OUTPATIENT
Start: 2018-09-02 | End: 2018-09-02

## 2018-09-02 RX ORDER — QUETIAPINE FUMARATE 200 MG/1
400 TABLET, FILM COATED ORAL NIGHTLY
Status: DISCONTINUED | OUTPATIENT
Start: 2018-09-02 | End: 2018-09-03

## 2018-09-02 RX ORDER — PREGABALIN 100 MG/1
200 CAPSULE ORAL 4 TIMES DAILY
Status: DISCONTINUED | OUTPATIENT
Start: 2018-09-02 | End: 2018-09-04 | Stop reason: HOSPADM

## 2018-09-02 RX ORDER — ALBUTEROL SULFATE 2.5 MG/3ML
2.5 SOLUTION RESPIRATORY (INHALATION) EVERY 6 HOURS PRN
Status: DISCONTINUED | OUTPATIENT
Start: 2018-09-02 | End: 2018-09-04 | Stop reason: HOSPADM

## 2018-09-02 RX ORDER — SYRING-NEEDL,DISP,INSUL,0.3 ML 30 GX5/16"
1 SYRINGE, EMPTY DISPOSABLE MISCELLANEOUS 3 TIMES DAILY
Status: DISCONTINUED | OUTPATIENT
Start: 2018-09-02 | End: 2018-09-02

## 2018-09-02 RX ORDER — POLYETHYLENE GLYCOL 3350 17 G/17G
17 POWDER, FOR SOLUTION ORAL 2 TIMES DAILY
Status: DISCONTINUED | OUTPATIENT
Start: 2018-09-02 | End: 2018-09-04 | Stop reason: HOSPADM

## 2018-09-02 RX ORDER — BUPRENORPHINE HYDROCHLORIDE AND NALOXONE HYDROCHLORIDE DIHYDRATE 2; .5 MG/1; MG/1
2 TABLET SUBLINGUAL 2 TIMES DAILY
Status: DISCONTINUED | OUTPATIENT
Start: 2018-09-02 | End: 2018-09-04 | Stop reason: HOSPADM

## 2018-09-02 RX ORDER — ACETAMINOPHEN 325 MG/1
650 TABLET ORAL EVERY 4 HOURS PRN
Status: DISCONTINUED | OUTPATIENT
Start: 2018-09-02 | End: 2018-09-04 | Stop reason: HOSPADM

## 2018-09-02 RX ADMIN — QUETIAPINE FUMARATE 400 MG: 200 TABLET ORAL at 21:39

## 2018-09-02 RX ADMIN — DULOXETINE HYDROCHLORIDE 60 MG: 30 CAPSULE, DELAYED RELEASE ORAL at 09:20

## 2018-09-02 RX ADMIN — PREGABALIN 200 MG: 100 CAPSULE ORAL at 21:39

## 2018-09-02 RX ADMIN — DIPHENOXYLATE HYDROCHLORIDE AND ATROPINE SULFATE 1 TABLET: 2.5; .025 TABLET ORAL at 09:20

## 2018-09-02 RX ADMIN — ACETAMINOPHEN 650 MG: 325 TABLET ORAL at 00:27

## 2018-09-02 RX ADMIN — BACLOFEN 20 MG: 10 TABLET ORAL at 15:43

## 2018-09-02 RX ADMIN — ACETAMINOPHEN 650 MG: 325 TABLET ORAL at 15:48

## 2018-09-02 RX ADMIN — TIZANIDINE 4 MG: 4 TABLET ORAL at 20:05

## 2018-09-02 RX ADMIN — TOPIRAMATE 150 MG: 100 TABLET, FILM COATED ORAL at 21:39

## 2018-09-02 RX ADMIN — POLYETHYLENE GLYCOL 3350 17 G: 17 POWDER, FOR SOLUTION ORAL at 21:40

## 2018-09-02 RX ADMIN — IOPAMIDOL 100 ML: 755 INJECTION, SOLUTION INTRAVENOUS at 01:48

## 2018-09-02 RX ADMIN — TOPIRAMATE 150 MG: 100 TABLET, FILM COATED ORAL at 09:19

## 2018-09-02 RX ADMIN — BACLOFEN 20 MG: 10 TABLET ORAL at 09:22

## 2018-09-02 RX ADMIN — PREGABALIN 200 MG: 100 CAPSULE ORAL at 09:20

## 2018-09-02 RX ADMIN — LACTULOSE 10 G: 20 SOLUTION ORAL at 15:39

## 2018-09-02 RX ADMIN — BUPRENORPHINE AND NALOXONE 0.5 TABLET: 8; 2 TABLET SUBLINGUAL at 09:12

## 2018-09-02 RX ADMIN — SODIUM CHLORIDE: 9 INJECTION, SOLUTION INTRAVENOUS at 15:56

## 2018-09-02 RX ADMIN — PANTOPRAZOLE SODIUM 20 MG: 40 TABLET, DELAYED RELEASE ORAL at 09:21

## 2018-09-02 RX ADMIN — ONDANSETRON HYDROCHLORIDE 4 MG: 4 TABLET, FILM COATED ORAL at 09:21

## 2018-09-02 RX ADMIN — BUPRENORPHINE AND NALOXONE 2 TABLET: 2; .5 TABLET SUBLINGUAL at 17:51

## 2018-09-02 RX ADMIN — LACTULOSE 10 G: 20 SOLUTION ORAL at 21:40

## 2018-09-02 RX ADMIN — SODIUM CHLORIDE 1000 ML: 9 INJECTION, SOLUTION INTRAVENOUS at 03:27

## 2018-09-02 RX ADMIN — PREGABALIN 200 MG: 100 CAPSULE ORAL at 15:43

## 2018-09-02 RX ADMIN — SODIUM CHLORIDE 1000 ML: 9 INJECTION, SOLUTION INTRAVENOUS at 00:52

## 2018-09-02 RX ADMIN — SODIUM CHLORIDE: 9 INJECTION, SOLUTION INTRAVENOUS at 05:42

## 2018-09-02 RX ADMIN — DIVALPROEX SODIUM 500 MG: 500 TABLET, EXTENDED RELEASE ORAL at 09:20

## 2018-09-02 RX ADMIN — PREGABALIN 200 MG: 100 CAPSULE ORAL at 17:50

## 2018-09-02 ASSESSMENT — ENCOUNTER SYMPTOMS
PHOTOPHOBIA: 0
CONSTIPATION: 1
VOMITING: 1
WHEEZING: 0
BACK PAIN: 0
TROUBLE SWALLOWING: 0
ABDOMINAL PAIN: 1
SORE THROAT: 0
NAUSEA: 0
COUGH: 0
CHEST TIGHTNESS: 0
SHORTNESS OF BREATH: 0

## 2018-09-02 ASSESSMENT — PAIN DESCRIPTION - ORIENTATION
ORIENTATION: RIGHT
ORIENTATION: RIGHT;LEFT
ORIENTATION: RIGHT

## 2018-09-02 ASSESSMENT — PAIN DESCRIPTION - ONSET
ONSET: ON-GOING
ONSET: ON-GOING

## 2018-09-02 ASSESSMENT — PAIN DESCRIPTION - DESCRIPTORS
DESCRIPTORS: SPASM
DESCRIPTORS: ACHING;CONSTANT;SHARP;TENDER
DESCRIPTORS: CRAMPING

## 2018-09-02 ASSESSMENT — PAIN DESCRIPTION - LOCATION
LOCATION: LEG
LOCATION: ARM

## 2018-09-02 ASSESSMENT — PAIN SCALES - GENERAL
PAINLEVEL_OUTOF10: 8
PAINLEVEL_OUTOF10: 10
PAINLEVEL_OUTOF10: 8
PAINLEVEL_OUTOF10: 8
PAINLEVEL_OUTOF10: 10

## 2018-09-02 ASSESSMENT — PAIN DESCRIPTION - FREQUENCY
FREQUENCY: CONTINUOUS
FREQUENCY: CONTINUOUS

## 2018-09-02 ASSESSMENT — PAIN DESCRIPTION - PAIN TYPE
TYPE: ACUTE PAIN;CHRONIC PAIN

## 2018-09-02 NOTE — ED NOTES
Pt arrived to the ED by wheelchair. Pt CO of abdominal pain, \"right side pain\". Pt stated he hasn't had a BM in x2 weeks. Pt states he is in a lot of pain on the right side. Pt appears to be anxious at this time. Pt connected to monitor.      Justice Hartley RN  09/02/18 0005

## 2018-09-02 NOTE — H&P
Single Solid Nodule: Nodule size less than 6 mm In a low-risk patient, no routine follow-up. In a high-risk patient, optional CT at 12 months. Nodule size equals 6-8 mm In a low-risk patient, CT at 6-12 months, then consider CT at 18-24 months. In a high-risk patient, CT at 6-12 months, then CT at 18-24 months. Nodule size greater than 8 mm In a low-risk patient, consider CT at 3 months, PET/CT, or tissue sampling. In a high-risk patient, consider CT at 3 months, PET/CT, or tissue sampling. Multiple Solid Nodules: Nodule size less than 6 mm In a low-risk patient, no routine follow-up. In a high-risk patient, optional CT at 12 months. Nodule size equals 6-8 mm In a low-risk patient, CT at 3-6 months, then consider CT at 18-24 months. In a high-risk patient, CT at 3-6 months, then CT at 18-24 months. Nodule size greater than 8 mm In a low-risk patient, CT at 3-6 months, then consider CT at 18-24 months. In a high-risk patient, CT at 3-6 months, then CT at 18-24 months. - Low risk patients include individuals with minimal or absent history of smoking and other known risk factors. - High risk patients include individuals with a history or smoking or known risk factors. Radiology 2017 http://pubs. rsna.org/doi/full/10.1148/radiol. 1894975646     Cta Abdomen Pelvis W Contrast    Result Date: 9/2/2018  EXAMINATION: CTA OF THE ABDOMEN AND PELVIS WITH CONTRAST 9/2/2018 2:08 am: TECHNIQUE: CTA of the abdomen and pelvis was performed with the administration of intravenous contrast. Multiplanar reformatted images are provided for review. MIP images are provided for review. Dose modulation, iterative reconstruction, and/or weight based adjustment of the mA/kV was utilized to reduce the radiation dose to as low as reasonably achievable. COMPARISON: None. HISTORY: ORDERING SYSTEM PROVIDED HISTORY: abd pain, aneurysm? FINDINGS: CTA ABDOMEN: Lung base findings discussed separately Aorta is normal in caliber without dissection. Multiple vascular calcifications are noted. Major branch vasculature is widely patent. Organs: Curvilinear low-density in the right lobe of the liver is noted on image 65. Detail in this region is limited due to artifact. Otherwise, there is no focal liver lesion. Heterogeneity of the spleen is likely due to timing of the bolus. There is no pancreatic lesion. There is no adrenal lesion. Tiny low-density lesion in the right kidney on image 94 is likely a small cyst.  Detail is limited. There is no hydronephrosis or renal laceration. Lymph nodes: There is no pathologic adenopathy. Bowel: There is stool throughout the colon. Detail of the bowel is significantly limited due to large amount of artifact and lack of bowel contrast.  There is no disproportionate distension. There is no pneumatosis. CTA PELVIS: Vascular calcifications are noted with mild multifocal associated narrowing. There is no aneurysm. There is no dissection. Urinary bladder wall thickening is noted diffusely. There is no pelvic adenopathy or pelvic fluid collection. Bones: Sclerotic densities are noted in the femoral heads. This raises the question of avascular necrosis. Postop changes are noted in the posterior elements at the lumbosacral junction. Multilevel degenerative change in the spine is noted. No destructive lesions are noted. Multilevel disc bulging is noted. Multilevel canal narrowing is noted. Evaluation of three-dimensional images demonstrates no aneurysm. No aneurysm or dissection. Curvilinear low-density in the right lobe of the liver is likely artifact. Findings suggesting bilateral femoral head avascular necrosis. Otherwise no acute abnormality in the abdomen or the pelvis. LABS:  I have reviewed and interpreted all available lab results.   Labs Reviewed   CBC WITH AUTO DIFFERENTIAL - Abnormal; Notable for the following:        Result Value    WBC 16.1 (*)     RDW 15.5 (*)     Seg Neutrophils 75 (*) Eosinophils % 0 (*)     Segs Absolute 12.08 (*)     All other components within normal limits   COMPREHENSIVE METABOLIC PANEL - Abnormal; Notable for the following:     Potassium 3.6 (*)     Chloride 108 (*)     CO2 18 (*)     AST 41 (*)     Total Bilirubin 0.24 (*)     All other components within normal limits   LACTIC ACID, PLASMA - Abnormal; Notable for the following:     Lactic Acid, Whole Blood 2.4 (*)     All other components within normal limits   LIPASE   URINE RT REFLEX TO CULTURE   CBC WITH AUTO DIFFERENTIAL   LACTIC ACID, WHOLE BLOOD   POCT TROPONIN   POCT TROPONIN   POCT TROPONIN     CDU IMPRESSION / Yohannes Score. is a 57 y.o. male who presents with:    1. Acute onset of RLQ abdominal pain, of uncertain etiology:  -ED workup with an elevated WBC to 16.1, lactate of 2.4 otherwise unremarkable labwork  -CT scan negative  -Will continue symptom control    2. Acute onset of Right shoulder, forearm, and right mandible pain  -Patient reports pain in these areas. He denies any trauma, but cannot tell me details regarding these pain. -X-ray to rule out fracture or other injury. 3. Acute onset of possible AMS, secondary to possible hepatic encephalopathy:  -Patient is afebile, and alert and oriented x3, but has periods of lucidity interspersed with periods or repeating himself. Difficult to tell if patient is altered without knowing baseline given his psychiatric disorders, but he has a history of altered mental status with an elevated ammonia back in April of this year. Will repeat ammonia.      · Continue home medications and pain control  · Monitor vitals, labs, and imaging  · DISPO: pending consults and clinical improvement    CONSULTS:    None    PROCEDURES:  Not indicated     PATIENT REFERRED TO:    Shey Pickering MD  [address] [place] [address] [address] [phone]            --  Milagros Larios, DO Milagros Larios    This dictation was generated by voice recognition computer software. Although all attempts are made to edit the dictation for accuracy, there may be errors in the transcription that are not intended.

## 2018-09-02 NOTE — ED NOTES
New set of vitals obtained, 2nd trop running, pt resting in bed, will continue to monitor.      Barbara Starks RN  09/02/18 0565

## 2018-09-02 NOTE — PROGRESS NOTES
Medicine Senior Resident Note    Pt seen and examined at bedside. Agree with intern assessment and plan. Added assessment and plan include:     Principal Problem:    Dehydration  Active Problems:    Generalized tonic-clonic seizure (HCC)    Seizure ([address]    Depression    Bipolar disorder (HCC)    Chronic back pain    Schizophrenia ([address]    Fibromyalgia    Type 2 diabetes mellitus with diabetic polyneuropathy, with long-term current use of insulin (HCC)    Cocaine substance abuse    Opiate abuse, episodic    Uncomplicated opioid dependence ([address]    Schizoaffective disorder (HCC)    Encephalopathy    Hyperammonemia (HCC)  Resolved Problems:    * No resolved hospital problems. *      This is a 56 YO male with PMH of schizophrenia and schizoaffective disorder now presenting with abdominal pain yesterday, with no diagnosed pathology. Labs were normal. CT scan abdomen revealed stool throughout the colon. The patient has a H/O IBS. He has an extensive H/O psych disorders, follows up with Megan and his Psychiatrist. Also has a H/O intractable epilepsy and follows up with Dr Isabella Falcon regarding the same. He has H/O IV drug abuse, quit 9 months ago and has been on Suboxone since. Patient has H/O Hep C and states is waiting on starting treatment. On exam, patient has a rapid speech with flight of ideas and is AAOx3. Plan:     1. Constipation : secondary to Suboxone, Lomotil and his underlying IBS : tap water enema, lactulose 10 g TID, restart his home dose of glycolax. 2. Hyperammonemia : likely secondary to constipation with added effect of valproic acid with topamax, patient has a baseline in 70-80. Lactulose 10 g TID. LFTs normal, asymptomatic. Doubt hepatic encephalopathy. Lactulose 10 g TID. Repeat ammonia tomorrow. 3. Restart home dose of Suboxone    4. Discontinue Lomotil. 5. Monitor electrolytes and replace PRN. 6. Continue antipsychotic medications.        Remy Fonseca MD      Department of

## 2018-09-02 NOTE — CARE COORDINATION
Case Management Initial Discharge Plan  Humberto Heck.,         Readmission Risk              Risk of Unplanned Readmission:        25               Met with:patient to discuss discharge plans. Information verified: address, contacts, phone number, , insurance Yes  PCP: Zachary Gilbert MD  Date of last visit: 2 weeks ago     Insurance Provider: kristin     Discharge Planning    Living Arrangements:  [place]:  Family Members    Home has 1 stories  none stairs to climb to get into front door, nonestairs to climb to reach second floor  Location of bedroom/bathroom in home main     Patient able to perform ADL's:Assisted    Current Services (outpatient & in home) [facility]   DME equipment: [employer], crutches   DME provider: laura     Pharmacy: [facility]    Potential Assistance Purchasing Medications:  No  Does patient want to participate in local refill/ meds to beds program?  No    Potential Assistance Needed:  N/A    Patient agreeable to home care: Yes  Freedom of choice provided:  yes    Prior SNF/Rehab Placement and Facility: no  Agreeable to SNF/Rehab: No  Model of choice provided: no   Evaluation: n/a    Expected Discharge date:  18  Patient expects to be discharged to:  Home  Follow Up Appointment: Best Day/ Time: Monday AM    Transportation provider: medical cab   Transportation arrangements needed for discharge: Yes     Discharge Plan: Plan to discharge to home with [facility] home care, face sheet and sury initiated.          Electronically signed by Alpa Contreras RN on 18 at 10:53 AM

## 2018-09-02 NOTE — H&P
Internal Medicine - History and Physical Examination    Patient's name:  Cristhian Hernandez. Medical Record Number: [id number]  Patient's account/billing number: [de-identified]  Patient's YOB: 1970  Age: 57 y.o. Date of Admission: 9/1/2018 11:47 PM  Primary Care Physician: Ernesto Adams MD    Code Status: Full Code    Chief complaint: Abdominal Pain    HISTORY OF PRESENT ILLNESS:   History was obtained from chart review and the patient. Cristhian Luz is a 57 y.o. male with a past medical history of IBD, fibromyalgia. anxiety disorder, bipolar, depression, schizoaffective disorder, polysubstance abuse, COPD (smoking and emphysema) and DM Type 2 with an HbA1c of 5.2 in April 2018 presented to the ER with complaints of abdominal pain especially on the right side. The patient states that she did not have a bowel movement for the last 2 weeks. He has pain in the right shoulder and right side of the jaw that the patient states has been killing him. He states that he has not slept for the last 3-4 days. The patient is also Hepatitis C positive due to his history of drug abuse. The patient complains of headache, nausea, vomiting, loss of appetite, SOB, chest pain, diarrhea and constipation. The patient states that his prescription of Lyrica was changed to Seroquiel at [facility] in Nov 2017. He was also prescribed Trazodone for insomnia. The patient states that these medicines make him wobbly, pee in the bed and he wants to switch back to Lyrica. The patient also complains of chronic anxiety. He was initially taking ativan for anxiety but was switched to suboxone and then to Buspar. As per the patient, he is 10 months drug clean tomorrow. In the ER, his ammonia levels were high. Imaging studies were negative for any signs of SBO. His vitals were fine. K was low at 3.6.     Past Medical History:        Diagnosis Date    Anxiety     Arthritis     osteoarthritis    Bipolar disorder ([address]     Bronchitis, chronic (HCC)     Chronic back pain     Chronic pain     Claustrophobia     COPD (chronic obstructive pulmonary disease) (HCC)     Depression     Diabetes mellitus (HCC)     Emphysema of lung (HCC)     History of irregular heartbeat     Hyperlipidemia     Liver disease     MRSA (methicillin resistant staph aureus) culture positive     Myofacial muscle pain     Osteomyelitis (HCC)     Peripheral neuropathy     bilateral feet    Pressure ulcer     Schizophrenia (HCC)     Seizures (HCC)     Sleep apnea     no machine    Spinal stenosis     Substance abuse        Past Surgical History:        Procedure Laterality Date    APPENDECTOMY      FRACTURE SURGERY  01/28/2017    closed reduction nasal fracture    LUMBAR FUSION      SEPTOPLASTY  01/13/2017    STOMACH SURGERY      TURBINOPLASTIES  01/13/2017       Allergies: Allergies   Allergen Reactions    Sulfa Antibiotics Anaphylaxis    Sulfasalazine Anaphylaxis    Bactrim Swelling    Levofloxacin Swelling    Levofloxacin Swelling    Phenobarbital Swelling    Sulfamethoxazole-Trimethoprim     Sulfur     Wellbutrin [Bupropion] Other (See Comments)     Delirium           Home Meds:   Prior to Admission medications    Medication Sig Start Date End Date Taking? Authorizing Provider   baclofen (LIORESAL) 20 MG tablet Take 40 mg by mouth nightly   Yes Historical Provider, MD   polyethylene glycol (GLYCOLAX) powder Take 17 g by mouth daily as needed   Yes Historical Provider, MD   pregabalin (LYRICA) 200 MG capsule Take 1 capsule by mouth 4 times daily. .  Patient taking differently: Take 400 mg by mouth 2 times daily. . 8/7/18 8/7/19 Yes Keenan Abbasi MD   Nutritional Supplements (BOOST) LIQD Two a day. Strawberry lactose free nutritional drinks.  7/26/18  Yes Keenan Abbasi MD   Diphenoxylate-Atropine (LOMOTIL PO) Take by mouth every 4 hours as needed    Yes Historical Provider, MD   baclofen (LIORESAL) 20 MG tablet Take 20 mg by appearing, appears in distress due to pain  Mental status - alert, oriented to person, place, and time  Chest - clear to auscultation, no wheezes, rales or rhonchi, symmetric air entry  Heart - normal rate, regular rhythm, normal S1, S2, no murmurs, rubs, clicks or gallops, no JVD  Abdomen - soft, non tender, swollen due to ascites  Neurological - alert, oriented, normal speech, no focal findings or movement disorder noted, cranial nerves II-XII grossly intact  Extremities - peripheral pulses normal, no pedal edema, no clubbing or cyanosis,  Skin - normal coloration and turgor, no rashes, no suspicious skin lesions noted     Any additional physical findings:    Laboratory findings:-    CBC:   Recent Labs      09/02/18   0848   WBC  10.9   HGB  13.0   PLT  235     BMP:    Recent Labs      09/02/18 0034 09/02/18   0848   NA  141  144   K  3.6*  3.6*   CL  108*  113*   CO2  18*  20   BUN  12  9   CREATININE  0.79  0.62*   GLUCOSE  80  105*     S. Calcium:  Recent Labs      09/02/18   0848   CALCIUM  8.0*     Hepatic functions:   Recent Labs      09/02/18   0848   ALKPHOS  68   ALT  26   AST  28   PROT  5.7*   BILITOT  0.16*   LABALBU  3.0*     Pancreatic functions:  Recent Labs      09/02/18 0034   LACTA  NOT REPORTED     S. Lactic Acid:   Recent Labs      09/02/18 0034   LACTA  NOT REPORTED     Cardiac enzymes:  Recent Labs      09/02/18   0019  09/02/18   0250   TROPONINI  0.00  0.00     Thyroid functions:   Lab Results   Component Value Date    TSH 1.12 12/07/2017     -----------------------------------------------------------------  Radiological reports:    X ray of the mandible, right elbow, right shoulder, CTA chest W WO contrast,  X ray abdomen were unremarkable. CT abdomen showed stool burden through out the colon. Assessment and Plan     1.  Constipation secondary to polypharmacy   - Most likely due to suboxone, lomotil and his underlying IBS   - On Lactulose Solution 10g TID, Protonix 20mg and

## 2018-09-02 NOTE — ED NOTES
Spoke with resident regarding BP of 92/57. Order given for NS bolus.      Pura Richey, GILBERT  09/02/18 [id number]

## 2018-09-02 NOTE — PROGRESS NOTES
Medicine resident PerfectServed re patient's request for different antispasmodic than baclofen and something additional for pain. Awaiting orders. Pastoral care paged per patient's request because he just wants someone to talk to.

## 2018-09-02 NOTE — ED PROVIDER NOTES
 Smoking status: Current Every Day Smoker     Packs/day: 1.00     Years: 35.00     Types: Cigarettes     Start date: 1/1/1983    Smokeless tobacco: Never Used    Alcohol use No    Drug use: Yes     Types: Marijuana, Opiates       Comment: pt reported hx of drug abuse    Sexual activity: No     Other Topics Concern    Not on file     Social History Narrative    No narrative on file       Family History   Problem Relation Age of Onset    Diabetes Mother         many family members with DM    Seizures Sister     Coronary Art Dis Maternal Uncle     Seizures Maternal Cousin        Allergies:  Sulfa antibiotics; Sulfasalazine; Bactrim; Levofloxacin; Levofloxacin; Phenobarbital; Sulfamethoxazole-trimethoprim; Sulfur; and Wellbutrin [bupropion]    Home Medications:  Prior to Admission medications    Medication Sig Start Date End Date Taking? Authorizing Provider   pregabalin (LYRICA) 200 MG capsule Take 1 capsule by mouth 4 times daily. . 8/7/18 8/7/19  Kory Lynch MD   Nutritional Supplements (BOOST) LIQD Two a day. Strawberry lactose free nutritional drinks.  7/26/18   Kory Lynch MD   Diphenoxylate-Atropine (LOMOTIL PO) Take by mouth every 4 hours    Historical Provider, MD   baclofen (LIORESAL) 20 MG tablet Take 20 mg by mouth 4 times daily    Historical Provider, MD   PANTOPRAZOLE SODIUM PO Take by mouth    Historical Provider, MD   ondansetron (ZOFRAN) 4 MG tablet Take 4 mg by mouth 2 times daily    Historical Provider, MD   Alcohol Swabs (ALCOHOL PREP) 70 % PADS Use__3___times per day Diagnosis: 250.0   Diabetes _x__Insulin-Dependent___Non-Insulin Dependent 7/9/18   Kory Lynch MD   Lancets MISC 1 each by Does not apply route 3 times daily Use__3_times daily Diagnisis:250.0  Diabetes Mellitus__x__Insulin Dependent___Non-Insulin Dependent 7/9/18   Kory Lynch MD   Lancet Device MISC 1 Device by Does not apply route 3 times daily Use__3__times daily Diagnosis:250.0   Diabetes Mellitus__x___Insulin Dependent_____Non-Insulin Dependent 7/9/18   Anish Palafox MD   blood glucose monitor strips 1 strip by Other route 3 times daily Test_3__times daily Diagnosis: 250.0   Diabetes Mellitus__x__Insulin Dependent____Non-Insulin Dependent 7/9/18   Anish Palafox MD   glucose monitoring kit (FREESTYLE) monitoring kit 1 kit by Does not apply route 3 times daily May prescribe different brand. 7/9/18   Anish Palafox MD   DULoxetine (CYMBALTA) 60 MG extended release capsule Take 1 capsule by mouth daily  Patient taking differently: Take 60 mg by mouth 2 times daily  4/13/18   Vanesa Fillers, APRN - CNP   traZODone (DESYREL) 150 MG tablet Take 1 tablet by mouth nightly as needed for Sleep 4/12/18   Vanesa Fillers, APRN - CNP   QUEtiapine (SEROQUEL) 400 MG tablet Take 1 tablet by mouth nightly 4/12/18   Vanesa Fillers, APRN - CNP   buprenorphine-naloxone (SUBOXONE) 2-0.5 MG SUBL Place 2 tablets under the tongue 2 times daily . 11/17/17   Kolby Orta MD   divalproex (DEPAKOTE ER) 500 MG extended release tablet TAKE 1 TABLET TWICE A DAY 4/27/17   Vern Quintero MD   albuterol (PROVENTIL) (2.5 MG/3ML) 0.083% nebulizer solution Take 2.5 mg by nebulization every 6 hours as needed for Wheezing    Historical Provider, MD   topiramate (TOPAMAX) 100 MG tablet Take 150 mg by mouth 2 times daily Indications: (pt takes one & one-half of a 100 mg tab = 150 mg BID)    Historical Provider, MD   ipratropium (ATROVENT) 0.02 % nebulizer solution Take 0.5 mg by nebulization every 6 hours as needed for Wheezing  10/24/16   Historical Provider, MD       REVIEW OF SYSTEMS    (2-9 systems for level 4, 10 or more for level 5)      Review of Systems   Constitutional: Negative for chills and fever. HENT: Negative for sore throat and trouble swallowing. Eyes: Negative for photophobia. Respiratory: Negative for cough, chest tightness, shortness of breath and wheezing.     Cardiovascular: Negative for chest pain and palpitations. Gastrointestinal: Positive for abdominal pain, constipation and vomiting. Negative for nausea. Endocrine: Negative for polyuria. Genitourinary: Negative for dysuria and flank pain. Musculoskeletal: Negative for back pain and neck pain. Skin: Negative for rash and wound. Neurological: Negative for syncope, weakness, light-headedness and headaches. Psychiatric/Behavioral: Negative for agitation and confusion. PHYSICAL EXAM   (up to 7 for level 4, 8 or more for level 5)      INITIAL VITALS:   BP (!) 95/57   Pulse 78   Temp 98.9 °F (37.2 °C) (Oral)   Resp 16   Ht 6' 3\" (1.905 m)   Wt 164 lb (74.4 kg)   SpO2 95%   BMI 20.50 kg/m²     Physical Exam   Constitutional: He is oriented to person, place, and time. He appears well-developed and well-nourished. HENT:   Head: Normocephalic and atraumatic. Nose: Nose normal.   Mouth/Throat: Oropharynx is clear and moist.   Eyes: Pupils are equal, round, and reactive to light. EOM are normal.   Neck: Normal range of motion. Neck supple. Cardiovascular: Normal rate, regular rhythm, normal heart sounds and intact distal pulses. Pulmonary/Chest: Breath sounds normal. No respiratory distress. He has no wheezes. He has no rales. Reproducible anterior chest pain on the right side. Pain also reproducible in the right side of the back around medial border of the scapula. Abdominal: Soft. Bowel sounds are normal. He exhibits distension. There is no tenderness. Diffuse abdominal pain. Voluntary guarding. Abdomen soft, mildly distended. Musculoskeletal: Normal range of motion. He exhibits no edema or deformity. Neurological: He is alert and oriented to person, place, and time. He has normal reflexes. Skin: Skin is warm and dry. No rash noted. No erythema. Psychiatric: He has a normal mood and affect.  His behavior is normal.       DIFFERENTIAL  DIAGNOSIS     PLAN (LABS / IMAGING / EKG):  Orders Placed This Encounter Bun/Cre Ratio NOT REPORTED 9 - 20    Calcium 9.0 8.6 - 10.4 mg/dL    Sodium 141 135 - 144 mmol/L    Potassium 3.6 (L) 3.7 - 5.3 mmol/L    Chloride 108 (H) 98 - 107 mmol/L    CO2 18 (L) 20 - 31 mmol/L    Anion Gap 15 9 - 17 mmol/L    Alkaline Phosphatase 94 40 - 129 U/L    ALT 33 5 - 41 U/L    AST 41 (H) <40 U/L    Total Bilirubin 0.24 (L) 0.3 - 1.2 mg/dL    Total Protein 7.5 6.4 - 8.3 g/dL    Alb 3.8 3.5 - 5.2 g/dL    Albumin/Globulin Ratio 1.0 1.0 - 2.5    GFR Non-African American >60 >60 mL/min    GFR African American >60 >60 mL/min    GFR Comment          GFR Staging NOT REPORTED    Lactic Acid, Plasma   Result Value Ref Range    Lactic Acid NOT REPORTED mmol/L    Lactic Acid, Whole Blood 2.4 (H) 0.7 - 2.1 mmol/L   Lipase   Result Value Ref Range    Lipase 25 13 - 60 U/L   POCT troponin   Result Value Ref Range    POC Troponin I 0.00 0.00 - 0.10 ng/mL    POC Troponin Interp       The Troponin-I (POC) results cannot be compared to the Troponin-T results. POCT troponin   Result Value Ref Range    POC Troponin I 0.00 0.00 - 0.10 ng/mL    POC Troponin Interp       The Troponin-I (POC) results cannot be compared to the Troponin-T results. RADIOLOGY:  None    EKG  None    All EKG's are interpreted by the Emergency Department Physician who either signs or Co-signs this chart in the absence of a cardiologist.    EMERGENCY DEPARTMENT COURSE:  Patient with pain out of proportion to exam with abdominal exam.  Concern for abdominal aortic aneurysm/ischemic bowel. We'll order a CTA of the abdomen and chest and a lactate. KUB pending CBC, CMP, troponin pending as well. CT of the abdomen and pelvis and chest normal.  No evidence of aneurysm. No evidence of bowel obstruction on KUB. Patient does have an elevated lactate and elevated white blood cell count. He has been having low blood pressure during stay.   Patient states that he is low normal, however given his significant pain and elevated lactate will continue to hydrate. Patient will be admitted to observation unit for further evaluation tomorrow. PROCEDURES:  None    CONSULTS:  None    CRITICAL CARE:  None    FINAL IMPRESSION      1.  Abdominal pain, unspecified abdominal location          DISPOSITION / PLAN     DISPOSITION Admitted 09/02/2018 03:57:13 AM      PATIENT REFERRED TO:  Kory Lynch MD  [facility] [address]  [phone]            DISCHARGE MEDICATIONS:  Current Discharge Medication List          Len Diaz MD  Emergency Medicine Resident    (Please note that portions of this note were completed with a voice recognition program.  Efforts were made to edit the dictations but occasionally words are mis-transcribed.)       Len Diaz MD  09/02/18 0632

## 2018-09-03 LAB
AMMONIA: 91 UMOL/L (ref 16–60)
ANION GAP SERPL CALCULATED.3IONS-SCNC: 10 MMOL/L (ref 9–17)
BUN BLDV-MCNC: 5 MG/DL (ref 6–20)
BUN/CREAT BLD: ABNORMAL (ref 9–20)
CALCIUM SERPL-MCNC: 8.1 MG/DL (ref 8.6–10.4)
CHLORIDE BLD-SCNC: 112 MMOL/L (ref 98–107)
CO2: 19 MMOL/L (ref 20–31)
CREAT SERPL-MCNC: 0.59 MG/DL (ref 0.7–1.2)
GFR AFRICAN AMERICAN: >60 ML/MIN
GFR NON-AFRICAN AMERICAN: >60 ML/MIN
GFR SERPL CREATININE-BSD FRML MDRD: ABNORMAL ML/MIN/{1.73_M2}
GFR SERPL CREATININE-BSD FRML MDRD: ABNORMAL ML/MIN/{1.73_M2}
GLUCOSE BLD-MCNC: 97 MG/DL (ref 70–99)
POTASSIUM SERPL-SCNC: 4.2 MMOL/L (ref 3.7–5.3)
SODIUM BLD-SCNC: 141 MMOL/L (ref 135–144)

## 2018-09-03 PROCEDURE — 6370000000 HC RX 637 (ALT 250 FOR IP): Performed by: INTERNAL MEDICINE

## 2018-09-03 PROCEDURE — 80048 BASIC METABOLIC PNL TOTAL CA: CPT

## 2018-09-03 PROCEDURE — 99223 1ST HOSP IP/OBS HIGH 75: CPT | Performed by: INTERNAL MEDICINE

## 2018-09-03 PROCEDURE — 82140 ASSAY OF AMMONIA: CPT

## 2018-09-03 PROCEDURE — 36415 COLL VENOUS BLD VENIPUNCTURE: CPT

## 2018-09-03 PROCEDURE — 6370000000 HC RX 637 (ALT 250 FOR IP): Performed by: STUDENT IN AN ORGANIZED HEALTH CARE EDUCATION/TRAINING PROGRAM

## 2018-09-03 PROCEDURE — 90792 PSYCH DIAG EVAL W/MED SRVCS: CPT | Performed by: NURSE PRACTITIONER

## 2018-09-03 PROCEDURE — 6360000002 HC RX W HCPCS: Performed by: STUDENT IN AN ORGANIZED HEALTH CARE EDUCATION/TRAINING PROGRAM

## 2018-09-03 PROCEDURE — 1200000000 HC SEMI PRIVATE

## 2018-09-03 RX ORDER — QUETIAPINE FUMARATE 200 MG/1
200 TABLET, FILM COATED ORAL NIGHTLY
Status: DISCONTINUED | OUTPATIENT
Start: 2018-09-03 | End: 2018-09-04 | Stop reason: HOSPADM

## 2018-09-03 RX ORDER — SENNA PLUS 8.6 MG/1
1 TABLET ORAL 2 TIMES DAILY
Status: DISCONTINUED | OUTPATIENT
Start: 2018-09-03 | End: 2018-09-04 | Stop reason: HOSPADM

## 2018-09-03 RX ORDER — LACTULOSE 10 G/15ML
20 SOLUTION ORAL 3 TIMES DAILY
Status: DISCONTINUED | OUTPATIENT
Start: 2018-09-03 | End: 2018-09-04 | Stop reason: HOSPADM

## 2018-09-03 RX ADMIN — PREGABALIN 200 MG: 100 CAPSULE ORAL at 10:42

## 2018-09-03 RX ADMIN — PREGABALIN 200 MG: 100 CAPSULE ORAL at 22:21

## 2018-09-03 RX ADMIN — POLYETHYLENE GLYCOL 3350 17 G: 17 POWDER, FOR SOLUTION ORAL at 10:44

## 2018-09-03 RX ADMIN — LACTULOSE 20 G: 20 SOLUTION ORAL at 14:24

## 2018-09-03 RX ADMIN — DULOXETINE HYDROCHLORIDE 60 MG: 30 CAPSULE, DELAYED RELEASE ORAL at 10:43

## 2018-09-03 RX ADMIN — TOPIRAMATE 150 MG: 100 TABLET, FILM COATED ORAL at 10:43

## 2018-09-03 RX ADMIN — BACLOFEN 20 MG: 10 TABLET ORAL at 22:21

## 2018-09-03 RX ADMIN — SENNA 8.6 MG: 8.6 TABLET, COATED ORAL at 10:54

## 2018-09-03 RX ADMIN — BUPRENORPHINE AND NALOXONE 2 TABLET: 2; .5 TABLET SUBLINGUAL at 10:51

## 2018-09-03 RX ADMIN — LACTULOSE 20 G: 20 SOLUTION ORAL at 22:22

## 2018-09-03 RX ADMIN — LACTULOSE 20 G: 20 SOLUTION ORAL at 10:45

## 2018-09-03 RX ADMIN — PANTOPRAZOLE SODIUM 20 MG: 40 TABLET, DELAYED RELEASE ORAL at 10:43

## 2018-09-03 RX ADMIN — DIVALPROEX SODIUM 500 MG: 500 TABLET, EXTENDED RELEASE ORAL at 10:45

## 2018-09-03 RX ADMIN — SENNA 8.6 MG: 8.6 TABLET, COATED ORAL at 22:21

## 2018-09-03 RX ADMIN — BACLOFEN 20 MG: 10 TABLET ORAL at 10:43

## 2018-09-03 RX ADMIN — TOPIRAMATE 150 MG: 100 TABLET, FILM COATED ORAL at 22:21

## 2018-09-03 RX ADMIN — PREGABALIN 200 MG: 100 CAPSULE ORAL at 14:23

## 2018-09-03 RX ADMIN — POLYETHYLENE GLYCOL 3350 17 G: 17 POWDER, FOR SOLUTION ORAL at 22:22

## 2018-09-03 ASSESSMENT — PAIN DESCRIPTION - PAIN TYPE: TYPE: CHRONIC PAIN

## 2018-09-03 ASSESSMENT — PAIN SCALES - GENERAL: PAINLEVEL_OUTOF10: 8

## 2018-09-03 NOTE — VIRTUAL HEALTH
past 3-4 months following the death of his nephew by overdose. He is unable to rate his current depression. He denies SI/HI. He reports hearing voices saying that 'he is a piece of _'. He reports having occasional feelings of hopelessness and guilt. He initially stated that he had been sleeping okay but then recanted and said that he was only getting about 2-3 hours of sleep and reports having a decreased appetite.      Current Outpatient Psychiatric Medications:  Cymbalta, Depakote, Seroquel, Trazodone, suboxone    Medications:    Current Facility-Administered Medications: lactulose (CHRONULAC) 10 GM/15ML solution 20 g, 20 g, Oral, TID  lactulose enema, , Rectal, Once  QUEtiapine (SEROQUEL) tablet 200 mg, 200 mg, Oral, Nightly  senna (SENOKOT) tablet 8.6 mg, 1 tablet, Oral, BID  ipratropium (ATROVENT) 0.02 % nebulizer solution 0.5 mg, 0.5 mg, Nebulization, Q6H PRN  albuterol (PROVENTIL) nebulizer solution 2.5 mg, 2.5 mg, Nebulization, Q6H PRN  topiramate (TOPAMAX) tablet 150 mg, 150 mg, Oral, BID  divalproex (DEPAKOTE ER) extended release tablet 500 mg, 500 mg, Oral, Daily  DULoxetine (CYMBALTA) extended release capsule 60 mg, 60 mg, Oral, Daily  traZODone (DESYREL) tablet 150 mg, 150 mg, Oral, Nightly PRN  baclofen (LIORESAL) tablet 20 mg, 20 mg, Oral, 4x daily  pantoprazole (PROTONIX) tablet 20 mg, 20 mg, Oral, QAM AC  pregabalin (LYRICA) capsule 200 mg, 200 mg, Oral, 4x Daily  sodium chloride flush 0.9 % injection 10 mL, 10 mL, Intravenous, 2 times per day  sodium chloride flush 0.9 % injection 10 mL, 10 mL, Intravenous, PRN  acetaminophen (TYLENOL) tablet 650 mg, 650 mg, Oral, Q4H PRN  0.9 % sodium chloride infusion, , Intravenous, Continuous  buprenorphine-naloxone (SUBOXONE) 2-0.5 MG SL tablet 2 tablet, 2 tablet, Sublingual, BID  polyethylene glycol (GLYCOLAX) packet 17 g, 17 g, Oral, BID       PAST PSYCHIATRIC HISTORY:  Schizophrenia, Bipolar disorder, Anxiety    Past psychiatric medications include: multiple unable to recall    Adverse reactions from psychotropic medications:  cognitive slowing, trouble with memory, difficulty holding things \"since I started topamax\"     Lifetime Psychiatric Review of Systems:     [place]: endorses occasional bursts of enregy, racing thoughts and pressured speech, unable to get a definitive duration of symptoms   Panic: denies  Phobia: denies  Hallucinations: denies  Delusions: denies     Past Medical History:        Diagnosis Date    Anxiety     Arthritis     osteoarthritis    Bipolar disorder (HCC)     Bronchitis, chronic (HCC)     Chronic back pain     Chronic pain     Claustrophobia     COPD (chronic obstructive pulmonary disease) ([address]     Depression     Diabetes mellitus ([address]     Emphysema of lung ([address]     History of irregular heartbeat     Hyperlipidemia     Liver disease     MRSA (methicillin resistant staph aureus) culture positive     Myofacial muscle pain     Osteomyelitis ([address]     Peripheral neuropathy     bilateral feet    Pressure ulcer     Schizophrenia ([address]     Seizures ([address]     Sleep apnea     no machine    Spinal stenosis     Substance abuse        Past Surgical History:        Procedure Laterality Date    APPENDECTOMY      FRACTURE SURGERY  01/28/2017    closed reduction nasal fracture    LUMBAR FUSION      SEPTOPLASTY  01/13/2017    STOMACH SURGERY      TURBINOPLASTIES  01/13/2017       Allergies: Sulfa antibiotics; Sulfasalazine; Bactrim; Levofloxacin; Levofloxacin; Phenobarbital; Sulfamethoxazole-trimethoprim;  Sulfur; and Wellbutrin [bupropion]      Social History: has been clean 10 months off of heroin per report   Social History     Social History    Marital status:      Spouse name: N/A    Number of children: N/A    Years of education: N/A     Social History Main Topics    Smoking status: Current Every Day Smoker     Packs/day: 1.00     Years: 35.00     Types: Cigarettes     Start date: 1/1/1983    Smokeless tobacco:

## 2018-09-03 NOTE — PLAN OF CARE
Problem: Falls - Risk of:  Goal: Will remain free from falls  Will remain free from falls   Patient was last seen by RN sitting in his wheelchair in his room eating breakfast.  Then patient fell backwards in his chair this am at 0900 yelled  f___. Patient refused assistance to get back in bed. Patient denies new injuries. Telesitter was initiated, bed alarm is on, call light in reach, and fall precautions in place. Physician was notified. Continue to monitor closely.

## 2018-09-03 NOTE — PROGRESS NOTES
IM RESIDENT PROGRESS NOTE        Patient:  Jean-Pierre Brown. YOB: 1970    MRN: [id number]     Acct: [de-identified]     Admit date: 9/1/2018    Chief complaints :Abdominal pain    Pt seen and Chart reviewed. Subjective:   Patient seen and examine at bed side  The patient was very drowsy. Patient complains of neck pain and spasm. He continues to have abdominal pain. The patient did not have a bowel movement, and he refused to get an enema yesterday, but he is ok with it today. Diet:  DIET GENERAL;      Medications:Current Inpatient    Scheduled Meds:   lactulose  20 g Oral TID    lactulose enema   Rectal Once    QUEtiapine  200 mg Oral Nightly    senna  1 tablet Oral BID    topiramate  150 mg Oral BID    divalproex  500 mg Oral Daily    DULoxetine  60 mg Oral Daily    baclofen  20 mg Oral 4x daily    pantoprazole  20 mg Oral QAM AC    pregabalin  200 mg Oral 4x Daily    sodium chloride flush  10 mL Intravenous 2 times per day    buprenorphine-naloxone  2 tablet Sublingual BID    polyethylene glycol  17 g Oral BID     Continuous Infusions:   sodium chloride 125 mL/hr at 09/02/18 1556     PRN Meds:ipratropium, albuterol, traZODone, sodium chloride flush, acetaminophen    Objective:    Physical Exam:  Vitals: /65   Pulse 78   Temp 98.1 °F (36.7 °C) (Oral)   Resp 16   Ht 6' 3\" (1.905 m)   Wt 164 lb (74.4 kg)   SpO2 94%   BMI 20.50 kg/m²   24 hour intake/output:  Intake/Output Summary (Last 24 hours) at 09/03/18 0904  Last data filed at 09/03/18 0658   Gross per 24 hour   Intake             1962 ml   Output                0 ml   Net             1962 ml     Last 3 weights:   Wt Readings from Last 3 Encounters:   09/01/18 164 lb (74.4 kg)   07/09/18 146 lb (66.2 kg)   04/07/18 166 lb 7.2 oz (75.5 kg)       Physical Examination:   General appearance - Drowsy, in pain  Mental status -  oriented to Left a message for pt to call clinic back.     RBC, UA   Date Value Ref Range Status   04/07/2018 0 TO 2 /HPF Final     Bacteria, UA   Date Value Ref Range Status   04/07/2018 NOT REPORTED NONE Final     Nitrite, Urine   Date Value Ref Range Status   09/02/2018 NEGATIVE NEG Final     WBC, UA   Date Value Ref Range Status   04/07/2018 0 TO 2 /HPF Final     Leukocyte Esterase, Urine   Date Value Ref Range Status   09/02/2018 NEGATIVE NEG Final     Yeast, UA   Date Value Ref Range Status   04/07/2018 NOT REPORTED NONE Final     Glucose, Ur   Date Value Ref Range Status   09/02/2018 NEGATIVE NEG Final     Bilirubin Urine   Date Value Ref Range Status   09/02/2018 NEGATIVE NEG Final       Assessment and plan:  1. Constipation secondary to polypharmacy              - Most likely due to suboxone, lomotil and his underlying IBS              - On Lactulose Solution 10g TID, Protonix 20mg and polyethylene glycol              - Will do tap water enema              - Will discontinue lomotil and Topamax considering hyperammonemia due to added effect of valproic acid with topamax              - LFTs normal.      2. Bipolar Disorder              - On Depakote ER Tablet 500mg, was on Seroquel 400mg, changed it to 100mg and consulted Psych     3. Depression              - On Cymbalta ER 60mg, Seroquel 100mg     4. Anxiety Disorder              - On Cymbalta ER 60mg     5. Fibromyalgia              - On Lyrica capsule 200mg, Cymbalta ER 60mg     6. Generalized tonic clonic seizures              - On Depakote ER Tablet 500mg     7. DM Type 2 with Neuropathy              - HbA1c 5.7 in April 2018              - Glucose 105 latest              - On Cymbalta ER 600mg      8. Schizoaffective disorder              - Seroquel 100mg, consulted Psych     9. Uncomplicated Opioid Dependence              - On Suboxone 2-0.5mg     10.  Kam Rush MD        PGY-1 Internal Medicine Resident  [facility][1]       9/3/2018, 9:04 AM

## 2018-09-04 VITALS
SYSTOLIC BLOOD PRESSURE: 126 MMHG | HEIGHT: 75 IN | OXYGEN SATURATION: 95 % | WEIGHT: 164 LBS | RESPIRATION RATE: 20 BRPM | DIASTOLIC BLOOD PRESSURE: 97 MMHG | BODY MASS INDEX: 20.39 KG/M2 | TEMPERATURE: 97.6 F | HEART RATE: 100 BPM

## 2018-09-04 PROCEDURE — 6370000000 HC RX 637 (ALT 250 FOR IP): Performed by: NURSE PRACTITIONER

## 2018-09-04 PROCEDURE — 6370000000 HC RX 637 (ALT 250 FOR IP): Performed by: STUDENT IN AN ORGANIZED HEALTH CARE EDUCATION/TRAINING PROGRAM

## 2018-09-04 PROCEDURE — 97162 PT EVAL MOD COMPLEX 30 MIN: CPT

## 2018-09-04 PROCEDURE — G8979 MOBILITY GOAL STATUS: HCPCS

## 2018-09-04 PROCEDURE — 99232 SBSQ HOSP IP/OBS MODERATE 35: CPT | Performed by: INTERNAL MEDICINE

## 2018-09-04 PROCEDURE — G8980 MOBILITY D/C STATUS: HCPCS

## 2018-09-04 PROCEDURE — 97530 THERAPEUTIC ACTIVITIES: CPT

## 2018-09-04 PROCEDURE — G8978 MOBILITY CURRENT STATUS: HCPCS

## 2018-09-04 PROCEDURE — 90792 PSYCH DIAG EVAL W/MED SRVCS: CPT | Performed by: NURSE PRACTITIONER

## 2018-09-04 PROCEDURE — 2580000003 HC RX 258: Performed by: INTERNAL MEDICINE

## 2018-09-04 PROCEDURE — 6370000000 HC RX 637 (ALT 250 FOR IP): Performed by: INTERNAL MEDICINE

## 2018-09-04 PROCEDURE — 6360000002 HC RX W HCPCS: Performed by: STUDENT IN AN ORGANIZED HEALTH CARE EDUCATION/TRAINING PROGRAM

## 2018-09-04 RX ORDER — DIVALPROEX SODIUM 500 MG/1
500 TABLET, EXTENDED RELEASE ORAL NIGHTLY
Status: DISCONTINUED | OUTPATIENT
Start: 2018-09-05 | End: 2018-09-04 | Stop reason: HOSPADM

## 2018-09-04 RX ORDER — TIZANIDINE 4 MG/1
4 TABLET ORAL 3 TIMES DAILY
Qty: 90 TABLET | Refills: 3 | Status: SHIPPED | OUTPATIENT
Start: 2018-09-04 | End: 2018-11-23 | Stop reason: SDUPTHER

## 2018-09-04 RX ORDER — DULOXETIN HYDROCHLORIDE 30 MG/1
90 CAPSULE, DELAYED RELEASE ORAL DAILY
Status: DISCONTINUED | OUTPATIENT
Start: 2018-09-05 | End: 2018-09-04 | Stop reason: HOSPADM

## 2018-09-04 RX ORDER — QUETIAPINE FUMARATE 200 MG/1
200 TABLET, FILM COATED ORAL NIGHTLY
Qty: 60 TABLET | Refills: 3 | Status: SHIPPED | OUTPATIENT
Start: 2018-09-04 | End: 2019-03-18 | Stop reason: SDUPTHER

## 2018-09-04 RX ORDER — PREGABALIN 200 MG/1
200 CAPSULE ORAL 4 TIMES DAILY
Qty: 60 CAPSULE | Refills: 3 | Status: CANCELLED | OUTPATIENT
Start: 2018-09-04 | End: 2018-11-03

## 2018-09-04 RX ORDER — DULOXETIN HYDROCHLORIDE 30 MG/1
30 CAPSULE, DELAYED RELEASE ORAL ONCE
Status: COMPLETED | OUTPATIENT
Start: 2018-09-04 | End: 2018-09-04

## 2018-09-04 RX ORDER — BUPRENORPHINE HYDROCHLORIDE AND NALOXONE HYDROCHLORIDE DIHYDRATE 2; .5 MG/1; MG/1
2 TABLET SUBLINGUAL 2 TIMES DAILY
Qty: 180 TABLET | Refills: 0 | Status: CANCELLED | OUTPATIENT
Start: 2018-09-04 | End: 2018-10-19

## 2018-09-04 RX ORDER — DIVALPROEX SODIUM 250 MG/1
250 TABLET, EXTENDED RELEASE ORAL EVERY MORNING
Qty: 30 TABLET | Refills: 3 | Status: ON HOLD | OUTPATIENT
Start: 2018-09-05 | End: 2019-04-23 | Stop reason: HOSPADM

## 2018-09-04 RX ORDER — ACETAMINOPHEN 325 MG/1
650 TABLET ORAL EVERY 4 HOURS PRN
Qty: 120 TABLET | Refills: 0 | Status: SHIPPED | OUTPATIENT
Start: 2018-09-04 | End: 2018-10-05 | Stop reason: SDUPTHER

## 2018-09-04 RX ORDER — SENNA PLUS 8.6 MG/1
1 TABLET ORAL 2 TIMES DAILY PRN
Qty: 60 TABLET | Refills: 0 | Status: SHIPPED | OUTPATIENT
Start: 2018-09-04 | End: 2018-10-05 | Stop reason: SDUPTHER

## 2018-09-04 RX ORDER — DIVALPROEX SODIUM 250 MG/1
250 TABLET, EXTENDED RELEASE ORAL EVERY MORNING
Status: DISCONTINUED | OUTPATIENT
Start: 2018-09-04 | End: 2018-09-04

## 2018-09-04 RX ORDER — TIZANIDINE 4 MG/1
4 TABLET ORAL 3 TIMES DAILY
Status: DISCONTINUED | OUTPATIENT
Start: 2018-09-04 | End: 2018-09-04 | Stop reason: HOSPADM

## 2018-09-04 RX ORDER — TRAZODONE HYDROCHLORIDE 150 MG/1
150 TABLET ORAL NIGHTLY PRN
Qty: 30 TABLET | Refills: 3 | Status: CANCELLED | OUTPATIENT
Start: 2018-09-04

## 2018-09-04 RX ORDER — LACTULOSE 10 G/15ML
10 SOLUTION ORAL 3 TIMES DAILY PRN
Qty: 1 BOTTLE | Refills: 1 | Status: SHIPPED | OUTPATIENT
Start: 2018-09-04 | End: 2018-09-25 | Stop reason: SDUPTHER

## 2018-09-04 RX ORDER — DIVALPROEX SODIUM 500 MG/1
500 TABLET, EXTENDED RELEASE ORAL NIGHTLY
Qty: 30 TABLET | Refills: 3 | Status: ON HOLD | OUTPATIENT
Start: 2018-09-05 | End: 2019-04-23 | Stop reason: HOSPADM

## 2018-09-04 RX ORDER — PANTOPRAZOLE SODIUM 20 MG/1
20 TABLET, DELAYED RELEASE ORAL
Qty: 30 TABLET | Refills: 3 | Status: CANCELLED | OUTPATIENT
Start: 2018-09-05

## 2018-09-04 RX ORDER — ALBUTEROL SULFATE 2.5 MG/3ML
2.5 SOLUTION RESPIRATORY (INHALATION) EVERY 6 HOURS PRN
Qty: 120 EACH | Refills: 3 | Status: CANCELLED | OUTPATIENT
Start: 2018-09-04

## 2018-09-04 RX ORDER — DULOXETIN HYDROCHLORIDE 30 MG/1
90 CAPSULE, DELAYED RELEASE ORAL DAILY
Qty: 90 CAPSULE | Refills: 3 | Status: SHIPPED | OUTPATIENT
Start: 2018-09-05 | End: 2018-11-23 | Stop reason: SDUPTHER

## 2018-09-04 RX ORDER — DULOXETIN HYDROCHLORIDE 30 MG/1
30 CAPSULE, DELAYED RELEASE ORAL DAILY
Status: DISCONTINUED | OUTPATIENT
Start: 2018-09-04 | End: 2018-09-04

## 2018-09-04 RX ORDER — DIVALPROEX SODIUM 250 MG/1
250 TABLET, EXTENDED RELEASE ORAL EVERY MORNING
Status: DISCONTINUED | OUTPATIENT
Start: 2018-09-05 | End: 2018-09-04 | Stop reason: HOSPADM

## 2018-09-04 RX ADMIN — TOPIRAMATE 150 MG: 100 TABLET, FILM COATED ORAL at 08:48

## 2018-09-04 RX ADMIN — TIZANIDINE 4 MG: 4 TABLET ORAL at 10:17

## 2018-09-04 RX ADMIN — BUPRENORPHINE AND NALOXONE 2 TABLET: 2; .5 TABLET SUBLINGUAL at 00:02

## 2018-09-04 RX ADMIN — SENNA 8.6 MG: 8.6 TABLET, COATED ORAL at 08:48

## 2018-09-04 RX ADMIN — LACTULOSE 20 G: 20 SOLUTION ORAL at 08:48

## 2018-09-04 RX ADMIN — PREGABALIN 200 MG: 100 CAPSULE ORAL at 08:48

## 2018-09-04 RX ADMIN — DIVALPROEX SODIUM 500 MG: 500 TABLET, EXTENDED RELEASE ORAL at 08:48

## 2018-09-04 RX ADMIN — DULOXETINE HYDROCHLORIDE 60 MG: 30 CAPSULE, DELAYED RELEASE ORAL at 08:48

## 2018-09-04 RX ADMIN — DULOXETINE HYDROCHLORIDE 30 MG: 30 CAPSULE, DELAYED RELEASE ORAL at 11:27

## 2018-09-04 RX ADMIN — PANTOPRAZOLE SODIUM 20 MG: 40 TABLET, DELAYED RELEASE ORAL at 07:41

## 2018-09-04 RX ADMIN — BUPRENORPHINE AND NALOXONE 2 TABLET: 2; .5 TABLET SUBLINGUAL at 08:48

## 2018-09-04 RX ADMIN — POLYETHYLENE GLYCOL 3350 17 G: 17 POWDER, FOR SOLUTION ORAL at 08:48

## 2018-09-04 RX ADMIN — LACTULOSE: 10 SOLUTION ORAL at 06:45

## 2018-09-04 RX ADMIN — BACLOFEN 20 MG: 10 TABLET ORAL at 08:49

## 2018-09-04 ASSESSMENT — PAIN DESCRIPTION - DESCRIPTORS: DESCRIPTORS: SPASM

## 2018-09-04 ASSESSMENT — PAIN DESCRIPTION - FREQUENCY: FREQUENCY: CONTINUOUS

## 2018-09-04 ASSESSMENT — PAIN DESCRIPTION - LOCATION: LOCATION: ARM;SHOULDER;LEG

## 2018-09-04 ASSESSMENT — PAIN SCALES - GENERAL
PAINLEVEL_OUTOF10: 5
PAINLEVEL_OUTOF10: 10
PAINLEVEL_OUTOF10: 7
PAINLEVEL_OUTOF10: 10

## 2018-09-04 ASSESSMENT — PAIN DESCRIPTION - PAIN TYPE: TYPE: CHRONIC PAIN

## 2018-09-04 ASSESSMENT — PAIN DESCRIPTION - ORIENTATION: ORIENTATION: RIGHT

## 2018-09-04 NOTE — PROGRESS NOTES
Physical Therapy    Facility/Department: [facility] RENAL//MED SURG  Initial Assessment    NAME: Cristhian Hernandez. : 1970  MRN: [id number]    Date of Service: 2018    Discharge Recommendations:  Home with assist PRN, Home with nursing aide, Home with Home health PT        Patient Diagnosis(es): The encounter diagnosis was Abdominal pain, unspecified abdominal location. has a past medical history of Anxiety; Arthritis; Bipolar disorder ([address] Bronchitis, chronic ([address] Chronic back pain; Chronic pain; Claustrophobia; COPD (chronic obstructive pulmonary disease) ([address] Depression; Diabetes mellitus ([address] Emphysema of lung ([address] History of irregular heartbeat; Hyperlipidemia; Liver disease; MRSA (methicillin resistant staph aureus) culture positive; Myofacial muscle pain; Osteomyelitis ([address] Peripheral neuropathy; Pressure ulcer; Schizophrenia ([address] Seizures ([address] Sleep apnea; Spinal stenosis; and Substance abuse.   has a past surgical history that includes Appendectomy; Stomach surgery; lumbar fusion; Septoplasty (2017); Turbinoplasties (2017); and fracture surgery (2017). Restrictions  Restrictions/Precautions  Restrictions/Precautions: General Precautions, Fall Risk  Required Braces or Orthoses?: No  Position Activity Restriction  Other position/activity restrictions: Up as tolerated  Vision/Hearing  Vision: Impaired  Vision Exceptions: Wears glasses for reading  Hearing: Within functional limits     Subjective  General  Patient assessed for rehabilitation services?: Yes  Response To Previous Treatment: Not applicable  Family / Caregiver Present: No  Follows Commands: Within Functional Limits  Subjective  Subjective: Pt and RN agreeable to PT. Pt alert in bed upon arrival.  Pain Screening  Patient Currently in Pain: Yes  Pain Assessment  Pain Assessment: 0-10  Pain Level: 10  Pain Type: Chronic pain  Pain Location: Arm; Shoulder;Leg  Pain Orientation: Right (R arm/shoulder, No  Stairs/Curb  Stairs?: No  Wheelchair Activities  Wheelchair Type: Standard  Wheelchair Parts Management: No  Propulsion: Yes (Pt uses LLE to propel with occassional RLE use)     Balance  Posture: Good  Sitting - Static: Good  Sitting - Dynamic: Good        Assessment   Body structures, Functions, Activity limitations: Decreased functional mobility   Assessment: Pt propelled w/c with ', Edu transfers from EOB to w/c. Pain noted throughout treatment on RUE, R shoulder, and BLE. Pt did not have difficulty transferring EOB<->w/c or with propulsion. Pt would no longer benefit from acute PT. Prognosis: Good  Decision Making: Medium Complexity  Patient Education: safety, PT POC  No Skilled PT: At baseline function  REQUIRES PT FOLLOW UP: No  Activity Tolerance  Activity Tolerance: Patient Tolerated treatment well;Patient limited by pain  Activity Tolerance: Pt had c/o pain throughout treatment while laying supine, transferring, and propelling w/c         Plan   Safety Devices  Type of devices: Call light within reach, Patient at risk for falls, Left in bed    G-Code  PT G-Codes  Functional Assessment Tool Used: [facility]  Score: 18/24  Functional Limitation: Mobility: Walking and moving around  Mobility: Walking and Moving Around Current Status (): At least 40 percent but less than 60 percent impaired, limited or restricted  Mobility: Walking and Moving Around Goal Status (): At least 40 percent but less than 60 percent impaired, limited or restricted  Mobility: Walking and Moving Around Discharge Status ():  At least 40 percent but less than 60 percent impaired, limited or restricted    AM-PAC Score  AM-PAC Inpatient Mobility Raw Score : 18  AM-PAC Inpatient T-Scale Score : 43.63  Mobility Inpatient CMS 0-100% Score: 46.58  Mobility Inpatient CMS G-Code Modifier : CK            Therapy Time   Individual Concurrent Group Co-treatment   Time In 1135         Time Out 1151         Minutes 16

## 2018-09-04 NOTE — PROGRESS NOTES
IM RESIDENT PROGRESS NOTE        Patient:  Jazmine Bronson. YOB: 1970    MRN: [id number]     Acct: [de-identified]     Admit date: 9/1/2018    Chief complaints :Abdominal pain    Pt seen and Chart reviewed. Subjective:   Patient seen and examine at bed side  Patient was given enema this morning and the patient had a bowel movement. Patient is hemodynamically stable. Denies any nausea, vomiting, abdominal pain. The patient complains of muscle spasms. Diet:  DIET GENERAL;      Medications:Current Inpatient    Scheduled Meds:   tiZANidine  4 mg Oral TID    [START ON 9/5/2018] divalproex  500 mg Oral Nightly    [START ON 9/5/2018] divalproex  250 mg Oral QAM    [START ON 9/5/2018] DULoxetine  90 mg Oral Daily    DULoxetine  30 mg Oral Once    naloxegol  12.5 mg Oral QAM    lactulose  20 g Oral TID    QUEtiapine  200 mg Oral Nightly    senna  1 tablet Oral BID    topiramate  150 mg Oral BID    pantoprazole  20 mg Oral QAM AC    pregabalin  200 mg Oral 4x Daily    sodium chloride flush  10 mL Intravenous 2 times per day    buprenorphine-naloxone  2 tablet Sublingual BID    polyethylene glycol  17 g Oral BID     Continuous Infusions:    PRN Meds:ipratropium, albuterol, traZODone, sodium chloride flush, acetaminophen    Objective:    Physical Exam:  Vitals: BP (!) 126/97   Pulse 100   Temp 97.6 °F (36.4 °C)   Resp 20   Ht 6' 3\" (1.905 m)   Wt 164 lb (74.4 kg)   SpO2 95%   BMI 20.50 kg/m²   24 hour intake/output:    Intake/Output Summary (Last 24 hours) at 09/04/18 1119  Last data filed at 09/04/18 0915   Gross per 24 hour   Intake             1250 ml   Output             1750 ml   Net             -500 ml     Last 3 weights:   Wt Readings from Last 3 Encounters:   09/01/18 164 lb (74.4 kg)   07/09/18 146 lb (66.2 kg)   04/07/18 166 lb 7.2 oz (75.5 kg)       Physical Examination:   General appearance - alert, Diet              General      Plan:  1. To discharge the patient today, psych ok to discharge. 2. To see Remigio Cowden, MD        PGY-1 Internal Medicine Resident  [facility]       9/4/2018, 11:19 AM       Attending Physician Samy 98 Problems    Diagnosis Date Noted    Generalized tonic-clonic seizure ([address] [P59.349] 03/03/2015     Priority: High    Dehydration [E86.0] 09/02/2018    Encephalopathy [G93.40] 09/02/2018    Hyperammonemia ([address] [E72.20] 09/02/2018    Constipation [K59.00] 09/02/2018    Schizoaffective disorder ([address] [F25.9] 18/95/6513    Uncomplicated opioid dependence ([address] [F11.20] 07/18/2017    Cocaine substance abuse [F14.10] 07/14/2016    Opiate abuse, episodic [F11.10] 07/14/2016    Type 2 diabetes mellitus with diabetic polyneuropathy, with long-term current use of insulin ([address] [E11.42, Z79.4] 03/27/2013    Bipolar disorder ([address] [F31.9] 10/03/2012    Chronic back pain [M54.9, G89.29] 10/03/2012    Depression [F32.9] 10/03/2012    Fibromyalgia [M79.7] 10/03/2012    Seizure ([address] [R56.9] 10/03/2012    Schizophrenia ([address] [F20.9] 10/03/2012       I have discussed the case, including pertinent history and exam findings with the resident and the team.  I have seen and examined the patient and the key elements of the encounter have been performed by me. I agree with the assessment, plan and orders as documented by the resident.       Moo Lott MD, [address] [place] [address] Internal Medicine Associate & [facility]  Associate  Department of Internal Medicine  Internal Medicine Clerkship - Andrea Malloy  9/4/2018, 11:46 AM

## 2018-09-04 NOTE — CONSULTS
Inpatient consult to Psychiatry  Consult performed by: Wagner Velasquez ordered by: Flo Rivera  Reason for consult: Follow up consult           Department of Psychiatry  Consult Service  Attending Physician Psychiatric Assessment      Thank you very much for allowing us to participate in the care of this patient. Reason for Consult: Follow up consult      History obtained from:  patient, electronic medical record    HISTORY OF PRESENT ILLNESS:          The patient is a 57 y.o. male with significant past medical history of   Past Medical History:   Diagnosis Date    Anxiety     Arthritis     osteoarthritis    Bipolar disorder (HCC)     Bronchitis, chronic (HCC)     Chronic back pain     Chronic pain     Claustrophobia     COPD (chronic obstructive pulmonary disease) ([address]     Depression     Diabetes mellitus ([address]     Emphysema of lung ([address]     History of irregular heartbeat     Hyperlipidemia     Liver disease     MRSA (methicillin resistant staph aureus) culture positive     Myofacial muscle pain     Osteomyelitis ([address]     Peripheral neuropathy     bilateral feet    Pressure ulcer     Schizophrenia ([address]     Seizures ([address]     Sleep apnea     no machine    Spinal stenosis     Substance abuse       who is admitted medically for treatment of  Abdominal pain/constipation.       Current Outpatient Psychiatric Medications:  Cymbalta, Seroquel, Depakote, Trazodone, Suboxone    Medications:    Current Facility-Administered Medications: tiZANidine (ZANAFLEX) tablet 4 mg, 4 mg, Oral, TID  DULoxetine (CYMBALTA) extended release capsule 30 mg, 30 mg, Oral, Daily  [START ON 9/5/2018] divalproex (DEPAKOTE ER) extended release tablet 500 mg, 500 mg, Oral, Nightly  [START ON 9/5/2018] divalproex (DEPAKOTE ER) extended release tablet 250 mg, 250 mg, Oral, QAM  lactulose (CHRONULAC) 10 GM/15ML solution 20 g, 20 g, Oral, TID  QUEtiapine (SEROQUEL) tablet 200 mg, 200 mg, Oral,

## 2018-09-05 NOTE — PROGRESS NOTES
OBS/CDU   RESIDENT NOTE FOR INPATIENT STATUS      INPATIENT       The Patient Now Meets Inpatient Criteria as of 09/01/18 @ 23:47 .     For the Following reasons:    [x]   Severity of Signs/ Symptoms indicate admission need   []   Medical Condition Would be Threatened in lower level of care   []   Diagnostic studies procedures results justify inpatient care   []   Adverse event likely if not inpatient admission   []   Pre-existing conditions warrants inpatient care     The patient requires inpatient care for further evaluation of their Dehydration [E86.0]  Encephalopathy [G93.40]

## 2018-09-05 NOTE — DISCHARGE SUMMARY
CDU Discharge Summary        Patient:  Jina Palm. YOB: 1970    MRN: [id number]   Acct: [de-identified]    Primary Care Physician: Lobo Carter MD    Admit date:  9/1/2018 11:47 PM  Transfer date: 9/2/18     Discharge Diagnoses/Diagnoses at time of Transfer: 9/5/2018 6:35 PM    1. Acute onset of RLQ abdominal pain, of uncertain etiology:  Treated with PO tyleonl    2. Acute onset of Right shoulder, forearm, and right mandible pain  Treated with PO tylenol    3. Acute onset of possible AMS, secondary to possible hepatic encephalopathy:  Requiring a higher level of care      Follow-up:  Patient is being transferred to another service. Outpatient follow-up will be arranged by discharging service. Discharge Medications:  Changes to medications made prior to transfer         [facility] Home Medication Instructions EJR:[id number]    Printed on:09/05/18 5035   Medication Information                      acetaminophen (TYLENOL) 325 MG tablet  Take 2 tablets by mouth every 4 hours as needed for Pain             albuterol (PROVENTIL) (2.5 MG/3ML) 0.083% nebulizer solution  Take 2.5 mg by nebulization every 6 hours as needed for Wheezing             buprenorphine-naloxone (SUBOXONE) 2-0.5 MG SUBL  Place 2 tablets under the tongue 2 times daily .              divalproex (DEPAKOTE ER) 250 MG extended release tablet  Take 1 tablet by mouth every morning             divalproex (DEPAKOTE ER) 500 MG extended release tablet  Take 1 tablet by mouth nightly             DULoxetine (CYMBALTA) 30 MG extended release capsule  Take 3 capsules by mouth daily             ipratropium (ATROVENT) 0.02 % nebulizer solution  Take 0.5 mg by nebulization every 6 hours as needed for Wheezing              lactulose (CHRONULAC) 10 GM/15ML solution  Take 15 mLs by mouth 3 times daily as needed (constipation)             naloxegol (MOVANTIK) 12.5 MG TABS tablet  Take 1 tablet by mouth every morning Nutritional Supplements (BOOST) LIQD  Two a day. Strawberry lactose free nutritional drinks. PANTOPRAZOLE SODIUM PO  Take 40 mg by mouth daily              polyethylene glycol (GLYCOLAX) powder  Take 17 g by mouth daily as needed             pregabalin (LYRICA) 200 MG capsule  Take 1 capsule by mouth 4 times daily. .             QUEtiapine (SEROQUEL) 200 MG tablet  Take 1 tablet by mouth nightly             senna (SENOKOT) 8.6 MG tablet  Take 1 tablet by mouth 2 times daily as needed for Constipation             tiZANidine (ZANAFLEX) 4 MG tablet  Take 1 tablet by mouth 3 times daily             topiramate (TOPAMAX) 100 MG tablet  Take 200 mg by mouth 2 times daily              traZODone (DESYREL) 150 MG tablet  Take 1 tablet by mouth nightly as needed for Sleep                 Diet:    , Advance as tolerated     Activity:  As tolerated    Consultants: IP CONSULT TO PSYCHIATRY  IP CONSULT TO HOME CARE NEEDS    Procedures:  Not indicated     Diagnostic Test:     Xr Mandible (min 4 Views)    Result Date: 9/2/2018  EXAMINATION: 3 XRAY VIEWS OF THE RIGHT SHOULDER; 4 XRAY VIEWS OF THE MANDIBLE; 2 XRAY VIEWS OF THE RIGHT ELBOW 9/2/2018 11:33 am COMPARISON: None. HISTORY: ORDERING SYSTEM PROVIDED HISTORY: right shoulder pain TECHNOLOGIST PROVIDED HISTORY: right shoulder pain Mandible pain, elbow pain FINDINGS: Right shoulder: 3 images were provided. Alignment is normal.  There is no fracture. There is no pneumothorax on the right. The visualized portions of the right lung are clear. Right elbow: 2 images of the right elbow were provided. Alignment is normal. There is no acute displaced fracture. There is no elevation of the posterior fat pad to suggest effusion. Mandible: 4 images of the mandible were provided. There is no fracture.  There is no cortical destruction or periosteal reaction     No acute osseous abnormality right shoulder No acute osseous abnormality right elbow No acute osseous abnormality abnormality mandible     Xr Abdomen (kub) (single Ap View)    Result Date: 9/2/2018  EXAMINATION: SINGLE SUPINE XRAY VIEW(S) OF THE ABDOMEN 9/2/2018 1:41 am COMPARISON: None. HISTORY: ORDERING SYSTEM PROVIDED HISTORY: constipation TECHNOLOGIST PROVIDED HISTORY: constipation FINDINGS: Nonspecific bowel gas pattern without evidence of obstruction. No abnormal calcifications. No acute osseous abnormality. No evidence of bowel obstruction. Cta Chest W Wo Contrast    Result Date: 9/2/2018  EXAMINATION: CTA OF THE CHEST WITH AND WITHOUT CONTRAST 9/2/2018 2:08 am TECHNIQUE: CTA of the chest was performed before and after the administration of intravenous contrast.  Multiplanar reformatted images are provided for review. MIP images are provided for review. Dose modulation, iterative reconstruction, and/or weight based adjustment of the mA/kV was utilized to reduce the radiation dose to as low as reasonably achievable. COMPARISON: None. HISTORY: ORDERING SYSTEM PROVIDED HISTORY: chest pain radiating to back FINDINGS: Aorta: On the noncontrast portion of the examination, there is no marginal hyperdensity in the aorta. On the postcontrast portion of the examination, great vessel origins are widely patent. Aorta is normal in caliber without dissection. Mediastinum: Multiple mediastinal lymph nodes are noted. Prevascular space lymph node on axial image 47 of series 5 measures approximately 3.3 x 1.1 cm. Subcarinal lymph node on axial image 62 right of midline measures approximately 2.2 x 1.7 cm multiple small mediastinal and hilar nodes are present. Lungs/Pleura: Emphysematous changes and bullous change is noted bilaterally. In the anterior left lung, there is a 3 mm nodule on axial image 61. A few adjacent much smaller nodules are noted at the same level. Left posterior lung base nodule is noted on image 105 measuring approximately 3.2 mm.   Left posteromedial lung base nodule is noted on image 108 measuring approximately 4.7 mm. Punctate nodular density in the periphery of the right lung is noted on image 46. Upper Abdomen: Abdominal findings discussed separately Soft Tissues/Bones: Multiple small axillary nodes are present. No destructive bone lesions are noted. There is no aneurysm or dissection. Emphysematous and bullous changes are noted bilaterally Multiple small pulmonary nodules as described. Mediastinal lymph nodes, some of which are pathologically enlarged. Follow-up is recommended. RECOMMENDATIONS: Fleischner Society guidelines for follow-up and management of incidentally detected pulmonary nodules: Single Solid Nodule: Nodule size less than 6 mm In a low-risk patient, no routine follow-up. In a high-risk patient, optional CT at 12 months. Nodule size equals 6-8 mm In a low-risk patient, CT at 6-12 months, then consider CT at 18-24 months. In a high-risk patient, CT at 6-12 months, then CT at 18-24 months. Nodule size greater than 8 mm In a low-risk patient, consider CT at 3 months, PET/CT, or tissue sampling. In a high-risk patient, consider CT at 3 months, PET/CT, or tissue sampling. Multiple Solid Nodules: Nodule size less than 6 mm In a low-risk patient, no routine follow-up. In a high-risk patient, optional CT at 12 months. Nodule size equals 6-8 mm In a low-risk patient, CT at 3-6 months, then consider CT at 18-24 months. In a high-risk patient, CT at 3-6 months, then CT at 18-24 months. Nodule size greater than 8 mm In a low-risk patient, CT at 3-6 months, then consider CT at 18-24 months. In a high-risk patient, CT at 3-6 months, then CT at 18-24 months. - Low risk patients include individuals with minimal or absent history of smoking and other known risk factors. - High risk patients include individuals with a history or smoking or known risk factors. Radiology 2017 http://pubs. rsna.org/doi/full/10.1148/radiol. 5206114570     Cta Abdomen Pelvis W Contrast    Result Date: 9/2/2018  EXAMINATION: noted. Multilevel disc bulging is noted. Multilevel canal narrowing is noted. Evaluation of three-dimensional images demonstrates no aneurysm. No aneurysm or dissection. Curvilinear low-density in the right lobe of the liver is likely artifact. Findings suggesting bilateral femoral head avascular necrosis. Otherwise no acute abnormality in the abdomen or the pelvis. Physical Exam:    General appearance - NAD, AOx 3  Lungs -CTA Bilat  Heart - RRR no murmurs  Abdomen - Soft NT/ND  Neurological:  No focal motor deficit, sensory loss  Extremities - Cap refil <2 sec in all ext. Skin -warm, dry      Hospital Course:  Clinical course has improved, labs and imaging reviewed. Guadalupe Finney originally presented to the hospital on 9/1/2018 11:47 PM. with acute onset of RLQ abdominal pain. He was admitted for observation and symptom control. When he got to the admission floor, he also complained of pain to the right mandible, right shoulder and right elbow. He was alert and oriented, but was perseverating and unable to answer many questions. There was a concern for altered mental status given a history of AMS and elevated ammonia level in April of 2018. Repeat ammonia was elevated Imaging results as above. Due to possible altered mental status and an elevated ammonia level,  He requires a higher level of care. Disposition: Transfer    Patient is transferred to internal medicine's service.   Accepting physician is Marcus        Condition: Stable    Time Spent: 2 day      --  DO Pastora Allen  Emergency Medicine Resident Physician

## 2018-09-07 ENCOUNTER — TELEPHONE (OUTPATIENT)
Dept: INTERNAL MEDICINE | Age: 48
End: 2018-09-07

## 2018-09-07 DIAGNOSIS — K59.03 THERAPEUTIC OPIOID INDUCED CONSTIPATION: Primary | ICD-10-CM

## 2018-09-07 DIAGNOSIS — T40.2X5A THERAPEUTIC OPIOID INDUCED CONSTIPATION: Primary | ICD-10-CM

## 2018-09-07 NOTE — DISCHARGE SUMMARY
[address] [place] [address]     Department of Internal Medicine - Staff Internal Medicine Service    INPATIENT DISCHARGE SUMMARY        Patient Identification:  Kya Padron is a 57 y.o. male. :  1970  MRN: [id number]     Acct: [de-identified]   Admit Date:  2018  Discharge date and time: 2018  1:42 PM   Attending Provider: Nessa att. providers found                                     Admission Diagnoses:   Dehydration [E86.0]  Encephalopathy [G93.40]    Discharge Diagnoses:   Principal Problem:    Constipation    Active Problems:    Generalized tonic-clonic seizure ([address]    Seizure ([address]    Depression    Bipolar disorder ([address]    Chronic back pain    Schizophrenia ([address]    Fibromyalgia    Type 2 diabetes mellitus with diabetic polyneuropathy, with long-term current use of insulin ([facility])    Cocaine substance abuse    Opiate abuse, episodic    Uncomplicated opioid dependence ([address]    Schizoaffective disorder ([address]    Encephalopathy    Hyperammonemia ([address]    Constipation  Resolved Problems:   Constipation       Consults:   psychiatry    Brief Inpatient course:    Kya Padron is a 57 y.o. male with a past medical history of IBD, fibromyalgia. anxiety disorder, bipolar, depression, schizoaffective disorder, polysubstance abuse, COPD and DM Type 2  presented to the ER with complaints of abdominal pain. The patient  did not have a bowel movement for the last 2 weeks. He has pain in the right shoulder and right side of the jaw.   In the ER, his ammonia levels were high. Imaging studies were negative for any signs of SBO. His vitals were fine. His constipation was diagnosed as secondary to polypharmacy. He was started on Lactulose 10g TID, polyethylene glycol and lomotil was discontinued. Lactulose enema was done on 18 and the patient had a bowel movement. Psychiatry was consulted as the patient had multiple psychiatric conditions and intermittent spasms of the body.  The patient was cleared from psychiatric standpoint and has a f/u appointment with Julieta on September 10th. The patient was discharged with home care and home PT. Procedures:  Lactulose enema    Any Hospital Acquired Infections: none    Discharge Functional Status:  stable    Disposition: home    Patient Instructions:   Discharge Medication List as of 9/4/2018  1:08 PM      START taking these medications    Details   acetaminophen (TYLENOL) 325 MG tablet Take 2 tablets by mouth every 4 hours as needed for Pain, Disp-120 tablet, R-0Normal      senna (SENOKOT) 8.6 MG tablet Take 1 tablet by mouth 2 times daily as needed for Constipation, Disp-60 tablet, R-0Normal      naloxegol (MOVANTIK) 12.5 MG TABS tablet Take 1 tablet by mouth every morning, Disp-120 tablet, R-0Normal      tiZANidine (ZANAFLEX) 4 MG tablet Take 1 tablet by mouth 3 times daily, Disp-90 tablet, R-3Normal      lactulose (CHRONULAC) 10 GM/15ML solution Take 15 mLs by mouth 3 times daily as needed (constipation), Disp-1 Bottle, R-1Normal         CONTINUE these medications which have CHANGED    Details   !! divalproex (DEPAKOTE ER) 500 MG extended release tablet Take 1 tablet by mouth nightly, Disp-30 tablet, R-3Normal      !! divalproex (DEPAKOTE ER) 250 MG extended release tablet Take 1 tablet by mouth every morning, Disp-30 tablet, R-3Normal      DULoxetine (CYMBALTA) 30 MG extended release capsule Take 3 capsules by mouth daily, Disp-90 capsule, R-3Normal      QUEtiapine (SEROQUEL) 200 MG tablet Take 1 tablet by mouth nightly, Disp-60 tablet, R-3Normal       !! - Potential duplicate medications found. Please discuss with provider. CONTINUE these medications which have NOT CHANGED    Details   polyethylene glycol (GLYCOLAX) powder Take 17 g by mouth daily as neededHistorical Med      pregabalin (LYRICA) 200 MG capsule Take 1 capsule by mouth 4 times daily. ., Disp-120 capsule, R-11Normal      Nutritional Supplements (BOOST) LIQD Two a day.  Strawberry lactose free

## 2018-09-25 RX ORDER — LACTULOSE 10 G/15ML
10 SOLUTION ORAL; RECTAL 3 TIMES DAILY PRN
Qty: 480 BOTTLE | Refills: 3 | Status: SHIPPED | OUTPATIENT
Start: 2018-09-25 | End: 2019-04-11 | Stop reason: SDUPTHER

## 2018-09-25 NOTE — TELEPHONE ENCOUNTER
Next Visit Date:  Future Appointments  Date Time Provider Leora Palmeri   10/10/2018 1:20 PM Nelson Lowry MD [facility] [address] Maintenance   Topic Date Due    Diabetic retinal exam  08/13/1982    Diabetic microalbuminuria test  08/13/1990    Pneumococcal med risk (1 of 1 - PPSV23) 08/13/1991    Flu vaccine (1) 09/01/2018    Diabetic foot exam  11/08/2018    Lipid screen  12/07/2018    A1C test (Diabetic or Prediabetic)  04/07/2019    DTaP/Tdap/Td vaccine (2 - Td) 01/26/2027    HIV screen  Completed       Hemoglobin A1C (%)   Date Value   04/07/2018 5.2   11/04/2017 5.3   09/01/2017 5.2             ( goal A1C is < 7)   No results found for: LABMICR  LDL Cholesterol (mg/dL)   Date Value   12/07/2017 61   01/20/2017 75       (goal LDL is <100)   AST (U/L)   Date Value   09/02/2018 28     ALT (U/L)   Date Value   09/02/2018 26     BUN (mg/dL)   Date Value   09/03/2018 5 (L)     BP Readings from Last 3 Encounters:   09/04/18 (!) 126/97   07/09/18 88/63   04/07/18 94/62          (goal 120/80)    All Future Testing planned in [facility]              Patient Active Problem List:     Anxiety     Seizure ([address]     Myofacial muscle pain     Chronic pain     Spinal stenosis     Hyperlipidemia     Depression     Panlobular emphysema (HCC)     Bipolar disorder (HCC)     Chronic back pain     Schizophrenia ([address]     Fibromyalgia     Type 2 diabetes mellitus with diabetic polyneuropathy, with long-term current use of insulin (HCC)     Onychomycosis     Pain in lower limb     Disc disease, degenerative, cervical     Cervical stenosis of spine     Generalized tonic-clonic seizure (HCC)     Superficial laceration of scalp     Altered mental status     Decubitus ulcer of coccyx, stage 2     Cocaine substance abuse     Opiate abuse, episodic     Closed fracture of nasal bone     Drug overdose     Uncomplicated opioid dependence ([address]     Paraplegia (HCC)     Schizoaffective disorder ([address]

## 2018-10-02 PROBLEM — E86.0 DEHYDRATION: Status: RESOLVED | Noted: 2018-09-02 | Resolved: 2018-10-02

## 2018-10-05 RX ORDER — SENNOSIDES 8.6 MG
1 TABLET ORAL 2 TIMES DAILY PRN
Qty: 60 TABLET | Refills: 3 | Status: SHIPPED | OUTPATIENT
Start: 2018-10-05 | End: 2018-11-04

## 2018-10-05 RX ORDER — ACETAMINOPHEN 325 MG/1
650 TABLET, COATED ORAL EVERY 4 HOURS PRN
Qty: 120 TABLET | Refills: 3 | Status: ON HOLD | OUTPATIENT
Start: 2018-10-05 | End: 2019-04-27 | Stop reason: HOSPADM

## 2018-11-26 RX ORDER — TIZANIDINE 4 MG/1
4 TABLET ORAL 3 TIMES DAILY
Qty: 90 TABLET | Refills: 0 | Status: SHIPPED | OUTPATIENT
Start: 2018-11-26 | End: 2019-02-15 | Stop reason: SDUPTHER

## 2018-11-26 RX ORDER — DULOXETIN HYDROCHLORIDE 30 MG/1
90 CAPSULE, DELAYED RELEASE ORAL DAILY
Qty: 90 CAPSULE | Refills: 0 | Status: ON HOLD | OUTPATIENT
Start: 2018-11-26 | End: 2019-04-23 | Stop reason: HOSPADM

## 2018-11-27 RX ORDER — LACTULOSE 10 G/15ML
10 SOLUTION ORAL; RECTAL 3 TIMES DAILY PRN
Qty: 480 BOTTLE | Refills: 0 | Status: ON HOLD | OUTPATIENT
Start: 2018-11-27 | End: 2019-04-23 | Stop reason: HOSPADM

## 2018-11-27 NOTE — TELEPHONE ENCOUNTER
([facility]     Schiz[facility]ffective disorder ([facility]     Non-dose-related adverse reaction to medication wellbutrin     Cannabis abuse     Encephalopathy     Hyperammonemia (HCC)     Constipation

## 2018-12-11 ENCOUNTER — APPOINTMENT (OUTPATIENT)
Dept: GENERAL RADIOLOGY | Age: 48
DRG: 383 | End: 2018-12-11
Payer: COMMERCIAL

## 2018-12-11 ENCOUNTER — HOSPITAL ENCOUNTER (INPATIENT)
Age: 48
LOS: 2 days | Discharge: HOME | DRG: 383 | End: 2018-12-13
Attending: EMERGENCY MEDICINE | Admitting: FAMILY MEDICINE
Payer: COMMERCIAL

## 2018-12-11 DIAGNOSIS — L02.419 CELLULITIS AND ABSCESS OF LEG: Primary | ICD-10-CM

## 2018-12-11 DIAGNOSIS — L03.119 CELLULITIS AND ABSCESS OF LEG: Primary | ICD-10-CM

## 2018-12-11 PROBLEM — L02.415 CELLULITIS AND ABSCESS OF RIGHT LEG: Status: ACTIVE | Noted: 2018-12-11

## 2018-12-11 PROBLEM — L03.115 CELLULITIS AND ABSCESS OF RIGHT LEG: Status: ACTIVE | Noted: 2018-12-11

## 2018-12-11 LAB
ABSOLUTE EOS #: 0.27 K/UL (ref 0–0.4)
ABSOLUTE IMMATURE GRANULOCYTE: 0 K/UL (ref 0–0.3)
ABSOLUTE LYMPH #: 3.97 K/UL (ref 1–4.8)
ABSOLUTE MONO #: 0.55 K/UL (ref 0.1–0.8)
ANION GAP SERPL CALCULATED.3IONS-SCNC: 12 MMOL/L (ref 9–17)
ATYPICAL LYMPHOCYTE ABSOLUTE COUNT: 0.41 K/UL
ATYPICAL LYMPHOCYTES: 3 %
BASOPHILS # BLD: 1 % (ref 0–2)
BASOPHILS ABSOLUTE: 0.14 K/UL (ref 0–0.2)
BUN BLDV-MCNC: 11 MG/DL (ref 6–20)
BUN/CREAT BLD: ABNORMAL (ref 9–20)
C-REACTIVE PROTEIN: 59 MG/L (ref 0–5)
CALCIUM SERPL-MCNC: 9.5 MG/DL (ref 8.6–10.4)
CHLORIDE BLD-SCNC: 99 MMOL/L (ref 98–107)
CO2: 26 MMOL/L (ref 20–31)
CREAT SERPL-MCNC: 0.82 MG/DL (ref 0.7–1.2)
DIFFERENTIAL TYPE: ABNORMAL
EOSINOPHILS RELATIVE PERCENT: 2 % (ref 1–4)
GFR AFRICAN AMERICAN: >60 ML/MIN
GFR NON-AFRICAN AMERICAN: >60 ML/MIN
GFR SERPL CREATININE-BSD FRML MDRD: ABNORMAL ML/MIN/{1.73_M2}
GFR SERPL CREATININE-BSD FRML MDRD: ABNORMAL ML/MIN/{1.73_M2}
GLUCOSE BLD-MCNC: 106 MG/DL (ref 70–99)
HCT VFR BLD CALC: 47.4 % (ref 40.7–50.3)
HEMOGLOBIN: 15.9 G/DL (ref 13–17)
IMMATURE GRANULOCYTES: 0 %
LYMPHOCYTES # BLD: 29 % (ref 24–44)
MCH RBC QN AUTO: 28.1 PG (ref 25.2–33.5)
MCHC RBC AUTO-ENTMCNC: 33.5 G/DL (ref 28.4–34.8)
MCV RBC AUTO: 83.9 FL (ref 82.6–102.9)
MONOCYTES # BLD: 4 % (ref 1–7)
MORPHOLOGY: ABNORMAL
NRBC AUTOMATED: 0 PER 100 WBC
PDW BLD-RTO: 15.1 % (ref 11.8–14.4)
PLATELET # BLD: 307 K/UL (ref 138–453)
PLATELET ESTIMATE: ABNORMAL
PMV BLD AUTO: 9.6 FL (ref 8.1–13.5)
POTASSIUM SERPL-SCNC: 3.2 MMOL/L (ref 3.7–5.3)
RBC # BLD: 5.65 M/UL (ref 4.21–5.77)
RBC # BLD: ABNORMAL 10*6/UL
SEDIMENTATION RATE, ERYTHROCYTE: 29 MM (ref 0–10)
SEG NEUTROPHILS: 61 % (ref 36–66)
SEGMENTED NEUTROPHILS ABSOLUTE COUNT: 8.36 K/UL (ref 1.8–7.7)
SODIUM BLD-SCNC: 137 MMOL/L (ref 135–144)
WBC # BLD: 13.7 K/UL (ref 3.5–11.3)
WBC # BLD: ABNORMAL 10*3/UL

## 2018-12-11 PROCEDURE — 87040 BLOOD CULTURE FOR BACTERIA: CPT

## 2018-12-11 PROCEDURE — 99284 EMERGENCY DEPT VISIT MOD MDM: CPT

## 2018-12-11 PROCEDURE — 87070 CULTURE OTHR SPECIMN AEROBIC: CPT

## 2018-12-11 PROCEDURE — 85651 RBC SED RATE NONAUTOMATED: CPT

## 2018-12-11 PROCEDURE — 87205 SMEAR GRAM STAIN: CPT

## 2018-12-11 PROCEDURE — 2500000003 HC RX 250 WO HCPCS: Performed by: PHYSICIAN ASSISTANT

## 2018-12-11 PROCEDURE — 96366 THER/PROPH/DIAG IV INF ADDON: CPT

## 2018-12-11 PROCEDURE — 85025 COMPLETE CBC W/AUTO DIFF WBC: CPT

## 2018-12-11 PROCEDURE — 86140 C-REACTIVE PROTEIN: CPT

## 2018-12-11 PROCEDURE — 1200000000 HC SEMI PRIVATE

## 2018-12-11 PROCEDURE — 80048 BASIC METABOLIC PNL TOTAL CA: CPT

## 2018-12-11 PROCEDURE — 10060 I&D ABSCESS SIMPLE/SINGLE: CPT

## 2018-12-11 PROCEDURE — 96365 THER/PROPH/DIAG IV INF INIT: CPT

## 2018-12-11 PROCEDURE — 96375 TX/PRO/DX INJ NEW DRUG ADDON: CPT

## 2018-12-11 PROCEDURE — 86403 PARTICLE AGGLUT ANTBDY SCRN: CPT

## 2018-12-11 PROCEDURE — 73610 X-RAY EXAM OF ANKLE: CPT

## 2018-12-11 PROCEDURE — 0Y9K0ZZ DRAINAGE OF RIGHT ANKLE REGION, OPEN APPROACH: ICD-10-PCS | Performed by: EMERGENCY MEDICINE

## 2018-12-11 PROCEDURE — 2580000003 HC RX 258: Performed by: PHYSICIAN ASSISTANT

## 2018-12-11 PROCEDURE — 2580000003 HC RX 258: Performed by: NURSE PRACTITIONER

## 2018-12-11 PROCEDURE — 6360000002 HC RX W HCPCS: Performed by: PHYSICIAN ASSISTANT

## 2018-12-11 RX ORDER — LACTULOSE 10 G/15ML
10 SOLUTION ORAL 3 TIMES DAILY PRN
Status: DISCONTINUED | OUTPATIENT
Start: 2018-12-11 | End: 2018-12-13 | Stop reason: HOSPADM

## 2018-12-11 RX ORDER — PANTOPRAZOLE SODIUM 40 MG/1
40 TABLET, DELAYED RELEASE ORAL DAILY
Status: DISCONTINUED | OUTPATIENT
Start: 2018-12-12 | End: 2018-12-13 | Stop reason: HOSPADM

## 2018-12-11 RX ORDER — MAGNESIUM SULFATE 1 G/100ML
1 INJECTION INTRAVENOUS PRN
Status: DISCONTINUED | OUTPATIENT
Start: 2018-12-11 | End: 2018-12-13 | Stop reason: HOSPADM

## 2018-12-11 RX ORDER — ACETAMINOPHEN 325 MG/1
650 TABLET ORAL EVERY 4 HOURS PRN
Status: DISCONTINUED | OUTPATIENT
Start: 2018-12-11 | End: 2018-12-13 | Stop reason: HOSPADM

## 2018-12-11 RX ORDER — SODIUM CHLORIDE 0.9 % (FLUSH) 0.9 %
10 SYRINGE (ML) INJECTION PRN
Status: DISCONTINUED | OUTPATIENT
Start: 2018-12-11 | End: 2018-12-13 | Stop reason: HOSPADM

## 2018-12-11 RX ORDER — NICOTINE 21 MG/24HR
1 PATCH, TRANSDERMAL 24 HOURS TRANSDERMAL DAILY PRN
Status: DISCONTINUED | OUTPATIENT
Start: 2018-12-11 | End: 2018-12-13 | Stop reason: HOSPADM

## 2018-12-11 RX ORDER — DULOXETIN HYDROCHLORIDE 30 MG/1
90 CAPSULE, DELAYED RELEASE ORAL DAILY
Status: DISCONTINUED | OUTPATIENT
Start: 2018-12-12 | End: 2018-12-13 | Stop reason: HOSPADM

## 2018-12-11 RX ORDER — ALBUTEROL SULFATE 2.5 MG/3ML
2.5 SOLUTION RESPIRATORY (INHALATION)
Status: DISCONTINUED | OUTPATIENT
Start: 2018-12-11 | End: 2018-12-11

## 2018-12-11 RX ORDER — DIVALPROEX SODIUM 250 MG/1
250 TABLET, EXTENDED RELEASE ORAL EVERY MORNING
Status: DISCONTINUED | OUTPATIENT
Start: 2018-12-12 | End: 2018-12-13 | Stop reason: HOSPADM

## 2018-12-11 RX ORDER — ALBUTEROL SULFATE 2.5 MG/3ML
2.5 SOLUTION RESPIRATORY (INHALATION) EVERY 6 HOURS PRN
Status: DISCONTINUED | OUTPATIENT
Start: 2018-12-11 | End: 2018-12-12

## 2018-12-11 RX ORDER — DIVALPROEX SODIUM 500 MG/1
500 TABLET, EXTENDED RELEASE ORAL NIGHTLY
Status: DISCONTINUED | OUTPATIENT
Start: 2018-12-11 | End: 2018-12-13 | Stop reason: HOSPADM

## 2018-12-11 RX ORDER — ONDANSETRON 2 MG/ML
4 INJECTION INTRAMUSCULAR; INTRAVENOUS EVERY 6 HOURS PRN
Status: DISCONTINUED | OUTPATIENT
Start: 2018-12-11 | End: 2018-12-13 | Stop reason: HOSPADM

## 2018-12-11 RX ORDER — POLYETHYLENE GLYCOL 3350 17 G/17G
17 POWDER, FOR SOLUTION ORAL DAILY PRN
Status: DISCONTINUED | OUTPATIENT
Start: 2018-12-11 | End: 2018-12-13 | Stop reason: HOSPADM

## 2018-12-11 RX ORDER — BISACODYL 10 MG
10 SUPPOSITORY, RECTAL RECTAL DAILY PRN
Status: DISCONTINUED | OUTPATIENT
Start: 2018-12-11 | End: 2018-12-13 | Stop reason: HOSPADM

## 2018-12-11 RX ORDER — POTASSIUM CHLORIDE 7.45 MG/ML
10 INJECTION INTRAVENOUS PRN
Status: DISCONTINUED | OUTPATIENT
Start: 2018-12-11 | End: 2018-12-13 | Stop reason: HOSPADM

## 2018-12-11 RX ORDER — TRAZODONE HYDROCHLORIDE 100 MG/1
300 TABLET ORAL NIGHTLY
Status: DISCONTINUED | OUTPATIENT
Start: 2018-12-11 | End: 2018-12-13 | Stop reason: HOSPADM

## 2018-12-11 RX ORDER — 0.9 % SODIUM CHLORIDE 0.9 %
1000 INTRAVENOUS SOLUTION INTRAVENOUS ONCE
Status: COMPLETED | OUTPATIENT
Start: 2018-12-11 | End: 2018-12-11

## 2018-12-11 RX ORDER — FENTANYL CITRATE 50 UG/ML
25 INJECTION, SOLUTION INTRAMUSCULAR; INTRAVENOUS ONCE
Status: COMPLETED | OUTPATIENT
Start: 2018-12-11 | End: 2018-12-11

## 2018-12-11 RX ORDER — LIDOCAINE HYDROCHLORIDE 10 MG/ML
20 INJECTION, SOLUTION INFILTRATION; PERINEURAL ONCE
Status: COMPLETED | OUTPATIENT
Start: 2018-12-11 | End: 2018-12-11

## 2018-12-11 RX ORDER — SODIUM CHLORIDE 9 MG/ML
INJECTION, SOLUTION INTRAVENOUS CONTINUOUS
Status: DISCONTINUED | OUTPATIENT
Start: 2018-12-11 | End: 2018-12-13 | Stop reason: HOSPADM

## 2018-12-11 RX ORDER — VANCOMYCIN HYDROCHLORIDE 1 G/200ML
1000 INJECTION, SOLUTION INTRAVENOUS EVERY 12 HOURS
Status: DISCONTINUED | OUTPATIENT
Start: 2018-12-12 | End: 2018-12-13

## 2018-12-11 RX ORDER — SODIUM CHLORIDE 0.9 % (FLUSH) 0.9 %
10 SYRINGE (ML) INJECTION EVERY 12 HOURS SCHEDULED
Status: DISCONTINUED | OUTPATIENT
Start: 2018-12-11 | End: 2018-12-13 | Stop reason: HOSPADM

## 2018-12-11 RX ORDER — POTASSIUM CHLORIDE 20 MEQ/1
40 TABLET, EXTENDED RELEASE ORAL PRN
Status: DISCONTINUED | OUTPATIENT
Start: 2018-12-11 | End: 2018-12-13 | Stop reason: HOSPADM

## 2018-12-11 RX ORDER — POTASSIUM CHLORIDE 20MEQ/15ML
40 LIQUID (ML) ORAL PRN
Status: DISCONTINUED | OUTPATIENT
Start: 2018-12-11 | End: 2018-12-13 | Stop reason: HOSPADM

## 2018-12-11 RX ORDER — VANCOMYCIN HYDROCHLORIDE 1 G/200ML
15 INJECTION, SOLUTION INTRAVENOUS ONCE
Status: COMPLETED | OUTPATIENT
Start: 2018-12-11 | End: 2018-12-11

## 2018-12-11 RX ORDER — QUETIAPINE FUMARATE 200 MG/1
200 TABLET, FILM COATED ORAL NIGHTLY
Status: DISCONTINUED | OUTPATIENT
Start: 2018-12-11 | End: 2018-12-13 | Stop reason: HOSPADM

## 2018-12-11 RX ORDER — PREGABALIN 100 MG/1
400 CAPSULE ORAL 2 TIMES DAILY
Status: DISCONTINUED | OUTPATIENT
Start: 2018-12-11 | End: 2018-12-11

## 2018-12-11 RX ADMIN — SODIUM CHLORIDE 1000 ML: 9 INJECTION, SOLUTION INTRAVENOUS at 19:45

## 2018-12-11 RX ADMIN — SODIUM CHLORIDE: 9 INJECTION, SOLUTION INTRAVENOUS at 23:18

## 2018-12-11 RX ADMIN — FENTANYL CITRATE 25 MCG: 50 INJECTION INTRAMUSCULAR; INTRAVENOUS at 19:45

## 2018-12-11 RX ADMIN — VANCOMYCIN HYDROCHLORIDE 1000 MG: 1 INJECTION, SOLUTION INTRAVENOUS at 19:45

## 2018-12-11 RX ADMIN — LIDOCAINE HYDROCHLORIDE 20 ML: 10 INJECTION, SOLUTION INFILTRATION; PERINEURAL at 19:25

## 2018-12-11 ASSESSMENT — PAIN DESCRIPTION - ORIENTATION
ORIENTATION: RIGHT
ORIENTATION: RIGHT;INNER

## 2018-12-11 ASSESSMENT — ENCOUNTER SYMPTOMS
COLOR CHANGE: 1
COUGH: 0
WHEEZING: 0
SORE THROAT: 0
NAUSEA: 0
BACK PAIN: 0
EYE PAIN: 0
VOMITING: 0
RHINORRHEA: 0
EYE ITCHING: 0
EYE DISCHARGE: 0
SHORTNESS OF BREATH: 0

## 2018-12-11 ASSESSMENT — PAIN DESCRIPTION - FREQUENCY
FREQUENCY: CONTINUOUS
FREQUENCY: CONTINUOUS

## 2018-12-11 ASSESSMENT — PAIN SCALES - GENERAL
PAINLEVEL_OUTOF10: 10

## 2018-12-11 ASSESSMENT — PAIN DESCRIPTION - LOCATION
LOCATION: ANKLE
LOCATION: ANKLE;LEG

## 2018-12-11 ASSESSMENT — PAIN DESCRIPTION - DESCRIPTORS
DESCRIPTORS: BURNING;ACHING
DESCRIPTORS: BURNING;SHARP

## 2018-12-12 ENCOUNTER — APPOINTMENT (OUTPATIENT)
Dept: GENERAL RADIOLOGY | Age: 48
DRG: 383 | End: 2018-12-12
Payer: COMMERCIAL

## 2018-12-12 PROBLEM — J84.9 INTERSTITIAL LUNG DISEASE (HCC): Status: ACTIVE | Noted: 2018-12-12

## 2018-12-12 LAB
AMPHETAMINE SCREEN URINE: NEGATIVE
ANION GAP SERPL CALCULATED.3IONS-SCNC: 11 MMOL/L (ref 9–17)
BARBITURATE SCREEN URINE: NEGATIVE
BENZODIAZEPINE SCREEN, URINE: NEGATIVE
BILIRUBIN URINE: NEGATIVE
BUN BLDV-MCNC: 10 MG/DL (ref 6–20)
BUN/CREAT BLD: ABNORMAL (ref 9–20)
BUPRENORPHINE URINE: ABNORMAL
C-REACTIVE PROTEIN: 50.5 MG/L (ref 0–5)
CALCIUM SERPL-MCNC: 8.6 MG/DL (ref 8.6–10.4)
CANNABINOID SCREEN URINE: POSITIVE
CHLORIDE BLD-SCNC: 106 MMOL/L (ref 98–107)
CO2: 22 MMOL/L (ref 20–31)
COCAINE METABOLITE, URINE: POSITIVE
COLOR: YELLOW
COMMENT UA: NORMAL
CREAT SERPL-MCNC: 0.68 MG/DL (ref 0.7–1.2)
GFR AFRICAN AMERICAN: >60 ML/MIN
GFR NON-AFRICAN AMERICAN: >60 ML/MIN
GFR SERPL CREATININE-BSD FRML MDRD: ABNORMAL ML/MIN/{1.73_M2}
GFR SERPL CREATININE-BSD FRML MDRD: ABNORMAL ML/MIN/{1.73_M2}
GLUCOSE BLD-MCNC: 119 MG/DL (ref 70–99)
GLUCOSE URINE: NEGATIVE
HCT VFR BLD CALC: 47.7 % (ref 40.7–50.3)
HEMOGLOBIN: 14.5 G/DL (ref 13–17)
KETONES, URINE: NEGATIVE
LEUKOCYTE ESTERASE, URINE: NEGATIVE
MCH RBC QN AUTO: 27.5 PG (ref 25.2–33.5)
MCHC RBC AUTO-ENTMCNC: 30.4 G/DL (ref 28.4–34.8)
MCV RBC AUTO: 90.3 FL (ref 82.6–102.9)
MDMA URINE: ABNORMAL
METHADONE SCREEN, URINE: NEGATIVE
METHAMPHETAMINE, URINE: ABNORMAL
NITRITE, URINE: NEGATIVE
NRBC AUTOMATED: 0 PER 100 WBC
OPIATES, URINE: NEGATIVE
OXYCODONE SCREEN URINE: NEGATIVE
PDW BLD-RTO: 15.4 % (ref 11.8–14.4)
PH UA: 7 (ref 5–8)
PHENCYCLIDINE, URINE: NEGATIVE
PLATELET # BLD: 317 K/UL (ref 138–453)
PMV BLD AUTO: 9.4 FL (ref 8.1–13.5)
POTASSIUM SERPL-SCNC: 3.7 MMOL/L (ref 3.7–5.3)
PROPOXYPHENE, URINE: ABNORMAL
PROTEIN UA: NEGATIVE
RBC # BLD: 5.28 M/UL (ref 4.21–5.77)
SODIUM BLD-SCNC: 139 MMOL/L (ref 135–144)
SPECIFIC GRAVITY UA: 1.01 (ref 1–1.03)
TEST INFORMATION: ABNORMAL
TRICYCLIC ANTIDEPRESSANTS, UR: ABNORMAL
TURBIDITY: CLEAR
URINE HGB: NEGATIVE
UROBILINOGEN, URINE: NORMAL
WBC # BLD: 14.9 K/UL (ref 3.5–11.3)

## 2018-12-12 PROCEDURE — 6370000000 HC RX 637 (ALT 250 FOR IP): Performed by: FAMILY MEDICINE

## 2018-12-12 PROCEDURE — 99222 1ST HOSP IP/OBS MODERATE 55: CPT | Performed by: FAMILY MEDICINE

## 2018-12-12 PROCEDURE — 36415 COLL VENOUS BLD VENIPUNCTURE: CPT

## 2018-12-12 PROCEDURE — 6370000000 HC RX 637 (ALT 250 FOR IP): Performed by: NURSE PRACTITIONER

## 2018-12-12 PROCEDURE — 94762 N-INVAS EAR/PLS OXIMTRY CONT: CPT

## 2018-12-12 PROCEDURE — 1200000000 HC SEMI PRIVATE

## 2018-12-12 PROCEDURE — 76937 US GUIDE VASCULAR ACCESS: CPT

## 2018-12-12 PROCEDURE — 85027 COMPLETE CBC AUTOMATED: CPT

## 2018-12-12 PROCEDURE — 6360000002 HC RX W HCPCS: Performed by: NURSE PRACTITIONER

## 2018-12-12 PROCEDURE — 80048 BASIC METABOLIC PNL TOTAL CA: CPT

## 2018-12-12 PROCEDURE — 80307 DRUG TEST PRSMV CHEM ANLYZR: CPT

## 2018-12-12 PROCEDURE — 86140 C-REACTIVE PROTEIN: CPT

## 2018-12-12 PROCEDURE — 6360000002 HC RX W HCPCS: Performed by: FAMILY MEDICINE

## 2018-12-12 PROCEDURE — 71046 X-RAY EXAM CHEST 2 VIEWS: CPT

## 2018-12-12 PROCEDURE — 81003 URINALYSIS AUTO W/O SCOPE: CPT

## 2018-12-12 RX ORDER — IPRATROPIUM BROMIDE AND ALBUTEROL SULFATE 2.5; .5 MG/3ML; MG/3ML
1 SOLUTION RESPIRATORY (INHALATION) EVERY 6 HOURS PRN
Status: DISCONTINUED | OUTPATIENT
Start: 2018-12-12 | End: 2018-12-13 | Stop reason: HOSPADM

## 2018-12-12 RX ORDER — BUPRENORPHINE HYDROCHLORIDE AND NALOXONE HYDROCHLORIDE DIHYDRATE 2; .5 MG/1; MG/1
2 TABLET SUBLINGUAL 2 TIMES DAILY
Status: DISCONTINUED | OUTPATIENT
Start: 2018-12-12 | End: 2018-12-13 | Stop reason: HOSPADM

## 2018-12-12 RX ORDER — TIZANIDINE 4 MG/1
4 TABLET ORAL 3 TIMES DAILY
Status: DISCONTINUED | OUTPATIENT
Start: 2018-12-12 | End: 2018-12-13 | Stop reason: HOSPADM

## 2018-12-12 RX ORDER — ALBUTEROL SULFATE 2.5 MG/3ML
2.5 SOLUTION RESPIRATORY (INHALATION)
Status: DISCONTINUED | OUTPATIENT
Start: 2018-12-12 | End: 2018-12-12

## 2018-12-12 RX ORDER — PREGABALIN 100 MG/1
200 CAPSULE ORAL 3 TIMES DAILY
Status: DISCONTINUED | OUTPATIENT
Start: 2018-12-12 | End: 2018-12-13 | Stop reason: HOSPADM

## 2018-12-12 RX ORDER — BUSPIRONE HYDROCHLORIDE 15 MG/1
15 TABLET ORAL 3 TIMES DAILY
Status: DISCONTINUED | OUTPATIENT
Start: 2018-12-12 | End: 2018-12-13 | Stop reason: HOSPADM

## 2018-12-12 RX ORDER — TRAMADOL HYDROCHLORIDE 50 MG/1
50 TABLET ORAL EVERY 6 HOURS PRN
Status: DISCONTINUED | OUTPATIENT
Start: 2018-12-12 | End: 2018-12-12

## 2018-12-12 RX ADMIN — PANTOPRAZOLE SODIUM 40 MG: 40 TABLET, DELAYED RELEASE ORAL at 08:51

## 2018-12-12 RX ADMIN — TRAZODONE HYDROCHLORIDE 300 MG: 100 TABLET ORAL at 21:30

## 2018-12-12 RX ADMIN — TRAMADOL HYDROCHLORIDE 50 MG: 50 TABLET, FILM COATED ORAL at 08:51

## 2018-12-12 RX ADMIN — QUETIAPINE FUMARATE 200 MG: 200 TABLET ORAL at 21:31

## 2018-12-12 RX ADMIN — DULOXETINE HYDROCHLORIDE 90 MG: 30 CAPSULE, DELAYED RELEASE ORAL at 08:51

## 2018-12-12 RX ADMIN — VANCOMYCIN HYDROCHLORIDE 1000 MG: 1 INJECTION, SOLUTION INTRAVENOUS at 19:57

## 2018-12-12 RX ADMIN — TIZANIDINE 4 MG: 4 TABLET ORAL at 10:48

## 2018-12-12 RX ADMIN — QUETIAPINE FUMARATE 200 MG: 200 TABLET ORAL at 00:25

## 2018-12-12 RX ADMIN — TOPIRAMATE 200 MG: 200 TABLET, FILM COATED ORAL at 21:31

## 2018-12-12 RX ADMIN — TRAMADOL HYDROCHLORIDE 50 MG: 50 TABLET, FILM COATED ORAL at 02:45

## 2018-12-12 RX ADMIN — POTASSIUM CHLORIDE 40 MEQ: 20 TABLET, EXTENDED RELEASE ORAL at 02:45

## 2018-12-12 RX ADMIN — TOPIRAMATE 200 MG: 200 TABLET, FILM COATED ORAL at 08:51

## 2018-12-12 RX ADMIN — ACETAMINOPHEN 650 MG: 325 TABLET ORAL at 00:25

## 2018-12-12 RX ADMIN — TIZANIDINE 4 MG: 4 TABLET ORAL at 21:31

## 2018-12-12 RX ADMIN — DIVALPROEX SODIUM 500 MG: 500 TABLET, EXTENDED RELEASE ORAL at 22:28

## 2018-12-12 RX ADMIN — PREGABALIN 200 MG: 100 CAPSULE ORAL at 15:00

## 2018-12-12 RX ADMIN — DIVALPROEX SODIUM 500 MG: 500 TABLET, EXTENDED RELEASE ORAL at 00:25

## 2018-12-12 RX ADMIN — TRAZODONE HYDROCHLORIDE 300 MG: 100 TABLET ORAL at 00:25

## 2018-12-12 RX ADMIN — PREGABALIN 200 MG: 100 CAPSULE ORAL at 10:48

## 2018-12-12 RX ADMIN — BUPRENORPHINE AND NALOXONE 2 TABLET: 2; .5 TABLET SUBLINGUAL at 22:28

## 2018-12-12 RX ADMIN — VANCOMYCIN HYDROCHLORIDE 1000 MG: 1 INJECTION, SOLUTION INTRAVENOUS at 08:51

## 2018-12-12 RX ADMIN — ENOXAPARIN SODIUM 40 MG: 40 INJECTION SUBCUTANEOUS at 08:51

## 2018-12-12 RX ADMIN — TIZANIDINE 4 MG: 4 TABLET ORAL at 15:00

## 2018-12-12 RX ADMIN — BUSPIRONE HYDROCHLORIDE 15 MG: 15 TABLET ORAL at 21:31

## 2018-12-12 RX ADMIN — TOPIRAMATE 200 MG: 200 TABLET, FILM COATED ORAL at 00:25

## 2018-12-12 RX ADMIN — BUPRENORPHINE AND NALOXONE 2 TABLET: 2; .5 TABLET SUBLINGUAL at 10:48

## 2018-12-12 RX ADMIN — DIVALPROEX SODIUM 250 MG: 500 TABLET, EXTENDED RELEASE ORAL at 08:51

## 2018-12-12 RX ADMIN — PREGABALIN 200 MG: 100 CAPSULE ORAL at 21:31

## 2018-12-12 ASSESSMENT — PAIN SCALES - GENERAL
PAINLEVEL_OUTOF10: 9
PAINLEVEL_OUTOF10: 2
PAINLEVEL_OUTOF10: 9
PAINLEVEL_OUTOF10: 9
PAINLEVEL_OUTOF10: 6
PAINLEVEL_OUTOF10: 9
PAINLEVEL_OUTOF10: 2
PAINLEVEL_OUTOF10: 10

## 2018-12-12 ASSESSMENT — PAIN DESCRIPTION - DESCRIPTORS: DESCRIPTORS: ACHING;DISCOMFORT

## 2018-12-12 ASSESSMENT — ENCOUNTER SYMPTOMS
COLOR CHANGE: 1
ABDOMINAL PAIN: 0
VOMITING: 0
DIARRHEA: 0
COUGH: 0
CONSTIPATION: 0
SHORTNESS OF BREATH: 0
SORE THROAT: 0
WHEEZING: 0
NAUSEA: 0

## 2018-12-12 ASSESSMENT — PAIN DESCRIPTION - ONSET: ONSET: ON-GOING

## 2018-12-12 ASSESSMENT — PAIN DESCRIPTION - PROGRESSION: CLINICAL_PROGRESSION: NOT CHANGED

## 2018-12-12 ASSESSMENT — PAIN DESCRIPTION - LOCATION: LOCATION: ANKLE

## 2018-12-12 ASSESSMENT — PAIN DESCRIPTION - ORIENTATION: ORIENTATION: RIGHT

## 2018-12-12 ASSESSMENT — PAIN DESCRIPTION - PAIN TYPE: TYPE: ACUTE PAIN

## 2018-12-12 ASSESSMENT — PAIN DESCRIPTION - FREQUENCY: FREQUENCY: CONTINUOUS

## 2018-12-12 NOTE — H&P
fever.   Respiratory: Negative for shortness of breath and wheezing. Cardiovascular: Negative for chest pain and leg swelling. Gastrointestinal: Negative for abdominal pain, constipation, diarrhea, nausea and vomiting. Musculoskeletal: Positive for arthralgias, joint swelling and myalgias. Neurological: Negative for syncope and headaches. Physical Exam:     Vitals:    18 1919 18 1932 18 2251 18 2315   BP: 100/69   97/64   Pulse: 94   89   Resp: 15   18   Temp: 97.7 °F (36.5 °C)   97.8 °F (36.6 °C)   TempSrc: Oral   Oral   SpO2: 96%   96%   Weight:  165 lb (74.8 kg)     Height:   6' 3.2\" (1.91 m)      Temp (24hrs), Av.8 °F (36.6 °C), Min:97.7 °F (36.5 °C), Max:97.8 °F (36.6 °C)      Intake/Output Summary (Last 24 hours) at 18 0733  Last data filed at 18 0854   Gross per 24 hour   Intake          1591.48 ml   Output                0 ml   Net          1591.48 ml       Physical Exam   Constitutional: He is oriented to person, place, and time. He appears well-developed and well-nourished. HENT:   Head: Normocephalic and atraumatic. Right Ear: External ear normal.   Left Ear: External ear normal.   Eyes: Conjunctivae are normal. Right eye exhibits no discharge. Left eye exhibits no discharge. No scleral icterus. Neck: Neck supple. No erythema present. Cardiovascular: Normal rate, regular rhythm and normal heart sounds. No murmur heard. Pulmonary/Chest: Effort normal and breath sounds normal. He has no wheezes. Abdominal: Soft. Bowel sounds are normal. He exhibits no mass. There is no tenderness. There is no rebound and no guarding. Musculoskeletal: He exhibits no edema. Right foot: There is tenderness and swelling. Feet:    Neurological: He is alert and oriented to person, place, and time. There are no new focal motor or sensory deficits, normal muscle tone and bulk, no abnormal sensation, normal speech. Skin: Skin is warm.  No rash 17 mmol/L    GFR Non-African American >60 >60 mL/min    GFR African American >60 >60 mL/min    GFR Comment          GFR Staging NOT REPORTED    C-REACTIVE PROTEIN    Collection Time: 12/11/18  7:32 PM   Result Value Ref Range    CRP 59.0 (H) 0.0 - 5.0 mg/L   Sedimentation Rate    Collection Time: 12/11/18  7:32 PM   Result Value Ref Range    Sed Rate 29 (H) 0 - 10 mm   CULTURE BLOOD #1    Collection Time: 12/12/18 12:16 AM   Result Value Ref Range    Specimen Description . BLOOD     Special Requests 5ML L AC     Culture NO GROWTH 7 HOURS     Status Pending    CULTURE BLOOD #2    Collection Time: 12/12/18 12:17 AM   Result Value Ref Range    Specimen Description . BLOOD     Special Requests 5 ML LAC POKE 2     Culture NO GROWTH 7 HOURS     Status Pending    Basic Metabolic Panel w/ Reflex to MG    Collection Time: 12/12/18  5:50 AM   Result Value Ref Range    Glucose 119 (H) 70 - 99 mg/dL    BUN 10 6 - 20 mg/dL    CREATININE 0.68 (L) 0.70 - 1.20 mg/dL    Bun/Cre Ratio NOT REPORTED 9 - 20    Calcium 8.6 8.6 - 10.4 mg/dL    Sodium 139 135 - 144 mmol/L    Potassium 3.7 3.7 - 5.3 mmol/L    Chloride 106 98 - 107 mmol/L    CO2 22 20 - 31 mmol/L    Anion Gap 11 9 - 17 mmol/L    GFR Non-African American >60 >60 mL/min    GFR African American >60 >60 mL/min    GFR Comment          GFR Staging NOT REPORTED    CBC    Collection Time: 12/12/18  5:50 AM   Result Value Ref Range    WBC 14.9 (H) 3.5 - 11.3 k/uL    RBC 5.28 4.21 - 5.77 m/uL    Hemoglobin 14.5 13.0 - 17.0 g/dL    Hematocrit 47.7 40.7 - 50.3 %    MCV 90.3 82.6 - 102.9 fL    MCH 27.5 25.2 - 33.5 pg    MCHC 30.4 28.4 - 34.8 g/dL    RDW 15.4 (H) 11.8 - 14.4 %    Platelets 780 376 - 375 k/uL    MPV 9.4 8.1 - 13.5 fL    NRBC Automated 0.0 0.0 per 100 WBC       Imaging/Diagonstics:  Xr Ankle Right (min 3 Views)  Result Date: 12/11/2018  No fracture. No radiodense foreign body. No subcutaneous gas.        Assessment :      Principal Problem:    Cellulitis and abscess of right

## 2018-12-12 NOTE — PROGRESS NOTES
mouth nightly  4/12/18  Yes Kelli Galan, APRN - CNP   albuterol (PROVENTIL) (2.5 MG/3ML) 0.083% nebulizer solution Take 2.5 mg by nebulization every 6 hours as needed for Wheezing   Yes Historical Provider, MD   topiramate (TOPAMAX) 100 MG tablet Take 200 mg by mouth 2 times daily    Yes Historical Provider, MD   ipratropium (ATROVENT) 0.02 % nebulizer solution Take 0.5 mg by nebulization every 6 hours as needed for Wheezing  10/24/16  Yes Historical Provider, MD   ENULOSE 10 GM/15ML SOLN solution TAKE 15 MLS BY MOUTH 3 TIMES DAILY AS NEEDED (CONSTIPATION) 11/27/18   Zenobia Bejarano MD   SM PAIN RELIEVER 325 MG tablet TAKE 2 TABLETS BY MOUTH EVERY 4 HOURS AS NEEDED FOR PAIN 10/5/18   Zenobia Bejarano MD   buprenorphine-naloxone (SUBOXONE) 2-0.5 MG SUBL Place 2 tablets under the tongue 2 times daily . 11/17/17   Kolbydara James MD       RR 17  Breath Sounds: clear      · Bronchodilator assessment at level  1  · Hyperinflation assessment at level   · Secretion Management assessment at level    · Home meds include Albuterol aerosol and Ipratropium aerosol as needed, patient is present 1 pack a day smoker.   ·   · []    Bronchodilator Assessment  BRONCHODILATOR ASSESSMENT SCORE  Score 0 1 2 3 4 5   Breath Sounds   []  Patient Baseline [x]  No Wheeze good aeration []  Faint, scattered wheezing, good aeration []  Expiratory Wheezing and or moderately diminished []  Insp/Exp wheeze and/or very diminished []  Insp/Exp and/ or marked distress   Respiratory Rate   []  Patient Baseline [x]  Less than 20 [x]  Less than 20 []  20-25 []  Greater than 25 []  Greater than 25   Peak flow % of Pred or PB [x]  NA   []  Greater than 90%  []  81-90% []  71-80% []  Less than or equal to 70%  or unable to perform []  Unable due to Respiratory Distress   Dyspnea re []  Patient Baseline [x]  No SOB [x]  No SOB []  SOB on exertion []  SOB min activity []  At rest/acute   e FEV% Predicted       [x]  NA []  Above 69%  []  Unable []  Above 60-69%  []  Unable []  Above 50-59%  []  Unable []  Above 35-49%  []  Unable []  Less than 35%  []  Unable                 []  Hyperinflation Assessment  Score 1 2 3   CXR and Breath Sounds   []  Clear []  No atelectasis  Basilar aeration []  Atelectasis or absent basilar breath sounds   Incentive Spirometry Volume  (Per IBW)   []  Greater than or equal to 15ml/Kg []  less than 15ml/Kg []  less than 15ml/Kg   Surgery within last 2 weeks []  None or general   []  Abdominal or thoracic surgery  []  Abdominal or thoracic   Chronic Pulmonary Historyre []  No []  Yes []  Yes     []  Secretion Management Assessment  Score 1 2 3   Bilateral Breath Sounds   []  Occasional Rhonchi []  Scattered Rhonchi []  Course Rhonchi and/or poor aeration   Sputum    []  Small amount of thin secretions []  Moderate amount of viscous secretions []  Copius, Viscious Yellow/ Secretions   CXR as reported by physician []  clear  []  Unavailable []  Infiltrates and/or consolidation  []  Unavailable []  Mucus Plugging and or lobar consolidation  []  Unavailable   Cough []  Strong, productive cough []  Weak productive cough []  No cough or weak non-productive cough   Nicolette Fallon  10:00 AM                            FEMALE                                  MALE                            FEV1 Predicted Normal Values                        FEV1 Predicted Normal Values          Age                                     Height in Feet and Inches       Age                                     Height in Feet and Inches       4' 11\" 5' 1\" 5' 3\" 5' 5\" 5' 7\" 5' 9\" 5' 11\" 6' 1\"  4' 11\" 5' 1\" 5' 3\" 5' 5\" 5' 7\" 5' 9\" 5' 11\" 6' 1\"   42 - 45 2.49 2.66 2.84 3.03 3.22 3.42 3.62 3.83 42 - 45 2.82 3.03 3.26 3.49 3.72 3.96 4.22 4.47   46 - 49 2.40 2.57 2.76 2.94 3.14 3.33 3.54 3.75 46 - 49 2.70 2.92 3.14 3.37 3.61 3.85 4.10 4.36   50 - 53 2.31 2.48 2.66 2.85 3.04 3.24 3.45 3.66 50 - 53 2.58 2.80 3.02 3.25 3.49 3.73 3.98 4.24   54 - 57 2.21 2.38 2.57 2.75 2.95 3. 14 3.35 3.56 54 - 57 2.46 2.67 2.89 3.12 3.36 3.60 3.85 4.11   58 - 61 2.10 2.28 2.46 2.65 2.84 3.04 3.24 3.45 58 - 61 2.32 2.54 2.76 2.99 3.23 3.47 3.72 3.98   62 - 65 1.99 2.17 2.35 2.54 2.73 2.93 3.13 3.34 62 - 65 2.19 2.40 2.62 2.85 3.09 3.33 3.58 3.84   66 - 69 1.88 2.05 2.23 2.42 2.61 2.81 3.02 3.23 66 - 69 2.04 2.26 2.48 2.71 2.95 3.19 3.44 3.70   70+ 1.82 1.99 2.17 2.36 2.55 2.75 2.95 3.16 70+ 1.97 2.19 2.41 2.64 2.87 3.12 3.37 3.62             Predicted Peak Expiratory Flow Rate                                       Height (in)  Female       Height (in) Male           Age 64 63 56 61 58 73 78 74 Age            21 344 357 372 387 402 417 432 446  60 62 64 66 68 70 72 74 76   25 337 352 366 381 396 411 426 441 25 447 476 [phone] [phone]   30 329 344 359 374 389 404 419 434 30 437 466 494 523 552 [phone] 667   35 322 337 351 366 381 396 411 426 35 426 455 484 512 541 570 [phone]   40 314 329 344 359 374 389 404 419 40 416 445 473 [phone] 647   45 307 322 336 351 366 381 396 411 45 405 434 463 491 520 549 577 606 636   50 299 314 329 344 359 374 389 404 50 395 424 452 481 510 538 567 596 625   55 292 307 321 336 351 366 381 396 55 384 413 442 470 499 528 556 585 615   60 284 299 314 329 344 359 374 389 60 374 403 431 460 489 517 546 575 605   65 277 292 306 321 336 351 366 381 65 363 392 421 449 478 507 535 564 594   70 269 284 299 314 329 344 359 374 70 353 382 410 439 468 496 525 554 583   75 261 274 289 305 319 334 348 364 75 344 372 400 429 458 487 515 544 573   80 253 266 282 296 312 327 342 356 80 335 362 390 419 448 476 [phone]

## 2018-12-12 NOTE — FLOWSHEET NOTE
Patient was sitting up in his bed when  visited. Patient was a bit tearful, saying that he lost his father and two friends in one month. Family was not present at the time. However, patient did make mention of his three children. Patient expressed appreciation for the treatment and care he was receiving.  maintained listening presence, offered support and prayed with patient. Patient was raised Pentec[employer]al but not affiliated with any parish. Patient received sacrament of anointing of the sick. Follow up visits recommended for more prayers and support. 12/12/18 1113   Encounter Summary   Services provided to: Patient   Support System Family members   [facility]   Continue Visiting (12/12/2018)   Complexity of Encounter Moderate   Length of Encounter 30 minutes   Spiritual Assessment Completed Yes   Routine   Type Initial   Spiritual/Anabaptism   Type Spiritual support   Assessment Calm; Approachable; Hopeful;Tearful   Intervention Active listening;Nurtured hope;Prayer; Anointing   Outcome Expressed gratitude   Sacraments   Sacrament of Sick-Anointing Anointed

## 2018-12-12 NOTE — ED PROVIDER NOTES
[facility]  [address] [place] [address]  Phone: [phone]      Attending Physician Attestation    I performed a history and physical examination of the patient and discussed management with the mid level provideer. I reviewed the mid level provider's note and agree with the documented findings and plan of care. Any areas of disagreement are noted on the chart. I was personally present for the key portions of any procedures. I have documented in the chart those procedures where I was not present during the key portions. I have reviewed the emergency nurses triage note. I agree with the chief complaint, past medical history, past surgical history, allergies, medications, social and family history as documented unless otherwise noted below. Documentation of the HPI, Physical Exam and Medical Decision Making performed by mid level providers is based on my personal performance of the HPI, PE and MDM. For Physician Assistant/ Nurse Practitioner cases/documentation I have personally evaluated this patient and have completed at least one if not all key elements of the E/M (history, physical exam, and MDM). Additional findings are as noted. CHIEF COMPLAINT       Chief Complaint   Patient presents with    Ankle Injury     redness to medial right ankle after hitting foot between w/c and wall          PAST MEDICAL HISTORY    has a past medical history of Anxiety; Arthritis; Bipolar disorder ([address] Bronchitis, chronic ([address] Chronic back pain; Chronic pain; Claustrophobia; COPD (chronic obstructive pulmonary disease) ([address] Depression; Diabetes mellitus ([address] Emphysema of lung ([address] History of irregular heartbeat; Hyperlipidemia; Liver disease; MRSA (methicillin resistant staph aureus) culture positive; Myofacial muscle pain; Osteomyelitis ([address] Peripheral neuropathy; Pressure ulcer; Schizophrenia ([address] Seizures ([address] Sleep apnea; Spinal stenosis; and Substance abuse ([address]     SURGICAL HISTORY has a past surgical history that includes Appendectomy; Stomach surgery; lumbar fusion; Septoplasty (01/13/2017); Turbinoplasties (01/13/2017); and fracture surgery (01/28/2017). CURRENT MEDICATIONS       Current Discharge Medication List      CONTINUE these medications which have NOT CHANGED    Details   DULoxetine (CYMBALTA) 30 MG extended release capsule TAKE 3 CAPSULES BY MOUTH DAILY  Qty: 90 capsule, Refills: 0      tiZANidine (ZANAFLEX) 4 MG tablet TAKE 1 TABLET BY MOUTH 3 TIMES DAILY  Qty: 90 tablet, Refills: 0      !! ENULOSE 10 GM/15ML SOLN solution TAKE 15 MLS BY MOUTH 3 TIMES DAILY AS NEEDED (CONSTIPATION)  Qty: 480 Bottle, Refills: 3      naloxegol (MOVANTIK) 25 MG TABS tablet Take 1 tablet by mouth every morning  Qty: 30 tablet, Refills: 0    Associated Diagnoses: Therapeutic opioid induced constipation      !! divalproex (DEPAKOTE ER) 500 MG extended release tablet Take 1 tablet by mouth nightly  Qty: 30 tablet, Refills: 3      !! divalproex (DEPAKOTE ER) 250 MG extended release tablet Take 1 tablet by mouth every morning  Qty: 30 tablet, Refills: 3      QUEtiapine (SEROQUEL) 200 MG tablet Take 1 tablet by mouth nightly  Qty: 60 tablet, Refills: 3      polyethylene glycol (GLYCOLAX) powder Take 17 g by mouth daily as needed      pregabalin (LYRICA) 200 MG capsule Take 1 capsule by mouth 4 times daily. Racquel Wisam: 120 capsule, Refills: 11    Associated Diagnoses: Peripheral polyneuropathy      Nutritional Supplements (BOOST) LIQD Two a day. Strawberry lactose free nutritional drinks. Qty: 60 Can, Refills: 5    Associated Diagnoses: Panlobular emphysema ([address]  Paraplegia ([facility])      PANTOPRAZOLE SODIUM PO Take 40 mg by mouth daily       traZODone (DESYREL) 150 MG tablet Take 1 tablet by mouth nightly as needed for Sleep  Qty: 14 tablet, Refills: 0      albuterol (PROVENTIL) (2.5 MG/3ML) 0.083% nebulizer solution Take 2.5 mg by nebulization every 6 hours as needed for Wheezing      topiramate (TOPAMAX) 100 MG tablet Take 200 mg by mouth 2 times daily       ipratropium (ATROVENT) 0.02 % nebulizer solution Take 0.5 mg by nebulization every 6 hours as needed for Wheezing       !! ENULOSE 10 GM/15ML SOLN solution TAKE 15 MLS BY MOUTH 3 TIMES DAILY AS NEEDED (CONSTIPATION)  Qty: 480 Bottle, Refills: 0      SM PAIN RELIEVER 325 MG tablet TAKE 2 TABLETS BY MOUTH EVERY 4 HOURS AS NEEDED FOR PAIN  Qty: 120 tablet, Refills: 3      buprenorphine-naloxone (SUBOXONE) 2-0.5 MG SUBL Place 2 tablets under the tongue 2 times daily . Qty: 28 tablet, Refills: 1       !! - Potential duplicate medications found. Please discuss with provider. ALLERGIES     is allergic to sulfa antibiotics; sulfasalazine; bactrim; levofloxacin; levofloxacin; phenobarbital; sulfamethoxazole-trimethoprim; sulfur; and wellbutrin [bupropion]. FAMILY HISTORY     indicated that his mother is alive. He indicated that his father is alive. He indicated that the status of his sister is unknown. He indicated that the status of his maternal uncle is unknown. He indicated that the status of his maternal cousin is unknown.      family history includes Coronary Art Dis in his maternal uncle; Diabetes in his mother; Seizures in his maternal cousin and sister. SOCIAL HISTORY      reports that he has been smoking Cigarettes. He started smoking about 35 years ago. He has a 35.00 pack-year smoking history. He has never used smokeless tobacco. He reports that he uses drugs, including Marijuana and Opiates . He reports that he does not drink alcohol.       DIAGNOSTIC RESULTS     EKG: All EKG's are interpreted by the Emergency Department Physician who either signs or Co-signs this chart in the absence of a cardiologist.      RADIOLOGY:   Non-plain film images such as CT, Ultrasound and MRI are read by the radiologist. Plain radiographic images are visualized and the radiologist interpretations are reviewed as follows:     XR CHEST STANDARD (2 VW)   Final few days ago. Afebrile. He does admit to heroin abuse. He has difficulty/painful movement of the ankle. On exam it does appear to be an abscessed right lateral ankle. Difficult to ascertain if there is a septic joint clinically however he does have painful movement of the joint. Plan will be to initiate further workup and investigate further for septic arthritis. Also   incise and drain the abscess. Sedimentation rate and CRP elevated. Foot/ankle team was consulted for evaluation of possible septic arthritis. Patient will be admitted.      (Please note that portions of this note were completed with a voice recognition program.  Efforts were made to edit the dictations but occasionally words are mis-transcribed.)    Lindsey Avelar,, DO  Attending Emergency Medicine Physician        Lindsey Avelar DO  12/13/18 [id number]

## 2018-12-12 NOTE — CONSULTS
Consultation Note  Podiatric Medicine and Surgery     Subjective     Chief Complaint: Right leg cellulitis     HPI:  Yeyo Bradley is a 57 y.o. male seen at [facility] for right ankle cellulitis and abscess. Patient admits that last week he hit his ankle on his recliner while in his motorized wheelchair. He admits he is nonambulatory and uses his motorized wheelchair for transferring. He states that he noticed his right ankle becoming more red, hot, and swollen. He presented to the emergency department yesterday. Upon arriving the ED performed a bedside I&D of the right ankle as they suspected abscess formation. Patient was admitted for IV antibiotic for cellulitis. Denies any nausea, vomiting, fever, or chills. PCP is Isra Alfaro MD    ROS:   Review of Systems   Constitutional: Negative for chills and fever. HENT: Negative for congestion and sore throat. Respiratory: Negative for cough and shortness of breath. Cardiovascular: Positive for leg swelling. Negative for chest pain. Gastrointestinal: Negative for nausea and vomiting. Genitourinary: Negative for difficulty urinating and dysuria. Musculoskeletal: Positive for gait problem, joint swelling and myalgias. Skin: Positive for color change and wound. Neurological: Positive for numbness. Negative for weakness. Psychiatric/Behavioral: Negative for agitation and confusion. Past Medical History   has a past medical history of Anxiety; Arthritis; Bipolar disorder ([address] Bronchitis, chronic ([address] Chronic back pain; Chronic pain; Claustrophobia; COPD (chronic obstructive pulmonary disease) ([address] Depression; Diabetes mellitus ([address] Emphysema of lung ([address] History of irregular heartbeat; Hyperlipidemia; Liver disease; MRSA (methicillin resistant staph aureus) culture positive; Myofacial muscle pain; Osteomyelitis ([address] Peripheral neuropathy; Pressure ulcer; Schizophrenia ([address] Seizures ([address]  Sleep apnea; Spinal stenosis; and Substance abuse [address] Past Surgical History   has a past surgical history that includes Appendectomy; Stomach surgery; lumbar fusion; Septoplasty (01/13/2017); Turbinoplasties (01/13/2017); and fracture surgery (01/28/2017). Medications  Prior to Admission medications    Medication Sig Start Date End Date Taking? Authorizing Provider   DULoxetine (CYMBALTA) 30 MG extended release capsule TAKE 3 CAPSULES BY MOUTH DAILY 11/26/18  Yes Mayela Nagy MD   tiZANidine (ZANAFLEX) 4 MG tablet TAKE 1 TABLET BY MOUTH 3 TIMES DAILY 11/26/18  Yes Mayela Nagy MD   ENULOSE 10 GM/15ML SOLN solution TAKE 15 MLS BY MOUTH 3 TIMES DAILY AS NEEDED (CONSTIPATION) 9/25/18  Yes Mayela Nagy MD   naloxegol (MOVANTIK) 25 MG TABS tablet Take 1 tablet by mouth every morning  Patient taking differently: Take 25 mg by mouth as needed  9/7/18  Yes Kandy Humphrey MD   divalproex (DEPAKOTE ER) 500 MG extended release tablet Take 1 tablet by mouth nightly 9/5/18  Yes Kandy Humphrey MD   divalproex (DEPAKOTE ER) 250 MG extended release tablet Take 1 tablet by mouth every morning  Patient taking differently: Take 750 mg by mouth every morning  9/5/18  Yes Kandy Humphrey MD   QUEtiapine (SEROQUEL) 200 MG tablet Take 1 tablet by mouth nightly  Patient taking differently: Take 300 mg by mouth nightly  9/4/18  Yes Kandy Humphrey MD   polyethylene glycol (GLYCOLAX) powder Take 17 g by mouth daily as needed   Yes Historical Provider, MD   pregabalin (LYRICA) 200 MG capsule Take 1 capsule by mouth 4 times daily. .  Patient taking differently: Take 400 mg by mouth 2 times daily. . 8/7/18 8/7/19 Yes Mayela Nagy MD   Nutritional Supplements (BOOST) LIQD Two a day. Strawberry lactose free nutritional drinks.  7/26/18  Yes Mayela Nagy MD   PANTOPRAZOLE SODIUM PO Take 40 mg by mouth daily    Yes Historical Provider, MD   traZODone (DESYREL) 150 MG tablet Take 1 tablet by mouth nightly as needed for Sleep  Patient taking portions. I have reviewed the Podiatry Resident progress note. I agree with the chief complaint, past medical history, past surgical history, allergies, medications, social and family history as documented unless otherwise noted below. Documentation of the HPI, Physical Exam and Medical Decision Making performed by medical students or scribes is based on my personal performance of the HPI, PE and MDM. I have personally evaluated this patient and have completed at least one if not all key elements of the E/M (history, physical exam, and MDM). Additional findings are as noted.      Electronically signed by Deanna Garcia DPM on 12/13/2018 at 7:57 AM

## 2018-12-12 NOTE — ED PROVIDER NOTES
[facility]  ED  Emergency Department  Emergency Medicine Resident Sign-out     Care of Jose Barbosa. was assumed from [facility] and is being seen for Ankle Injury (redness to medial right ankle after hitting foot between w/c and wall )  . The patient's initial evaluation and plan have been discussed with the prior provider who initially evaluated the patient. EMERGENCY DEPARTMENT COURSE / MEDICAL DECISION MAKING:       MEDICATIONS GIVEN:  Orders Placed This Encounter   Medications    lidocaine 1 % injection 20 mL    vancomycin (VANCOCIN) 1000 mg in dextrose 5% 200 mL IVPB    0.9 % sodium chloride bolus    fentaNYL (SUBLIMAZE) injection 25 mcg       LABS / RADIOLOGY:     Labs Reviewed   CBC WITH AUTO DIFFERENTIAL - Abnormal; Notable for the following:        Result Value    WBC 13.7 (*)     RDW 15.1 (*)     All other components within normal limits   BASIC METABOLIC PANEL - Abnormal; Notable for the following:     Glucose 106 (*)     Potassium 3.2 (*)     All other components within normal limits   C-REACTIVE PROTEIN - Abnormal; Notable for the following:     CRP 59.0 (*)     All other components within normal limits   WOUND CULTURE   SEDIMENTATION RATE       Xr Ankle Right (min 3 Views)    Result Date: 12/11/2018  EXAMINATION: 3 XRAY VIEWS OF THE RIGHT ANKLE 12/11/2018 7:11 pm COMPARISON: None. HISTORY: ORDERING SYSTEM PROVIDED HISTORY: r/o foreign body abscess to the medial aspect TECHNOLOGIST PROVIDED HISTORY: r/o foreign body abscess to the medial aspect FINDINGS: There is moderate soft tissue swelling medially. Cortical margins are intact. Alignment is anatomic. No fracture. No radiodense foreign body. No subcutaneous gas. RECENT VITALS:     Temp: 97.7 °F (36.5 °C),  Pulse: 94, Resp: 15, BP: 100/69, SpO2: 96 %    This patient is a 57 y.o.  Male with  Abscess and cellulitis of his ankle, incision and drainage was performed bedside, patient has a history of

## 2018-12-12 NOTE — PROGRESS NOTES
Smoking Cessation    Unable to meet with patient today. Will try again tomorrow as able.       Alessio Fly  4:09 PM

## 2018-12-13 VITALS
RESPIRATION RATE: 15 BRPM | TEMPERATURE: 97.6 F | DIASTOLIC BLOOD PRESSURE: 84 MMHG | OXYGEN SATURATION: 96 % | BODY MASS INDEX: 20.51 KG/M2 | HEART RATE: 68 BPM | HEIGHT: 75 IN | WEIGHT: 165 LBS | SYSTOLIC BLOOD PRESSURE: 97 MMHG

## 2018-12-13 PROBLEM — L02.419 CELLULITIS AND ABSCESS OF LEG: Status: ACTIVE | Noted: 2018-12-11

## 2018-12-13 PROBLEM — L03.119 CELLULITIS AND ABSCESS OF LEG: Status: ACTIVE | Noted: 2018-12-11

## 2018-12-13 LAB
C-REACTIVE PROTEIN: 32.1 MG/L (ref 0–5)
CULTURE: ABNORMAL
DIRECT EXAM: ABNORMAL
DIRECT EXAM: ABNORMAL
Lab: ABNORMAL
SPECIMEN DESCRIPTION: ABNORMAL
STATUS: ABNORMAL

## 2018-12-13 PROCEDURE — 99239 HOSP IP/OBS DSCHRG MGMT >30: CPT | Performed by: FAMILY MEDICINE

## 2018-12-13 PROCEDURE — 6370000000 HC RX 637 (ALT 250 FOR IP): Performed by: NURSE PRACTITIONER

## 2018-12-13 PROCEDURE — 99406 BEHAV CHNG SMOKING 3-10 MIN: CPT

## 2018-12-13 PROCEDURE — 6360000002 HC RX W HCPCS: Performed by: FAMILY MEDICINE

## 2018-12-13 PROCEDURE — 6360000002 HC RX W HCPCS: Performed by: NURSE PRACTITIONER

## 2018-12-13 PROCEDURE — 36415 COLL VENOUS BLD VENIPUNCTURE: CPT

## 2018-12-13 PROCEDURE — 6370000000 HC RX 637 (ALT 250 FOR IP): Performed by: FAMILY MEDICINE

## 2018-12-13 PROCEDURE — 99222 1ST HOSP IP/OBS MODERATE 55: CPT | Performed by: PODIATRIST

## 2018-12-13 PROCEDURE — 2580000003 HC RX 258: Performed by: NURSE PRACTITIONER

## 2018-12-13 PROCEDURE — 86140 C-REACTIVE PROTEIN: CPT

## 2018-12-13 RX ORDER — NICOTINE 21 MG/24HR
1 PATCH, TRANSDERMAL 24 HOURS TRANSDERMAL DAILY PRN
Qty: 30 PATCH | Refills: 3 | Status: ON HOLD | OUTPATIENT
Start: 2018-12-13 | End: 2019-04-23 | Stop reason: HOSPADM

## 2018-12-13 RX ORDER — DOXYCYCLINE HYCLATE 100 MG
100 TABLET ORAL EVERY 12 HOURS SCHEDULED
Qty: 20 TABLET | Refills: 0 | Status: SHIPPED | OUTPATIENT
Start: 2018-12-13 | End: 2018-12-23

## 2018-12-13 RX ORDER — IBUPROFEN 800 MG/1
800 TABLET ORAL EVERY 8 HOURS PRN
Qty: 30 TABLET | Refills: 0 | Status: ON HOLD | OUTPATIENT
Start: 2018-12-13 | End: 2019-04-15

## 2018-12-13 RX ORDER — CEPHALEXIN 500 MG/1
500 CAPSULE ORAL EVERY 8 HOURS SCHEDULED
Status: DISCONTINUED | OUTPATIENT
Start: 2018-12-13 | End: 2018-12-13 | Stop reason: HOSPADM

## 2018-12-13 RX ORDER — DOXYCYCLINE HYCLATE 100 MG
100 TABLET ORAL EVERY 12 HOURS SCHEDULED
Status: DISCONTINUED | OUTPATIENT
Start: 2018-12-13 | End: 2018-12-13 | Stop reason: HOSPADM

## 2018-12-13 RX ORDER — PANTOPRAZOLE SODIUM 40 MG/1
40 TABLET, DELAYED RELEASE ORAL DAILY
Qty: 30 TABLET | Refills: 3 | Status: SHIPPED | OUTPATIENT
Start: 2018-12-14 | End: 2019-04-12 | Stop reason: SDUPTHER

## 2018-12-13 RX ORDER — CEPHALEXIN 500 MG/1
500 CAPSULE ORAL 3 TIMES DAILY
Qty: 28 CAPSULE | Refills: 0 | Status: SHIPPED | OUTPATIENT
Start: 2018-12-13 | End: 2018-12-23

## 2018-12-13 RX ADMIN — DULOXETINE HYDROCHLORIDE 90 MG: 30 CAPSULE, DELAYED RELEASE ORAL at 08:33

## 2018-12-13 RX ADMIN — TIZANIDINE 4 MG: 4 TABLET ORAL at 08:34

## 2018-12-13 RX ADMIN — PREGABALIN 200 MG: 100 CAPSULE ORAL at 08:34

## 2018-12-13 RX ADMIN — TOPIRAMATE 200 MG: 200 TABLET, FILM COATED ORAL at 08:33

## 2018-12-13 RX ADMIN — PREGABALIN 200 MG: 100 CAPSULE ORAL at 14:00

## 2018-12-13 RX ADMIN — BUSPIRONE HYDROCHLORIDE 15 MG: 15 TABLET ORAL at 14:00

## 2018-12-13 RX ADMIN — BUSPIRONE HYDROCHLORIDE 15 MG: 15 TABLET ORAL at 08:34

## 2018-12-13 RX ADMIN — ENOXAPARIN SODIUM 40 MG: 40 INJECTION SUBCUTANEOUS at 08:36

## 2018-12-13 RX ADMIN — VANCOMYCIN HYDROCHLORIDE 1000 MG: 1 INJECTION, SOLUTION INTRAVENOUS at 08:00

## 2018-12-13 RX ADMIN — BUPRENORPHINE AND NALOXONE 2 TABLET: 2; .5 TABLET SUBLINGUAL at 08:42

## 2018-12-13 RX ADMIN — DOXYCYCLINE HYCLATE 100 MG: 100 TABLET ORAL at 10:45

## 2018-12-13 RX ADMIN — ACETAMINOPHEN 650 MG: 325 TABLET ORAL at 08:21

## 2018-12-13 RX ADMIN — DIVALPROEX SODIUM 250 MG: 500 TABLET, EXTENDED RELEASE ORAL at 08:34

## 2018-12-13 RX ADMIN — CEPHALEXIN 500 MG: 500 CAPSULE ORAL at 14:00

## 2018-12-13 RX ADMIN — SODIUM CHLORIDE, PRESERVATIVE FREE 10 ML: 5 INJECTION INTRAVENOUS at 08:20

## 2018-12-13 RX ADMIN — PANTOPRAZOLE SODIUM 40 MG: 40 TABLET, DELAYED RELEASE ORAL at 08:34

## 2018-12-13 RX ADMIN — TIZANIDINE 4 MG: 4 TABLET ORAL at 14:00

## 2018-12-13 ASSESSMENT — PAIN DESCRIPTION - FREQUENCY: FREQUENCY: INTERMITTENT

## 2018-12-13 ASSESSMENT — PAIN DESCRIPTION - LOCATION: LOCATION: HEAD

## 2018-12-13 ASSESSMENT — PAIN SCALES - GENERAL
PAINLEVEL_OUTOF10: 0
PAINLEVEL_OUTOF10: 0
PAINLEVEL_OUTOF10: 7
PAINLEVEL_OUTOF10: 7
PAINLEVEL_OUTOF10: 0

## 2018-12-13 ASSESSMENT — ENCOUNTER SYMPTOMS
NAUSEA: 0
VOMITING: 0
ABDOMINAL PAIN: 0
DIARRHEA: 0
SHORTNESS OF BREATH: 0
WHEEZING: 0
CONSTIPATION: 0

## 2018-12-13 ASSESSMENT — PAIN DESCRIPTION - DESCRIPTORS: DESCRIPTORS: ACHING

## 2018-12-13 ASSESSMENT — PAIN DESCRIPTION - ONSET: ONSET: AWAKENED FROM SLEEP

## 2018-12-13 ASSESSMENT — PAIN DESCRIPTION - PAIN TYPE: TYPE: ACUTE PAIN

## 2018-12-13 ASSESSMENT — PAIN DESCRIPTION - ORIENTATION: ORIENTATION: ANTERIOR

## 2018-12-13 NOTE — DISCHARGE INSTR - COC
of spine M48.02    Generalized tonic-clonic seizure ([facility] G40.409    Superficial laceration of scalp S01. 01XA    Altered mental status R41.82    Decubitus ulcer of coccyx, stage 2 L89.152    Cocaine substance abuse ([facility] F14.10    Opiate abuse, episodic ([facility]) F11.10    Closed fracture of nasal bone S02. 2XXA    Drug overdose U88.037N    Uncomplicated opioid dependence ([facility] [facility] F11.20    Paraplegia ([facility] [address] [facility] G82.20    Schizoaffective disorder ([facility] [facility] F25.9    Non-dose-related adverse reaction to medication wellbutrin T88. 7XXA    Cannabis abuse F12.10    Encephalopathy G93.40    Hyperammonemia ([facility]) E72.20    Constipation K59.00    Cellulitis and abscess of right leg L03.115, L02.415    Interstitial lung disease ([facility]) J84.9       Isolation/Infection:   Isolation          No Isolation            Nurse Assessment:  Last Vital Signs: BP 97/84   Pulse 68   Temp 97.6 °F (36.4 °C) (Axillary)   Resp 15   Ht 6' 3.2\" (1.91 m)   Wt 165 lb (74.8 kg)   SpO2 94%   BMI 20.52 kg/m²     Last documented pain score (0-10 scale): Pain Level: 7  Last Weight:   Wt Readings from Last 1 Encounters:   12/11/18 165 lb (74.8 kg)     Mental Status:  {IP PT MENTAL STATUS:29588}    IV Access:  { RANDY IV ACCESS:146817050}    Nursing Mobility/ADLs:  Walking   {[facility] DME JMAC:113367674}  Transfer  {[facility] DME ONQF:858358237}  Bathing  {[facility] DME RBOA:164794154}  Dressing  {[facility] LZCX:857355076}  Toileting  {[facility] DME FXZO:832690862}  Feeding  {[facility] DME RMDC:237958416}  Med Admin  {[facility] DME PAQW:304830543}  Med Delivery   { RANDY MED Delivery:946545275}    Wound Care Documentation and Therapy:  Wound Ankle Right;Medial (Active)   Wound Traumatic 12/12/2018  8:44 AM   Dressing Status Clean;Dry; Intact 12/12/2018  7:30 PM   Dressing Changed Changed/New 12/12/2018  8:44 AM   Dressing/Treatment Ace Wrap;Dry dressing 12/12/2018  7:30 PM   Wound Assessment CAROLYNE 12/12/2018  7:30 PM   Drainage Amount Small 12/12/2018  8:44 AM   Drainage Description

## 2018-12-13 NOTE — PROGRESS NOTES
body.  No subcutaneous gas. Physical Examination:      Physical Exam   Constitutional: He is oriented to person, place, and time. He appears well-developed and well-nourished. HENT:   Head: Normocephalic and atraumatic. Right Ear: External ear normal.   Left Ear: External ear normal.   Eyes: Conjunctivae are normal. Right eye exhibits no discharge. Left eye exhibits no discharge. No scleral icterus. Neck: Neck supple. No erythema present. Cardiovascular: Normal rate, regular rhythm and normal heart sounds. No murmur heard. Pulmonary/Chest: Effort normal and breath sounds normal. He has no wheezes. Abdominal: Soft. Bowel sounds are normal. He exhibits no mass. There is no tenderness. There is no rebound and no guarding. Musculoskeletal: He exhibits no edema or tenderness. Neurological: He is alert and oriented to person, place, and time. There are no new focal motor or sensory deficits, normal muscle tone and bulk, no abnormal sensation, normal speech. Skin: Skin is warm. No rash noted. Right ankle erythema   Psychiatric: His behavior is normal.   Nursing note and vitals reviewed. Assessment:        Principal Problem:    Cellulitis and abscess of leg  Active Problems:    Generalized tonic-clonic seizure (HCC)    Panlobular emphysema (HCC)    Chronic back pain    Fibromyalgia    Type 2 diabetes mellitus with diabetic polyneuropathy, with long-term current use of insulin (HCC)    Cocaine substance abuse ([facility] [address]    Cannabis abuse    Interstitial lung disease ([facility] [address]  Resolved Problems:    * No resolved hospital problems. *      Plan:        Cellulitis and abscess of right leg, cultures, status post I&D. At ER, IV vancomycin, CRP, podiatry consult. Improving, no new surgical intervention, switch to doxy and keflex for 10 days ,discharge.    Add ibuprofen and resume Protonix   Generalized tonic-clonic seizure :Stable, Continue antiepileptics  Panlobular emphysema : Stable Respiratory

## 2018-12-13 NOTE — FLOWSHEET NOTE
Pt discharged home per wheelchair. All personal belongings with patient. Sister here to p/u patient.

## 2018-12-13 NOTE — DISCHARGE SUMMARY
surgical intervention, switch to doxy and keflex for 10 days ,discharge. Add ibuprofen and resume Protonix   Generalized tonic-clonic seizure :Stable, Continue antiepileptics  Panlobular emphysema : Stable Respiratory care, inhalers, oxygen ,continous pulse ox, spirometer    Chronic back pain: Continue current home medication, resume Suboxone, avoid any narcotics. U tox positive for cocaine and cannabis , counseling , discussed with pt. DM:Insulin sliding scale, Hypoglycemia protocol, Resume home doses of antidiabetic medications, diabetic diet once patient is not NPO. Improving to           Significant Diagnostic Studies:   Labs / Micro:  Recent Results (from the past 168 hour(s))   WOUND CULTURE    Collection Time: 12/11/18  7:30 PM   Result Value Ref Range    Specimen Description . ABSCESS RIGHT MEDIAL ANKLE SWAB     Special Requests NOT REPORTED     Direct Exam RARE NEUTROPHILS (A)     Direct Exam FEW GRAM POSITIVE COCCI IN PAIRS (A)     Culture STREPTOCOCCUS PYOGENES (GROUP A) MODERATE GROWTH (A)     Culture PRESUMPTIVE CANDIDA ALBICANS LIGHT GROWTH (A)     Culture NORMAL SKIN NOLAN LIGHT GROWTH (A)     Status FINAL 12/13/2018    CBC Auto Differential    Collection Time: 12/11/18  7:32 PM   Result Value Ref Range    WBC 13.7 (H) 3.5 - 11.3 k/uL    RBC 5.65 4.21 - 5.77 m/uL    Hemoglobin 15.9 13.0 - 17.0 g/dL    Hematocrit 47.4 40.7 - 50.3 %    MCV 83.9 82.6 - 102.9 fL    MCH 28.1 25.2 - 33.5 pg    MCHC 33.5 28.4 - 34.8 g/dL    RDW 15.1 (H) 11.8 - 14.4 %    Platelets 481 361 - 700 k/uL    MPV 9.6 8.1 - 13.5 fL    NRBC Automated 0.0 0.0 per 100 WBC    Differential Type NOT REPORTED     WBC Morphology NOT REPORTED     RBC Morphology NOT REPORTED     Platelet Estimate NOT REPORTED     Immature Granulocytes 0 0 %    Seg Neutrophils 61 36 - 66 %    Lymphocytes 29 24 - 44 %    Atypical Lymphocytes 3 %    Monocytes 4 1 - 7 %    Eosinophils % 2 1 - 4 %    Basophils 1 0 - 2 %    Absolute Immature

## 2018-12-13 NOTE — PLAN OF CARE
Problem: Risk for Impaired Skin Integrity  Goal: Tissue integrity - skin and mucous membranes  Structural intactness and normal physiological function of skin and  mucous membranes.    Outcome: Ongoing      Problem: Pain:  Goal: Pain level will decrease  Pain level will decrease   Outcome: Ongoing    Goal: Control of acute pain  Control of acute pain   Outcome: Ongoing

## 2018-12-13 NOTE — PROGRESS NOTES
CLINICAL PHARMACY NOTE: MEDS TO [address] [place] [address] Drive Select Patient?: Yes  Total # of Prescriptions Filled: 4   The following medications were delivered to the patient:  · Nicotine patch  · Doxycycline  · Cephalexin  · ibuprofen  Total # of Interventions Completed: 0  Time Spent (min): 0    Additional Documentation:

## 2018-12-14 ENCOUNTER — TELEPHONE (OUTPATIENT)
Dept: PRIMARY CARE CLINIC | Age: 48
End: 2018-12-14

## 2018-12-18 LAB
CULTURE: NORMAL
CULTURE: NORMAL
Lab: NORMAL
Lab: NORMAL
SPECIMEN DESCRIPTION: NORMAL
SPECIMEN DESCRIPTION: NORMAL
STATUS: NORMAL
STATUS: NORMAL

## 2018-12-20 ENCOUNTER — OFFICE VISIT (OUTPATIENT)
Dept: PRIMARY CARE CLINIC | Age: 48
End: 2018-12-20
Payer: COMMERCIAL

## 2018-12-20 VITALS
DIASTOLIC BLOOD PRESSURE: 75 MMHG | OXYGEN SATURATION: 96 % | TEMPERATURE: 98.2 F | HEART RATE: 90 BPM | SYSTOLIC BLOOD PRESSURE: 113 MMHG

## 2018-12-20 DIAGNOSIS — J84.9 INTERSTITIAL LUNG DISEASE (HCC): ICD-10-CM

## 2018-12-20 DIAGNOSIS — G82.20 PARAPLEGIA (HCC): ICD-10-CM

## 2018-12-20 DIAGNOSIS — R62.7 ADULT FAILURE TO THRIVE SYNDROME: Primary | ICD-10-CM

## 2018-12-20 DIAGNOSIS — J43.1 PANLOBULAR EMPHYSEMA (HCC): ICD-10-CM

## 2018-12-20 DIAGNOSIS — L03.115 CELLULITIS OF RIGHT LOWER EXTREMITY: ICD-10-CM

## 2018-12-20 PROCEDURE — 99213 OFFICE O/P EST LOW 20 MIN: CPT | Performed by: NURSE PRACTITIONER

## 2018-12-20 PROCEDURE — 3023F SPIROM DOC REV: CPT | Performed by: NURSE PRACTITIONER

## 2018-12-20 PROCEDURE — 4004F PT TOBACCO SCREEN RCVD TLK: CPT | Performed by: NURSE PRACTITIONER

## 2018-12-20 PROCEDURE — 1111F DSCHRG MED/CURRENT MED MERGE: CPT | Performed by: NURSE PRACTITIONER

## 2018-12-20 PROCEDURE — G8484 FLU IMMUNIZE NO ADMIN: HCPCS | Performed by: NURSE PRACTITIONER

## 2018-12-20 PROCEDURE — G8926 SPIRO NO PERF OR DOC: HCPCS | Performed by: NURSE PRACTITIONER

## 2018-12-20 PROCEDURE — G8420 CALC BMI NORM PARAMETERS: HCPCS | Performed by: NURSE PRACTITIONER

## 2018-12-20 PROCEDURE — G8427 DOCREV CUR MEDS BY ELIG CLIN: HCPCS | Performed by: NURSE PRACTITIONER

## 2018-12-20 RX ORDER — BUSPIRONE HYDROCHLORIDE 15 MG/1
15 TABLET ORAL 3 TIMES DAILY
Status: ON HOLD | COMMUNITY
Start: 2018-08-15 | End: 2019-04-23 | Stop reason: HOSPADM

## 2018-12-20 RX ORDER — SENNA PLUS 8.6 MG/1
TABLET ORAL
COMMUNITY
Start: 2018-09-04

## 2018-12-20 RX ORDER — ALUMINA, MAGNESIA, AND SIMETHICONE 2400; 2400; 240 MG/30ML; MG/30ML; MG/30ML
10 SUSPENSION ORAL 2 TIMES DAILY PRN
Qty: 1 BOTTLE | Refills: 1 | Status: SHIPPED | OUTPATIENT
Start: 2018-12-20 | End: 2019-01-19

## 2018-12-20 RX ORDER — LIDOCAINE 40 MG/G
CREAM TOPICAL
COMMUNITY
End: 2019-04-12 | Stop reason: SDUPTHER

## 2018-12-20 RX ORDER — LACTOSE-REDUCED FOOD
LIQUID (ML) ORAL
Qty: 60 CAN | Refills: 5 | Status: SHIPPED | OUTPATIENT
Start: 2018-12-20 | End: 2019-03-11 | Stop reason: SDUPTHER

## 2018-12-20 RX ORDER — SIMVASTATIN 40 MG
40 TABLET ORAL NIGHTLY
COMMUNITY

## 2018-12-20 RX ORDER — ONDANSETRON 4 MG/1
4 TABLET, FILM COATED ORAL EVERY 8 HOURS PRN
Qty: 30 TABLET | Refills: 1 | Status: SHIPPED | OUTPATIENT
Start: 2018-12-20 | End: 2019-01-07 | Stop reason: SDUPTHER

## 2018-12-20 RX ORDER — ALBUTEROL SULFATE 2.5 MG/3ML
2.5 SOLUTION RESPIRATORY (INHALATION) EVERY 6 HOURS PRN
Qty: 120 EACH | Refills: 1 | Status: SHIPPED | OUTPATIENT
Start: 2018-12-20 | End: 2020-01-08 | Stop reason: SDUPTHER

## 2018-12-20 RX ORDER — HYDROXYZINE PAMOATE 25 MG/1
25 CAPSULE ORAL 2 TIMES DAILY
COMMUNITY
End: 2019-04-12 | Stop reason: SDUPTHER

## 2018-12-20 RX ORDER — ALBUTEROL SULFATE 90 UG/1
2 AEROSOL, METERED RESPIRATORY (INHALATION) EVERY 6 HOURS PRN
Qty: 1 INHALER | Refills: 3 | Status: SHIPPED | OUTPATIENT
Start: 2018-12-20 | End: 2019-03-26 | Stop reason: SDUPTHER

## 2018-12-20 ASSESSMENT — ENCOUNTER SYMPTOMS
NAUSEA: 1
WHEEZING: 1
BACK PAIN: 1
SHORTNESS OF BREATH: 1

## 2018-12-20 NOTE — PROGRESS NOTES
[facility]  [address] [place] [address]  Dept: [phone]  Dept Fax: [phone]    2018     Twan Alexandra (:  1970)is a 57 y.o. male, here for evaluation of the following medical concerns:   Chief Complaint   Patient presents with    Follow-Up from [address] [place] [address]     discharged from [facility]. V's 18 for Cellulitis and abcess of right leg. Treated for cellulitis, discharged on   Has appt with Podiatry on  at 12:45  Taking doxycycline and Keflex both    C/O cough with bloody sputum  Smokes crack before, does not snort  No ETOH  Smokes weed for pain and seizures. .    Review of Systems   Constitutional: Negative for fever. Respiratory: Positive for shortness of breath and wheezing. Gastrointestinal: Positive for nausea. Musculoskeletal: Positive for arthralgias and back pain. Neurological: Positive for seizures. Psychiatric/Behavioral: Positive for dysphoric mood. Negative for hallucinations. All other systems reviewed and are negative. Prior to Visit Medications    Medication Sig Taking? Authorizing Provider   busPIRone (BUSPAR) 15 MG tablet Take 15 mg by mouth Yes Historical Provider, MD   vitamin D-3 (CHOLECALCIFEROL) 5000 units TABS Take 2,000 Units by mouth Yes Historical Provider, MD   cyanocobalamin 500 MCG tablet Take 500 mcg by mouth Yes Historical Provider, MD   senna (SENNA-TIME) 8.6 MG tablet twice daily as needed.   Yes Historical Provider, MD   albuterol (PROVENTIL) (2.5 MG/3ML) 0.083% nebulizer solution Take 3 mLs by nebulization every 6 hours as needed for Wheezing Yes CHAD Jensen CNP   ipratropium (ATROVENT) 0.02 % nebulizer solution Take 2.5 mLs by nebulization 4 times daily as needed for Wheezing Yes CHAD Jensen CNP   aluminum & magnesium hydroxide-simethicone (MYLANTA) 400-400-40 MG/5ML SUSP Take 10 mLs by mouth 2 times daily as needed (SEROQUEL) 200 MG tablet Take 1 tablet by mouth nightly  Patient taking differently: Take 300 mg by mouth nightly  Yes Sabra Carrion MD   pregabalin (LYRICA) 200 MG capsule Take 1 capsule by mouth 4 times daily. .  Patient taking differently: Take 400 mg by mouth 2 times daily. . Yes Nabil Jesus MD   traZODone (DESYREL) 150 MG tablet Take 1 tablet by mouth nightly as needed for Sleep  Patient taking differently: Take 300 mg by mouth nightly  Yes CHAD Sommer CNP   buprenorphine-naloxone (SUBOXONE) 2-0.5 MG SUBL Place 2 tablets under the tongue 2 times daily . Yes Lauren Sheets MD   topiramate (TOPAMAX) 100 MG tablet Take 200 mg by mouth 2 times daily  Yes Historical Provider, MD   hydrOXYzine (VISTARIL) 25 MG capsule Take 25 mg by mouth  Historical Provider, MD   lidocaine (LMX) 4 % cream Apply topically  Historical Provider, MD   simvastatin (ZOCOR) 40 MG tablet Take 40 mg by mouth  Historical Provider, MD   ibuprofen (ADVIL;MOTRIN) 800 MG tablet Take 1 tablet by mouth every 8 hours as needed for Pain  Cecile Branch MD   polyethylene glycol (GLYCOLAX) powder Take 17 g by mouth daily as needed  Historical Provider, MD        Social History   Substance Use Topics    Smoking status: Current Every Day Smoker     Packs/day: 1.00     Years: 35.00     Types: Cigarettes     Start date: 1/1/1983    Smokeless tobacco: Never Used    Alcohol use No        Vitals:    12/20/18 1442   BP: 113/75   Site: Left Upper Arm   Position: Sitting   Cuff Size: Medium Adult   Pulse: 90   Temp: 98.2 °F (36.8 °C)   TempSrc: Oral   SpO2: 96%     Estimated body mass index is 20.52 kg/m² as calculated from the following:    Height as of 12/11/18: 6' 3.2\" (1.91 m). Weight as of 12/11/18: 165 lb (74.8 kg). Physical Exam   Constitutional: He is oriented to person, place, and time. He appears well-developed and well-nourished. HENT:   Head: Normocephalic and atraumatic. Neck: Neck supple.    Cardiovascular: SUSP     Sig: Take 10 mLs by mouth 2 times daily as needed (indigestion)     Dispense:  1 Bottle     Refill:  1    albuterol sulfate HFA (VENTOLIN HFA) 108 (90 Base) MCG/ACT inhaler     Sig: Inhale 2 puffs into the lungs every 6 hours as needed for Wheezing     Dispense:  1 Inhaler     Refill:  3    ondansetron (ZOFRAN) 4 MG tablet     Sig: Take 1 tablet by mouth every 8 hours as needed for Nausea or Vomiting     Dispense:  30 tablet     Refill:  1    Nutritional Supplements (BOOST) LIQD     Sig: Two a day. Strawberry lactose free nutritional drinks. Dispense:  60 Can     Refill:  5     High caloire         1. Keep Podiatry appointment, finish antibiotics  2. Refills as requested  3. Referral to [facility] per patient request    Adverse drug reactions discussed with patient  Patient verbalized understanding of all instructions given. Return in about 6 months (around 6/20/2019) for COPD, Failure to thrive. An electronic signature was used to authenticate this note. --CHAD Arita - CNP on 12/20/2018 at 3:26 PM    Visit Information    Have you changed or started any medications since your last visit including any over-the-counter medicines, vitamins, or herbal medicines? no   Are you having any side effects from any of your medications? -  yes -  Have you stopped taking any of your medications? Is so, why? -  no    Have you seen any other physician or provider since your last visit? Yes - Records Requested  Have you had any other diagnostic tests since your last visit? Yes - Records Requested  Have you been seen in the emergency room and/or had an admission to a hospital since we last saw you? Yes - Records Requested  Have you had your routine dental cleaning in the past 6 months? no    Have you activated your Docin account? If not, what are your barriers?  Yes     Patient Care Team:  Val Breaux MD as PCP - General (Internal Medicine)  Val Breaux MD as PCP - S Attributed

## 2018-12-27 ENCOUNTER — TELEPHONE (OUTPATIENT)
Dept: PRIMARY CARE CLINIC | Age: 48
End: 2018-12-27

## 2018-12-27 RX ORDER — BENZONATATE 100 MG/1
100-200 CAPSULE ORAL 3 TIMES DAILY PRN
Qty: 60 CAPSULE | Refills: 0 | Status: SHIPPED | OUTPATIENT
Start: 2018-12-27 | End: 2018-12-28 | Stop reason: SDUPTHER

## 2018-12-28 ENCOUNTER — TELEPHONE (OUTPATIENT)
Dept: PRIMARY CARE CLINIC | Age: 48
End: 2018-12-28

## 2018-12-28 RX ORDER — BENZONATATE 100 MG/1
100-200 CAPSULE ORAL 3 TIMES DAILY PRN
Qty: 60 CAPSULE | Refills: 0 | Status: SHIPPED | OUTPATIENT
Start: 2018-12-28 | End: 2019-01-04

## 2018-12-31 ENCOUNTER — TELEPHONE (OUTPATIENT)
Dept: PRIMARY CARE CLINIC | Age: 48
End: 2018-12-31

## 2019-01-08 RX ORDER — ONDANSETRON 4 MG/1
TABLET, FILM COATED ORAL
Qty: 30 TABLET | Refills: 0 | Status: ON HOLD | OUTPATIENT
Start: 2019-01-08 | End: 2019-04-23 | Stop reason: HOSPADM

## 2019-01-10 ENCOUNTER — OFFICE VISIT (OUTPATIENT)
Dept: PRIMARY CARE CLINIC | Age: 49
End: 2019-01-10
Payer: COMMERCIAL

## 2019-01-10 VITALS
SYSTOLIC BLOOD PRESSURE: 94 MMHG | OXYGEN SATURATION: 96 % | DIASTOLIC BLOOD PRESSURE: 63 MMHG | TEMPERATURE: 96.8 F | HEART RATE: 67 BPM

## 2019-01-10 DIAGNOSIS — J43.1 PANLOBULAR EMPHYSEMA (HCC): Primary | ICD-10-CM

## 2019-01-10 PROCEDURE — G8420 CALC BMI NORM PARAMETERS: HCPCS | Performed by: NURSE PRACTITIONER

## 2019-01-10 PROCEDURE — 99213 OFFICE O/P EST LOW 20 MIN: CPT | Performed by: NURSE PRACTITIONER

## 2019-01-10 PROCEDURE — G8427 DOCREV CUR MEDS BY ELIG CLIN: HCPCS | Performed by: NURSE PRACTITIONER

## 2019-01-10 PROCEDURE — G8926 SPIRO NO PERF OR DOC: HCPCS | Performed by: NURSE PRACTITIONER

## 2019-01-10 PROCEDURE — 4004F PT TOBACCO SCREEN RCVD TLK: CPT | Performed by: NURSE PRACTITIONER

## 2019-01-10 PROCEDURE — G8484 FLU IMMUNIZE NO ADMIN: HCPCS | Performed by: NURSE PRACTITIONER

## 2019-01-10 PROCEDURE — 3023F SPIROM DOC REV: CPT | Performed by: NURSE PRACTITIONER

## 2019-01-10 RX ORDER — DEXTROMETHORPHAN HYDROBROMIDE AND PROMETHAZINE HYDROCHLORIDE 15; 6.25 MG/5ML; MG/5ML
5 SYRUP ORAL 4 TIMES DAILY PRN
Qty: 150 ML | Refills: 0 | Status: SHIPPED | OUTPATIENT
Start: 2019-01-10 | End: 2019-01-17

## 2019-01-10 RX ORDER — PREDNISONE 20 MG/1
20 TABLET ORAL DAILY
Qty: 5 TABLET | Refills: 0 | Status: SHIPPED | OUTPATIENT
Start: 2019-01-10 | End: 2019-01-15

## 2019-01-10 RX ORDER — AZITHROMYCIN 500 MG/1
500 TABLET, FILM COATED ORAL DAILY
Qty: 6 TABLET | Refills: 0 | Status: SHIPPED | OUTPATIENT
Start: 2019-01-10 | End: 2019-01-13

## 2019-01-10 ASSESSMENT — ENCOUNTER SYMPTOMS
COUGH: 0
SHORTNESS OF BREATH: 0
RHINORRHEA: 1

## 2019-01-10 ASSESSMENT — COPD QUESTIONNAIRES: COPD: 1

## 2019-01-14 DIAGNOSIS — J43.1 PANLOBULAR EMPHYSEMA (HCC): ICD-10-CM

## 2019-01-14 RX ORDER — ALBUTEROL SULFATE 2.5 MG/3ML
SOLUTION RESPIRATORY (INHALATION)
Qty: 360 EACH | Refills: 0 | OUTPATIENT
Start: 2019-01-14

## 2019-01-23 ENCOUNTER — TELEPHONE (OUTPATIENT)
Dept: PRIMARY CARE CLINIC | Age: 49
End: 2019-01-23

## 2019-01-23 DIAGNOSIS — Z79.4 DIABETES MELLITUS WITH INSULIN THERAPY (HCC): Primary | ICD-10-CM

## 2019-01-23 DIAGNOSIS — E11.9 DIABETES MELLITUS WITH INSULIN THERAPY (HCC): Primary | ICD-10-CM

## 2019-01-23 RX ORDER — BLOOD SUGAR DIAGNOSTIC
1 STRIP MISCELLANEOUS 3 TIMES DAILY
Qty: 100 EACH | Refills: 3 | Status: SHIPPED | OUTPATIENT
Start: 2019-01-23

## 2019-02-15 ENCOUNTER — TELEPHONE (OUTPATIENT)
Dept: PRIMARY CARE CLINIC | Age: 49
End: 2019-02-15

## 2019-02-15 RX ORDER — TIZANIDINE 4 MG/1
4 TABLET ORAL 3 TIMES DAILY
Qty: 30 TABLET | Refills: 0 | Status: SHIPPED | OUTPATIENT
Start: 2019-02-15 | End: 2019-03-06 | Stop reason: SDUPTHER

## 2019-02-18 ENCOUNTER — OFFICE VISIT (OUTPATIENT)
Dept: PRIMARY CARE CLINIC | Age: 49
End: 2019-02-18
Payer: COMMERCIAL

## 2019-02-18 ENCOUNTER — HOSPITAL ENCOUNTER (OUTPATIENT)
Dept: GENERAL RADIOLOGY | Age: 49
Discharge: HOME | End: 2019-02-20
Payer: COMMERCIAL

## 2019-02-18 ENCOUNTER — HOSPITAL ENCOUNTER (OUTPATIENT)
Age: 49
Discharge: HOME | End: 2019-02-20
Payer: COMMERCIAL

## 2019-02-18 VITALS
SYSTOLIC BLOOD PRESSURE: 106 MMHG | DIASTOLIC BLOOD PRESSURE: 65 MMHG | HEART RATE: 71 BPM | TEMPERATURE: 97.5 F | OXYGEN SATURATION: 99 %

## 2019-02-18 DIAGNOSIS — S60.519D: ICD-10-CM

## 2019-02-18 DIAGNOSIS — J43.1 PANLOBULAR EMPHYSEMA (HCC): Primary | ICD-10-CM

## 2019-02-18 DIAGNOSIS — F31.5 BIPOLAR DISORDER, CURRENT EPISODE DEPRESSED, SEVERE, WITH PSYCHOTIC FEATURES (HCC): ICD-10-CM

## 2019-02-18 DIAGNOSIS — E11.42 TYPE 2 DIABETES MELLITUS WITH DIABETIC POLYNEUROPATHY, WITH LONG-TERM CURRENT USE OF INSULIN (HCC): ICD-10-CM

## 2019-02-18 DIAGNOSIS — G82.20 PARAPLEGIA (HCC): ICD-10-CM

## 2019-02-18 DIAGNOSIS — F25.9 SCHIZOAFFECTIVE DISORDER, UNSPECIFIED TYPE (HCC): ICD-10-CM

## 2019-02-18 DIAGNOSIS — F20.9 SCHIZOPHRENIA, UNSPECIFIED TYPE (HCC): ICD-10-CM

## 2019-02-18 DIAGNOSIS — Z79.4 TYPE 2 DIABETES MELLITUS WITH DIABETIC POLYNEUROPATHY, WITH LONG-TERM CURRENT USE OF INSULIN (HCC): ICD-10-CM

## 2019-02-18 PROCEDURE — G8427 DOCREV CUR MEDS BY ELIG CLIN: HCPCS | Performed by: NURSE PRACTITIONER

## 2019-02-18 PROCEDURE — 3046F HEMOGLOBIN A1C LEVEL >9.0%: CPT | Performed by: NURSE PRACTITIONER

## 2019-02-18 PROCEDURE — 2022F DILAT RTA XM EVC RTNOPTHY: CPT | Performed by: NURSE PRACTITIONER

## 2019-02-18 PROCEDURE — 4004F PT TOBACCO SCREEN RCVD TLK: CPT | Performed by: NURSE PRACTITIONER

## 2019-02-18 PROCEDURE — G8926 SPIRO NO PERF OR DOC: HCPCS | Performed by: NURSE PRACTITIONER

## 2019-02-18 PROCEDURE — 73120 X-RAY EXAM OF HAND: CPT

## 2019-02-18 PROCEDURE — G8484 FLU IMMUNIZE NO ADMIN: HCPCS | Performed by: NURSE PRACTITIONER

## 2019-02-18 PROCEDURE — 3023F SPIROM DOC REV: CPT | Performed by: NURSE PRACTITIONER

## 2019-02-18 PROCEDURE — 99214 OFFICE O/P EST MOD 30 MIN: CPT | Performed by: NURSE PRACTITIONER

## 2019-02-18 PROCEDURE — G8420 CALC BMI NORM PARAMETERS: HCPCS | Performed by: NURSE PRACTITIONER

## 2019-02-18 PROCEDURE — 73130 X-RAY EXAM OF HAND: CPT

## 2019-02-18 RX ORDER — BLOOD-GLUCOSE METER
KIT MISCELLANEOUS
COMMUNITY
Start: 2019-01-14 | End: 2019-04-12 | Stop reason: SDUPTHER

## 2019-02-18 RX ORDER — GLUCOSAM/CHON-MSM1/C/MANG/BOSW 500-416.6
TABLET ORAL
COMMUNITY
Start: 2019-02-08

## 2019-02-18 RX ORDER — FLUTICASONE FUROATE AND VILANTEROL 100; 25 UG/1; UG/1
1 POWDER RESPIRATORY (INHALATION) DAILY
Qty: 1 EACH | Refills: 3 | Status: SHIPPED | OUTPATIENT
Start: 2019-02-18 | End: 2020-01-08 | Stop reason: SDUPTHER

## 2019-02-18 RX ORDER — CLONIDINE HYDROCHLORIDE 0.1 MG/1
0.1 TABLET ORAL NIGHTLY
Status: ON HOLD | COMMUNITY
Start: 2019-01-24 | End: 2019-04-23 | Stop reason: HOSPADM

## 2019-02-18 ASSESSMENT — ENCOUNTER SYMPTOMS
COUGH: 1
NAUSEA: 1
DIARRHEA: 1
SHORTNESS OF BREATH: 1
WHEEZING: 1
RHINORRHEA: 1
BACK PAIN: 1

## 2019-02-18 ASSESSMENT — COPD QUESTIONNAIRES: COPD: 1

## 2019-02-19 ENCOUNTER — CARE COORDINATION (OUTPATIENT)
Dept: CARE COORDINATION | Age: 49
End: 2019-02-19

## 2019-02-20 ENCOUNTER — CARE COORDINATION (OUTPATIENT)
Dept: CARE COORDINATION | Age: 49
End: 2019-02-20

## 2019-03-06 ENCOUNTER — TELEPHONE (OUTPATIENT)
Dept: PRIMARY CARE CLINIC | Age: 49
End: 2019-03-06

## 2019-03-06 RX ORDER — TIZANIDINE 4 MG/1
4 TABLET ORAL 3 TIMES DAILY
Qty: 30 TABLET | Refills: 0 | Status: SHIPPED | OUTPATIENT
Start: 2019-03-06 | End: 2019-03-15 | Stop reason: SDUPTHER

## 2019-03-11 ENCOUNTER — CARE COORDINATION (OUTPATIENT)
Dept: CARE COORDINATION | Age: 49
End: 2019-03-11

## 2019-03-11 DIAGNOSIS — G82.20 PARAPLEGIA (HCC): ICD-10-CM

## 2019-03-11 DIAGNOSIS — J43.1 PANLOBULAR EMPHYSEMA (HCC): ICD-10-CM

## 2019-03-11 RX ORDER — LACTOSE-REDUCED FOOD
LIQUID (ML) ORAL
Qty: 60 CAN | Refills: 5 | Status: SHIPPED | OUTPATIENT
Start: 2019-03-11 | End: 2019-04-11 | Stop reason: SDUPTHER

## 2019-03-15 ENCOUNTER — TELEPHONE (OUTPATIENT)
Dept: PRIMARY CARE CLINIC | Age: 49
End: 2019-03-15

## 2019-03-15 RX ORDER — TIZANIDINE 4 MG/1
4 TABLET ORAL 3 TIMES DAILY
Qty: 30 TABLET | Refills: 0 | Status: SHIPPED | OUTPATIENT
Start: 2019-03-15 | End: 2019-04-12 | Stop reason: SDUPTHER

## 2019-03-20 RX ORDER — QUETIAPINE FUMARATE 200 MG/1
300 TABLET, FILM COATED ORAL NIGHTLY
Qty: 30 TABLET | Refills: 1 | Status: ON HOLD | OUTPATIENT
Start: 2019-03-20 | End: 2019-04-23 | Stop reason: HOSPADM

## 2019-04-02 ENCOUNTER — TELEPHONE (OUTPATIENT)
Dept: PRIMARY CARE CLINIC | Age: 49
End: 2019-04-02

## 2019-04-02 NOTE — TELEPHONE ENCOUNTER
Pt called asking for a refill on Humulin and Lyrica sent to [facility].      Last visit: 2/18/19  Last Med refill:   Does patient have enough medication for 72 hours: yes    Next Visit Date:  Future Appointments   Date Time Provider Leora Trujillo   6/20/2019  1:00 PM Ed Spar, APRN -  [facility] Maintenance   Topic Date Due    Pneumococcal 0-64 years at Risk Vaccine (1 of 1 - PPSV23) 08/13/1978    Diabetic retinal exam  08/13/1982    Diabetic microalbuminuria test  08/13/1990    Hepatitis B Vaccine (1 of 3 - Risk 3-dose series) 08/13/1991    Lipid screen  12/07/2018    A1C test (Diabetic or Prediabetic)  04/07/2019    Flu vaccine (Season Ended) 09/01/2019    Diabetic foot exam  12/11/2019    DTaP/Tdap/Td vaccine (3 - Td) 01/26/2027    HIV screen  Completed       Hemoglobin A1C (%)   Date Value   04/07/2018 5.2   11/04/2017 5.3   09/01/2017 5.2             ( goal A1C is < 7)   No results found for: LABMICR  LDL Cholesterol (mg/dL)   Date Value   12/07/2017 61   01/20/2017 75       (goal LDL is <100)   AST (U/L)   Date Value   09/02/2018 28     ALT (U/L)   Date Value   09/02/2018 26     BUN (mg/dL)   Date Value   12/12/2018 10     BP Readings from Last 3 Encounters:   02/18/19 106/65   01/10/19 94/63   12/20/18 113/75          (goal 120/80)    All Future Testing planned in [facility]              Patient Active Problem List:     Anxiety     Seizure ([address]     Myofascial muscle pain     Chronic pain     Spinal stenosis     Hyperlipidemia     Depression     Panlobular emphysema (HCC)     Bipolar disorder (HCC)     Chronic back pain     Schizophrenia ([address]     Fibromyalgia     Type 2 diabetes mellitus with diabetic polyneuropathy, with long-term current use of insulin (HCC)     Onychomycosis     Pain in lower limb     Disc disease, degenerative, cervical     Cervical stenosis of spine     Generalized tonic-clonic seizure (HCC)     Superficial laceration of scalp     Altered mental status     Decubitus ulcer of coccyx, stage 2     Cocaine substance abuse ([facility] [address]     Opiate abuse, episodic (HCC)     Closed fracture of nasal bone     Drug overdose     Uncomplicated opioid dependence (HCC)     Paraplegia (HCC)     Schizoaffective disorder (HCC)     Non-dose-related adverse reaction to medication wellbutrin     Cannabis abuse     Encephalopathy     Hyperammonemia (HCC)     Constipation     Cellulitis and abscess of right leg     Interstitial lung disease ([facility] [address]

## 2019-04-03 NOTE — TELEPHONE ENCOUNTER
I sent the insulin. Lyrica has never been prescribed by me and has not been prescribed since 2017.  This will not be sent

## 2019-04-03 NOTE — TELEPHONE ENCOUNTER
Pt is upset stating he only has 1 pill left and can not go with out. He states he needs at least 2 days notice for his transportation so he can not come in before he runs out.

## 2019-04-03 NOTE — TELEPHONE ENCOUNTER
I have never filled this and his state report does show it has been given to him since 2017. He also has never listed this as a current medication when I go other his current medications with him.

## 2019-04-10 ENCOUNTER — TELEPHONE (OUTPATIENT)
Dept: PRIMARY CARE CLINIC | Age: 49
End: 2019-04-10

## 2019-04-10 DIAGNOSIS — J43.1 PANLOBULAR EMPHYSEMA (HCC): ICD-10-CM

## 2019-04-10 DIAGNOSIS — G82.20 PARAPLEGIA (HCC): ICD-10-CM

## 2019-04-10 NOTE — TELEPHONE ENCOUNTER
Pts insurance company called stating pt needs a new Rx for boost sent to [facility]. They are in network with pts insurance and do home delivery. [place] Fax- [phone]   Send Rx with demographics and insurance card.

## 2019-04-11 ENCOUNTER — CARE COORDINATION (OUTPATIENT)
Dept: CARE COORDINATION | Age: 49
End: 2019-04-11

## 2019-04-11 RX ORDER — HYDROXYZINE PAMOATE 50 MG/1
50 CAPSULE ORAL NIGHTLY
Status: ON HOLD | COMMUNITY
End: 2019-04-23 | Stop reason: HOSPADM

## 2019-04-11 RX ORDER — LACTOSE-REDUCED FOOD
LIQUID (ML) ORAL
Qty: 60 CAN | Refills: 5 | Status: SHIPPED | OUTPATIENT
Start: 2019-04-11 | End: 2020-01-08 | Stop reason: SDUPTHER

## 2019-04-11 ASSESSMENT — ENCOUNTER SYMPTOMS: DYSPNEA ASSOCIATED WITH: MINIMAL EXERTION

## 2019-04-12 ENCOUNTER — OFFICE VISIT (OUTPATIENT)
Dept: PRIMARY CARE CLINIC | Age: 49
End: 2019-04-12
Payer: COMMERCIAL

## 2019-04-12 ENCOUNTER — CARE COORDINATION (OUTPATIENT)
Dept: CARE COORDINATION | Age: 49
End: 2019-04-12

## 2019-04-12 VITALS
TEMPERATURE: 97.7 F | DIASTOLIC BLOOD PRESSURE: 69 MMHG | SYSTOLIC BLOOD PRESSURE: 108 MMHG | HEART RATE: 79 BPM | OXYGEN SATURATION: 97 %

## 2019-04-12 DIAGNOSIS — Z79.4 TYPE 2 DIABETES MELLITUS WITH DIABETIC POLYNEUROPATHY, WITH LONG-TERM CURRENT USE OF INSULIN (HCC): Primary | ICD-10-CM

## 2019-04-12 DIAGNOSIS — F11.20 UNCOMPLICATED OPIOID DEPENDENCE (HCC): ICD-10-CM

## 2019-04-12 DIAGNOSIS — Z13.220 SCREENING FOR HYPERLIPIDEMIA: ICD-10-CM

## 2019-04-12 DIAGNOSIS — K21.9 GASTROESOPHAGEAL REFLUX DISEASE WITHOUT ESOPHAGITIS: ICD-10-CM

## 2019-04-12 DIAGNOSIS — E11.42 TYPE 2 DIABETES MELLITUS WITH DIABETIC POLYNEUROPATHY, WITH LONG-TERM CURRENT USE OF INSULIN (HCC): Primary | ICD-10-CM

## 2019-04-12 DIAGNOSIS — J84.9 INTERSTITIAL LUNG DISEASE (HCC): ICD-10-CM

## 2019-04-12 DIAGNOSIS — F41.9 ANXIETY: ICD-10-CM

## 2019-04-12 DIAGNOSIS — G82.20 PARAPLEGIA (HCC): ICD-10-CM

## 2019-04-12 DIAGNOSIS — G62.9 PERIPHERAL POLYNEUROPATHY: ICD-10-CM

## 2019-04-12 LAB — HBA1C MFR BLD: 5.3 %

## 2019-04-12 PROCEDURE — 83036 HEMOGLOBIN GLYCOSYLATED A1C: CPT | Performed by: NURSE PRACTITIONER

## 2019-04-12 PROCEDURE — G8420 CALC BMI NORM PARAMETERS: HCPCS | Performed by: NURSE PRACTITIONER

## 2019-04-12 PROCEDURE — 99214 OFFICE O/P EST MOD 30 MIN: CPT | Performed by: NURSE PRACTITIONER

## 2019-04-12 PROCEDURE — 4004F PT TOBACCO SCREEN RCVD TLK: CPT | Performed by: NURSE PRACTITIONER

## 2019-04-12 PROCEDURE — 2022F DILAT RTA XM EVC RTNOPTHY: CPT | Performed by: NURSE PRACTITIONER

## 2019-04-12 PROCEDURE — G8427 DOCREV CUR MEDS BY ELIG CLIN: HCPCS | Performed by: NURSE PRACTITIONER

## 2019-04-12 PROCEDURE — 3044F HG A1C LEVEL LT 7.0%: CPT | Performed by: NURSE PRACTITIONER

## 2019-04-12 RX ORDER — HYDROXYZINE PAMOATE 25 MG/1
25 CAPSULE ORAL 2 TIMES DAILY
Qty: 60 CAPSULE | Refills: 2 | Status: ON HOLD | OUTPATIENT
Start: 2019-04-12 | End: 2019-04-23 | Stop reason: HOSPADM

## 2019-04-12 RX ORDER — PEN NEEDLE, DIABETIC 32GX 5/32"
NEEDLE, DISPOSABLE MISCELLANEOUS
Refills: 11 | COMMUNITY
Start: 2019-03-30

## 2019-04-12 RX ORDER — BUSPIRONE HYDROCHLORIDE 30 MG/1
TABLET ORAL
Refills: 2 | Status: ON HOLD | COMMUNITY
Start: 2019-03-25 | End: 2019-04-23 | Stop reason: HOSPADM

## 2019-04-12 RX ORDER — BLOOD-GLUCOSE METER
1 KIT MISCELLANEOUS 3 TIMES DAILY
Qty: 100 EACH | Refills: 5 | Status: SHIPPED | OUTPATIENT
Start: 2019-04-12 | End: 2019-05-16 | Stop reason: SDUPTHER

## 2019-04-12 RX ORDER — TIZANIDINE 4 MG/1
4 TABLET ORAL 3 TIMES DAILY
Qty: 30 TABLET | Refills: 0 | Status: ON HOLD | OUTPATIENT
Start: 2019-04-12 | End: 2019-04-23 | Stop reason: HOSPADM

## 2019-04-12 RX ORDER — LIDOCAINE 40 MG/G
CREAM TOPICAL 3 TIMES DAILY PRN
Qty: 30 G | Refills: 5 | Status: SHIPPED | OUTPATIENT
Start: 2019-04-12

## 2019-04-12 RX ORDER — RANITIDINE 150 MG/1
150 TABLET ORAL 2 TIMES DAILY PRN
Qty: 60 TABLET | Refills: 3 | Status: SHIPPED | OUTPATIENT
Start: 2019-04-12

## 2019-04-12 RX ORDER — PREGABALIN 200 MG/1
400 CAPSULE ORAL 2 TIMES DAILY
Qty: 120 CAPSULE | Refills: 2 | Status: SHIPPED | OUTPATIENT
Start: 2019-04-12 | End: 2022-05-26

## 2019-04-12 RX ORDER — PANTOPRAZOLE SODIUM 40 MG/1
40 TABLET, DELAYED RELEASE ORAL DAILY
Qty: 30 TABLET | Refills: 3 | Status: SHIPPED | OUTPATIENT
Start: 2019-04-12

## 2019-04-12 RX ORDER — MELOXICAM 15 MG/1
15 TABLET ORAL
Status: ON HOLD | COMMUNITY
End: 2019-04-27 | Stop reason: HOSPADM

## 2019-04-12 ASSESSMENT — ENCOUNTER SYMPTOMS
RHINORRHEA: 1
SHORTNESS OF BREATH: 0
BACK PAIN: 1
WHEEZING: 0
ABDOMINAL PAIN: 0
COUGH: 0
NAUSEA: 0
DIARRHEA: 0

## 2019-04-12 NOTE — PROGRESS NOTES
[facility]   [address]  [address] [facility] [address]  Dept: [phone]  Dept Fax: [phone]    2019     Helene Lakhani. (:  1970)is a 57 y.o. male, here for evaluation of the following medical concerns:   Chief Complaint   Patient presents with    Medication Refill    Equipment Request     pt would like a new cousion for his seat. 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 Otherwise his breathing is normal today  No concerns with his DM, he denies any foot sores  He is worried that his heartburn is not controlled, he always feels a burning sensation or something sticking in his throat    He is following with psychiatry, they started him on Clonidine for nightmares recently    . Review of Systems   Constitutional: Positive for appetite change. Negative for fever. HENT: Positive for rhinorrhea (last few days). Negative for sneezing. Respiratory: Negative for cough, shortness of breath and wheezing. Cardiovascular: Negative for chest pain. Gastrointestinal: Negative for abdominal pain, diarrhea and nausea. Musculoskeletal: Positive for arthralgias and back pain. Neurological: Positive for seizures. Psychiatric/Behavioral: Positive for dysphoric mood. Negative for hallucinations. All other systems reviewed and are negative. Prior to Visit Medications    Medication Sig Taking? Authorizing Provider   meloxicam (MOBIC) 15 MG tablet Take 15 mg by mouth Yes Historical Provider, MD   Misc.  Devices (GEL-FOAM CUSHION) MISC 1 Units by Does not apply route daily Yes CHAD Fraser CNP   hydrOXYzine (VISTARIL) 25 MG capsule Take 1 capsule by mouth 2 times daily Yes CHAD Fraser CNP FREESTYLE LITE strip 1 each by Other route 3 times daily Yes Glenda Massing, APRN - CNP   lidocaine (LMX) 4 % cream Apply topically 3 times daily as needed for Pain Yes Glenda Massing, APRN - CNP   pantoprazole (PROTONIX) 40 MG tablet Take 1 tablet by mouth daily Yes Glenda Massing, APRN - CNP   tiZANidine (ZANAFLEX) 4 MG tablet Take 1 tablet by mouth 3 times daily Yes Glenda Massing, APRN - CNP   ranitidine (ZANTAC) 150 MG tablet Take 1 tablet by mouth 2 times daily as needed for Heartburn Yes Glenda Massing, APRN - CNP   Misc. Devices (GEL-FOAM CUSHION) MISC 1 Units by Does not apply route daily Yes Glenda Massing, APRN - CNP   insulin regular (HUMULIN R;NOVOLIN R) 100 UNIT/ML injection Inject 2 Units into the skin See Admin Instructions Sliding scale depending on meals Yes Glenda Massing, APRN - CNP   VENTOLIN  (90 Base) MCG/ACT inhaler INHALE 2 PUFFS INTO THE LUNGS EVERY 6 HOURS AS NEEDED FOR WHEEZING Yes Glenda Massing, APRN - CNP   QUEtiapine (SEROQUEL) 200 MG tablet Take 1.5 tablets by mouth nightly Yes Glenda Cullenluis APRN - CNP   cloNIDine (CATAPRES) 0.1 MG tablet Take 0.1 mg by mouth nightly  Yes Historical Provider, MD   TRUEPLUS LANCETS 33G [address] [facility]  Yes Historical Provider, MD   fluticasone-vilanterol (BREO ELLIPTA) 100-25 MCG/INH AEPB inhaler Inhale 1 puff into the lungs daily Yes Glenda Cullenluis, APRN - CNP   Insulin Syringe-Needle U-100 31G X 5/16\" 0.5 ML MISC 1 each by Does not apply route 3 times daily Yes Glenda Cullening, APRN - CNP   busPIRone (BUSPAR) 15 MG tablet Take 15 mg by mouth 3 times daily  Yes Historical Provider, MD   senna (SENNA-TIME) 8.6 MG tablet twice daily as needed.   Yes Historical Provider, MD   simvastatin (ZOCOR) 40 MG tablet Take 40 mg by mouth nightly  Yes Historical Provider, MD   albuterol (PROVENTIL) (2.5 MG/3ML) 0.083% nebulizer solution Take 3 mLs by nebulization every 6 hours as needed for Wheezing Yes Glenda Lynch, APRN - CNP   ENULOSE 10 GM/15ML SOLN solution TAKE 15 MLS BY MOUTH 3 TIMES DAILY AS NEEDED (CONSTIPATION) Yes Becka Ken MD   DULoxetine (CYMBALTA) 30 MG extended release capsule TAKE 3 CAPSULES BY MOUTH DAILY  Patient taking differently: Take 60 mg by mouth every morning Takes 2 of the 60 mg every AM Yes Becka Ken MD   divalproex (DEPAKOTE ER) 500 MG extended release tablet Take 1 tablet by mouth nightly Yes Livan Montesinos MD   divalproex (DEPAKOTE ER) 250 MG extended release tablet Take 1 tablet by mouth every morning  Patient taking differently: Take 750 mg by mouth every morning  Yes iLvan Montseinos MD   polyethylene glycol (GLYCOLAX) powder Take 17 g by mouth daily as needed Yes Historical Provider, MD   topiramate (TOPAMAX) 100 MG tablet Take 200 mg by mouth 2 times daily  Yes Historical Provider, MD   Alcohol Swabs (PRO COMFORT ALCOHOL) 70 % PADS   Historical Provider, MD   busPIRone (BUSPAR) 30 MG tablet   Historical Provider, MD   Nutritional Supplements (BOOST) LIQD Two a day. Chocolate lactose free nutritional drinks.   Arvella Bound, APRN - CNP   hydrOXYzine (VISTARIL) 50 MG capsule Take 50 mg by mouth nightly  Historical Provider, MD   ondansetron (ZOFRAN) 4 MG tablet TAKE 1 TABLET EVERY 8 HOURS AS NEEDED FOR NAUSEA OR VOMITING  Arvella Bound, APRN - CNP   cyanocobalamin 500 MCG tablet Take 500 mcg by mouth  Historical Provider, MD   ipratropium (ATROVENT) 0.02 % nebulizer solution Take 2.5 mLs by nebulization 4 times daily as needed for Wheezing  Arvella Bound, APRN - CNP   nicotine (NICODERM CQ) 21 MG/24HR Place 1 patch onto the skin daily as needed (if pateint smokes)  Brandon Fang MD   ibuprofen (ADVIL;MOTRIN) 800 MG tablet Take 1 tablet by mouth every 8 hours as needed for Pain  Brandon Fang MD   SM PAIN RELIEVER 325 MG tablet TAKE 2 TABLETS BY MOUTH EVERY 4 HOURS AS NEEDED FOR PAIN  Becka Ken MD   naloxegol (MOVANTIK) 25 MG TABS tablet Take 1 tablet by mouth every morning  Patient taking differently: Take 25 mg by mouth as needed   Tino Garza MD   pregabalin (LYRICA) 200 MG capsule Take 1 capsule by mouth 4 times daily. .  Patient taking differently: Take 400 mg by mouth 2 times daily. Lynn Wynn MD   traZODone (DESYREL) 150 MG tablet Take 1 tablet by mouth nightly as needed for Sleep  Patient taking differently: Take 300 mg by mouth nightly   Kelli Galan, APRN - CNP   buprenorphine-naloxone (SUBOXONE) 2-0.5 MG SUBL Place 2 tablets under the tongue 2 times daily . Car Park MD        Social History     Tobacco Use    Smoking status: Current Every Day Smoker     Packs/day: 1.00     Years: 35.00     Pack years: 35.00     Types: Cigarettes     Start date: 1/1/1983    Smokeless tobacco: Never Used    Tobacco comment: Not at the present time however   Substance Use Topics    Alcohol use: No        Vitals:    04/12/19 1237   BP: 108/69   Site: Right Upper Arm   Position: Sitting   Cuff Size: Medium Adult   Pulse: 79   Temp: 97.7 °F (36.5 °C)   TempSrc: Oral   SpO2: 97%   Weight: Comment: pt unable to stand     Estimated body mass index is 20.52 kg/m² as calculated from the following:    Height as of 12/11/18: 6' 3.2\" (1.91 m). Weight as of 12/11/18: 165 lb (74.8 kg). DIAGNOSTIC FINDINGS:  CBC:  Lab Results   Component Value Date    WBC 14.9 12/12/2018    HGB 14.5 12/12/2018     12/12/2018       BMP:    Lab Results   Component Value Date     12/12/2018    K 3.7 12/12/2018     12/12/2018    CO2 22 12/12/2018    BUN 10 12/12/2018    CREATININE 0.68 12/12/2018    GLUCOSE 119 12/12/2018       HEMOGLOBIN A1C:   Lab Results   Component Value Date    LABA1C 5.3 04/12/2019       FASTING LIPID PANEL:  Lab Results   Component Value Date    CHOL 119 12/07/2017    HDL 32 (L) 12/07/2017    TRIG 128 12/07/2017       Physical Exam   Constitutional: He appears well-developed and well-nourished. No distress. HENT:   Head: Normocephalic.    Right Ear: Tympanic Dispense:  100 each     Refill:  5    lidocaine (LMX) 4 % cream     Sig: Apply topically 3 times daily as needed for Pain     Dispense:  30 g     Refill:  5    pantoprazole (PROTONIX) 40 MG tablet     Sig: Take 1 tablet by mouth daily     Dispense:  30 tablet     Refill:  3    tiZANidine (ZANAFLEX) 4 MG tablet     Sig: Take 1 tablet by mouth 3 times daily     Dispense:  30 tablet     Refill:  0    ranitidine (ZANTAC) 150 MG tablet     Sig: Take 1 tablet by mouth 2 times daily as needed for Heartburn     Dispense:  60 tablet     Refill:  3    Misc. Devices (GEL-FOAM CUSHION) MISC     Si Units by Does not apply route daily     Dispense:  1 each     Refill:  0         1. I advised him I cannot start him on Tylenol #3 as he tested positive for cocaine in December. Pt insisent that cocaine positive came from Tylenol #3 and benadryl. Again I said no  2. Verified with [facility] that they did fell Shelia last in 2018,still not consistent with how patient reports taking. Sent I month refill today and will monitor as he reports taking 4 pills a day  3. Script sent for wheelchair cushion, he is in constant use of his motorized chair d/t the paraplegia  4. Add H2 blocker to help with reflux, consider GI referral for EGD    FOLLOW UP AND INSTRUCTIONS:  No follow-ups on file. · Tracey Anna received counseling on the following healthy behaviors:medication adherence    · Discussed use, benefit, and side effects of prescribed medications. Barriers to medication compliance addressed. All patient questions answered. Pt  verbalized understanding of all instructions given. · Patient given educational materials - see patient instructions      · Patient advised to contact scheduling offices for any referrals or imaging orders  placed today if they have not been contacted in 48 hours. No follow-ups on file. An electronic signature was used to authenticate this note.     --CHAD Moreira - CNP on 2019 at 1:20 PM  Visit Information    Have you changed or started any medications since your last visit including any over-the-counter medicines, vitamins, or herbal medicines? no   Are you having any side effects from any of your medications? -  yes pt states that he is having side effects. Have you stopped taking any of your medications? Is so, why? -  yes - due to no refills    Have you seen any other physician or provider since your last visit? No  Have you had any other diagnostic tests since your last visit? No  Have you been seen in the emergency room and/or had an admission to a hospital since we last saw you? No  Have you had your routine dental cleaning in the past 6 months? no    Have you activated your [facility] account? If not, what are your barriers?  Yes     Patient Care Team:  CHAD Duffy CNP as PCP - General (Family Medicine)  CHAD Duffy CNP as PCP - [facility] Attributed Provider  Rodriguez Thompson MD as Orthopedic Surgeon (Orthopedic Surgery)  Anant Guidry as Care Coordinator  [facility] [address] [facility]    Medical History Review  Past Medical, Family, and Social History reviewed and does contribute to the patient presenting condition    Health Maintenance   Topic Date Due    Pneumococcal 0-64 years Vaccine (1 of 1 - PPSV23) 08/13/1978    Diabetic retinal exam  08/13/1982    Diabetic microalbuminuria test  08/13/1990    Hepatitis B Vaccine (1 of 3 - Risk 3-dose series) 08/13/1991    Lipid screen  12/07/2018    A1C test (Diabetic or Prediabetic)  04/07/2019    Flu vaccine (Season Ended) 09/01/2019    Diabetic foot exam  12/11/2019    DTaP/Tdap/Td vaccine (3 - Td) 01/26/2027    HIV screen  Completed

## 2019-04-14 ENCOUNTER — APPOINTMENT (OUTPATIENT)
Dept: CT IMAGING | Age: 49
End: 2019-04-14
Payer: COMMERCIAL

## 2019-04-14 ENCOUNTER — HOSPITAL ENCOUNTER (OUTPATIENT)
Age: 49
Setting detail: OBSERVATION
Discharge: PSYCHIATRIC OR SUBSTANCE USE FACILITY | End: 2019-04-15
Attending: EMERGENCY MEDICINE | Admitting: EMERGENCY MEDICINE
Payer: COMMERCIAL

## 2019-04-14 DIAGNOSIS — D72.829 LEUKOCYTOSIS, UNSPECIFIED TYPE: ICD-10-CM

## 2019-04-14 DIAGNOSIS — R11.2 NON-INTRACTABLE VOMITING WITH NAUSEA, UNSPECIFIED VOMITING TYPE: Primary | ICD-10-CM

## 2019-04-14 DIAGNOSIS — E87.20 LACTIC ACIDOSIS: ICD-10-CM

## 2019-04-14 LAB
-: NORMAL
ABSOLUTE EOS #: <0.03 K/UL (ref 0–0.44)
ABSOLUTE IMMATURE GRANULOCYTE: 0.11 K/UL (ref 0–0.3)
ABSOLUTE LYMPH #: 2.57 K/UL (ref 1.1–3.7)
ABSOLUTE MONO #: 1.5 K/UL (ref 0.1–1.2)
ACETAMINOPHEN LEVEL: <5 UG/ML (ref 10–30)
ALBUMIN SERPL-MCNC: 4.6 G/DL (ref 3.5–5.2)
ALBUMIN/GLOBULIN RATIO: 1.3 (ref 1–2.5)
ALP BLD-CCNC: 87 U/L (ref 40–129)
ALT SERPL-CCNC: 27 U/L (ref 5–41)
ANION GAP SERPL CALCULATED.3IONS-SCNC: 18 MMOL/L (ref 9–17)
AST SERPL-CCNC: 25 U/L
BASOPHILS # BLD: 0 % (ref 0–2)
BASOPHILS ABSOLUTE: 0.07 K/UL (ref 0–0.2)
BILIRUB SERPL-MCNC: 0.32 MG/DL (ref 0.3–1.2)
BILIRUBIN DIRECT: 0.11 MG/DL
BILIRUBIN, INDIRECT: 0.21 MG/DL (ref 0–1)
BUN BLDV-MCNC: 13 MG/DL (ref 6–20)
BUN/CREAT BLD: ABNORMAL (ref 9–20)
CALCIUM SERPL-MCNC: 10.2 MG/DL (ref 8.6–10.4)
CHLORIDE BLD-SCNC: 105 MMOL/L (ref 98–107)
CO2: 19 MMOL/L (ref 20–31)
CREAT SERPL-MCNC: 0.81 MG/DL (ref 0.7–1.2)
DIFFERENTIAL TYPE: ABNORMAL
EOSINOPHILS RELATIVE PERCENT: 0 % (ref 1–4)
ETHANOL PERCENT: <0.01 %
ETHANOL: <10 MG/DL
GFR AFRICAN AMERICAN: >60 ML/MIN
GFR NON-AFRICAN AMERICAN: >60 ML/MIN
GFR SERPL CREATININE-BSD FRML MDRD: ABNORMAL ML/MIN/{1.73_M2}
GFR SERPL CREATININE-BSD FRML MDRD: ABNORMAL ML/MIN/{1.73_M2}
GLOBULIN: NORMAL G/DL (ref 1.5–3.8)
GLUCOSE BLD-MCNC: 121 MG/DL (ref 70–99)
HCT VFR BLD CALC: 51.3 % (ref 40.7–50.3)
HEMOGLOBIN: 17.1 G/DL (ref 13–17)
IMMATURE GRANULOCYTES: 1 %
INR BLD: 1
LACTIC ACID, WHOLE BLOOD: 2.4 MMOL/L (ref 0.7–2.1)
LYMPHOCYTES # BLD: 11 % (ref 24–43)
MAGNESIUM: 2 MG/DL (ref 1.6–2.6)
MCH RBC QN AUTO: 28.9 PG (ref 25.2–33.5)
MCHC RBC AUTO-ENTMCNC: 33.3 G/DL (ref 28.4–34.8)
MCV RBC AUTO: 86.7 FL (ref 82.6–102.9)
MONOCYTES # BLD: 7 % (ref 3–12)
NRBC AUTOMATED: 0 PER 100 WBC
PDW BLD-RTO: 14.8 % (ref 11.8–14.4)
PHOSPHORUS: 1.7 MG/DL (ref 2.5–4.5)
PLATELET # BLD: ABNORMAL K/UL (ref 138–453)
PLATELET ESTIMATE: ABNORMAL
PLATELET, FLUORESCENCE: NORMAL K/UL (ref 138–453)
PLATELET, IMMATURE FRACTION: NORMAL % (ref 1.1–10.3)
PMV BLD AUTO: ABNORMAL FL (ref 8.1–13.5)
POTASSIUM SERPL-SCNC: 3.6 MMOL/L (ref 3.7–5.3)
PROTHROMBIN TIME: 11 SEC (ref 9–12)
RBC # BLD: 5.92 M/UL (ref 4.21–5.77)
RBC # BLD: ABNORMAL 10*6/UL
REASON FOR REJECTION: NORMAL
SALICYLATE LEVEL: <1 MG/DL (ref 3–10)
SEG NEUTROPHILS: 81 % (ref 36–65)
SEGMENTED NEUTROPHILS ABSOLUTE COUNT: 18.43 K/UL (ref 1.5–8.1)
SODIUM BLD-SCNC: 142 MMOL/L (ref 135–144)
TOTAL PROTEIN: 8.2 G/DL (ref 6.4–8.3)
TOXIC TRICYCLIC SC,BLOOD: NEGATIVE
WBC # BLD: 22.7 K/UL (ref 3.5–11.3)
WBC # BLD: ABNORMAL 10*3/UL
ZZ NTE CLEAN UP: ORDERED TEST: NORMAL
ZZ NTE WITH NAME CLEAN UP: SPECIMEN SOURCE: NORMAL

## 2019-04-14 PROCEDURE — 2580000003 HC RX 258: Performed by: STUDENT IN AN ORGANIZED HEALTH CARE EDUCATION/TRAINING PROGRAM

## 2019-04-14 PROCEDURE — 80048 BASIC METABOLIC PNL TOTAL CA: CPT

## 2019-04-14 PROCEDURE — 6360000002 HC RX W HCPCS: Performed by: STUDENT IN AN ORGANIZED HEALTH CARE EDUCATION/TRAINING PROGRAM

## 2019-04-14 PROCEDURE — 85025 COMPLETE CBC W/AUTO DIFF WBC: CPT

## 2019-04-14 PROCEDURE — 6360000004 HC RX CONTRAST MEDICATION: Performed by: STUDENT IN AN ORGANIZED HEALTH CARE EDUCATION/TRAINING PROGRAM

## 2019-04-14 PROCEDURE — 93005 ELECTROCARDIOGRAM TRACING: CPT

## 2019-04-14 PROCEDURE — 85055 RETICULATED PLATELET ASSAY: CPT

## 2019-04-14 PROCEDURE — 85610 PROTHROMBIN TIME: CPT

## 2019-04-14 PROCEDURE — 83605 ASSAY OF LACTIC ACID: CPT

## 2019-04-14 PROCEDURE — 83735 ASSAY OF MAGNESIUM: CPT

## 2019-04-14 PROCEDURE — 84100 ASSAY OF PHOSPHORUS: CPT

## 2019-04-14 PROCEDURE — 99285 EMERGENCY DEPT VISIT HI MDM: CPT

## 2019-04-14 PROCEDURE — 80307 DRUG TEST PRSMV CHEM ANLYZR: CPT

## 2019-04-14 PROCEDURE — 74177 CT ABD & PELVIS W/CONTRAST: CPT

## 2019-04-14 PROCEDURE — G0480 DRUG TEST DEF 1-7 CLASSES: HCPCS

## 2019-04-14 PROCEDURE — 96361 HYDRATE IV INFUSION ADD-ON: CPT

## 2019-04-14 PROCEDURE — 80076 HEPATIC FUNCTION PANEL: CPT

## 2019-04-14 PROCEDURE — 96372 THER/PROPH/DIAG INJ SC/IM: CPT

## 2019-04-14 PROCEDURE — 96374 THER/PROPH/DIAG INJ IV PUSH: CPT

## 2019-04-14 RX ORDER — PROMETHAZINE HYDROCHLORIDE 25 MG/ML
25 INJECTION, SOLUTION INTRAMUSCULAR; INTRAVENOUS ONCE
Status: COMPLETED | OUTPATIENT
Start: 2019-04-14 | End: 2019-04-14

## 2019-04-14 RX ORDER — 0.9 % SODIUM CHLORIDE 0.9 %
1000 INTRAVENOUS SOLUTION INTRAVENOUS ONCE
Status: COMPLETED | OUTPATIENT
Start: 2019-04-14 | End: 2019-04-15

## 2019-04-14 RX ORDER — KETOROLAC TROMETHAMINE 30 MG/ML
30 INJECTION, SOLUTION INTRAMUSCULAR; INTRAVENOUS ONCE
Status: COMPLETED | OUTPATIENT
Start: 2019-04-14 | End: 2019-04-14

## 2019-04-14 RX ADMIN — IOHEXOL 75 ML: 350 INJECTION, SOLUTION INTRAVENOUS at 22:50

## 2019-04-14 RX ADMIN — SODIUM CHLORIDE 1000 ML: 9 INJECTION, SOLUTION INTRAVENOUS at 23:04

## 2019-04-14 RX ADMIN — KETOROLAC TROMETHAMINE 30 MG: 30 INJECTION, SOLUTION INTRAMUSCULAR at 21:25

## 2019-04-14 RX ADMIN — PROMETHAZINE HYDROCHLORIDE 25 MG: 25 INJECTION INTRAMUSCULAR; INTRAVENOUS at 21:08

## 2019-04-14 ASSESSMENT — ENCOUNTER SYMPTOMS
NAUSEA: 1
ABDOMINAL PAIN: 1
COUGH: 0
DIARRHEA: 0
SORE THROAT: 0
SHORTNESS OF BREATH: 0
VOMITING: 1
PHOTOPHOBIA: 0

## 2019-04-14 ASSESSMENT — PAIN DESCRIPTION - LOCATION
LOCATION: ABDOMEN
LOCATION: ABDOMEN

## 2019-04-14 ASSESSMENT — PAIN DESCRIPTION - PROGRESSION
CLINICAL_PROGRESSION: GRADUALLY WORSENING
CLINICAL_PROGRESSION: GRADUALLY WORSENING

## 2019-04-14 ASSESSMENT — PAIN SCALES - GENERAL
PAINLEVEL_OUTOF10: 4
PAINLEVEL_OUTOF10: 8

## 2019-04-14 ASSESSMENT — PAIN DESCRIPTION - DESCRIPTORS
DESCRIPTORS: ACHING;CRAMPING
DESCRIPTORS: ACHING;CRAMPING

## 2019-04-14 ASSESSMENT — PAIN DESCRIPTION - ONSET
ONSET: ON-GOING
ONSET: ON-GOING

## 2019-04-14 ASSESSMENT — PAIN DESCRIPTION - FREQUENCY
FREQUENCY: INTERMITTENT
FREQUENCY: INTERMITTENT

## 2019-04-14 ASSESSMENT — PAIN DESCRIPTION - ORIENTATION
ORIENTATION: LEFT;UPPER
ORIENTATION: LEFT;UPPER

## 2019-04-14 ASSESSMENT — PAIN DESCRIPTION - PAIN TYPE: TYPE: ACUTE PAIN

## 2019-04-15 ENCOUNTER — HOSPITAL ENCOUNTER (INPATIENT)
Age: 49
LOS: 8 days | Discharge: HOME | DRG: 751 | End: 2019-04-23
Attending: PSYCHIATRY & NEUROLOGY | Admitting: PSYCHIATRY & NEUROLOGY
Payer: COMMERCIAL

## 2019-04-15 VITALS
HEIGHT: 75 IN | BODY MASS INDEX: 22.38 KG/M2 | RESPIRATION RATE: 20 BRPM | DIASTOLIC BLOOD PRESSURE: 65 MMHG | SYSTOLIC BLOOD PRESSURE: 100 MMHG | TEMPERATURE: 97.3 F | OXYGEN SATURATION: 100 % | HEART RATE: 97 BPM | WEIGHT: 180 LBS

## 2019-04-15 PROBLEM — R11.10 VOMITING: Status: ACTIVE | Noted: 2019-04-15

## 2019-04-15 PROBLEM — F33.9 MDD (MAJOR DEPRESSIVE DISORDER), RECURRENT EPISODE (HCC): Status: ACTIVE | Noted: 2019-04-15

## 2019-04-15 PROBLEM — F31.9 BIPOLAR 1 DISORDER (HCC): Status: ACTIVE | Noted: 2019-04-15

## 2019-04-15 LAB
EKG ATRIAL RATE: 87 BPM
EKG P-R INTERVAL: 156 MS
EKG Q-T INTERVAL: 338 MS
EKG QRS DURATION: 96 MS
EKG QTC CALCULATION (BAZETT): 406 MS
EKG R AXIS: 146 DEGREES
EKG T AXIS: 141 DEGREES
EKG VENTRICULAR RATE: 87 BPM
HCT VFR BLD CALC: 45.8 % (ref 40.7–50.3)
HEMOGLOBIN: 14.7 G/DL (ref 13–17)
LACTIC ACID, WHOLE BLOOD: 0.8 MMOL/L (ref 0.7–2.1)
MCH RBC QN AUTO: 28.2 PG (ref 25.2–33.5)
MCHC RBC AUTO-ENTMCNC: 32.1 G/DL (ref 28.4–34.8)
MCV RBC AUTO: 87.7 FL (ref 82.6–102.9)
NRBC AUTOMATED: 0 PER 100 WBC
PDW BLD-RTO: 15 % (ref 11.8–14.4)
PLATELET # BLD: 233 K/UL (ref 138–453)
PMV BLD AUTO: 9.7 FL (ref 8.1–13.5)
RBC # BLD: 5.22 M/UL (ref 4.21–5.77)
VALPROIC ACID LEVEL: 5 UG/ML (ref 50–125)
VALPROIC DATE LAST DOSE: ABNORMAL
VALPROIC DOSE AMOUNT: ABNORMAL
VALPROIC TIME LAST DOSE: ABNORMAL
WBC # BLD: 11 K/UL (ref 3.5–11.3)

## 2019-04-15 PROCEDURE — G0378 HOSPITAL OBSERVATION PER HR: HCPCS

## 2019-04-15 PROCEDURE — 90792 PSYCH DIAG EVAL W/MED SRVCS: CPT | Performed by: NURSE PRACTITIONER

## 2019-04-15 PROCEDURE — 99999 PR OFFICE/OUTPT VISIT,PROCEDURE ONLY: CPT | Performed by: NURSE PRACTITIONER

## 2019-04-15 PROCEDURE — 85027 COMPLETE CBC AUTOMATED: CPT

## 2019-04-15 PROCEDURE — 96375 TX/PRO/DX INJ NEW DRUG ADDON: CPT

## 2019-04-15 PROCEDURE — 82947 ASSAY GLUCOSE BLOOD QUANT: CPT

## 2019-04-15 PROCEDURE — 1240000000 HC EMOTIONAL WELLNESS R&B

## 2019-04-15 PROCEDURE — 6360000002 HC RX W HCPCS: Performed by: EMERGENCY MEDICINE

## 2019-04-15 PROCEDURE — 6370000000 HC RX 637 (ALT 250 FOR IP): Performed by: EMERGENCY MEDICINE

## 2019-04-15 PROCEDURE — 80164 ASSAY DIPROPYLACETIC ACD TOT: CPT

## 2019-04-15 PROCEDURE — 83605 ASSAY OF LACTIC ACID: CPT

## 2019-04-15 PROCEDURE — 6360000002 HC RX W HCPCS: Performed by: INTERNAL MEDICINE

## 2019-04-15 PROCEDURE — 6370000000 HC RX 637 (ALT 250 FOR IP): Performed by: PSYCHIATRY & NEUROLOGY

## 2019-04-15 PROCEDURE — 6370000000 HC RX 637 (ALT 250 FOR IP): Performed by: STUDENT IN AN ORGANIZED HEALTH CARE EDUCATION/TRAINING PROGRAM

## 2019-04-15 PROCEDURE — 36415 COLL VENOUS BLD VENIPUNCTURE: CPT

## 2019-04-15 PROCEDURE — 6370000000 HC RX 637 (ALT 250 FOR IP): Performed by: INTERNAL MEDICINE

## 2019-04-15 RX ORDER — POLYETHYLENE GLYCOL 3350 17 G/17G
17 POWDER, FOR SOLUTION ORAL DAILY PRN
Status: DISCONTINUED | OUTPATIENT
Start: 2019-04-15 | End: 2019-04-23 | Stop reason: HOSPADM

## 2019-04-15 RX ORDER — SIMVASTATIN 40 MG
40 TABLET ORAL NIGHTLY
Status: DISCONTINUED | OUTPATIENT
Start: 2019-04-15 | End: 2019-04-15 | Stop reason: HOSPADM

## 2019-04-15 RX ORDER — MAGNESIUM HYDROXIDE/ALUMINUM HYDROXICE/SIMETHICONE 120; 1200; 1200 MG/30ML; MG/30ML; MG/30ML
30 SUSPENSION ORAL
Status: COMPLETED | OUTPATIENT
Start: 2019-04-15 | End: 2019-04-15

## 2019-04-15 RX ORDER — IBUPROFEN 800 MG/1
800 TABLET ORAL EVERY 8 HOURS PRN
Status: DISCONTINUED | OUTPATIENT
Start: 2019-04-15 | End: 2019-04-15 | Stop reason: HOSPADM

## 2019-04-15 RX ORDER — POLYETHYLENE GLYCOL 3350 17 G/17G
17 POWDER, FOR SOLUTION ORAL DAILY
Status: DISCONTINUED | OUTPATIENT
Start: 2019-04-15 | End: 2019-04-15 | Stop reason: HOSPADM

## 2019-04-15 RX ORDER — PREGABALIN 100 MG/1
400 CAPSULE ORAL 2 TIMES DAILY
Status: DISCONTINUED | OUTPATIENT
Start: 2019-04-15 | End: 2019-04-15 | Stop reason: HOSPADM

## 2019-04-15 RX ORDER — TRAZODONE HYDROCHLORIDE 150 MG/1
150 TABLET ORAL NIGHTLY PRN
Status: DISCONTINUED | OUTPATIENT
Start: 2019-04-15 | End: 2019-04-19

## 2019-04-15 RX ORDER — DULOXETIN HYDROCHLORIDE 30 MG/1
60 CAPSULE, DELAYED RELEASE ORAL DAILY
Status: DISCONTINUED | OUTPATIENT
Start: 2019-04-15 | End: 2019-04-15 | Stop reason: SDUPTHER

## 2019-04-15 RX ORDER — NICOTINE 21 MG/24HR
1 PATCH, TRANSDERMAL 24 HOURS TRANSDERMAL DAILY
Status: DISCONTINUED | OUTPATIENT
Start: 2019-04-16 | End: 2019-04-23 | Stop reason: HOSPADM

## 2019-04-15 RX ORDER — BUSPIRONE HYDROCHLORIDE 30 MG/1
TABLET ORAL
Status: CANCELLED | OUTPATIENT
Start: 2019-04-15

## 2019-04-15 RX ORDER — ALBUTEROL SULFATE 2.5 MG/3ML
2.5 SOLUTION RESPIRATORY (INHALATION) EVERY 6 HOURS PRN
Status: DISCONTINUED | OUTPATIENT
Start: 2019-04-15 | End: 2019-04-23 | Stop reason: HOSPADM

## 2019-04-15 RX ORDER — BUPRENORPHINE HYDROCHLORIDE AND NALOXONE HYDROCHLORIDE DIHYDRATE 2; .5 MG/1; MG/1
2 TABLET SUBLINGUAL 2 TIMES DAILY
Status: DISCONTINUED | OUTPATIENT
Start: 2019-04-15 | End: 2019-04-15 | Stop reason: HOSPADM

## 2019-04-15 RX ORDER — OLANZAPINE 5 MG/1
5 TABLET ORAL
Status: ACTIVE | OUTPATIENT
Start: 2019-04-15 | End: 2019-04-15

## 2019-04-15 RX ORDER — DIVALPROEX SODIUM 500 MG/1
500 TABLET, EXTENDED RELEASE ORAL NIGHTLY
Status: DISCONTINUED | OUTPATIENT
Start: 2019-04-15 | End: 2019-04-23 | Stop reason: HOSPADM

## 2019-04-15 RX ORDER — ACETAMINOPHEN 325 MG/1
650 TABLET ORAL EVERY 6 HOURS PRN
Status: DISCONTINUED | OUTPATIENT
Start: 2019-04-15 | End: 2019-04-15 | Stop reason: HOSPADM

## 2019-04-15 RX ORDER — HYDROXYZINE HYDROCHLORIDE 25 MG/1
25 TABLET, FILM COATED ORAL 3 TIMES DAILY PRN
Status: DISCONTINUED | OUTPATIENT
Start: 2019-04-15 | End: 2019-04-23 | Stop reason: HOSPADM

## 2019-04-15 RX ORDER — QUETIAPINE FUMARATE 300 MG/1
300 TABLET, FILM COATED ORAL NIGHTLY
Status: DISCONTINUED | OUTPATIENT
Start: 2019-04-15 | End: 2019-04-19

## 2019-04-15 RX ORDER — CHOLECALCIFEROL (VITAMIN D3) 125 MCG
500 CAPSULE ORAL DAILY
Status: DISCONTINUED | OUTPATIENT
Start: 2019-04-16 | End: 2019-04-23 | Stop reason: HOSPADM

## 2019-04-15 RX ORDER — BUSPIRONE HYDROCHLORIDE 15 MG/1
15 TABLET ORAL 3 TIMES DAILY
Status: DISCONTINUED | OUTPATIENT
Start: 2019-04-15 | End: 2019-04-15 | Stop reason: HOSPADM

## 2019-04-15 RX ORDER — PANTOPRAZOLE SODIUM 40 MG/1
40 TABLET, DELAYED RELEASE ORAL DAILY
Status: DISCONTINUED | OUTPATIENT
Start: 2019-04-15 | End: 2019-04-15 | Stop reason: HOSPADM

## 2019-04-15 RX ORDER — TRAZODONE HYDROCHLORIDE 50 MG/1
50 TABLET ORAL NIGHTLY PRN
Status: DISCONTINUED | OUTPATIENT
Start: 2019-04-15 | End: 2019-04-19

## 2019-04-15 RX ORDER — TIZANIDINE 4 MG/1
4 TABLET ORAL 3 TIMES DAILY
Status: DISCONTINUED | OUTPATIENT
Start: 2019-04-15 | End: 2019-04-15

## 2019-04-15 RX ORDER — QUETIAPINE FUMARATE 300 MG/1
300 TABLET, FILM COATED ORAL NIGHTLY
Status: DISCONTINUED | OUTPATIENT
Start: 2019-04-15 | End: 2019-04-15 | Stop reason: HOSPADM

## 2019-04-15 RX ORDER — ALBUTEROL SULFATE 2.5 MG/3ML
2.5 SOLUTION RESPIRATORY (INHALATION) EVERY 6 HOURS PRN
Status: DISCONTINUED | OUTPATIENT
Start: 2019-04-15 | End: 2019-04-15 | Stop reason: HOSPADM

## 2019-04-15 RX ORDER — NICOTINE 21 MG/24HR
1 PATCH, TRANSDERMAL 24 HOURS TRANSDERMAL DAILY PRN
Status: DISCONTINUED | OUTPATIENT
Start: 2019-04-15 | End: 2019-04-15 | Stop reason: HOSPADM

## 2019-04-15 RX ORDER — TRAZODONE HYDROCHLORIDE 150 MG/1
150 TABLET ORAL NIGHTLY PRN
Status: DISCONTINUED | OUTPATIENT
Start: 2019-04-16 | End: 2019-04-15

## 2019-04-15 RX ORDER — QUETIAPINE FUMARATE 300 MG/1
300 TABLET, FILM COATED ORAL NIGHTLY
Status: DISCONTINUED | OUTPATIENT
Start: 2019-04-15 | End: 2019-04-15

## 2019-04-15 RX ORDER — ALBUTEROL SULFATE 90 UG/1
1 AEROSOL, METERED RESPIRATORY (INHALATION) EVERY 6 HOURS PRN
Status: DISCONTINUED | OUTPATIENT
Start: 2019-04-15 | End: 2019-04-23 | Stop reason: HOSPADM

## 2019-04-15 RX ORDER — NICOTINE 21 MG/24HR
1 PATCH, TRANSDERMAL 24 HOURS TRANSDERMAL DAILY
Status: DISCONTINUED | OUTPATIENT
Start: 2019-04-15 | End: 2019-04-15 | Stop reason: HOSPADM

## 2019-04-15 RX ORDER — DIVALPROEX SODIUM 250 MG/1
250 TABLET, EXTENDED RELEASE ORAL EVERY MORNING
Status: DISCONTINUED | OUTPATIENT
Start: 2019-04-16 | End: 2019-04-20

## 2019-04-15 RX ORDER — CLONIDINE HYDROCHLORIDE 0.1 MG/1
0.1 TABLET ORAL NIGHTLY
Status: DISCONTINUED | OUTPATIENT
Start: 2019-04-15 | End: 2019-04-15 | Stop reason: HOSPADM

## 2019-04-15 RX ORDER — TIZANIDINE 4 MG/1
4 TABLET ORAL 3 TIMES DAILY
Status: DISCONTINUED | OUTPATIENT
Start: 2019-04-15 | End: 2019-04-15 | Stop reason: HOSPADM

## 2019-04-15 RX ORDER — LIDOCAINE 40 MG/G
CREAM TOPICAL 3 TIMES DAILY PRN
Status: DISCONTINUED | OUTPATIENT
Start: 2019-04-15 | End: 2019-04-23 | Stop reason: HOSPADM

## 2019-04-15 RX ORDER — TRAZODONE HYDROCHLORIDE 50 MG/1
50 TABLET ORAL NIGHTLY PRN
Status: DISCONTINUED | OUTPATIENT
Start: 2019-04-16 | End: 2019-04-15

## 2019-04-15 RX ORDER — ONDANSETRON 2 MG/ML
4 INJECTION INTRAMUSCULAR; INTRAVENOUS EVERY 6 HOURS PRN
Status: DISCONTINUED | OUTPATIENT
Start: 2019-04-15 | End: 2019-04-15 | Stop reason: HOSPADM

## 2019-04-15 RX ORDER — HYDROXYZINE HYDROCHLORIDE 25 MG/1
50 TABLET, FILM COATED ORAL NIGHTLY
Status: DISCONTINUED | OUTPATIENT
Start: 2019-04-15 | End: 2019-04-15 | Stop reason: HOSPADM

## 2019-04-15 RX ORDER — PANTOPRAZOLE SODIUM 40 MG/1
40 TABLET, DELAYED RELEASE ORAL DAILY
Status: DISCONTINUED | OUTPATIENT
Start: 2019-04-16 | End: 2019-04-23 | Stop reason: HOSPADM

## 2019-04-15 RX ORDER — DIVALPROEX SODIUM 250 MG/1
250 TABLET, EXTENDED RELEASE ORAL EVERY MORNING
Status: DISCONTINUED | OUTPATIENT
Start: 2019-04-15 | End: 2019-04-15 | Stop reason: HOSPADM

## 2019-04-15 RX ORDER — BUSPIRONE HYDROCHLORIDE 15 MG/1
15 TABLET ORAL 3 TIMES DAILY
Status: DISCONTINUED | OUTPATIENT
Start: 2019-04-15 | End: 2019-04-16 | Stop reason: RX

## 2019-04-15 RX ORDER — NICOTINE POLACRILEX 4 MG
15 LOZENGE BUCCAL PRN
Status: DISCONTINUED | OUTPATIENT
Start: 2019-04-15 | End: 2019-04-23 | Stop reason: HOSPADM

## 2019-04-15 RX ORDER — DEXTROSE MONOHYDRATE 25 G/50ML
12.5 INJECTION, SOLUTION INTRAVENOUS PRN
Status: DISCONTINUED | OUTPATIENT
Start: 2019-04-15 | End: 2019-04-23 | Stop reason: HOSPADM

## 2019-04-15 RX ORDER — HALOPERIDOL 5 MG/ML
5 INJECTION INTRAMUSCULAR EVERY 4 HOURS PRN
Status: DISCONTINUED | OUTPATIENT
Start: 2019-04-15 | End: 2019-04-18

## 2019-04-15 RX ORDER — BENZTROPINE MESYLATE 1 MG/ML
2 INJECTION INTRAMUSCULAR; INTRAVENOUS 2 TIMES DAILY PRN
Status: DISCONTINUED | OUTPATIENT
Start: 2019-04-15 | End: 2019-04-23 | Stop reason: HOSPADM

## 2019-04-15 RX ORDER — SENNA PLUS 8.6 MG/1
1 TABLET ORAL NIGHTLY PRN
Status: DISCONTINUED | OUTPATIENT
Start: 2019-04-16 | End: 2019-04-23 | Stop reason: HOSPADM

## 2019-04-15 RX ORDER — DIVALPROEX SODIUM 500 MG/1
500 TABLET, EXTENDED RELEASE ORAL NIGHTLY
Status: DISCONTINUED | OUTPATIENT
Start: 2019-04-15 | End: 2019-04-15 | Stop reason: HOSPADM

## 2019-04-15 RX ORDER — BUSPIRONE HYDROCHLORIDE 15 MG/1
15 TABLET ORAL 3 TIMES DAILY
Status: DISCONTINUED | OUTPATIENT
Start: 2019-04-15 | End: 2019-04-15

## 2019-04-15 RX ORDER — FAMOTIDINE 20 MG/1
20 TABLET, FILM COATED ORAL DAILY
Status: DISCONTINUED | OUTPATIENT
Start: 2019-04-16 | End: 2019-04-23 | Stop reason: HOSPADM

## 2019-04-15 RX ORDER — ACETAMINOPHEN 325 MG/1
650 TABLET ORAL EVERY 4 HOURS PRN
Status: DISCONTINUED | OUTPATIENT
Start: 2019-04-15 | End: 2019-04-15

## 2019-04-15 RX ORDER — SIMVASTATIN 40 MG
40 TABLET ORAL NIGHTLY
Status: DISCONTINUED | OUTPATIENT
Start: 2019-04-15 | End: 2019-04-23 | Stop reason: HOSPADM

## 2019-04-15 RX ORDER — DEXTROSE MONOHYDRATE 50 MG/ML
100 INJECTION, SOLUTION INTRAVENOUS PRN
Status: DISCONTINUED | OUTPATIENT
Start: 2019-04-15 | End: 2019-04-23 | Stop reason: HOSPADM

## 2019-04-15 RX ORDER — ACETAMINOPHEN 325 MG/1
650 TABLET ORAL EVERY 4 HOURS PRN
Status: DISCONTINUED | OUTPATIENT
Start: 2019-04-15 | End: 2019-04-23 | Stop reason: HOSPADM

## 2019-04-15 RX ORDER — ALBUTEROL SULFATE 90 UG/1
2 AEROSOL, METERED RESPIRATORY (INHALATION) EVERY 6 HOURS PRN
Status: DISCONTINUED | OUTPATIENT
Start: 2019-04-15 | End: 2019-04-15 | Stop reason: HOSPADM

## 2019-04-15 RX ORDER — TRAZODONE HYDROCHLORIDE 100 MG/1
100 TABLET ORAL NIGHTLY PRN
Status: DISCONTINUED | OUTPATIENT
Start: 2019-04-15 | End: 2019-04-15 | Stop reason: HOSPADM

## 2019-04-15 RX ORDER — DULOXETIN HYDROCHLORIDE 30 MG/1
60 CAPSULE, DELAYED RELEASE ORAL EVERY MORNING
Status: DISCONTINUED | OUTPATIENT
Start: 2019-04-15 | End: 2019-04-15 | Stop reason: HOSPADM

## 2019-04-15 RX ADMIN — BUSPIRONE HYDROCHLORIDE 15 MG: 15 TABLET ORAL at 03:01

## 2019-04-15 RX ADMIN — TIZANIDINE 4 MG: 4 TABLET ORAL at 15:07

## 2019-04-15 RX ADMIN — BUSPIRONE HYDROCHLORIDE 15 MG: 15 TABLET ORAL at 09:55

## 2019-04-15 RX ADMIN — BUSPIRONE HYDROCHLORIDE 15 MG: 15 TABLET ORAL at 15:06

## 2019-04-15 RX ADMIN — QUETIAPINE FUMARATE 300 MG: 300 TABLET ORAL at 03:01

## 2019-04-15 RX ADMIN — DULOXETINE HYDROCHLORIDE 60 MG: 30 CAPSULE, DELAYED RELEASE ORAL at 10:59

## 2019-04-15 RX ADMIN — PREGABALIN 400 MG: 100 CAPSULE ORAL at 09:56

## 2019-04-15 RX ADMIN — IBUPROFEN 800 MG: 800 TABLET, FILM COATED ORAL at 10:59

## 2019-04-15 RX ADMIN — ACETAMINOPHEN 650 MG: 325 TABLET ORAL at 10:59

## 2019-04-15 RX ADMIN — ONDANSETRON 4 MG: 2 INJECTION INTRAMUSCULAR; INTRAVENOUS at 13:35

## 2019-04-15 RX ADMIN — DIVALPROEX SODIUM 500 MG: 500 TABLET, EXTENDED RELEASE ORAL at 22:41

## 2019-04-15 RX ADMIN — TRAZODONE HYDROCHLORIDE 150 MG: 100 TABLET ORAL at 03:01

## 2019-04-15 RX ADMIN — MOMETASONE FUROATE AND FORMOTEROL FUMARATE DIHYDRATE 2 PUFF: 200; 5 AEROSOL RESPIRATORY (INHALATION) at 22:40

## 2019-04-15 RX ADMIN — BUPRENORPHINE AND NALOXONE 2 TABLET: 2; .5 TABLET SUBLINGUAL at 09:51

## 2019-04-15 RX ADMIN — DIVALPROEX SODIUM 250 MG: 250 TABLET, FILM COATED, EXTENDED RELEASE ORAL at 09:56

## 2019-04-15 RX ADMIN — ALUMINUM HYDROXIDE, MAGNESIUM HYDROXIDE, AND SIMETHICONE 30 ML: 200; 200; 20 SUSPENSION ORAL at 15:26

## 2019-04-15 RX ADMIN — TIZANIDINE 4 MG: 4 TABLET ORAL at 09:55

## 2019-04-15 RX ADMIN — BUSPIRONE HYDROCHLORIDE 15 MG: 15 TABLET ORAL at 22:42

## 2019-04-15 RX ADMIN — TOPIRAMATE 200 MG: 200 TABLET, FILM COATED ORAL at 09:55

## 2019-04-15 RX ADMIN — TIZANIDINE 4 MG: 4 TABLET ORAL at 03:01

## 2019-04-15 RX ADMIN — PREGABALIN 400 MG: 100 CAPSULE ORAL at 03:00

## 2019-04-15 RX ADMIN — TRAZODONE HYDROCHLORIDE 150 MG: 150 TABLET ORAL at 22:53

## 2019-04-15 RX ADMIN — QUETIAPINE FUMARATE 300 MG: 300 TABLET ORAL at 22:41

## 2019-04-15 RX ADMIN — PREGABALIN 400 MG: 150 CAPSULE ORAL at 22:41

## 2019-04-15 RX ADMIN — SIMVASTATIN 40 MG: 40 TABLET, FILM COATED ORAL at 22:42

## 2019-04-15 RX ADMIN — PANTOPRAZOLE SODIUM 40 MG: 40 TABLET, DELAYED RELEASE ORAL at 09:59

## 2019-04-15 ASSESSMENT — PAIN SCALES - GENERAL
PAINLEVEL_OUTOF10: 7
PAINLEVEL_OUTOF10: 7
PAINLEVEL_OUTOF10: 9
PAINLEVEL_OUTOF10: 7
PAINLEVEL_OUTOF10: 10

## 2019-04-15 ASSESSMENT — PAIN DESCRIPTION - PAIN TYPE: TYPE: ACUTE PAIN

## 2019-04-15 ASSESSMENT — SLEEP AND FATIGUE QUESTIONNAIRES
AVERAGE NUMBER OF SLEEP HOURS: 2
SLEEP PATTERN: DIFFICULTY FALLING ASLEEP;INSOMNIA;RESTLESSNESS;DISTURBED/INTERRUPTED SLEEP
DO YOU USE A SLEEP AID: YES
DIFFICULTY STAYING ASLEEP: YES
DIFFICULTY FALLING ASLEEP: YES
RESTFUL SLEEP: NO
DO YOU HAVE DIFFICULTY SLEEPING: YES
DIFFICULTY ARISING: NO

## 2019-04-15 ASSESSMENT — LIFESTYLE VARIABLES: HISTORY_ALCOHOL_USE: NO

## 2019-04-15 NOTE — CARE COORDINATION
Ambulatory Care Coordination Note  4/15/2019  CM Risk Score: 10  Jeanie Mortality Risk Score:      ACC: Anant Guidry    Summary Note: CC met Enrique Pitt at his appointment with his PCP. He reports he has not received the prescribed Boost or a call from the supplier to date. TC to [place]. A new fax number obtained to fax the prescription to. Prescription for a replacement gel seat is requested for his scooter. Plan:  TC to [facility]. Ekta Peña reports they have gel seat cushions. She reports they will deliver the DME after they receive approval from the insurance company. CC will fax prescription when it is available. Care Coordination Interventions    Program Enrollment:  Complex Care  Referral from Primary Care Provider:  No  Suggested Interventions and Community Resources  Behavorial Health:  Completed (Comment: Shira)  Meals on Wheels:  Completed         Goals Addressed     None          Prior to Admission medications    Medication Sig Start Date End Date Taking? Authorizing Provider   Alcohol Swabs (PRO COMFORT ALCOHOL) 70 % PADS  3/30/19   Historical Provider, MD   meloxicam (MOBIC) 15 MG tablet Take 15 mg by mouth    Historical Provider, MD   busPIRone (BUSPAR) 30 MG tablet  3/25/19   Historical Provider, MD   Misc.  Devices (GEL-FOAM CUSHION) MISC 1 Units by Does not apply route daily 4/12/19   CHAD Duffy CNP   hydrOXYzine (VISTARIL) 25 MG capsule Take 1 capsule by mouth 2 times daily 4/12/19   CHAD Duffy CNP   FREESTYLE LITE strip 1 each by Other route 3 times daily 4/12/19   CHAD Duffy CNP   lidocaine (LMX) 4 % cream Apply topically 3 times daily as needed for Pain 4/12/19   CHAD Duffy CNP   pantoprazole (PROTONIX) 40 MG tablet Take 1 tablet by mouth daily 4/12/19   CHAD Duffy CNP   tiZANidine (ZANAFLEX) 4 MG tablet Take 1 tablet by mouth 3 times daily 4/12/19   CHAD Duffy CNP   ranitidine (ZANTAC) 150 MG tablet Take 1 tablet by mouth 2 times daily as needed for Heartburn 4/12/19   CHAD Issa CNP   Misc. Devices (GEL-FOAM CUSHION) MISC 1 Units by Does not apply route daily 4/12/19   CHAD Issa CNP   pregabalin (LYRICA) 200 MG capsule Take 2 capsules by mouth 2 times daily. 4/12/19 4/11/20  CHAD Issa CNP   Nutritional Supplements (BOOST) LIQD Two a day. Chocolate lactose free nutritional drinks. 4/11/19   CHAD Issa CNP   hydrOXYzine (VISTARIL) 50 MG capsule Take 50 mg by mouth nightly    Historical Provider, MD   insulin regular (HUMULIN R;NOVOLIN R) 100 UNIT/ML injection Inject 2 Units into the skin See Admin Instructions Sliding scale depending on meals 4/3/19   CHAD Issa CNP   VENTOLIN  (90 Base) MCG/ACT inhaler INHALE 2 PUFFS INTO THE LUNGS EVERY 6 HOURS AS NEEDED FOR WHEEZING 3/27/19   CHAD Issa CNP   QUEtiapine (SEROQUEL) 200 MG tablet Take 1.5 tablets by mouth nightly 3/20/19   CHAD Issa CNP   cloNIDine (CATAPRES) 0.1 MG tablet Take 0.1 mg by mouth nightly  1/24/19   Historical Provider, MD   TRUEPLUS LANCETS 33G [address] [facility]  2/8/19   Historical Provider, MD   fluticasone-vilanterol (BREO ELLIPTA) 100-25 MCG/INH AEPB inhaler Inhale 1 puff into the lungs daily 2/18/19   CHAD Issa CNP   Insulin Syringe-Needle U-100 31G X 5/16\" 0.5 ML MISC 1 each by Does not apply route 3 times daily 1/23/19   CHAD Issa CNP   ondansetron (ZOFRAN) 4 MG tablet TAKE 1 TABLET EVERY 8 HOURS AS NEEDED FOR NAUSEA OR VOMITING 1/8/19   CHAD Issa CNP   busPIRone (BUSPAR) 15 MG tablet Take 15 mg by mouth 3 times daily  8/15/18   Historical Provider, MD   cyanocobalamin 500 MCG tablet Take 500 mcg by mouth 10/26/18   Historical Provider, MD   senna (SENNA-TIME) 8.6 MG tablet twice daily as needed.   9/4/18   Historical Provider, MD   simvastatin (ZOCOR) 40 MG tablet Take 40 mg by mouth nightly     Historical Provider, MD albuterol (PROVENTIL) (2.5 MG/3ML) 0.083% nebulizer solution Take 3 mLs by nebulization every 6 hours as needed for Wheezing 12/20/18   CHAD Liang CNP   ipratropium (ATROVENT) 0.02 % nebulizer solution Take 2.5 mLs by nebulization 4 times daily as needed for Wheezing 12/20/18   CHAD Liang CNP   nicotine (NICODERM CQ) 21 MG/24HR Place 1 patch onto the skin daily as needed (if pateint smokes) 12/13/18   Barbara Randolph MD   ENULOSE 10 GM/15ML SOLN solution TAKE 15 MLS BY MOUTH 3 TIMES DAILY AS NEEDED (CONSTIPATION) 11/27/18   Cristina Kelly MD   DULoxetine (CYMBALTA) 30 MG extended release capsule TAKE 3 CAPSULES BY MOUTH DAILY  Patient taking differently: Take 60 mg by mouth every morning Takes 2 of the 60 mg every AM 11/26/18   Cristina Kelly MD   SM PAIN RELIEVER 325 MG tablet TAKE 2 TABLETS BY MOUTH EVERY 4 HOURS AS NEEDED FOR PAIN 10/5/18   Cristina Kelly MD   naloxegol (MOVANTIK) 25 MG TABS tablet Take 1 tablet by mouth every morning  Patient taking differently: Take 25 mg by mouth as needed  9/7/18   Charo Mcknight MD   divalproex (DEPAKOTE ER) 500 MG extended release tablet Take 1 tablet by mouth nightly 9/5/18   Charo Mcknight MD   divalproex (DEPAKOTE ER) 250 MG extended release tablet Take 1 tablet by mouth every morning  Patient taking differently: Take 750 mg by mouth every morning  9/5/18   Charo Mcknight MD   polyethylene glycol (GLYCOLAX) powder Take 17 g by mouth daily as needed    Historical Provider, MD   traZODone (DESYREL) 150 MG tablet Take 1 tablet by mouth nightly as needed for Sleep  Patient taking differently: Take 300 mg by mouth nightly  4/12/18   CHAD Waterman CNP   buprenorphine-naloxone (SUBOXONE) 2-0.5 MG SUBL Place 2 tablets under the tongue 2 times daily .  11/17/17   Radha Wolff MD   topiramate (TOPAMAX) 100 MG tablet Take 200 mg by mouth 2 times daily     Historical Provider, MD       Future Appointments   Date Time Provider Leora Trujillo   6/20/2019  1:00 PM [address] [facility] [place]

## 2019-04-15 NOTE — ED PROVIDER NOTES
resistant staph aureus) culture positive, Myofacial muscle pain, Osteomyelitis ([facility] [address] Peripheral neuropathy, Pressure ulcer, Schizophrenia ([facility] [address] Seizures ([facility] [address] Sleep apnea, Spinal stenosis, and Substance abuse ([facility] [address] has a past surgical history that includes Appendectomy; Stomach surgery; lumbar fusion; Septoplasty (01/13/2017); Turbinoplasties (01/13/2017); and fracture surgery (01/28/2017).     Social History     Socioeconomic History    Marital status:      Spouse name: Not on file    Number of children: Not on file    Years of education: Not on file    Highest education level: Not on file   Occupational History    Not on file   Social Needs    Financial resource strain: Not on file    Food insecurity:     Worry: Not on file     Inability: Not on file    Transportation needs:     Medical: Not on file     Non-medical: Not on file   Tobacco Use    Smoking status: Current Every Day Smoker     Packs/day: 1.00     Years: 35.00     Pack years: 35.00     Types: Cigarettes     Start date: 1/1/1983    Smokeless tobacco: Never Used    Tobacco comment: Not at the present time however   Substance and Sexual Activity    Alcohol use: No    Drug use: Yes     Types: Marijuana, Opiates      Comment: pt reported hx of drug abuse    Sexual activity: Never   Lifestyle    Physical activity:     Days per week: Not on file     Minutes per session: Not on file    Stress: Not on file   Relationships    Social connections:     Talks on phone: Not on file     Gets together: Not on file     Attends Latter-day service: Not on file     Active member of club or organization: Not on file     Attends meetings of clubs or organizations: Not on file     Relationship status: Not on file    Intimate partner violence:     Fear of current or ex partner: Not on file     Emotionally abused: Not on file     Physically abused: Not on file     Forced sexual activity: Not on file   Other Topics Concern    Not on file   Social History Narrative    Not on file       Family History   Problem Relation Age of Onset    Diabetes Mother         many family members with DM    Seizures Sister     Coronary Art Dis Maternal Uncle     Seizures Maternal Cousin        Allergies:  Sulfa antibiotics; Sulfasalazine; Bactrim; Levofloxacin; Levofloxacin; Phenobarbital; Sulfamethoxazole-trimethoprim; Sulfur; Tessalon [benzonatate]; and Wellbutrin [bupropion]    Home Medications:  Prior to Admission medications    Medication Sig Start Date End Date Taking? Authorizing Provider   busPIRone (BUSPAR) 30 MG tablet  3/25/19  Yes Historical Provider, MD   Misc. Devices (GEL-FOAM CUSHION) MISC 1 Units by Does not apply route daily 4/12/19  Yes CHAD Moreira CNP   hydrOXYzine (VISTARIL) 25 MG capsule Take 1 capsule by mouth 2 times daily 4/12/19  Yes CHAD Moreira CNP   FREESTYLE LITE strip 1 each by Other route 3 times daily 4/12/19  Yes CHAD Moreira CNP   lidocaine (LMX) 4 % cream Apply topically 3 times daily as needed for Pain 4/12/19  Yes CHAD Moreira CNP   pantoprazole (PROTONIX) 40 MG tablet Take 1 tablet by mouth daily 4/12/19  Yes CHAD Moreira CNP   tiZANidine (ZANAFLEX) 4 MG tablet Take 1 tablet by mouth 3 times daily 4/12/19  Yes CHAD Moreira CNP   ranitidine (ZANTAC) 150 MG tablet Take 1 tablet by mouth 2 times daily as needed for Heartburn 4/12/19  Yes CHAD Moreira CNP. Devices (GEL-FOAM CUSHION) MISC 1 Units by Does not apply route daily 4/12/19  Yes CHAD Moreira CNP   pregabalin (LYRICA) 200 MG capsule Take 2 capsules by mouth 2 times daily. 4/12/19 4/11/20 Yes CHAD Moreira CNP   Nutritional Supplements (BOOST) LIQD Two a day. Chocolate lactose free nutritional drinks.  4/11/19  Yes CHAD Moreira CNP   hydrOXYzine (VISTARIL) 50 MG capsule Take 50 mg by mouth nightly   Yes Historical Provider, MD   insulin regular (HUMULIN R;NOVOLIN R) 100 UNIT/ML injection Inject 2 Units into the skin See Admin Instructions Sliding scale depending on meals 4/3/19  Yes CHAD Santos CNP   VENTOLIN  (90 Base) MCG/ACT inhaler INHALE 2 PUFFS INTO THE LUNGS EVERY 6 HOURS AS NEEDED FOR WHEEZING 3/27/19  Yes CHAD Santos CNP   QUEtiapine (SEROQUEL) 200 MG tablet Take 1.5 tablets by mouth nightly 3/20/19  Yes CHAD Santos CNP   cloNIDine (CATAPRES) 0.1 MG tablet Take 0.1 mg by mouth nightly  1/24/19  Yes Historical Provider, MD   TRUEPLUS LANCETS 33G [facility]  2/8/19  Yes Historical Provider, MD   fluticasone-vilanterol (BREO ELLIPTA) 100-25 MCG/INH AEPB inhaler Inhale 1 puff into the lungs daily 2/18/19  Yes CHAD Santos CNP   Insulin Syringe-Needle U-100 31G X 5/16\" 0.5 ML MISC 1 each by Does not apply route 3 times daily 1/23/19  Yes CHAD Santos CNP   ondansetron (ZOFRAN) 4 MG tablet TAKE 1 TABLET EVERY 8 HOURS AS NEEDED FOR NAUSEA OR VOMITING 1/8/19  Yes CHAD Santos CNP   busPIRone (BUSPAR) 15 MG tablet Take 15 mg by mouth 3 times daily  8/15/18  Yes Historical Provider, MD   cyanocobalamin 500 MCG tablet Take 500 mcg by mouth 10/26/18  Yes Historical Provider, MD   simvastatin (ZOCOR) 40 MG tablet Take 40 mg by mouth nightly    Yes Historical Provider, MD   albuterol (PROVENTIL) (2.5 MG/3ML) 0.083% nebulizer solution Take 3 mLs by nebulization every 6 hours as needed for Wheezing 12/20/18  Yes CHAD Santos CNP   ipratropium (ATROVENT) 0.02 % nebulizer solution Take 2.5 mLs by nebulization 4 times daily as needed for Wheezing 12/20/18  Yes CHAD Santos CNP   nicotine (NICODERM CQ) 21 MG/24HR Place 1 patch onto the skin daily as needed (if pateint smokes) 12/13/18  Yes Yunior Jeffers MD   ENULOSE 10 GM/15ML SOLN solution TAKE 15 MLS BY MOUTH 3 TIMES DAILY AS NEEDED (CONSTIPATION) 11/27/18  Yes Kush Daley MD   naloxegol (MOVANTIK) 25 MG TABS tablet Take 1 tablet by mouth every morning  Patient taking differently: Take 25 mg by mouth as needed  9/7/18  Yes Jj Roach MD   divalproex (DEPAKOTE ER) 500 MG extended release tablet Take 1 tablet by mouth nightly 9/5/18  Yes Jj Roach MD   divalproex (DEPAKOTE ER) 250 MG extended release tablet Take 1 tablet by mouth every morning  Patient taking differently: Take 750 mg by mouth every morning  9/5/18  Yes Jj Roach MD   polyethylene glycol (GLYCOLAX) powder Take 17 g by mouth daily as needed   Yes Historical Provider, MD   traZODone (DESYREL) 150 MG tablet Take 1 tablet by mouth nightly as needed for Sleep  Patient taking differently: Take 300 mg by mouth nightly  4/12/18  Yes CHAD Concepcion CNP   topiramate (TOPAMAX) 100 MG tablet Take 200 mg by mouth 2 times daily    Yes Historical Provider, MD   Alcohol Swabs (PRO COMFORT ALCOHOL) 70 % PADS  3/30/19   Historical Provider, MD   meloxicam (MOBIC) 15 MG tablet Take 15 mg by mouth    Historical Provider, MD   senna (SENNA-TIME) 8.6 MG tablet twice daily as needed. 9/4/18   Historical Provider, MD   ibuprofen (ADVIL;MOTRIN) 800 MG tablet Take 1 tablet by mouth every 8 hours as needed for Pain 12/13/18   Kleber Domingo MD   DULoxetine (CYMBALTA) 30 MG extended release capsule TAKE 3 CAPSULES BY MOUTH DAILY  Patient taking differently: Take 60 mg by mouth every morning Takes 2 of the 60 mg every AM 11/26/18   Basilio Plascencia MD   SM PAIN RELIEVER 325 MG tablet TAKE 2 TABLETS BY MOUTH EVERY 4 HOURS AS NEEDED FOR PAIN 10/5/18   Basilio Plascencia MD   buprenorphine-naloxone (SUBOXONE) 2-0.5 MG SUBL Place 2 tablets under the tongue 2 times daily . 11/17/17   Isaac Wells MD       REVIEW OF SYSTEMS    (2-9 systems for level 4, 10 or more for level 5)      Review of Systems   Constitutional: Negative for chills and fever. HENT: Negative for congestion and sore throat. Eyes: Negative for photophobia and visual disturbance.    Respiratory: Negative for cough and shortness of breath. Cardiovascular: Negative for chest pain. Gastrointestinal: Positive for abdominal pain, nausea and vomiting. Negative for diarrhea. Genitourinary: Negative for dysuria, flank pain and hematuria. Musculoskeletal: Negative for neck pain and neck stiffness. Skin: Negative for rash and wound. Neurological: Negative for weakness, light-headedness, numbness and headaches. Psychiatric/Behavioral: Positive for sleep disturbance. The patient is hyperactive. PHYSICAL EXAM   (up to 7 for level 4, 8 or more for level 5)      INITIAL VITALS:   BP (!) 112/93   Pulse 110   Temp 98.7 °F (37.1 °C) (Oral)   Resp 18   Ht 6' 3\" (1.905 m)   Wt 140 lb (63.5 kg)   SpO2 98%   BMI 17.50 kg/m²     Physical Exam   Gen. Appearance:  Unkempt-appearing male patient, nontoxic-appearing, in no acute distress  HEENT: head atraumatic, normocephalic. Pupils equal and reactive to light. Oropharynx clear and moist.  Neck: Supple, normal range of motion. No lymphadenopathy. Pulmonary: Lungs clear to auscultation bilaterally. Equal air entry right left. Cardiovascular:  Heart sounds normal, no murmurs. Peripheral pulses strong, regular, equal.   Abdomen: Soft, nontender, nondistended. Bowel sounds normal. No palpable masses. Neurology: GCS 15. Oriented to person place and time. Normal tone and power in all 4 extremities. No sensory deficits. Skin: Warm, dry, well perfused  Psych: Fast, pressured speech; flight of ideas; energetic. He denies suicidal or homicidal ideation. Reports intermittent hallucinations.       DIFFERENTIAL  DIAGNOSIS     PLAN (LABS / IMAGING / EKG):  Orders Placed This Encounter   Procedures    CT ABDOMEN PELVIS W IV CONTRAST    CBC Auto Differential    BASIC METABOLIC PANEL    HEPATIC FUNCTION PANEL    Protime-INR    MAGNESIUM    PHOSPHORUS    TOX SCR, BLD, ED    Urine Drug Screen    Immature Platelet Fraction    SPECIMEN REJECTION    Lactic Acid, Whole Blood    PREVIOUS SPECIMEN    CBC Auto Differential    BASIC METABOLIC PANEL    Lactic Acid, Whole Blood    Inpatient consult to Psychiatry    EKG 12 Lead    PATIENT STATUS (FROM ED OR OR/PROCEDURAL) Observation       MEDICATIONS ORDERED:  Orders Placed This Encounter   Medications    promethazine (PHENERGAN) injection 25 mg    ketorolac (TORADOL) injection 30 mg    0.9 % sodium chloride bolus    iohexol (OMNIPAQUE 350) solution 75 mL    albuterol (PROVENTIL) nebulizer solution 2.5 mg    busPIRone (BUSPAR) tablet 15 mg    cloNIDine (CATAPRES) tablet 0.1 mg    divalproex (DEPAKOTE ER) extended release tablet 250 mg    divalproex (DEPAKOTE ER) extended release tablet 500 mg    DULoxetine (CYMBALTA) extended release capsule 60 mg    mometasone-formoterol (DULERA) 200-5 MCG/ACT inhaler 2 puff    hydrOXYzine (ATARAX) tablet 50 mg    ibuprofen (ADVIL;MOTRIN) tablet 800 mg    ipratropium (ATROVENT) 0.02 % nebulizer solution 0.5 mg    nicotine (NICODERM CQ) 21 MG/24HR 1 patch    pantoprazole (PROTONIX) tablet 40 mg    polyethylene glycol (GLYCOLAX) packet 17 g    pregabalin (LYRICA) capsule 400 mg    QUEtiapine (SEROQUEL) tablet 300 mg    simvastatin (ZOCOR) tablet 40 mg    tiZANidine (ZANAFLEX) tablet 4 mg    topiramate (TOPAMAX) tablet 200 mg    traZODone (DESYREL) tablet 150 mg    albuterol sulfate  (90 Base) MCG/ACT inhaler 2 puff       DDX: Small bowel obstruction, DKA, Abdominal (gastroenteritis, appendicitis, gallbladder, pancreatitis, PUD, perforation), ICH, meningitis, vertigo, hyperemesis, abnormal lytes, EtOH intoxication/ tox, post-tussive, ACS/ MI, psychogenic polydipsia    DIAGNOSTIC RESULTS / EMERGENCY DEPARTMENT COURSE / MDM     LABS:  Results for orders placed or performed during the hospital encounter of 04/14/19   CBC Auto Differential   Result Value Ref Range    WBC 22.7 (H) 3.5 - 11.3 k/uL    RBC 5.92 (H) 4.21 - 5.77 m/uL    Hemoglobin 17.1 (H) 13.0 - 17.0 g/dL Hematocrit 51.3 (H) 40.7 - 50.3 %    MCV 86.7 82.6 - 102.9 fL    MCH 28.9 25.2 - 33.5 pg    MCHC 33.3 28.4 - 34.8 g/dL    RDW 14.8 (H) 11.8 - 14.4 %    Platelets See Reflexed IPF Result 138 - 453 k/uL    MPV NOT REPORTED 8.1 - 13.5 fL    NRBC Automated 0.0 0.0 per 100 WBC    Differential Type NOT REPORTED     WBC Morphology NOT REPORTED     RBC Morphology ANISOCYTOSIS PRESENT     Platelet Estimate NOT REPORTED     Seg Neutrophils 81 (H) 36 - 65 %    Lymphocytes 11 (L) 24 - 43 %    Monocytes 7 3 - 12 %    Eosinophils % 0 (L) 1 - 4 %    Basophils 0 0 - 2 %    Immature Granulocytes 1 (H) 0 %    Segs Absolute 18.43 (H) 1.50 - 8.10 k/uL    Absolute Lymph # 2.57 1.10 - 3.70 k/uL    Absolute Mono # 1.50 (H) 0.10 - 1.20 k/uL    Absolute Eos # <0.03 0.00 - 0.44 k/uL    Basophils # 0.07 0.00 - 0.20 k/uL    Absolute Immature Granulocyte 0.11 0.00 - 0.30 k/uL   BASIC METABOLIC PANEL   Result Value Ref Range    Glucose 121 (H) 70 - 99 mg/dL    BUN 13 6 - 20 mg/dL    CREATININE 0.81 0.70 - 1.20 mg/dL    Bun/Cre Ratio NOT REPORTED 9 - 20    Calcium 10.2 8.6 - 10.4 mg/dL    Sodium 142 135 - 144 mmol/L    Potassium 3.6 (L) 3.7 - 5.3 mmol/L    Chloride 105 98 - 107 mmol/L    CO2 19 (L) 20 - 31 mmol/L    Anion Gap 18 (H) 9 - 17 mmol/L    GFR Non-African American >60 >60 mL/min    GFR African American >60 >60 mL/min    GFR Comment          GFR Staging NOT REPORTED    HEPATIC FUNCTION PANEL   Result Value Ref Range    Alb 4.6 3.5 - 5.2 g/dL    Alkaline Phosphatase 87 40 - 129 U/L    ALT 27 5 - 41 U/L    AST 25 <40 U/L    Total Bilirubin 0.32 0.3 - 1.2 mg/dL    Bilirubin, Direct 0.11 <0.31 mg/dL    Bilirubin, Indirect 0.21 0.00 - 1.00 mg/dL    Total Protein 8.2 6.4 - 8.3 g/dL    Globulin NOT REPORTED 1.5 - 3.8 g/dL    Albumin/Globulin Ratio 1.3 1.0 - 2.5   Protime-INR   Result Value Ref Range    Protime 11.0 9.0 - 12.0 sec    INR 1.0    MAGNESIUM   Result Value Ref Range    Magnesium 2.0 1.6 - 2.6 mg/dL   PHOSPHORUS   Result Value Ref Range    Phosphorus 1.7 (L) 2.5 - 4.5 mg/dL   TOX SCR, BLD, ED   Result Value Ref Range    Ethanol <10 <10 mg/dL    Ethanol percent <4.824 <3.244 %    Salicylate Lvl <1 (L) 3 - 10 mg/dL    Acetaminophen Level <5 (L) 10 - 30 ug/mL    Toxic Tricyclic Sc,Blood NEGATIVE NEGATIVE   Immature Platelet Fraction   Result Value Ref Range    Platelet, Immature Fraction  1.1 - 10.3 %    Platelet, Fluorescence Platelet clumps present, count appears adequate. 138 - 453 k/uL   SPECIMEN REJECTION   Result Value Ref Range    Specimen Source . BLOOD     Ordered Test LACWB     Reason for Rejection Unable to perform testing: Specimen clotted. - NOT REPORTED    Lactic Acid, Whole Blood   Result Value Ref Range    Lactic Acid, Whole Blood 2.4 (H) 0.7 - 2.1 mmol/L       IMPRESSION: 57year old patient presents with generalized abdominal pain, nausea, vomiting, and manic episodes symptoms. Patient is afebrile, hemodynamically stable. He has pressured speech and flight of ideas. Otherwise appropriately interactive and cooperative with interview and examination. Normal respiratory effort, lungs clear bilaterally, heart sounds normal.  Abdomen is benign, with no distention, tenderness to palpation, masses, or any other abnormality. Bowel sounds normal.  No neurological deficits. Patient's symptoms most consistent with psychogenic polydipsia. He is vomiting copious amounts of clear water and reportedly drank an entire case of water today with numerous empty water bottles found by EMS at the scene. With benign abdominal examination, very low clinical suspicion for pancreatitis, bowel obstruction, cholecystitis, diverticulitis, or any other significant intra-abdominal pathology. There is no history or evidence of head injury. Patient does not take lithium. Patient did take [place] yesterday and states that he was very euphoric afterwards and did not sleep; it is possible it was laced with another substance such as ecstasy. Plan for thorough evaluation with CBC, BMP, mag, phosphorus, hepatic function panel, lactic acid, ED tox screen, coags, JA.  EKG. Frequent reassessment. RADIOLOGY:  None    EKG  None    All EKG's are interpreted by the Emergency Department Physician who either signs or Co-signs this chart in the absence of a cardiologist.    EMERGENCY DEPARTMENT COURSE:    Laboratory investigations unremarkable for leukocytosis, mild lactic acidosis. He actually appears hemoconcentrated. We'll provide fluid bolus. Given his ongoing tachycardia and laboratory abnormalities, will obtain CT abdomen/pelvis. CT abdomen/pelvis unremarkable. Patient reassessed. He reports significant improvement of his symptoms, and is sipping on water and [place] mist with no further nausea or vomiting. I counseled him to take sips and drinks slowly. Given patient's laboratory abnormalities and an ongoing tachycardia, plan for admission to observation unit for repeat labs in the morning and reassessment. Patient will also need psychiatric evaluation tomorrow for his manic symptoms. He is open to this. 2:48 AM - patient signout to Dr. Ruth Sosa, who will assume care of this patient while he awaits bed placement. PROCEDURES:  None    CONSULTS:  IP CONSULT TO PSYCHIATRY    CRITICAL CARE:  None    FINAL IMPRESSION      1. Non-intractable vomiting with nausea, unspecified vomiting type    2. Leukocytosis, unspecified type    3.  Lactic acidosis          DISPOSITION / PLAN     DISPOSITION Admitted 04/15/2019 12:10:48 AM      PATIENT REFERRED TO:  CHAD Lebron - CNP  [address]  [address] [facility]            DISCHARGE MEDICATIONS:  New Prescriptions    No medications on file       Priyanka Rey MD  Emergency Medicine Resident    (Please note that portions of thisnote were completed with a voice recognition program.  Efforts were made to edit the dictations but occasionally words are mis-transcribed.) Kyle Eden MD  04/15/19 [id number]       Kyle Eden MD  04/15/19 [id number]

## 2019-04-15 NOTE — PROGRESS NOTES
Pt requesting to go to [facility]. Made pt aware that he was medically cleared and would be going to [facility]. After pt was made aware of plan he began to have multiple medical complaints. Resident made aware of pts complaints and felt pt was okay to for DC to [facility].

## 2019-04-15 NOTE — BH NOTE
Writer spoke to Chhaya Zelaya at [address]. Dhaval's to request original pink slip be sent to hospital.

## 2019-04-15 NOTE — CARE COORDINATION
Discussed home care services with patient. He states he would like to use [facility].

## 2019-04-15 NOTE — BH NOTE
Patient given tobacco quitline number 69307371835 at this time, refusing to call at this time, states \" I just dont want to quit now\"- patient given information as to the dangers of long term tobacco use. Continue to reinforce the importance of tobacco cessation.

## 2019-04-15 NOTE — PROGRESS NOTES
[address] [facility]  CDU / OBSERVATION eNCOUnter  Attending NOte       I performed a history and physical examination of the patient and discussed management with the resident. I reviewed the residents note and agree with the documented findings and plan of care. Any areas of disagreement are noted on the chart. I was personally present for the key portions of any procedures. I have documented in the chart those procedures where I was not present during the key portions. I have reviewed the nurses notes. I agree with the chief complaint, past medical history, past surgical history, allergies, medications, social and family history as documented unless otherwise noted below. The Family history, social history, and ROS are effectively unchanged since admission unless noted elsewhere in the chart. Social work and ED notes reviewed. Patient admitted after fairly significant drug use. Patient had elevated lactate around elevated white blood count. Patient had been throwing up. Patient has been receiving IV hydration with clearance of his lactate. We'll recheck CBC. We'll reassess from a social standpoint to begin discharge planning. Patient medically improved. Patient will need to be placed in an appropriate living situation such that he can deal with his drug issues and have a chance that good medical follow-up. Patient was evaluated by psychiatry and most with a inpatient stay. From medical standpoint I believe the patient is clear for psychiatric treatment. Ongoing symptomatology but studies here been negative and no ominous medical condition suggests itself at this time. Patient be in a supervised setting and is appropriate for transfer to [facility].     David Wilhelm MD  Attending Emergency  Physician

## 2019-04-15 NOTE — H&P
albuterol sulfate  (90 Base) MCG/ACT inhaler 2 puff Q6H PRN   busPIRone (BUSPAR) tablet 15 mg TID   QUEtiapine (SEROQUEL) tablet 300 mg Nightly   tiZANidine (ZANAFLEX) tablet 4 mg TID   acetaminophen (TYLENOL) tablet 650 mg Q6H PRN   buprenorphine-naloxone (SUBOXONE) 2-0.5 MG SL tablet 2 tablet BID   DULoxetine (CYMBALTA) extended release capsule 60 mg Daily   nicotine (NICODERM CQ) 21 MG/24HR 1 patch Daily   traZODone (DESYREL) tablet 100 mg Nightly PRN       All medication charted and reviewed. ALLERGIES     is allergic to sulfa antibiotics; sulfasalazine; bactrim; levofloxacin; levofloxacin; phenobarbital; sulfamethoxazole-trimethoprim; sulfur; tessalon [benzonatate]; and wellbutrin [bupropion]. FAMILY HISTORY     indicated that his mother is alive. He indicated that his father is alive. He indicated that the status of his sister is unknown. He indicated that the status of his maternal uncle is unknown. He indicated that the status of his maternal cousin is unknown.     family history includes Coronary Art Dis in his maternal uncle; Diabetes in his mother; Seizures in his maternal cousin and sister. The patient denies any pertinent family history. I have reviewed and agree with the family history entered. I have reviewed the Family History and it is not significant to the case    SOCIAL HISTORY      reports that he has been smoking cigarettes. He started smoking about 36 years ago. He has a 35.00 pack-year smoking history. He has never used smokeless tobacco. He reports that he has current or past drug history. Drugs: Marijuana and Opiates . He reports that he does not drink alcohol. I have reviewed and agree with all Social.  There are  concerns for substance abuse/use. PHYSICAL EXAM     INITIAL VITALS:  height is 6' 3\" (1.905 m) and weight is 180 lb (81.6 kg). His oral temperature is 98.6 °F (37 °C). His blood pressure is 95/67 and his pulse is 95.  His respiration is 18 and oxygen other components within normal limits   LACTIC ACID, WHOLE BLOOD - Abnormal; Notable for the following components:    Lactic Acid, Whole Blood 2.4 (*)     All other components within normal limits   HEPATIC FUNCTION PANEL   PROTIME-INR   MAGNESIUM   IMMATURE PLATELET FRACTION   SPECIMEN REJECTION   LACTIC ACID, WHOLE BLOOD   URINE DRUG SCREEN   PREVIOUS SPECIMEN       SCREENING TOOLS:    HEART Risk Score for Chest Pain Patients   History and Physical Exam Suspicion Level  (Nausea, Vomiting, Diaphoresis, Radiation, Exertion)   Slightly Suspicious (0 pts)   Moderately Suspicious (1 pt)   Highly Suspicious (2 pts)   EKG Interpretation   Normal (0 pts)   Non-Specific Repolarization Disturbance (1 pt)   Significant ST-Depression (2 pts)   Age of Patient (in years)   = 39 (0 pts)   46-64 (1 pt)   = 65 (2 pts)   Risk Factors   No Risk Factors (0 pts)   1-2 Risk Factors (1 pt)   = 3 Risk Factors (2 pts)   Risk Factors Include:   Hypercholesterolemia   Hypertension   Diabetes Mellitus   Cigarette smoking   Positive family history   Obesity   CAD   (SLE, CKDz, HIV, Cocaine abuse)   Troponin Levels   = Normal Limit (0 pts)   1-3 Times Normal Limit (1 pt)   > 3 Times Normal Limit (2 pts)  TOTAL:3    Percent Risk for Major Adverse Cardiac Event (MACE)  0-3 pts indicates low risk for MACE   2.5% (DISCHARGE)   4-7 pts indicates moderate risk for MACE  20.3% (OBS)  8-10 pts indicates high risk for MACE  72.7% (EARLY INVASIVE TX)    CDU IMPRESSION / Cindy Black is a 57 y.o. male who presents with    1. Acute onset persistent vomiting, likely secondary to drug abuse, resolved spontaneously. 2.  Acute onset diffuse epigastric, right lower quadrant and diffuse abdominal pain unknown etiology, treated with IV Toradol. 3. Acute elevated lactic acidosis, likely related to persistent vomiting, treated with iVF, resolved.       1.  Workup in emergency department showed no cardiopulmonary pathology, electrolyte within

## 2019-04-15 NOTE — ED PROVIDER NOTES
[facility]  Emergency Department  Emergency Medicine Resident Sign-out     Care of Violet Hancock. was assumed from Dr. Darrion Munoz and is being seen for Abdominal Pain and Emesis  . The patient's initial evaluation and plan have been discussed with the prior provider who initially evaluated the patient.      EMERGENCY DEPARTMENT COURSE / MEDICAL DECISION MAKING:       MEDICATIONS GIVEN:  Orders Placed This Encounter   Medications    promethazine (PHENERGAN) injection 25 mg    ketorolac (TORADOL) injection 30 mg    0.9 % sodium chloride bolus    iohexol (OMNIPAQUE 350) solution 75 mL    albuterol (PROVENTIL) nebulizer solution 2.5 mg    DISCONTD: busPIRone (BUSPAR) tablet 15 mg    cloNIDine (CATAPRES) tablet 0.1 mg    divalproex (DEPAKOTE ER) extended release tablet 250 mg    divalproex (DEPAKOTE ER) extended release tablet 500 mg    DULoxetine (CYMBALTA) extended release capsule 60 mg    mometasone-formoterol (DULERA) 200-5 MCG/ACT inhaler 2 puff    hydrOXYzine (ATARAX) tablet 50 mg    ibuprofen (ADVIL;MOTRIN) tablet 800 mg    ipratropium (ATROVENT) 0.02 % nebulizer solution 0.5 mg    nicotine (NICODERM CQ) 21 MG/24HR 1 patch    pantoprazole (PROTONIX) tablet 40 mg    polyethylene glycol (GLYCOLAX) packet 17 g    pregabalin (LYRICA) capsule 400 mg    DISCONTD: QUEtiapine (SEROQUEL) tablet 300 mg    simvastatin (ZOCOR) tablet 40 mg    DISCONTD: tiZANidine (ZANAFLEX) tablet 4 mg    topiramate (TOPAMAX) tablet 200 mg    traZODone (DESYREL) tablet 150 mg    albuterol sulfate  (90 Base) MCG/ACT inhaler 2 puff    busPIRone (BUSPAR) tablet 15 mg    QUEtiapine (SEROQUEL) tablet 300 mg    tiZANidine (ZANAFLEX) tablet 4 mg       LABS / RADIOLOGY:     Labs Reviewed   CBC WITH AUTO DIFFERENTIAL - Abnormal; Notable for the following components:       Result Value    WBC 22.7 (*)     RBC 5.92 (*)     Hemoglobin 17.1 (*)     Hematocrit 51.3 (*)     RDW 14.8 (*)     Seg biliary duct dilatation. Normal gallbladder. Spleen, adrenal glands, and pancreas are normal.  Kidneys are symmetric in size and enhancement. No hydronephrosis. GI/Bowel: Stomach, small bowel, and colon are nondilated. Post appendectomy. Pelvis: Urinary bladder and pelvic organs are unremarkable. No free fluid in the pelvis. Peritoneum/Retroperitoneum: Aortoiliac atherosclerotic calcification. Abdominal aorta is normal in caliber. No abdominal or retroperitoneal adenopathy. No free fluid in the abdomen. Bones/Soft Tissues: No acute osseous abnormality. No acute inflammatory process in the abdomen or pelvis. Aortoiliac atherosclerotic calcification. Abdominal aorta is normal in caliber. RECENT VITALS:     Temp: 98.7 °F (37.1 °C),  Pulse: 110, Resp: 18, BP: (!) 112/93, SpO2: 98 %    This patient is a 57 y.o. Male with no complaints of nausea, vomiting, abdominal pain. Patient reports that he smokes crack cocaine and drank a case of water and has been vomiting since then. Lab work remarkable for elevated white count, lactic acid. Patient was also noted to be tachycardic. This is attributed to the patient's crack cocaine use and persistent vomiting. CT abdomen was negative. Patient has not noted to the observation unit for repeat labs, reassessment in the morning. OUTSTANDING TASKS / RECOMMENDATIONS:    1. Awaiting bed placement, follow-up lactic acid. FINAL IMPRESSION:     1. Non-intractable vomiting with nausea, unspecified vomiting type    2. Leukocytosis, unspecified type    3.  Lactic acidosis        DISPOSITION:         DISPOSITION:  []  Discharge   []  Transfer -    [x]  Admission -      []  Against Medical Advice   []  Eloped   FOLLOW-UP: Angelina Francisco, APRN - CNP  [address]  [address]: New Prescriptions    No medications on file           Wil Hernandez MD  Emergency Medicine Resident  [address] Ольга Vargas MD  Resident  04/15/19 9985

## 2019-04-15 NOTE — BH NOTE
Writer spoke to Paris Mcbride at [address] [place] [address] to request medication list be faxed to unit.

## 2019-04-15 NOTE — ED PROVIDER NOTES
FACULTY SIGN-OUT  ADDENDUM       Patient: Nehal Parra. MRN: [id number]  PCP:  CHAD Acosta CNP  The patient's initial evaluation and plan have been discussed with the prior provider who initially evaluated the patient. Nursing Notes, Past Medical Hx, Past Surgical Hx, Social Hx, Allergies, and Family Hx were all reviewed. Pertinent Comments: The patient is a 57 y.o. male taken in signout with currently a manic episode off of his psychiatric medications and currently with nausea/running/diarrhea and found to have a white blood count of 22,000. We are awaiting CT abdomen/pelvis as well as attempted symptom control and reevaluation after but even if everything negative likely admission and observation unit given leukocytosis.       ED COURSE      The patient was given the following medications:  Orders Placed This Encounter   Medications    promethazine (PHENERGAN) injection 25 mg    ketorolac (TORADOL) injection 30 mg    0.9 % sodium chloride bolus    iohexol (OMNIPAQUE 350) solution 75 mL       RECENT VITALS:   BP: (!) 112/93  Pulse: 110  Resp: 18  Temp: 98.7 °F (37.1 °C) SpO2: 98 %    (Please note that portions of this note were completed with a voice recognition program.  Efforts were made to edit the dictations but occasionally words are mis-transcribed.)    Calli Conley MD [facility]  Attending Emergency Medicine Physician        Calli Conley MD  04/14/19 9984

## 2019-04-15 NOTE — DISCHARGE INSTR - COC
1101  No intake/output data recorded.     Safety Concerns:     [facility] Safety Concerns:696782895}    Impairments/Disabilities:      [facility] Impairments/Disabilities:265793200}    Nutrition Therapy:  Current Nutrition Therapy:   [facility] Diet List:642593035}    Routes of Feeding: {CHP DME Other Feedings:771778574}  Liquids: {Slp liquid thickness:04940}  Daily Fluid Restriction: {CHP DME Yes amt example:642638855}  Last Modified Barium Swallow with Video (Video Swallowing Test): {Done Not Done HXAI:046820379}    Treatments at the Time of Hospital Discharge:   Respiratory Treatments: ***  Oxygen Therapy:  {Therapy; copd oxygen:25640}  Ventilator:    {[facility] Vent UZZT:420736558}    Rehab Therapies: {THERAPEUTIC INTERVENTION:3467851595}  Weight Bearing Status/Restrictions: [address] [place] [address] Weight Bearin}  Other Medical Equipment (for information only, NOT a DME order):  {EQUIPMENT:310707175}  Other Treatments: ***    Patient's personal belongings (please select all that are sent with patient):  {CHP DME Belongings:033000625}    RN SIGNATURE:  {Esignature:638183797}    CASE MANAGEMENT/SOCIAL WORK SECTION    Inpatient Status Date: ***    Readmission Risk Assessment Score:  Readmission Risk              Risk of Unplanned Readmission:        23           Discharging to Facility/ Agency   · [facility], [address]        Phone: [phone]       Fax: [phone]        ·     Dialysis Facility (if applicable)   · Name:  · Address:  · Dialysis Schedule:  · Phone:  · Fax:    / signature: {Esignature:498177648}    PHYSICIAN SECTION    Prognosis: {Prognosis:3948257712}    Condition at Discharge: [facility] Patient Condition:790018862}    Rehab Potential (if transferring to Rehab): {Prognosis:2558774755}    Recommended Labs or Other Treatments After Discharge: PT/OT Eval and Treat,  Skilled Nursing for Medication Education, Aid for Mcclellan Roxo as needed, SW Consult    Physician Certification: I certify the above information and transfer of Arron Cummings.  is necessary for the continuing treatment of the diagnosis listed and that he requires {Admit to Appropriate Level of Care:90627} for {GREATER/LESS:808167763} 30 days.      Update Admission H&P: {CHP DME Changes in NFWYS:858303894}    PHYSICIAN SIGNATURE:  {Esignature:672870170}

## 2019-04-15 NOTE — FLOWSHEET NOTE
Vinny Mcintyre. Is a 57 y.o. Male who stated to  he relapsed and used drugs. Patient was very distraught. Patient was very anxious, and ashamed of himself. Patient feels he deserves God's wrath. Patient is angry at himself.  sat at patient's bedside and provided a presence and active, empathetic and compassionate listening.  asked patient if he would like prayer and patient became very emotional and stated yes. Patient expressed gratitude to   after prayer. Patient was very tearful. Chaplains remain available for spiritual and emotional support as needed. 04/14/19 7570   Encounter Summary   Services provided to: Patient   Referral/Consult From: [facility]   (4/14/19)   Complexity of Encounter Moderate   Length of Encounter 30 minutes   Spiritual Assessment Completed Yes   Routine   Type Initial   Assessment Tearful;Grieving;Fearful; Anxious; Angry   Intervention Active listening;Prayer   Outcome Acceptance;Expressed gratitude

## 2019-04-15 NOTE — BH NOTE
`[facility]  Admission Note     Admission Type:   Admission Type: Voluntary    Reason for admission:  Reason for Admission: Pt feels like everything in life is falling apart, his house, relapsed, fearful of others. Tactile hallucinations, racing thoughts, pressured speech. hopeless, worthless.      PATIENT STRENGTHS:  Strengths: Connection to output provider    Patient Strengths and Limitations:  Limitations: Hopeless about future    Addictive Behavior:   Addictive Behavior  In the past 3 months, have you felt or has someone told you that you have a problem with:  : None  Do you have a history of Chemical Use?: No  Do you have a history of Alcohol Use?: No  Do you have a history of Street Drug Abuse?: Yes  Histroy of Prescripton Drug Abuse?: No    Medical Problems:   Past Medical History:   Diagnosis Date    Anxiety     Arthritis     osteoarthritis    Bipolar disorder (HCC)     Bronchitis, chronic (HCC)     Chronic back pain     Chronic pain     Claustrophobia     COPD (chronic obstructive pulmonary disease) (HCC)     Depression     Diabetes mellitus (HCC)     Emphysema of lung (HCC)     History of irregular heartbeat     Hyperlipidemia     Liver disease     MRSA (methicillin resistant staph aureus) culture positive     Myofacial muscle pain     Osteomyelitis (HCC)     Peripheral neuropathy     bilateral feet    Pressure ulcer     Schizophrenia (HCC)     Seizures (HCC)     Sleep apnea     no machine    Spinal stenosis     Substance abuse (HCC)        Status EXAM:  Status and Exam  Normal: No  Facial Expression: Exaggerated  Affect: Unstable  Level of Consciousness: Alert  Mood:Normal: No  Mood: Depressed, Anxious, Worthless, low self-esteem, Helpless  Motor Activity:Normal: No  Motor Activity: Decreased  Interview Behavior: Cooperative  Preception: Boomer to Person, Boomer to Time, Boomer to Place, Boomer to Situation  Attention:Normal: No  Attention: Hyperalert, Distractible, Unable to Concentrate  Thought Processes: Flt.of Ideas  Thought Content:Normal: No  Thought Content: Paranoia, Preoccupations  Hallucinations: Auditory (Comment), Visual (Comment), Tactile (Comment)  Delusions: No  Memory:Normal: No  Memory: Poor Recent, Poor Remote, Confabulation  Insight and Judgment: No  Insight and Judgment: Poor Judgment, Poor Insight  Present Suicidal Ideation: No  Present Homicidal Ideation: No    Tobacco Screening:  Practical Counseling, on admission, rachel X, if applicable and completed (first 3 are required if patient doesn't refuse):            ( )  Recognizing danger situations (included triggers and roadblocks)                    ( )  Coping skills (new ways to manage stress, exercise, relaxation techniques, changing routine, distraction)                                                           ( )  Basic information about quitting (benefits of quitting, techniques in how to quit, available resources  ( ) Referral for counseling faxed to [facility]                                           ( ) Patient refused counseling  ( ) Patient has not smoked in the last 30 days    Metabolic Screening:    Lab Results   Component Value Date    LABA1C 5.3 04/12/2019       Lab Results   Component Value Date    CHOL 119 12/07/2017    CHOL 128 01/20/2017     Lab Results   Component Value Date    TRIG 128 12/07/2017    TRIG 70 01/20/2017     Lab Results   Component Value Date    HDL 32 (L) 12/07/2017    HDL 39 (L) 01/20/2017     No components found for: LDLCAL  No results found for: LABVLDL      Body mass index is 22.5 kg/m². BP Readings from Last 2 Encounters:   04/15/19 96/67   04/15/19 100/65           Pt admitted with followings belongings:  Dentures: None  Vision - Corrective Lenses: Glasses  Hearing Aid: None  Jewelry: Necklace  Body Piercings Removed: N/A  Clothing:  Footwear, Jacket / coat, Pants, Shirt, Undergarments (Comment), Socks  Were All Patient Medications Collected?: Not Applicable  Other Valuables: Cell phone, [employer] [address], [address], Money (Comment)(phone, , $1 in [place], wallet)     Valuables sent to safe. Valuables placed in safe in security envelope, number:  [id number]. Patient's home medications were na. Patient oriented to surroundings and program expectations and copy of patient rights given. Received admission packet:  yes. Consents reviewed, signed yes. Refused no. Patient verbalize understanding:  yes.     Patient education on precautions: yes                   Sharda Hu RN

## 2019-04-15 NOTE — CARE COORDINATION
Ambulatory Care Coordination Note  4/15/2019  CM Risk Score: 10  Jeanie Mortality Risk Score:      ACC: [facility]    Summary Note: It was difficult to communicate with Florina Lovelace. He talked over CC through out the encounter. Plan:  Meet Florina Lovelace at his appointment on 4.12.19. Plan to make sure he has the community resources he needs. He is well connected with the [facility]. CC will plan to discontinue care coordination after community resources are confirmed and defer to behavioral health. Ambulatory Care Coordination Assessment    Care Coordination Protocol  Program Enrollment:  Complex Care  Referral from Primary Care Provider:  No  Week 1 - Initial Assessment     Do you have all of your prescriptions and are they filled?:  Yes  Barriers to medication adherence:  None  Are you able to afford your medications?:  Yes  How often do you have trouble taking your medications the way you have been told to take them?:  I always take them as prescribed. Do you have Home O2 Therapy?:  Yes   Oxygen Regimen: At night/Sleep Flow - Enter rate/FIO2:  2   Method of Delivery:  Nasal Cannula, Mask      Ability to seek help/take action for Emergent Urgent situations i.e. fire, crime, inclement weather or health crisis. :  Independent  Ability to ambulate to restroom:  Independent  Ability handle personal hygeine needs (bathing/dressing/grooming): Independent  Ability to manage Medications: Independent  Ability to prepare Food Preparation:  Needs Assistance  Ability to maintain home (clean home, laundry):  Needs Assistance  Ability to drive and/or has transportation:  Dependent  Ability to do shopping:  Needs Assistance  Ability to manage finances: Independent  Is patient able to live independently?:  Yes     Current Housing:  Apartment        Per the Fall Risk Screening, did the patient have 2 or more falls or 1 fall with injury in the past year?:  Yes  How often do you think you are about to fall and you do NOT fall?  For example, you grab something to stabilize yourself or hold onto a wall/furniture?:  Frequently  Use of a Mobility Aid:  Yes (Comment: w/c)  Difficulty walking/impaired gait:  Yes (Comment: R leg weakness)  Issues with feet or shoes like numbness, edema, shoes not fitting:  Yes  Changes in vision, poor vision or poor lighting in environment:  Yes (Comment: does not have glasses)  Dizziness:  Yes  Other Fall Risk:  No     Frequent urination at night?:  No  Do you use rails/bars?:  No  Do you have a non-slip tub mat?:  No     Are you experiencing loss of meaning?:  Yes  Are you experiencing loss of hope and peace?:  Yes     Thinking about your patient's physical health needs, are there any symptoms or problems (risk indicators) you are unsure about that require further investigation?:  Moderate to severe symptoms or problems that impact on daily life   Are the patients physical health problems impacting on their mental well-being?:  Severe impact upon mental well-being and preventing engagement with usual activities   Are there any problems with your patients lifestyle behaviors (alcohol, drugs, diet, exercise) that are impacting on physical or mental well-being?:  Moderate to severe impact on well-being, preventing enjoyment of usual activities   Do you have any other concerns about your patients mental well-being?  How would you rate their severity and impact on the patient?:  Severe problems impairing most daily functions   How would you rate their home environment in terms of safety and stability (including domestic violence, insecure housing, neighbor harassment)?:  Safety/stability questionable   How do daily activities impact on the patient's well-being? (include current or anticipated unemployment, work, caregiving, access to transportation or other):  Severe impact on poor mental well-being   How would you rate their social network (family, work, friends)?:  Little participation, lonely and socially isolated How would you rate their financial resources (including ability to afford all required medical care)?:  Financially insecure, very few resources, immediate challenges   How wells does the patient now understand their health and well-being (symptoms, signs or risk factors) and what they need to do to manage their health?:  Little understanding which impacts on their ability to undertake better management   How well do you think your patient can engage in healthcare discussions? (Barriers include language, deafness, aphasia, alcohol or drug problems, learning difficulties, concentration):  Some difficulties in communication with or without moderate barriers   Do other services need to be involved to help this patient?:  Other care/services in place and adequate   Are current services involved with this patient well-coordinated? (Include coordination with other services you are now recommendation):  Required care/services in place with some coordination barriers   Suggested Interventions and Community Resources  Behavioral Health:  Completed (Comment: Shira)     Meals on Wheels:  Completed                  Prior to Admission medications    Medication Sig Start Date End Date Taking? Authorizing Provider   Nutritional Supplements (BOOST) LIQD Two a day. Chocolate lactose free nutritional drinks.  4/11/19  Yes CHAD Hernández CNP   hydrOXYzine (VISTARIL) 50 MG capsule Take 50 mg by mouth nightly   Yes Historical Provider, MD   insulin regular (HUMULIN R;NOVOLIN R) 100 UNIT/ML injection Inject 2 Units into the skin See Admin Instructions Sliding scale depending on meals 4/3/19  Yes CHAD Hernández CNP   VENTOLIN  (90 Base) MCG/ACT inhaler INHALE 2 PUFFS INTO THE LUNGS EVERY 6 HOURS AS NEEDED FOR WHEEZING 3/27/19  Yes CHAD Hernández CNP   QUEtiapine (SEROQUEL) 200 MG tablet Take 1.5 tablets by mouth nightly 3/20/19  Yes CHAD Hernández CNP   cloNIDine (CATAPRES) 0.1 MG tablet Take 0.1 mg by mouth nightly  1/24/19  Yes Historical Provider, MD   TRUEPLUS LANCETS 33G [address] [facility]  2/8/19  Yes Historical Provider, MD   fluticasone-vilanterol (BREO ELLIPTA) 100-25 MCG/INH AEPB inhaler Inhale 1 puff into the lungs daily 2/18/19  Yes CHAD Dillard CNP   Insulin Syringe-Needle U-100 31G X 5/16\" 0.5 ML MISC 1 each by Does not apply route 3 times daily 1/23/19  Yes CHAD Dillard CNP   busPIRone (BUSPAR) 15 MG tablet Take 15 mg by mouth 3 times daily  8/15/18  Yes Historical Provider, MD   senna (SENNA-TIME) 8.6 MG tablet twice daily as needed.   9/4/18  Yes Historical Provider, MD   simvastatin (ZOCOR) 40 MG tablet Take 40 mg by mouth nightly    Yes Historical Provider, MD   albuterol (PROVENTIL) (2.5 MG/3ML) 0.083% nebulizer solution Take 3 mLs by nebulization every 6 hours as needed for Wheezing 12/20/18  Yes CHAD Dillard CNP   ENULOSE 10 GM/15ML SOLN solution TAKE 15 MLS BY MOUTH 3 TIMES DAILY AS NEEDED (CONSTIPATION) 11/27/18  Yes Ganesh Pena MD   DULoxetine (CYMBALTA) 30 MG extended release capsule TAKE 3 CAPSULES BY MOUTH DAILY  Patient taking differently: Take 60 mg by mouth every morning Takes 2 of the 60 mg every AM 11/26/18  Yes Ganesh Pena MD   SM PAIN RELIEVER 325 MG tablet TAKE 2 TABLETS BY MOUTH EVERY 4 HOURS AS NEEDED FOR PAIN 10/5/18  Yes Ganesh Pena MD   naloxegol (MOVANTIK) 25 MG TABS tablet Take 1 tablet by mouth every morning  Patient taking differently: Take 25 mg by mouth as needed  9/7/18  Yes James Bautista MD   divalproex (DEPAKOTE ER) 500 MG extended release tablet Take 1 tablet by mouth nightly 9/5/18  Yes James Bautista MD   divalproex (DEPAKOTE ER) 250 MG extended release tablet Take 1 tablet by mouth every morning  Patient taking differently: Take 750 mg by mouth every morning  9/5/18  Yes James Bautista MD   polyethylene glycol (GLYCOLAX) powder Take 17 g by mouth daily as needed   Yes Historical Provider, MD   topiramate (TOPAMAX) 100 MG tablet Take 200 mg by mouth 2 times daily    Yes Historical Provider, MD   Alcohol Swabs (PRO COMFORT ALCOHOL) 70 % PADS  3/30/19   Historical Provider, MD   meloxicam (MOBIC) 15 MG tablet Take 15 mg by mouth    Historical Provider, MD   busPIRone (BUSPAR) 30 MG tablet  3/25/19   Historical Provider, MD   Misc. Devices (GEL-FOAM CUSHION) MISC 1 Units by Does not apply route daily 4/12/19   CHAD Diana CNP   hydrOXYzine (VISTARIL) 25 MG capsule Take 1 capsule by mouth 2 times daily 4/12/19   CHAD Diana CNP   FREESTYLE LITE strip 1 each by Other route 3 times daily 4/12/19   CHAD Diana CNP   lidocaine (LMX) 4 % cream Apply topically 3 times daily as needed for Pain 4/12/19   CHAD Diana CNP   pantoprazole (PROTONIX) 40 MG tablet Take 1 tablet by mouth daily 4/12/19   CHAD Diana CNP   tiZANidine (ZANAFLEX) 4 MG tablet Take 1 tablet by mouth 3 times daily 4/12/19   CHAD Diana CNP   ranitidine (ZANTAC) 150 MG tablet Take 1 tablet by mouth 2 times daily as needed for Heartburn 4/12/19   CHAD Diana CNP   Misc. Devices (GEL-FOAM CUSHION) MISC 1 Units by Does not apply route daily 4/12/19   CHAD Diana CNP   pregabalin (LYRICA) 200 MG capsule Take 2 capsules by mouth 2 times daily.  4/12/19 4/11/20  CHAD Diana CNP   ondansetron (ZOFRAN) 4 MG tablet TAKE 1 TABLET EVERY 8 HOURS AS NEEDED FOR NAUSEA OR VOMITING 1/8/19   CHAD Diana CNP   cyanocobalamin 500 MCG tablet Take 500 mcg by mouth 10/26/18   Historical Provider, MD   ipratropium (ATROVENT) 0.02 % nebulizer solution Take 2.5 mLs by nebulization 4 times daily as needed for Wheezing 12/20/18   CHAD Diana CNP   nicotine (NICODERM CQ) 21 MG/24HR Place 1 patch onto the skin daily as needed (if pateint smokes) 12/13/18   Anne Pat MD   traZODone (DESYREL) 150 MG tablet Take 1 tablet by mouth nightly as needed for Sleep  Patient

## 2019-04-15 NOTE — CARE COORDINATION
Case Management Initial Discharge Plan  Anabel Sanchez,             Met with:patient to discuss discharge plans. Information verified: address, contacts, phone number, , insurance Yes  PCP: CHAD Gutierrez - CNP  Date of last visit: \"Friday\"    Insurance Provider: Shiva    Discharge Planning    Living Arrangements:  Alone, Friends   Support Systems:  Friends/Neighbors    Home has 2 stories  Ramp -  stairs to climb to get into front door, ramp - stairs to climb to reach second floor  Location of bedroom/bathroom in home - main floor living    Patient able to perform ADL's:Independent    Current Services (outpatient & in home)  None  DME equipment: Hospital Bed, electric wheel chair  DME provider: RAMONITA    Pharmacy: [facility]. Medications:  Yes  Does patient want to participate in local refill/ meds to beds program?  Yes    Potential Assistance Needed:  Durable Medical Equipment, Home Care    Patient agreeable to home care: Yes  Freedom of choice provided:  yes    Prior SNF/Rehab Placement and Facility: No  Agreeable to SNF/Rehab: No  [place] of choice provided: n/a   Evaluation: yes - already begun in ED    Expected Discharge date:  04/15/19  Patient expects to be discharged to:  home  Follow Up Appointment: Best Day/ Time: Monday PM    Transportation provider: Patient needs transportation  Transportation arrangements needed for discharge: Yes    Readmission Risk              Risk of Unplanned Readmission:        23             Does patient have a readmission risk score greater than 14?: Yes  If yes, follow-up appointment must be made within 7 days of discharge. Discharge Plan: Home with [facility] and transportation assistance.           Electronically signed by Mariposa Keane RN on 4/15/19 at 8:30 AM

## 2019-04-15 NOTE — VIRTUAL HEALTH
Inpatient consult to Psychiatry  Consult performed by: CHAD Weaver - CNP  Consult ordered by: Elias Palomo MD  Reason for consult: depression/SI   Assessment/Recommendations: 1.) Increase the Seroquel to 400 mg PO Qhs for irritability/manic symptoms. Add Seroquel XR 50 mg PO Q AM.  2.) May consult daily as needed for med changes. 3.) Consider discontinuing Vistaril (has strong anticholinergic effect and may be the cause of his dry mouth, excessive thirst and urinary retention). 4.) If still exhibiting manic like behaviors at the time he is eligible for medical discharge; consult for need of inpatient psych placement. 5.) continue all other psych medications as currently prescribed. Patient Location:  [address] Med Surg    Provider Location (City/State):   [place]     This virtual visit was conducted via interactive/real-time audio/video. Department of Psychiatry  Consult Service  Attending Physician Psychiatric Assessment      Thank you very much for allowing us to participate in the care of this patient. Reason for Consult:  Depression/SI       History obtained from:  patient, electronic medical record    HISTORY OF PRESENT ILLNESS:          The patient is a 57 y.o. male with significant past medical history of seizures, hyperlipidemia, substance abuse, schizophrenia, bipolar who is admitted medically for treatment of vomiting. Era Dave reports that he has been dealing with depression for a while. He states that \"everything has been falling apart recently, my house is falling apart, I recently relapsed because I was in so much fucking pain I couldn't stand it no more\". He reports that his living conditions are a big stressor; he reports having mice, roaches and bed bugs. He reports that he feels like others are always taking advantage of him and out to get him.  He states that it has gotten to the point where he has stopped leaving the house because he is fearful of others. He endorses tactile hallucinations. He reports having worsening depression which he rates a 9/10, irritability 10/10, trouble sleeping, decreased appetite, racing thoughts, pressured speech, increased goal directed behaviors. The patient reports that he woke up yesterday and started sweeping the whole house with a broken broom \"that's how I knew I was manic\". He reports hopelessness and feelings of worthlessness. He reports having passive SI but denies intent to act on these thoughts. He is strongly against inpatient psych but is willing to make changes to his current medications.          Current Outpatient Psychiatric Medications:  Buspar, Vistaril, Cymbalta, depakote, trazodone     Medications:    Current Facility-Administered Medications: albuterol (PROVENTIL) nebulizer solution 2.5 mg, 2.5 mg, Nebulization, Q6H PRN  cloNIDine (CATAPRES) tablet 0.1 mg, 0.1 mg, Oral, Nightly  divalproex (DEPAKOTE ER) extended release tablet 250 mg, 250 mg, Oral, QAM  divalproex (DEPAKOTE ER) extended release tablet 500 mg, 500 mg, Oral, Nightly  DULoxetine (CYMBALTA) extended release capsule 60 mg, 60 mg, Oral, QAM  mometasone-formoterol (DULERA) 200-5 MCG/ACT inhaler 2 puff, 2 puff, Inhalation, BID  hydrOXYzine (ATARAX) tablet 50 mg, 50 mg, Oral, Nightly  ibuprofen (ADVIL;MOTRIN) tablet 800 mg, 800 mg, Oral, Q8H PRN  ipratropium (ATROVENT) 0.02 % nebulizer solution 0.5 mg, 0.5 mg, Nebulization, 4x Daily PRN  nicotine (NICODERM CQ) 21 MG/24HR 1 patch, 1 patch, Transdermal, Daily PRN  pantoprazole (PROTONIX) tablet 40 mg, 40 mg, Oral, Daily  polyethylene glycol (GLYCOLAX) packet 17 g, 17 g, Oral, Daily  pregabalin (LYRICA) capsule 400 mg, 400 mg, Oral, BID  simvastatin (ZOCOR) tablet 40 mg, 40 mg, Oral, Nightly  topiramate (TOPAMAX) tablet 200 mg, 200 mg, Oral, BID  traZODone (DESYREL) tablet 150 mg, 150 mg, Oral, Nightly PRN  albuterol sulfate  (90 Base) MCG/ACT inhaler 2 puff, 2 puff, Inhalation, Q6H PRN  busPIRone (BUSPAR) tablet 15 mg, 15 mg, Oral, TID  QUEtiapine (SEROQUEL) tablet 300 mg, 300 mg, Oral, Nightly  tiZANidine (ZANAFLEX) tablet 4 mg, 4 mg, Oral, TID  acetaminophen (TYLENOL) tablet 650 mg, 650 mg, Oral, Q6H PRN  buprenorphine-naloxone (SUBOXONE) 2-0.5 MG SL tablet 2 tablet, 2 tablet, Sublingual, BID  DULoxetine (CYMBALTA) extended release capsule 60 mg, 60 mg, Oral, Daily  nicotine (NICODERM CQ) 21 MG/24HR 1 patch, 1 patch, Transdermal, Daily  traZODone (DESYREL) tablet 100 mg, 100 mg, Oral, Nightly PRN       PAST PSYCHIATRIC HISTORY:  Treatment at [facility], schizophrenia/bipolar disorder     Past psychiatric medications include: Trazodone; Ativan    Adverse reactions from psychotropic medications:  Dry mouth    Lifetime Psychiatric Review of Systems:     [place]: endorses   Panic: endorses  Phobia: endorses  Hallucinations: tactile  Delusions: paranoid     Past Medical History:        Diagnosis Date    Anxiety     Arthritis     osteoarthritis    Bipolar disorder (HCC)     Bronchitis, chronic (HCC)     Chronic back pain     Chronic pain     Claustrophobia     COPD (chronic obstructive pulmonary disease) ([address]     Depression     Diabetes mellitus ([address]     Emphysema of lung ([address]     History of irregular heartbeat     Hyperlipidemia     Liver disease     MRSA (methicillin resistant staph aureus) culture positive     Myofacial muscle pain     Osteomyelitis ([address]     Peripheral neuropathy     bilateral feet    Pressure ulcer     Schizophrenia ([address]     Seizures ([address]     Sleep apnea     no machine    Spinal stenosis     Substance abuse ([address]        Past Surgical History:        Procedure Laterality Date    APPENDECTOMY      FRACTURE SURGERY  01/28/2017    closed reduction nasal fracture    LUMBAR FUSION      SEPTOPLASTY  01/13/2017    STOMACH SURGERY      TURBINOPLASTIES  01/13/2017       Allergies: Sulfa antibiotics; Sulfasalazine; Bactrim; Levofloxacin;  Levofloxacin; Phenobarbital; Sulfamethoxazole-trimethoprim;  Sulfur; Tessalon [benzonatate]; and Wellbutrin [bupropion]      Social History:  Lives at home alone; history of substance abuse opiates; recently used white china/cocaine  Social History     Socioeconomic History    Marital status:      Spouse name: None    Number of children: None    Years of education: None    Highest education level: None   Occupational History    None   Social Needs    Financial resource strain: None    Food insecurity:     Worry: None     Inability: None    Transportation needs:     Medical: None     Non-medical: None   Tobacco Use    Smoking status: Current Every Day Smoker     Packs/day: 1.00     Years: 35.00     Pack years: 35.00     Types: Cigarettes     Start date: 1/1/1983    Smokeless tobacco: Never Used    Tobacco comment: Not at the present time however   Substance and Sexual Activity    Alcohol use: No    Drug use: Yes     Types: Marijuana, Opiates      Comment: pt reported hx of drug abuse    Sexual activity: Never   Lifestyle    Physical activity:     Days per week: None     Minutes per session: None    Stress: None   Relationships    Social connections:     Talks on phone: None     Gets together: None     Attends Yazdanism service: None     Active member of club or organization: None     Attends meetings of clubs or organizations: None     Relationship status: None    Intimate partner violence:     Fear of current or ex partner: None     Emotionally abused: None     Physically abused: None     Forced sexual activity: None   Other Topics Concern    None   Social History Narrative    None       Family Psychiatric History:  Bipolar- mother, sister, Schizophrenia- mother; substance abuse- mother     Family History:       Problem Relation Age of Onset    Diabetes Mother         many family members with DM    Seizures Sister     Coronary Art Dis Maternal Uncle     Seizures Maternal Cousin          Physical  BP mental status R41.82    Decubitus ulcer of coccyx, stage 2 L89.152    Cocaine substance abuse ([facility]) F14.10    Opiate abuse, episodic ([facility]) F11.10    Closed fracture of nasal bone S02. 2XXA    Drug overdose I89.041F    Uncomplicated opioid dependence ([address] F11.20    Paraplegia ([address] G82.20    Schizoaffective disorder ([address] F25.9    Non-dose-related adverse reaction to medication wellbutrin T88. 7XXA    Cannabis abuse F12.10    Encephalopathy G93.40    Hyperammonemia ([facility]) E72.20    Constipation K59.00    Cellulitis and abscess of right leg L03.115, L02.415    Interstitial lung disease ([facility]) J84.9    Vomiting R11.10         PLAN:      1.) Increase the Seroquel to 400 mg PO Qhs for irritability/manic symptoms. Add Seroquel XR 50 mg PO Q AM.  2.) May consult daily as needed for med changes. 3.) Consider discontinuing Vistaril (has strong anticholinergic effect and may be the cause of his dry mouth, excessive thirst and urinary retention). 4.) If still exhibiting manic like behaviors at the time he is eligible for medical discharge; consult for need of inpatient psych placement. 5.) continue all other psych medications as currently prescribed. Thank you very much for allowing us to participate in the care of this patient. Time spent > 60 min. Physicians Signature:  Electronically signed by CHAD Davis CNP on 4/15/2019 at 10:10 AM.    Dragon voice recognition software used in portions of this document.

## 2019-04-15 NOTE — ED NOTES
The patient is a 57year old male that came to the ER today due to Abdominal pain. The patient presents to  with paranoid thoughts, flight of ideas and some pressured speech. Patient reports that he has had issues with access to his psychiatric medications and recently relapsed on [address] and Crack Cocaine. Patient reports that his doctor at [facility] is now prescribing his psychiatric medications and has sent him a prescription for his Trazodone. Patient complains of not being able to sleep the past 3 days due to lack of medications. Patient reports that he took the [place] because he was in so much pain. Patient then began to discuss his living conditions. Patient reports that he has not had hot water in months, his front doors have no lock, he is living in dirt with cockroaches and other bugs in his house. Patient has a significant history of Schizophrenia, PTSD and depression, but is usually able to manage on him self as evidence by little inpatient psychiatric history. SW was consulted due to a concern about the patient being able to take care of himself. Patient does appear to have flight of ideas, pressured speech and drinking large amounts of water. Patient is denying being in any acute psychiatric crisis or suicidal or homicidal thoughts or plans. SW concerned about patient having insight into mental health or excessive water drinking.       Gisela Pan 56  04/14/19 [id number]

## 2019-04-15 NOTE — ED NOTES
Bed: 17  Expected date: 4/14/19  Expected time: 8:34 PM  Means of arrival: Ambulance  Comments:  47yo M vomiting, abd pain      Nancy Ramos, GILBERT  04/14/19 2047

## 2019-04-16 ENCOUNTER — APPOINTMENT (OUTPATIENT)
Dept: GENERAL RADIOLOGY | Age: 49
DRG: 751 | End: 2019-04-16
Attending: PSYCHIATRY & NEUROLOGY
Payer: COMMERCIAL

## 2019-04-16 LAB
ABSOLUTE EOS #: 0.4 K/UL (ref 0–0.4)
ABSOLUTE IMMATURE GRANULOCYTE: ABNORMAL K/UL (ref 0–0.3)
ABSOLUTE LYMPH #: 3.6 K/UL (ref 1–4.8)
ABSOLUTE MONO #: 0.9 K/UL (ref 0.1–1.3)
ALBUMIN SERPL-MCNC: 3.4 G/DL (ref 3.5–5.2)
ALBUMIN/GLOBULIN RATIO: ABNORMAL (ref 1–2.5)
ALP BLD-CCNC: 76 U/L (ref 40–129)
ALT SERPL-CCNC: 21 U/L (ref 5–41)
ANION GAP SERPL CALCULATED.3IONS-SCNC: 10 MMOL/L (ref 9–17)
AST SERPL-CCNC: 26 U/L
BASOPHILS # BLD: 1 % (ref 0–2)
BASOPHILS ABSOLUTE: 0.1 K/UL (ref 0–0.2)
BILIRUB SERPL-MCNC: <0.15 MG/DL (ref 0.3–1.2)
BUN BLDV-MCNC: 9 MG/DL (ref 6–20)
BUN/CREAT BLD: ABNORMAL (ref 9–20)
CALCIUM SERPL-MCNC: 8.9 MG/DL (ref 8.6–10.4)
CHLORIDE BLD-SCNC: 107 MMOL/L (ref 98–107)
CHOLESTEROL/HDL RATIO: 4.3
CHOLESTEROL: 137 MG/DL
CO2: 25 MMOL/L (ref 20–31)
CREAT SERPL-MCNC: 0.8 MG/DL (ref 0.7–1.2)
DIFFERENTIAL TYPE: ABNORMAL
EOSINOPHILS RELATIVE PERCENT: 4 % (ref 0–4)
ESTIMATED AVERAGE GLUCOSE: 105 MG/DL
GFR AFRICAN AMERICAN: >60 ML/MIN
GFR NON-AFRICAN AMERICAN: >60 ML/MIN
GFR SERPL CREATININE-BSD FRML MDRD: ABNORMAL ML/MIN/{1.73_M2}
GFR SERPL CREATININE-BSD FRML MDRD: ABNORMAL ML/MIN/{1.73_M2}
GLUCOSE BLD-MCNC: 111 MG/DL (ref 70–99)
GLUCOSE BLD-MCNC: 123 MG/DL (ref 75–110)
GLUCOSE BLD-MCNC: 124 MG/DL (ref 75–110)
GLUCOSE BLD-MCNC: 182 MG/DL (ref 75–110)
GLUCOSE BLD-MCNC: 91 MG/DL (ref 75–110)
HBA1C MFR BLD: 5.3 % (ref 4–6)
HCT VFR BLD CALC: 46.4 % (ref 41–53)
HDLC SERPL-MCNC: 32 MG/DL
HEMOGLOBIN: 15.3 G/DL (ref 13.5–17.5)
IMMATURE GRANULOCYTES: ABNORMAL %
LDL CHOLESTEROL: 73 MG/DL (ref 0–130)
LYMPHOCYTES # BLD: 40 % (ref 24–44)
MCH RBC QN AUTO: 28.7 PG (ref 26–34)
MCHC RBC AUTO-ENTMCNC: 33 G/DL (ref 31–37)
MCV RBC AUTO: 86.9 FL (ref 80–100)
MONOCYTES # BLD: 10 % (ref 1–7)
NRBC AUTOMATED: ABNORMAL PER 100 WBC
PDW BLD-RTO: 15.5 % (ref 11.5–14.9)
PLATELET # BLD: 219 K/UL (ref 150–450)
PLATELET ESTIMATE: ABNORMAL
PMV BLD AUTO: 8.4 FL (ref 6–12)
POTASSIUM SERPL-SCNC: 3.6 MMOL/L (ref 3.7–5.3)
RBC # BLD: 5.34 M/UL (ref 4.5–5.9)
RBC # BLD: ABNORMAL 10*6/UL
SEG NEUTROPHILS: 45 % (ref 36–66)
SEGMENTED NEUTROPHILS ABSOLUTE COUNT: 4.1 K/UL (ref 1.3–9.1)
SODIUM BLD-SCNC: 142 MMOL/L (ref 135–144)
THYROXINE, FREE: 1 NG/DL (ref 0.93–1.7)
TOTAL PROTEIN: 6.3 G/DL (ref 6.4–8.3)
TRIGL SERPL-MCNC: 162 MG/DL
TSH SERPL DL<=0.05 MIU/L-ACNC: 0.76 MIU/L (ref 0.3–5)
VLDLC SERPL CALC-MCNC: ABNORMAL MG/DL (ref 1–30)
WBC # BLD: 9 K/UL (ref 3.5–11)
WBC # BLD: ABNORMAL 10*3/UL

## 2019-04-16 PROCEDURE — 84443 ASSAY THYROID STIM HORMONE: CPT

## 2019-04-16 PROCEDURE — 80061 LIPID PANEL: CPT

## 2019-04-16 PROCEDURE — 36415 COLL VENOUS BLD VENIPUNCTURE: CPT

## 2019-04-16 PROCEDURE — 6360000002 HC RX W HCPCS: Performed by: PSYCHIATRY & NEUROLOGY

## 2019-04-16 PROCEDURE — 99255 IP/OBS CONSLTJ NEW/EST HI 80: CPT | Performed by: INTERNAL MEDICINE

## 2019-04-16 PROCEDURE — 83036 HEMOGLOBIN GLYCOSYLATED A1C: CPT

## 2019-04-16 PROCEDURE — 80053 COMPREHEN METABOLIC PANEL: CPT

## 2019-04-16 PROCEDURE — 84439 ASSAY OF FREE THYROXINE: CPT

## 2019-04-16 PROCEDURE — 6370000000 HC RX 637 (ALT 250 FOR IP): Performed by: NURSE PRACTITIONER

## 2019-04-16 PROCEDURE — 97530 THERAPEUTIC ACTIVITIES: CPT

## 2019-04-16 PROCEDURE — 73502 X-RAY EXAM HIP UNI 2-3 VIEWS: CPT

## 2019-04-16 PROCEDURE — 97162 PT EVAL MOD COMPLEX 30 MIN: CPT

## 2019-04-16 PROCEDURE — 90792 PSYCH DIAG EVAL W/MED SRVCS: CPT | Performed by: NURSE PRACTITIONER

## 2019-04-16 PROCEDURE — 6370000000 HC RX 637 (ALT 250 FOR IP): Performed by: PSYCHIATRY & NEUROLOGY

## 2019-04-16 PROCEDURE — 1240000000 HC EMOTIONAL WELLNESS R&B

## 2019-04-16 PROCEDURE — 85025 COMPLETE CBC W/AUTO DIFF WBC: CPT

## 2019-04-16 RX ORDER — BUSPIRONE HYDROCHLORIDE 10 MG/1
10 TABLET ORAL 3 TIMES DAILY
Status: DISCONTINUED | OUTPATIENT
Start: 2019-04-16 | End: 2019-04-20

## 2019-04-16 RX ORDER — BUSPIRONE HYDROCHLORIDE 5 MG/1
5 TABLET ORAL 3 TIMES DAILY
Status: DISCONTINUED | OUTPATIENT
Start: 2019-04-16 | End: 2019-04-20

## 2019-04-16 RX ADMIN — HALOPERIDOL LACTATE 5 MG: 5 INJECTION INTRAMUSCULAR at 13:46

## 2019-04-16 RX ADMIN — MOMETASONE FUROATE AND FORMOTEROL FUMARATE DIHYDRATE 2 PUFF: 200; 5 AEROSOL RESPIRATORY (INHALATION) at 20:37

## 2019-04-16 RX ADMIN — SIMVASTATIN 40 MG: 40 TABLET, FILM COATED ORAL at 20:37

## 2019-04-16 RX ADMIN — DIVALPROEX SODIUM 500 MG: 500 TABLET, EXTENDED RELEASE ORAL at 20:37

## 2019-04-16 RX ADMIN — DIVALPROEX SODIUM 250 MG: 250 TABLET, EXTENDED RELEASE ORAL at 09:00

## 2019-04-16 RX ADMIN — QUETIAPINE FUMARATE 300 MG: 300 TABLET ORAL at 20:37

## 2019-04-16 RX ADMIN — BUSPIRONE HYDROCHLORIDE 10 MG: 10 TABLET ORAL at 20:38

## 2019-04-16 RX ADMIN — MOMETASONE FUROATE AND FORMOTEROL FUMARATE DIHYDRATE 2 PUFF: 200; 5 AEROSOL RESPIRATORY (INHALATION) at 08:59

## 2019-04-16 RX ADMIN — BUSPIRONE HYDROCHLORIDE 10 MG: 10 TABLET ORAL at 15:02

## 2019-04-16 RX ADMIN — TRAZODONE HYDROCHLORIDE 150 MG: 150 TABLET ORAL at 20:36

## 2019-04-16 RX ADMIN — INSULIN LISPRO 2 UNITS: 100 INJECTION, SOLUTION INTRAVENOUS; SUBCUTANEOUS at 17:51

## 2019-04-16 RX ADMIN — HYDROXYZINE HYDROCHLORIDE 25 MG: 25 TABLET, FILM COATED ORAL at 08:59

## 2019-04-16 RX ADMIN — BUSPIRONE HYDROCHLORIDE 5 MG: 5 TABLET ORAL at 20:36

## 2019-04-16 RX ADMIN — TRAZODONE HYDROCHLORIDE 50 MG: 50 TABLET ORAL at 20:38

## 2019-04-16 RX ADMIN — ALBUTEROL SULFATE 2.5 MG: 2.5 SOLUTION RESPIRATORY (INHALATION) at 20:57

## 2019-04-16 RX ADMIN — SENNOSIDES 8.6 MG: 8.6 TABLET, FILM COATED ORAL at 20:57

## 2019-04-16 RX ADMIN — PREGABALIN 400 MG: 150 CAPSULE ORAL at 20:36

## 2019-04-16 RX ADMIN — BUSPIRONE HYDROCHLORIDE 5 MG: 5 TABLET ORAL at 15:03

## 2019-04-16 RX ADMIN — FAMOTIDINE 20 MG: 20 TABLET ORAL at 09:00

## 2019-04-16 RX ADMIN — PREGABALIN 400 MG: 150 CAPSULE ORAL at 09:00

## 2019-04-16 RX ADMIN — BUSPIRONE HYDROCHLORIDE 15 MG: 15 TABLET ORAL at 09:06

## 2019-04-16 RX ADMIN — PANTOPRAZOLE SODIUM 40 MG: 40 TABLET, DELAYED RELEASE ORAL at 09:00

## 2019-04-16 ASSESSMENT — SLEEP AND FATIGUE QUESTIONNAIRES
SLEEP PATTERN: DIFFICULTY FALLING ASLEEP;RESTLESSNESS;DISTURBED/INTERRUPTED SLEEP;EARLY AWAKENING;INSOMNIA
DIFFICULTY FALLING ASLEEP: YES
DO YOU USE A SLEEP AID: YES
RESTFUL SLEEP: NO
DIFFICULTY ARISING: NO
AVERAGE NUMBER OF SLEEP HOURS: 2
DIFFICULTY STAYING ASLEEP: YES
DO YOU HAVE DIFFICULTY SLEEPING: YES

## 2019-04-16 ASSESSMENT — PAIN SCALES - GENERAL
PAINLEVEL_OUTOF10: 10
PAINLEVEL_OUTOF10: 10

## 2019-04-16 ASSESSMENT — PAIN DESCRIPTION - DESCRIPTORS: DESCRIPTORS: DISCOMFORT;SHARP

## 2019-04-16 ASSESSMENT — LIFESTYLE VARIABLES: HISTORY_ALCOHOL_USE: NO

## 2019-04-16 ASSESSMENT — PAIN DESCRIPTION - ONSET: ONSET: ON-GOING

## 2019-04-16 ASSESSMENT — PAIN DESCRIPTION - LOCATION
LOCATION: GENERALIZED
LOCATION: GENERALIZED

## 2019-04-16 ASSESSMENT — PATIENT HEALTH QUESTIONNAIRE - PHQ9: SUM OF ALL RESPONSES TO PHQ QUESTIONS 1-9: 9

## 2019-04-16 ASSESSMENT — PAIN DESCRIPTION - PAIN TYPE: TYPE: CHRONIC PAIN

## 2019-04-16 ASSESSMENT — PAIN DESCRIPTION - PROGRESSION: CLINICAL_PROGRESSION: GRADUALLY WORSENING

## 2019-04-16 ASSESSMENT — PAIN DESCRIPTION - FREQUENCY: FREQUENCY: CONTINUOUS

## 2019-04-16 NOTE — GROUP NOTE
Group Therapy Note    Date:     Group Start Time: 400  Group End Time: 430  Group Topic: Group Therapy    [facility]    Alli Cho        Group Therapy Note    Attendees: 8             Patient's Goal:  ***    Notes:  ***    Status After Intervention:  {Status After Intervention:539408250}    Participation Level: {Participation Level:819851082}    Participation Quality: {[facility] PARTICIPATION QUALITY:896128929}      Speech:  {[facility] CD_SPEECH:73353}      Thought Process/Content: {Thought Process/Content:125271784}      Affective Functioning: {Affective Functionin}      Mood: {Mood:332779777}      Level of consciousness:  {Level of consciousness:131937295}      Response to Learnin [facility] Responses to Learnin}      Endings: {[facility] Endings:64920}    Modes of Intervention: {[facility] Modes of Intervention:279414514}      Discipline Responsible: {[facility] Multidisciplinary:976707489}      Signature:  Elda Vázquez

## 2019-04-16 NOTE — BH NOTE
Radiology called, transport will be up to escort patient for hip x-ray. T will go withy patient and transport.

## 2019-04-16 NOTE — GROUP NOTE
Group Therapy Note    Date: April 16    Group Start Time: 0400  Group End Time: 0430  Group Topic: Group Therapy    AFYE Menendez        Group Therapy Note    Attendees: 8         Patient's Goal: To obtain a new coping skill    Notes:  Pt. Was accepting of education    Status After Intervention:  Improved    Participation Level:  Active Listener and Interactive    Participation Quality: Appropriate, Attentive and Sharing      Speech:  normal      Thought Process/Content: Logical      Affective Functioning: Congruent      Mood: depressed      Level of consciousness:  Oriented x4      Response to Learning: Able to verbalize current knowledge/experience and Capable of insight      Endings: None Reported    Modes of Intervention: Education      Discipline Responsible: Behavorial Health Tech      Signature:  Giulia Menendez

## 2019-04-16 NOTE — PLAN OF CARE
[facility]  Initial Interdisciplinary Treatment Plan NO      Original treatment plan Date & Time: 4/16/19 0930    Admission Type:  Admission Type: Voluntary    Reason for admission:   Reason for Admission: Pt feels like everything in life is falling apart, his house, relapsed, fearful of others. Tactile hallucinations, racing thoughts, pressured speech. hopeless, worthless.      Estimated Length of Stay:  5-7days  Estimated Discharge Date: to be determined by physician    PATIENT STRENGTHS:  Patient Strengths:Strengths: Connection to output provider  Patient Strengths and Limitations:Limitations: Hopeless about future  Addictive Behavior: Addictive Behavior  In the past 3 months, have you felt or has someone told you that you have a problem with:  : None  Do you have a history of Chemical Use?: No  Do you have a history of Alcohol Use?: No  Do you have a history of Street Drug Abuse?: Yes  Histroy of Prescripton Drug Abuse?: No  Medical Problems:  Past Medical History:   Diagnosis Date    Anxiety     Arthritis     osteoarthritis    Bipolar disorder (HCC)     Bronchitis, chronic (HCC)     Chronic back pain     Chronic pain     Claustrophobia     COPD (chronic obstructive pulmonary disease) (HCC)     Depression     Diabetes mellitus (HCC)     Emphysema of lung (HCC)     History of irregular heartbeat     Hyperlipidemia     Liver disease     MRSA (methicillin resistant staph aureus) culture positive     Myofacial muscle pain     Osteomyelitis (HCC)     Peripheral neuropathy     bilateral feet    Pressure ulcer     Schizophrenia (HCC)     Seizures (HCC)     Sleep apnea     no machine    Spinal stenosis     Substance abuse ([facility] [address]      Status EXAM:Status and Exam  Normal: No  Facial Expression: Sad, Worried  Affect: Blunt  Level of Consciousness: Alert  Mood:Normal: No  Mood: Depressed, Anxious, Helpless, Sad  Motor Activity:Normal: No  Motor Activity: Decreased  Interview Behavior: Cooperative  Preception: [place] to Person, Sydnie Arguetady to Time, [place] to Place, [place] to Situation  Attention:Normal: No  Attention: Unable to Concentrate  Thought Processes: Circumstantial  Thought Content:Normal: No  Thought Content: Preoccupations  Hallucinations: None  Delusions: No  Memory:Normal: No  Memory: Poor Recent, Poor Remote  Insight and Judgment: No  Insight and Judgment: Poor Judgment, Poor Insight, Unmotivated  Present Suicidal Ideation: No  Present Homicidal Ideation: No    EDUCATION:   Learner Progress Toward Treatment Goals: reviewed group plans and strategies for care    Method:group therapy, medication compliance, individualized assessments and care planning    Outcome: needs reinforcement    PATIENT GOALS: to be discussed with patient within 72 hours    PLAN/TREATMENT RECOMMENDATIONS:     continue group therapy , medications compliance, goal setting, individualized assessments and care, continue to monitor pt on unit      SHORT-TERM GOALS:   Time frame for Short-Term Goals: 5-7 days    LONG-TERM GOALS:  Time frame for Long-Term Goals: 6 months  Members Present in Team Meeting: See Signature Sheet    [place], [place]

## 2019-04-16 NOTE — PLAN OF CARE
Pt. Remains free from harm or falls and is safe on the unit. Pt. Denies any suicidal or homicidal ideations and reports no auditory/visual hallucinations. Pt. Reports having increased pain and is calm and cooperative upon assessment.

## 2019-04-16 NOTE — CONSULTS
times daily 4/12/19  Yes Vinnie Killings, CHAD - CNP   ranitidine (ZANTAC) 150 MG tablet Take 1 tablet by mouth 2 times daily as needed for Heartburn 4/12/19  Yes Minta Killings, CHAD - CNP   pregabalin (LYRICA) 200 MG capsule Take 2 capsules by mouth 2 times daily.  4/12/19 4/11/20 Yes Minta Killings, APRN - CNP   VENTOLIN  (90 Base) MCG/ACT inhaler INHALE 2 PUFFS INTO THE LUNGS EVERY 6 HOURS AS NEEDED FOR WHEEZING 3/27/19  Yes Minta Killings, APRN - CNP   QUEtiapine (SEROQUEL) 200 MG tablet Take 1.5 tablets by mouth nightly 3/20/19  Yes Minta Killings, CHAD - CNP   cloNIDine (CATAPRES) 0.1 MG tablet Take 0.1 mg by mouth nightly  1/24/19  Yes Historical Provider, MD   fluticasone-vilanterol (BREO ELLIPTA) 100-25 MCG/INH AEPB inhaler Inhale 1 puff into the lungs daily 2/18/19  Yes Minta Killings, APRN - CNP   Insulin Syringe-Needle U-100 31G X 5/16\" 0.5 ML MISC 1 each by Does not apply route 3 times daily 1/23/19  Yes Vinnie Pedros, APRN - CNP   simvastatin (ZOCOR) 40 MG tablet Take 40 mg by mouth nightly    Yes Historical Provider, MD   albuterol (PROVENTIL) (2.5 MG/3ML) 0.083% nebulizer solution Take 3 mLs by nebulization every 6 hours as needed for Wheezing 12/20/18  Yes Mintasha Killings, APRN - CNP   nicotine (NICODERM CQ) 21 MG/24HR Place 1 patch onto the skin daily as needed (if pateint smokes) 12/13/18  Yes Jessica Carson MD   divalproex (DEPAKOTE ER) 500 MG extended release tablet Take 1 tablet by mouth nightly 9/5/18  Yes Nan Nguyen MD   divalproex (DEPAKOTE ER) 250 MG extended release tablet Take 1 tablet by mouth every morning  Patient taking differently: Take 750 mg by mouth every morning  9/5/18  Yes Nan Nguyen MD   traZODone (DESYREL) 150 MG tablet Take 1 tablet by mouth nightly as needed for Sleep  Patient taking differently: Take 300 mg by mouth nightly  4/12/18  Yes Clementina Wilson, APRN - CNP   Alcohol Swabs (PRO COMFORT ALCOHOL) 70 % PADS  3/30/19   Historical Provider, MD meloxicam (MOBIC) 15 MG tablet Take 15 mg by mouth    Historical Provider, MD   Misc. Devices (GEL-FOAM CUSHION) MISC 1 Units by Does not apply route daily 4/12/19   CHAD Dillard CNP   FREESTYLE LITE strip 1 each by Other route 3 times daily 4/12/19   CHAD Dillard CNP   Misc. Devices (GEL-FOAM CUSHION) MISC 1 Units by Does not apply route daily 4/12/19   CHAD Dillard CNP   Nutritional Supplements (BOOST) LIQD Two a day. Chocolate lactose free nutritional drinks. 4/11/19   CHAD Dillard CNP   hydrOXYzine (VISTARIL) 50 MG capsule Take 50 mg by mouth nightly    Historical Provider, MD   insulin regular (HUMULIN R;NOVOLIN R) 100 UNIT/ML injection Inject 2 Units into the skin See Admin Instructions Sliding scale depending on meals 4/3/19   CHAD Dillard CNP   TRUEPLUS LANCETS 33G MISC  2/8/19   Historical Provider, MD   ondansetron (ZOFRAN) 4 MG tablet TAKE 1 TABLET EVERY 8 HOURS AS NEEDED FOR NAUSEA OR VOMITING 1/8/19   CHAD Dillard CNP   busPIRone (BUSPAR) 15 MG tablet Take 15 mg by mouth 3 times daily  8/15/18   Historical Provider, MD   cyanocobalamin 500 MCG tablet Take 500 mcg by mouth 10/26/18   Historical Provider, MD   senna (SENNA-TIME) 8.6 MG tablet twice daily as needed.   9/4/18   Historical Provider, MD   ipratropium (ATROVENT) 0.02 % nebulizer solution Take 2.5 mLs by nebulization 4 times daily as needed for Wheezing 12/20/18   CHAD Dillard CNP   ENULOSE 10 GM/15ML SOLN solution TAKE 15 MLS BY MOUTH 3 TIMES DAILY AS NEEDED (CONSTIPATION) 11/27/18   Ganesh Pena MD   DULoxetine (CYMBALTA) 30 MG extended release capsule TAKE 3 CAPSULES BY MOUTH DAILY  Patient taking differently: Take 60 mg by mouth every morning Takes 2 of the 60 mg every AM 11/26/18   Ganesh Pena MD   SM PAIN RELIEVER 325 MG tablet TAKE 2 TABLETS BY MOUTH EVERY 4 HOURS AS NEEDED FOR PAIN 10/5/18   Ganesh Pena MD   naloxegol (MOVANTIK) 25 MG TABS tablet Take 1 tablet by mouth every morning  Patient taking differently: Take 25 mg by mouth as needed  9/7/18   Misael Cronin MD   polyethylene glycol (GLYCOLAX) powder Take 17 g by mouth daily as needed    Historical Provider, MD   buprenorphine-naloxone (SUBOXONE) 2-0.5 MG SUBL Place 2 tablets under the tongue 2 times daily . 11/17/17   Colletta Ravel, MD   topiramate (TOPAMAX) 100 MG tablet Take 200 mg by mouth 2 times daily     Historical Provider, MD       Allergies:  Lactose intolerance (gi); Sulfa antibiotics; Sulfasalazine; Bactrim; Levofloxacin; Levofloxacin; Phenobarbital; Sulfamethoxazole-trimethoprim; Sulfur; Tessalon [benzonatate]; and Wellbutrin [bupropion]    Social History:   reports that he has been smoking cigarettes. He started smoking about 36 years ago. He has a 35.00 pack-year smoking history. He has never used smokeless tobacco. He reports that he has current or past drug history. Drugs: Marijuana and Opiates . He reports that he does not drink alcohol. Family History: family history includes Coronary Art Dis in his maternal uncle; Diabetes in his mother; Seizures in his maternal cousin and sister. REVIEW OF SYSTEMS:    · Constitutional: Negative for weight loss  · Eyes: Negative for visual changes, diplopia, scleral icterus. · ENT: Negative for Headaches, hearing loss, vertigo, mouth sores, sore throat. · Cardiovascular: Negative for lightheadedness/orthostatic symptoms ,chest pain, dyspnea on exertion, palpitations or loss of consciousness. · Respiratory: Negative for cough or wheezing, sputum production, hemoptysis, pleuritic pain. · Gastrointestinal: Negative for nausea/vomiting, change in bowel habits, abdominal pain, dysphagia/appetite loss, hematemesis, blood in stools. · Genitourinary:Negative for change in bladder habits, dysuria, trouble voiding, hematuria. · Musculoskeletal: Negative for gait disturbance, weakness, joint complaints. pos hip pain  · Integumentary: Negative for rash, pruritis. · Neurological:pos for headache, dizziness, change in muscle strength,pos  numbness/tingling, change in gait, balance, coordination,   · Endocrine: negative for temperature intolerance, excessive thirst, fluid intake, or urination, tremor. · Hematologic/Lymphatic: negative for abnormal bruising or bleeding, blood clots, swollen lymph nodes. · Allergic/Immunologic: negative for nasal congestion, pruritis, hives. PHYSICAL EXAM:    /68   Pulse 72   Temp 97.4 °F (36.3 °C) (Oral)   Resp 14   Ht 6' 3\" (1.905 m)   Wt 180 lb (81.6 kg)   SpO2 94%   BMI 22.50 kg/m²      · General appearance: well nourished  · HEENT: Head: Normocephalic, no lesions, without obvious abnormality. · Lungs: clear to auscultation bilaterally  · Heart: regular rate and rhythm, S1, S2 normal, no murmur, click, rub or gallop  · Abdomen: soft, non-tender; bowel sounds normal; no masses,  no organomegaly  · Extremities: extremities normal, atraumatic, no cyanosis or edema hip tenderness rt  · Neurological:  Unable to ambulateReflexes normal and symmetric. Sensation grossly  hypersensitive tender all over  · Skin - no rash, no lump   · Eye no icterus no redness  · Lymphatic system-no lymphadenopathy no splenomegaly       DIAGNOSTICS:    Laboratory Testing:  CBC:   Recent Labs     04/16/19 0719   WBC 9.0   HGB 15.3        BMP:    Recent Labs     04/14/19 2109 04/16/19 0719    142   K 3.6* 3.6*    107   CO2 19* 25   BUN 13 9   CREATININE 0.81 0.80   GLUCOSE 121* 111*     S. Calcium:  Recent Labs     04/16/19 0719   CALCIUM 8.9     S. Ionized Calcium:No results for input(s): IONCA in the last 72 hours. S. Magnesium:  Recent Labs     04/14/19 2109   MG 2.0     S.  Phosphorus:  Recent Labs     04/14/19 2109   PHOS 1.7*     S. Glucose:  Recent Labs     04/15/19  2237 04/16/19  0743 04/16/19  1656   POCGLU 124* 91 182*     Glycosylated hemoglobin A1C:   Recent Labs     04/16/19 0719 nondilated. Post appendectomy. Pelvis: Urinary bladder and pelvic organs are unremarkable. No free fluid in the pelvis. Peritoneum/Retroperitoneum: Aortoiliac atherosclerotic calcification. Abdominal aorta is normal in caliber. No abdominal or retroperitoneal adenopathy. No free fluid in the abdomen. Bones/Soft Tissues: No acute osseous abnormality. No acute inflammatory process in the abdomen or pelvis. Aortoiliac atherosclerotic calcification. Abdominal aorta is normal in caliber. ASSESSMENT:    Patient Active Problem List   Diagnosis    Anxiety    Seizure ([address]    Myofascial muscle pain    Chronic pain    Spinal stenosis    Hyperlipidemia    Depression    Panlobular emphysema (HCC)    Bipolar disorder (HCC)    Chronic back pain    Schizophrenia ([address]    Fibromyalgia    Type 2 diabetes mellitus with diabetic polyneuropathy, with long-term current use of insulin (HCC)    Onychomycosis    Pain in lower limb    Disc disease, degenerative, cervical    Cervical stenosis of spine    Generalized tonic-clonic seizure (HCC)    Superficial laceration of scalp    Altered mental status    Decubitus ulcer of coccyx, stage 2    Cocaine substance abuse ([address]    Opiate abuse, episodic (HCC)    Closed fracture of nasal bone    Drug overdose    Uncomplicated opioid dependence ([address]    Paraplegia ([address]    Schizoaffective disorder ([address]    Non-dose-related adverse reaction to medication wellbutrin    Cannabis abuse    Encephalopathy    Hyperammonemia (HCC)    Constipation    Cellulitis and abscess of right leg    Interstitial lung disease (HCC)    Vomiting    Bipolar 1 disorder (HCC)    MDD (major depressive disorder), recurrent episode (HCC)    neuropathy    From etoh  More than dm    Has spinal stenosis     myofacial pain syndrome      fibromyalgia     organic brain syndrome      dm2 controlled     On insulin   cont home dose     Copd baseline     ?  Hip fx     1 year  Non ambulating has seen ortho no intervention planned     PLAN:    Cont lyrica    Cont depakote       was on cymbalta at home   would advise to continue     cont zanaflex   Cont home insulin      xrya rt hip     On meloxicam   cont   cr nl      bp controlled     MD ROSARIO White [address], [address] [place] [address].    Phone [phone]   Fax: [phone]  Answering Service: [phone]

## 2019-04-16 NOTE — CONSULTS
[address] [facility]  Humberto \"Leon\" Arlin Newell D.P.M. Podiatry H&P/Consult    Asael Ann. is a 57 y.o. male diabetic who is seen for consultation on a complaint of painful fungal toenails. Symptoms began months ago. Patient relates pain is present to toes 1,2,3,4,5 left and right foot. Pain is rated 4 out of 10 and is described as constant sharp. Patient states toenails are painful with shoegear, walking, standing. Treatments prior to today's visit include: self care. Currently denies F/C/N/V. Past Medical History:   Diagnosis Date    Anxiety     Arthritis     osteoarthritis    Bipolar disorder (HCC)     Bronchitis, chronic (HCC)     Chronic back pain     Chronic pain     Claustrophobia     COPD (chronic obstructive pulmonary disease) (HCC)     Depression     Diabetes mellitus (HCC)     Emphysema of lung (HCC)     History of irregular heartbeat     Hyperlipidemia     Liver disease     MRSA (methicillin resistant staph aureus) culture positive     Myofacial muscle pain     Osteomyelitis ([address]     Peripheral neuropathy     bilateral feet    Pressure ulcer     Schizophrenia ([address]     Seizures ([address]     Sleep apnea     no machine    Spinal stenosis     [facility]        Past Surgical History:   Procedure Laterality Date    APPENDECTOMY      FRACTURE SURGERY  01/28/2017    closed reduction nasal fracture    LUMBAR FUSION      SEPTOPLASTY  01/13/2017    STOMACH SURGERY      TURBINOPLASTIES  01/13/2017       Prior to Admission medications    Medication Sig Start Date End Date Taking?  Authorizing Provider   busPIRone (BUSPAR) 30 MG tablet  3/25/19  Yes Historical Provider, MD   hydrOXYzine (VISTARIL) 25 MG capsule Take 1 capsule by mouth 2 times daily 4/12/19  Yes CHAD Hernández CNP   lidocaine (LMX) 4 % cream Apply topically 3 times daily as needed for Pain 4/12/19  Yes CHAD Hernández CNP   pantoprazole (PROTONIX) 40 MG tablet Take 1 tablet by mouth daily 4/12/19  Yes CHAD Burch CNP   tiZANidine (ZANAFLEX) 4 MG tablet Take 1 tablet by mouth 3 times daily 4/12/19  Yes CHAD Burch CNP   ranitidine (ZANTAC) 150 MG tablet Take 1 tablet by mouth 2 times daily as needed for Heartburn 4/12/19  Yes CHAD Burch CNP   pregabalin (LYRICA) 200 MG capsule Take 2 capsules by mouth 2 times daily.  4/12/19 4/11/20 Yes CHAD Burch CNP   VENTOLIN  (90 Base) MCG/ACT inhaler INHALE 2 PUFFS INTO THE LUNGS EVERY 6 HOURS AS NEEDED FOR WHEEZING 3/27/19  Yes CHAD Burch CNP   QUEtiapine (SEROQUEL) 200 MG tablet Take 1.5 tablets by mouth nightly 3/20/19  Yes CHAD Burch CNP   cloNIDine (CATAPRES) 0.1 MG tablet Take 0.1 mg by mouth nightly  1/24/19  Yes Historical Provider, MD   fluticasone-vilanterol (BREO ELLIPTA) 100-25 MCG/INH AEPB inhaler Inhale 1 puff into the lungs daily 2/18/19  Yes CHAD Burch CNP   Insulin Syringe-Needle U-100 31G X 5/16\" 0.5 ML MISC 1 each by Does not apply route 3 times daily 1/23/19  Yes CHAD Burch CNP   simvastatin (ZOCOR) 40 MG tablet Take 40 mg by mouth nightly    Yes Historical Provider, MD   albuterol (PROVENTIL) (2.5 MG/3ML) 0.083% nebulizer solution Take 3 mLs by nebulization every 6 hours as needed for Wheezing 12/20/18  Yes CHAD Burch CNP   nicotine (NICODERM CQ) 21 MG/24HR Place 1 patch onto the skin daily as needed (if pateint smokes) 12/13/18  Yes Fidelina Caal MD   divalproex (DEPAKOTE ER) 500 MG extended release tablet Take 1 tablet by mouth nightly 9/5/18  Yes Nato Pelayo MD   divalproex (DEPAKOTE ER) 250 MG extended release tablet Take 1 tablet by mouth every morning  Patient taking differently: Take 750 mg by mouth every morning  9/5/18  Yes Nato Pelayo MD   traZODone (DESYREL) 150 MG tablet Take 1 tablet by mouth nightly as needed for Sleep  Patient taking differently: Take 300 mg by mouth nightly  4/12/18  Yes CHAD Belcher CNP   Alcohol Swabs (PRO COMFORT ALCOHOL) 70 % PADS  3/30/19   Historical Provider, MD   meloxicam (MOBIC) 15 MG tablet Take 15 mg by mouth    Historical Provider, MD   Misc. Devices (GEL-FOAM CUSHION) MISC 1 Units by Does not apply route daily 4/12/19   Nelta Nipple, APRN - CNP   FREESTYLE LITE strip 1 each by Other route 3 times daily 4/12/19   Nelta Nipple, APRN - CNP   Misc. Devices (GEL-FOAM CUSHION) MISC 1 Units by Does not apply route daily 4/12/19   Nelta Nipple, APRN - CNP   Nutritional Supplements (BOOST) LIQD Two a day. Chocolate lactose free nutritional drinks. 4/11/19   Nelta Nipple, APRN - CNP   hydrOXYzine (VISTARIL) 50 MG capsule Take 50 mg by mouth nightly    Historical Provider, MD   insulin regular (HUMULIN R;NOVOLIN R) 100 UNIT/ML injection Inject 2 Units into the skin See Admin Instructions Sliding scale depending on meals 4/3/19   Nelta Nipple APRN - CNP   TRUEPLUS LANCETS 33G MISC  2/8/19   Historical Provider, MD   ondansetron (ZOFRAN) 4 MG tablet TAKE 1 TABLET EVERY 8 HOURS AS NEEDED FOR NAUSEA OR VOMITING 1/8/19   Nelta Nipcalvin APRN - CNP   busPIRone (BUSPAR) 15 MG tablet Take 15 mg by mouth 3 times daily  8/15/18   Historical Provider, MD   cyanocobalamin 500 MCG tablet Take 500 mcg by mouth 10/26/18   Historical Provider, MD   senna (SENNA-TIME) 8.6 MG tablet twice daily as needed.   9/4/18   Historical Provider, MD   ipratropium (ATROVENT) 0.02 % nebulizer solution Take 2.5 mLs by nebulization 4 times daily as needed for Wheezing 12/20/18   Nelta NipCHAD simpson - CNP   ENULOSE 10 GM/15ML SOLN solution TAKE 15 MLS BY MOUTH 3 TIMES DAILY AS NEEDED (CONSTIPATION) 11/27/18   Justa Burciaga MD   DULoxetine (CYMBALTA) 30 MG extended release capsule TAKE 3 CAPSULES BY MOUTH DAILY  Patient taking differently: Take 60 mg by mouth every morning Takes 2 of the 60 mg every AM 11/26/18   MD SHERRY Velazquez PAIN RELIEVER 325 MG tablet TAKE 2 TABLETS BY MOUTH EVERY 4 HOURS AS NEEDED FOR PAIN 10/5/18   Rox Sebastian MD   naloxegol (MOVANTIK) 25 MG TABS tablet Take 1 tablet by mouth every morning  Patient taking differently: Take 25 mg by mouth as needed  9/7/18   Zev Merchant MD   polyethylene glycol (GLYCOLAX) powder Take 17 g by mouth daily as needed    Historical Provider, MD   buprenorphine-naloxone (SUBOXONE) 2-0.5 MG SUBL Place 2 tablets under the tongue 2 times daily .  11/17/17   Ochoa Montoya MD   topiramate (TOPAMAX) 100 MG tablet Take 200 mg by mouth 2 times daily     Historical Provider, MD     Scheduled Meds:   busPIRone  10 mg Oral TID    And    busPIRone  5 mg Oral TID    nicotine  1 patch Transdermal Daily    cyanocobalamin  500 mcg Oral Daily    divalproex  250 mg Oral QAM    divalproex  500 mg Oral Nightly    mometasone-formoterol  2 puff Inhalation BID    pantoprazole  40 mg Oral Daily    pregabalin  400 mg Oral BID    QUEtiapine  300 mg Oral Nightly    famotidine  20 mg Oral Daily    simvastatin  40 mg Oral Nightly    insulin lispro  0-12 Units Subcutaneous TID WC    insulin lispro  0-6 Units Subcutaneous Nightly     Continuous Infusions:   dextrose       PRN Meds:albuterol, hydrOXYzine, benztropine mesylate, magnesium hydroxide, nicotine polacrilex, ipratropium, lidocaine, polyethylene glycol, senna, albuterol sulfate HFA, acetaminophen, haloperidol lactate, glucose, dextrose, glucagon (rDNA), dextrose, traZODone, traZODone    Allergies   Allergen Reactions    Lactose Intolerance (Gi) Diarrhea    Sulfa Antibiotics Anaphylaxis    Sulfasalazine Anaphylaxis    Bactrim Swelling    Levofloxacin Swelling    Levofloxacin Swelling    Phenobarbital Swelling    Sulfamethoxazole-Trimethoprim     Sulfur     Tessalon [Benzonatate] Swelling    Wellbutrin [Bupropion] Other (See Comments)     Delirium         Family History   Problem Relation Age of Onset    Diabetes Mother         many family members with DM    Seizures Sister     Coronary Art Dis Maternal Uncle     Seizures Maternal Cousin        Social History     Tobacco Use    Smoking status: Current Every Day Smoker     Packs/day: 1.00     Years: 35.00     Pack years: 35.00     Types: Cigarettes     Start date: 1/1/1983    Smokeless tobacco: Never Used    Tobacco comment: Not at the present time however   Substance Use Topics    Alcohol use: No       Review of Systems:  Constitutional: denies weight loss/gain, fever, chills, nausea  Eyes: denies vision disturbance  ENMTF: : denies runny nose, denies sore throat, denies ear ache  Respiratory: denies shortness of breath  Cardiovascular: denies claudication, denies unilateral/bilateral/general swelling, denies chest pain  Gastrointestinal: denies vomiting, diarrhea  Hematologic/lymphatic: denies easily bruising  Musculoskeletal: denies muscle pain, denies joint pain, denies weakness  Neurological: denies numbness, tingling, burning sensation    Lower Extremity Physical Examination:     Vitals:   Vitals:    04/16/19 0815   BP: 111/68   Pulse: 72   Resp: 14   Temp: 97.4 °F (36.3 °C)   SpO2: 94%     General: AAO x 3 in NAD. Vascular:    DP and PT pulses palpable1/4,bilateral.  CFT < 5 seconds,bilateral.  Hair growth absent to the level of the digits, bilateral.  Edema is absent bilateral.  Varicosities are absent bilateral. No rubor is noted bilateral.     Neurological:   Sensation is impaired to light touch to level of digits, bilateral.  Protective sensation is grossly intact. Musculoskeletal:   Muscle strength 5/5, bilateral  Pain is present to palpation of toenails 1,2,3,4,5 left and right foot. Reduced medial longitudinal arch, bilateral.  Ankle ROM is within normal limits, bilateral.  1st MPJ ROM is decreased, bilateral and non-painful. Dorsally contracted digits are present to toes 2-5 bilaterally.      Integument:   Toenails 1-5 bilateral are thick, discolored, elongated, with

## 2019-04-16 NOTE — PROGRESS NOTES
Physical Therapy    Facility/Department: [facility]  Initial Assessment    NAME: Samy Zuleta. : 1970  MRN: [id number]    Date of Service: 2019    Discharge Recommendations:  Home with Home health PT, Home with assist PRN        Assessment   Body structures, Functions, Activity limitations: Decreased functional mobility ; Increased Pain  Assessment: He has a progressive functional decline and he uses wheelchair since . Pt reports severe pain all over, especially with R Hip movement, unable to tolerate any WB on R LE. Will attmept WB R LE if able safely. Treatment Diagnosis: Impaired mobility, increased pain  Specific instructions for Next Treatment: Have 2 to 3 person assist when attempting Standing/WB B LE, pt has been /wc bound for years, but reports takes 2 to3 steps with crutches to use the toilet, W/C does not fit through the door. Prognosis: Fair  Decision Making: High Complexity  Patient Education: PT POC  REQUIRES PT FOLLOW UP: Yes  Activity Tolerance  Activity Tolerance: Patient limited by pain       Patient Diagnosis(es): There were no encounter diagnoses. has a past medical history of Anxiety, Arthritis, Bipolar disorder ([address] Bronchitis, chronic (HCC), Chronic back pain, Chronic pain, Claustrophobia, COPD (chronic obstructive pulmonary disease) ([address] Depression, Diabetes mellitus ([address] Emphysema of lung ([address] History of irregular heartbeat, Hyperlipidemia, Liver disease, MRSA (methicillin resistant staph aureus) culture positive, Myofacial muscle pain, Osteomyelitis ([address] Peripheral neuropathy, Pressure ulcer, Schizophrenia ([address] Seizures ([address] Sleep apnea, Spinal stenosis, and Substance abuse ([address] has a past surgical history that includes Appendectomy; Stomach surgery; lumbar fusion; Septoplasty (2017); Turbinoplasties (2017); and fracture surgery (2017).     Restrictions  Restrictions/Precautions  Restrictions/Precautions: Seizure, Fall Risk  Implants present? : Metal implants  Position Activity Restriction  Other position/activity restrictions: He has a progressive functional decline and he uses wheelchair since 2007. Pt reports he broke his R hip ~ 2 yrs ago and did not get it repaired as he was w/c bound. Pt reports he goes to [address] [place] [address] for testing for MS. Pt also reports he is supposed to go for physical therapy at [facility]. Vision/Hearing  Vision: Impaired  Vision Exceptions: Wears glasses at all times(Reports needs new glasses)  Hearing: Exceptions to [facility]  Hearing Exceptions: Hard of hearing/hearing concerns     Subjective  General  Patient assessed for rehabilitation services?: Yes  Additional Pertinent Hx: Per H and P:   Melanie Esposito is a 57 yr old male who was admitted from [facility] after using heroin this weekend,   He is crying, Says he is so disgusted with himself and his life and he wants to die, He lives in an apt and says it is filthy, He wanted to clean it last weekend but his back hurt, He uses a wheelchair and cannot walk, He has fibromyalgia and Diabetic neuropathy. He says he has not used Heroin for 2 1/2 yr until this weekend, but his life is so bad he just decided to use it again. Referral Date : 04/15/19  Diagnosis: Bipolar  Follows Commands: Within Functional Limits  Subjective  Subjective: Pt with multiple complaints of pain through out his body, more so at R hip, reports his home is inhabitable. Pain Screening  Patient Currently in Pain: Yes  Pain Assessment  Pain Assessment: 0-10  Pain Level: 10  Patient's Stated Pain Goal: 5  Pain Type: Chronic pain  Pain Location: Generalized(all over, more so at R Hip.)  Pain Descriptors: Discomfort; Sharp  Pain Frequency: Continuous  Pain Onset: On-going  Clinical Progression: Gradually worsening  Vital Signs  Patient Currently in Pain: Yes       Orientation  Orientation  Overall Orientation Status: Within Functional Limits  Social/Functional History  Social/Functional History  Lives With: Other (comment)(Roommate)  Type of Home: Apartment  Home Layout: One level  Home Access: Ramped entrance  Bathroom Shower/Tub: Tub/Shower unit, Shower chair with back(Shower chair is broken)  Bathroom Toilet: Standard  Bathroom Accessibility: (Not electric w/c accessible,2 steps to toilet with crutches)  Home Equipment: Wheelchair-electric, Hospital bed, Leg (Loft strain crutches, Reports all equipment broken except crutches)  Receives Help From: Friend(s), Other (comment)  ADL Assistance: Independent(Sponge bath, no hot water at home)  Ambulation Assistance: (W/c bound, 2 steps to toilet with crtuches.)  Transfer Assistance: Independent  Active : No  Mode of Transportation: Friends, Cab  Occupation: On disability  Additional Comments: Pt reports his home is inhabitable, no hot water. Reports his electric w/c does not fit close to the bathroom, uses loft strain crutches - 2 steps to the toilet. Pt reports multiple fals at home. Cognition        Objective          AROM RLE (degrees)  RLE General AROM: Hip flexion limited, pt reports he can not move his right leg much due to severe pain, Knee flexion 15 to 90 degrees, DF 10 degrees  AROM LLE (degrees)  LLE AROM : WFL  AROM RUE (degrees)  RUE AROM : WFL  AROM LUE (degrees)  LUE AROM : WFL  Strength RLE  Comment: Unable to perfrom MMT due to pt very aprehansive due to pain at R HIp, but pt able to move his legs on his own in the bed and in seated psotion, Knee 4-/5, ankle 3/5 atleast, Poor effort from pt during MMT for Knee/ankle  Strength LLE  Comment: Grossly 4/5  Strength RUE  Comment: 4/5  Strength LUE  Comment: 4/5     Sensation  Overall Sensation Status: Impaired  Additional Comments: B LE /B hand neuropathy.   Bed mobility  Rolling to Left: Modified independent  Rolling to Right: Modified independent  Supine to Sit: Modified independent  Sit to Supine: Modified independent  Scooting: Modified independent  Transfers  Sit to Stand: (NA)  Stand to sit: (NA)  Squat Pivot Transfers: Stand by assistance  Comment: Pt able to demonstrate good technique for Bed<>w/c transfers by pivoting on L LE, pt reports he takes himself to the bathroom vis w/c and transfers on w/c. Ambulation  Ambulation?: No  Wheelchair Activities  Propulsion: Yes  Propulsion 1  Propulsion: Manual  Level: Level Tile  Method: LUE;RUE  Level of Assistance: Modified independent  Description/ Details: Pt manuevers w/c in his room withoud difficulty. Distance: 25 ft x 2. Balance  Posture: Fair  Sitting - Static: Good  Sitting - Dynamic: Good        Plan   Plan  Times per week: 2 x/wk  Specific instructions for Next Treatment: Have 2 to 3 person assist when attempting Standing/WB B LE, pt has been /wc bound for years, but reports takes 2 to3 steps with crutches to use the toilet, W/C does not fit through the door. Safety Devices  Type of devices: Left in bed    G-Code       OutComes Score                                                  AM-PAC Score             Goals  Short term goals  Time Frame for Short term goals: 4 to 6 visits  Short term goal 1: Pt able to tolerate strengthening B LE/B UE ex's to be able to be independent for ADL/self care   Short term goal 2: Progress pt to WB on R LE if able , to progress to standing up with RW. Short term goal 3: Progress pt to take steps with RW if able, (start with 2 to 3 person for safety), and progress as able. Short term goal 4: Pt to tolerate R LE ex's at pain < 8/10 to promote increase activity gradually. Patient Goals   Patient goals : I want some one to help me out for my living situation, get new equipment.        Therapy Time   Individual Concurrent Group Co-treatment   Time In 1420         Time Out 1455         Minutes 35         Timed Code Treatment Minutes: 12 Minutes       Orysia Meigs, PT

## 2019-04-16 NOTE — GROUP NOTE
Group Therapy Note    Date: April 16    Group Start Time: 1000  Group End Time: 1045  Group Topic: Psychotherapy    FA[facility] BHMEGAN Cruz LPC        Group Therapy Note    Attendees: 6/13         Patient's Goal:  Increase coping skills and relationships    Notes:  particpated in group was monopolizing and preoccupied with pain meds and getting \"his\" medications     Status After Intervention:  Unchanged    Participation Level: Monopolizing    Participation Quality: Intrusive      Speech:  loud      Thought Process/Content: Perseverating      Affective Functioning: Exaggerated      Mood: irritable      Level of consciousness:  Preoccupied      Response to Learning: Able to retain information      Endings: None Reported    Modes of Intervention: Problem-solving      Discipline Responsible: /Counselor      Signature:  Nubia Pabon LPC

## 2019-04-16 NOTE — BH NOTE
disorder (HCC)     Bronchitis, chronic (HCC)     Chronic back pain     Chronic pain     Claustrophobia     COPD (chronic obstructive pulmonary disease) (HCC)     Depression     Diabetes mellitus (HCC)     Emphysema of lung (HCC)     History of irregular heartbeat     Hyperlipidemia     Liver disease     MRSA (methicillin resistant staph aureus) culture positive     Myofacial muscle pain     Osteomyelitis (HCC)     Peripheral neuropathy     bilateral feet    Pressure ulcer     Schizophrenia (HCC)     Seizures ([address]     Sleep apnea     no machine    Spinal stenosis     Substance abuse (HCC)       Past Surgical History:   Procedure Laterality Date    APPENDECTOMY      FRACTURE SURGERY  01/28/2017    closed reduction nasal fracture    LUMBAR FUSION      SEPTOPLASTY  01/13/2017    STOMACH SURGERY      TURBINOPLASTIES  01/13/2017     Neurologic Exam        SOCIAL HISTORY  Social History     Socioeconomic History    Marital status:      Spouse name: Not on file    Number of children: Not on file    Years of education: Not on file    Highest education level: Not on file   Occupational History    Not on file   Social Needs    Financial resource strain: Not on file    Food insecurity:     Worry: Not on file     Inability: Not on file    Transportation needs:     Medical: Not on file     Non-medical: Not on file   Tobacco Use    Smoking status: Current Every Day Smoker     Packs/day: 1.00     Years: 35.00     Pack years: 35.00     Types: Cigarettes     Start date: 1/1/1983    Smokeless tobacco: Never Used    Tobacco comment: Not at the present time however   Substance and Sexual Activity    Alcohol use: No    Drug use: Yes     Types: Marijuana, Opiates      Comment: pt reported hx of drug abuse    Sexual activity: Never   Lifestyle    Physical activity:     Days per week: Not on file     Minutes per session: Not on file    Stress: Not on file   Relationships    Social connections:     Talks on phone: Not on file     Gets together: Not on file     Attends Shinto service: Not on file     Active member of club or organization: Not on file     Attends meetings of clubs or organizations: Not on file     Relationship status: Not on file    Intimate partner violence:     Fear of current or ex partner: Not on file     Emotionally abused: Not on file     Physically abused: Not on file     Forced sexual activity: Not on file   Other Topics Concern    Not on file   Social History Narrative    Not on file         Mental Status  Pt. was alert, fully oriented, and cooperative. Appearance and hygiene weredisheveled, poor hygiene . Mood was depressed. Affect was \"dysthymic and poorly reactive Thought process was circumstantial. Patient endorses auditory and visual hallucinations. Endorses paranoia. .    Patient endorsed suicidal ideations. Patient denied homicidal ideations . Patient's gross cognitive functions were intact. Insight and judgement were poor. Both recent and remote memory were intact. Psychomotor status was slowed     Diagnostic Impression  Active Problems:    Bipolar 1 disorder ([facility])    MDD (major depressive disorder), recurrent episode ([address]  Resolved Problems:    * No resolved hospital problems.  *    Medications   busPIRone  10 mg Oral TID    And    busPIRone  5 mg Oral TID    nicotine  1 patch Transdermal Daily    cyanocobalamin  500 mcg Oral Daily    divalproex  250 mg Oral QAM    divalproex  500 mg Oral Nightly    mometasone-formoterol  2 puff Inhalation BID    pantoprazole  40 mg Oral Daily    pregabalin  400 mg Oral BID    QUEtiapine  300 mg Oral Nightly    famotidine  20 mg Oral Daily    simvastatin  40 mg Oral Nightly    insulin lispro  0-12 Units Subcutaneous TID WC    insulin lispro  0-6 Units Subcutaneous Nightly     albuterol, hydrOXYzine, benztropine mesylate, magnesium hydroxide, nicotine polacrilex, ipratropium, lidocaine, polyethylene glycol, senna, albuterol sulfate HFA, acetaminophen, haloperidol lactate, glucose, dextrose, glucagon (rDNA), dextrose, traZODone, traZODone    Treatment Plan:    1. Admit to inpatient psychiatric treatment  2. Supportive therapy with medication management. Reviewed risks and benefits as well as potential side effects with patient. 3. Therapeutic activities and groups  4. Follow up at [facility] after symptoms stabilized. 5. Social work for discharge planning. 6. Restart home medications. Valproic Acid level low, possible noncompliance with medications. Estimated length of stay: 5-7 days    Sindhu Wilhelm, APRN - CNP  Psychiatric Nurse Practitioner. Dragon voice recognition software used in portions of this document.  Occasionally words are mis-transcribed

## 2019-04-16 NOTE — PLAN OF CARE
Pt. Reports having increased depression as a result of being in pain. Pt. Has been sitting in the dayroom socializing and remains calm and cooperative with staff requests. Q15min checks continued for safety.

## 2019-04-16 NOTE — H&P
HISTORY       Past Surgical History:   Procedure Laterality Date    APPENDECTOMY      FRACTURE SURGERY  01/28/2017    closed reduction nasal fracture    LUMBAR FUSION      SEPTOPLASTY  01/13/2017    STOMACH SURGERY      TURBINOPLASTIES  01/13/2017       FAMILY HISTORY       Family History   Problem Relation Age of Onset    Diabetes Mother         many family members with DM    Seizures Sister     Coronary Art Dis Maternal Uncle     Seizures Maternal Cousin        SOCIAL HISTORY       Social History     Socioeconomic History    Marital status:      Spouse name: None    Number of children: None    Years of education: None    Highest education level: None   Occupational History    None   Social Needs    Financial resource strain: None    Food insecurity:     Worry: None     Inability: None    Transportation needs:     Medical: None     Non-medical: None   Tobacco Use    Smoking status: Current Every Day Smoker     Packs/day: 1.00     Years: 35.00     Pack years: 35.00     Types: Cigarettes     Start date: 1/1/1983    Smokeless tobacco: Never Used    Tobacco comment: Not at the present time however   Substance and Sexual Activity    Alcohol use: No    Drug use: Yes     Types: Marijuana, Opiates      Comment: pt reported hx of drug abuse    Sexual activity: Never   Lifestyle    Physical activity:     Days per week: None     Minutes per session: None    Stress: None   Relationships    Social connections:     Talks on phone: None     Gets together: None     Attends Orthodox service: None     Active member of club or organization: None     Attends meetings of clubs or organizations: None     Relationship status: None    Intimate partner violence:     Fear of current or ex partner: None     Emotionally abused: None     Physically abused: None     Forced sexual activity: None   Other Topics Concern    None   Social History Narrative    None           REVIEW OF SYSTEMS      Allergies Allergen Reactions    Lactose Intolerance (Gi) Diarrhea    Sulfa Antibiotics Anaphylaxis    Sulfasalazine Anaphylaxis    Bactrim Swelling    Levofloxacin Swelling    Levofloxacin Swelling    Phenobarbital Swelling    Sulfamethoxazole-Trimethoprim     Sulfur     Tessalon [Benzonatate] Swelling    Wellbutrin [Bupropion] Other (See Comments)     Delirium         No current facility-administered medications on file prior to encounter. Current Outpatient Medications on File Prior to Encounter   Medication Sig Dispense Refill    busPIRone (BUSPAR) 30 MG tablet   2    hydrOXYzine (VISTARIL) 25 MG capsule Take 1 capsule by mouth 2 times daily 60 capsule 2    lidocaine (LMX) 4 % cream Apply topically 3 times daily as needed for Pain 30 g 5    pantoprazole (PROTONIX) 40 MG tablet Take 1 tablet by mouth daily 30 tablet 3    tiZANidine (ZANAFLEX) 4 MG tablet Take 1 tablet by mouth 3 times daily 30 tablet 0    ranitidine (ZANTAC) 150 MG tablet Take 1 tablet by mouth 2 times daily as needed for Heartburn 60 tablet 3    pregabalin (LYRICA) 200 MG capsule Take 2 capsules by mouth 2 times daily.  120 capsule 2    VENTOLIN  (90 Base) MCG/ACT inhaler INHALE 2 PUFFS INTO THE LUNGS EVERY 6 HOURS AS NEEDED FOR WHEEZING 18 g 3    QUEtiapine (SEROQUEL) 200 MG tablet Take 1.5 tablets by mouth nightly 30 tablet 1    cloNIDine (CATAPRES) 0.1 MG tablet Take 0.1 mg by mouth nightly       fluticasone-vilanterol (BREO ELLIPTA) 100-25 MCG/INH AEPB inhaler Inhale 1 puff into the lungs daily 1 each 3    Insulin Syringe-Needle U-100 31G X 5/16\" 0.5 ML MISC 1 each by Does not apply route 3 times daily 100 each 3    simvastatin (ZOCOR) 40 MG tablet Take 40 mg by mouth nightly       albuterol (PROVENTIL) (2.5 MG/3ML) 0.083% nebulizer solution Take 3 mLs by nebulization every 6 hours as needed for Wheezing 120 each 1    nicotine (NICODERM CQ) 21 MG/24HR Place 1 patch onto the skin daily as needed (if pateint smokes) 30 patch 3    divalproex (DEPAKOTE ER) 500 MG extended release tablet Take 1 tablet by mouth nightly 30 tablet 3    divalproex (DEPAKOTE ER) 250 MG extended release tablet Take 1 tablet by mouth every morning (Patient taking differently: Take 750 mg by mouth every morning ) 30 tablet 3    traZODone (DESYREL) 150 MG tablet Take 1 tablet by mouth nightly as needed for Sleep (Patient taking differently: Take 300 mg by mouth nightly ) 14 tablet 0    Alcohol Swabs (PRO COMFORT ALCOHOL) 70 % PADS   11    meloxicam (MOBIC) 15 MG tablet Take 15 mg by mouth      Misc. Devices (GEL-FOAM CUSHION) MISC 1 Units by Does not apply route daily 1 each 0    FREESTYLE LITE strip 1 each by Other route 3 times daily 100 each 5    Misc. Devices (GEL-FOAM CUSHION) MISC 1 Units by Does not apply route daily 1 each 0    Nutritional Supplements (BOOST) LIQD Two a day. Chocolate lactose free nutritional drinks. 60 Can 5    hydrOXYzine (VISTARIL) 50 MG capsule Take 50 mg by mouth nightly      insulin regular (HUMULIN R;NOVOLIN R) 100 UNIT/ML injection Inject 2 Units into the skin See Admin Instructions Sliding scale depending on meals 1 vial 2    TRUEPLUS LANCETS 33G MISC       ondansetron (ZOFRAN) 4 MG tablet TAKE 1 TABLET EVERY 8 HOURS AS NEEDED FOR NAUSEA OR VOMITING 30 tablet 0    busPIRone (BUSPAR) 15 MG tablet Take 15 mg by mouth 3 times daily       cyanocobalamin 500 MCG tablet Take 500 mcg by mouth      senna (SENNA-TIME) 8.6 MG tablet twice daily as needed.        ipratropium (ATROVENT) 0.02 % nebulizer solution Take 2.5 mLs by nebulization 4 times daily as needed for Wheezing 100 mL 3    ENULOSE 10 GM/15ML SOLN solution TAKE 15 MLS BY MOUTH 3 TIMES DAILY AS NEEDED (CONSTIPATION) 480 Bottle 0    DULoxetine (CYMBALTA) 30 MG extended release capsule TAKE 3 CAPSULES BY MOUTH DAILY (Patient taking differently: Take 60 mg by mouth every morning Takes 2 of the 60 mg every AM) 90 capsule 0    SM PAIN RELIEVER 325 MG tablet TAKE 2 TABLETS BY MOUTH EVERY 4 HOURS AS NEEDED FOR PAIN 120 tablet 3    naloxegol (MOVANTIK) 25 MG TABS tablet Take 1 tablet by mouth every morning (Patient taking differently: Take 25 mg by mouth as needed ) 30 tablet 0    polyethylene glycol (GLYCOLAX) powder Take 17 g by mouth daily as needed      buprenorphine-naloxone (SUBOXONE) 2-0.5 MG SUBL Place 2 tablets under the tongue 2 times daily . 28 tablet 1    topiramate (TOPAMAX) 100 MG tablet Take 200 mg by mouth 2 times daily                         General health:  Patient states health is very bad, He cannot walk, He cannot clean his home, He needs in home care, He has no family support He is in constant pain . Skin:  No skin lesions . Head, eyes, ears, nose, throat:  Has freq headaches, No neck pain, Dry mouth and trouble swallowing  Denies hearing loss, No recent epistaxis or sinus problems or sore throat. Neck:  No pain, stiffness or masses. Cardiovascular/Respiratory system:  Denies any chest pain or heart disease, No breathing problems                Gastrointestinal tract: Denies any abdominal pain or change in bowel habits,     Genitourinary:  Currently has no UTI sx. Locomotor:  Back and neck problems      Neuropsychiatric:  Depression ,  \Suicidal thoughts with no plan     See HPI. Depression   Suicidal thoughts     GENERAL PHYSICAL EXAM:         Vitals: /68   Pulse 72   Temp 97.4 °F (36.3 °C) (Oral)   Resp 14   Ht 6' 3\" (1.905 m)   Wt 180 lb (81.6 kg)   SpO2 94%   BMI 22.50 kg/m²  Body mass index is 22.5 kg/m². GENERAL APPEARANCE:  Jose Lin is 57 y.o.,  male, not obese,  Physical Exam   Constitutional: He appears well-developed and well-nourished. HENT:   Head: Normocephalic. Mouth/Throat: Oropharynx is clear and moist.   Has poor dental hygiene    Eyes: Pupils are equal, round, and reactive to light. EOM are normal.   Neck: Normal range of motion. No thyromegaly present. Cardiovascular: Normal rate, normal heart sounds and intact distal pulses. Pulmonary/Chest: Effort normal and breath sounds normal.   Abdominal: Soft. Bowel sounds are normal. He exhibits no distension. There is no tenderness. Genitourinary:   Genitourinary Comments: Deferred   Musculoskeletal:   Has a nervous TIC,   Has pain to palpate the vertebral bodies, Has pain in lumbar and neck area,    Neurological: A sensory deficit is present. No cranial nerve deficit. Coordination abnormal.   Has severe diabetic neuropathy and weak legs and cannot walk since 2007  He withdrawls from any touch,   Skin: Skin is warm and dry. brisk. No rash noted. Psychiatric:   Cries easily,  Cannot sit up in bed due to pain and weakness, Is remorseful for using Heroin this weekend          PROVISIONAL DIAGNOSES:      Active Problems:    Bipolar 1 disorder ([facility])    MDD (major depressive disorder), recurrent episode [facility]  Resolved Problems:    * No resolved hospital problems.  CHAD Glass - CNP on 4/16/2019 at 2:46 PM

## 2019-04-16 NOTE — GROUP NOTE
Group Therapy Note    Date:     Group Start Time: 400  Group End Time: 430  Group Topic: Group Therapy    [facility]    Alli Miles        Group Therapy Note    Attendees: 8         Patient's Goal:  ***    Notes:  ***    Status After Intervention:  {Status After Intervention:052290049}    Participation Level: {Participation Level:057419546}    Participation Quality: {[facility] PARTICIPATION QUALITY:072944240}      Speech:  {[facility] CD_SPEECH:55784}      Thought Process/Content: {Thought Process/Content:168361482}      Affective Functioning: {Affective Functionin}      Mood: {Mood:606771603}      Level of consciousness:  {Level of consciousness:057346472}      Response to Learnin Gela Phillip BHI Responses to Learnin}      Endings: {[facility] Endings:35857}    Modes of Intervention: {[facility] Modes of Intervention:618824236}      Discipline Responsible: {[facility] Multidisciplinary:743301752}      Signature:  Hilda Ventura
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
## 2019-04-17 ENCOUNTER — CARE COORDINATION (OUTPATIENT)
Dept: CARE COORDINATION | Age: 49
End: 2019-04-17

## 2019-04-17 LAB
GLUCOSE BLD-MCNC: 120 MG/DL (ref 75–110)
GLUCOSE BLD-MCNC: 133 MG/DL (ref 75–110)
GLUCOSE BLD-MCNC: 99 MG/DL (ref 75–110)

## 2019-04-17 PROCEDURE — 6370000000 HC RX 637 (ALT 250 FOR IP): Performed by: PSYCHIATRY & NEUROLOGY

## 2019-04-17 PROCEDURE — 6370000000 HC RX 637 (ALT 250 FOR IP): Performed by: NURSE PRACTITIONER

## 2019-04-17 PROCEDURE — 99232 SBSQ HOSP IP/OBS MODERATE 35: CPT | Performed by: INTERNAL MEDICINE

## 2019-04-17 PROCEDURE — 1240000000 HC EMOTIONAL WELLNESS R&B

## 2019-04-17 PROCEDURE — 80048 BASIC METABOLIC PNL TOTAL CA: CPT

## 2019-04-17 PROCEDURE — 82947 ASSAY GLUCOSE BLOOD QUANT: CPT

## 2019-04-17 PROCEDURE — 99232 SBSQ HOSP IP/OBS MODERATE 35: CPT | Performed by: NURSE PRACTITIONER

## 2019-04-17 RX ADMIN — PREGABALIN 400 MG: 150 CAPSULE ORAL at 09:43

## 2019-04-17 RX ADMIN — DIVALPROEX SODIUM 250 MG: 250 TABLET, EXTENDED RELEASE ORAL at 09:43

## 2019-04-17 RX ADMIN — PANTOPRAZOLE SODIUM 40 MG: 40 TABLET, DELAYED RELEASE ORAL at 09:43

## 2019-04-17 RX ADMIN — SIMVASTATIN 40 MG: 40 TABLET, FILM COATED ORAL at 21:25

## 2019-04-17 RX ADMIN — TRAZODONE HYDROCHLORIDE 50 MG: 50 TABLET ORAL at 21:28

## 2019-04-17 RX ADMIN — BUSPIRONE HYDROCHLORIDE 10 MG: 10 TABLET ORAL at 09:46

## 2019-04-17 RX ADMIN — DIVALPROEX SODIUM 500 MG: 500 TABLET, EXTENDED RELEASE ORAL at 21:28

## 2019-04-17 RX ADMIN — BUSPIRONE HYDROCHLORIDE 5 MG: 5 TABLET ORAL at 14:47

## 2019-04-17 RX ADMIN — PREGABALIN 400 MG: 150 CAPSULE ORAL at 21:26

## 2019-04-17 RX ADMIN — BUSPIRONE HYDROCHLORIDE 10 MG: 10 TABLET ORAL at 14:46

## 2019-04-17 RX ADMIN — FAMOTIDINE 20 MG: 20 TABLET ORAL at 09:44

## 2019-04-17 RX ADMIN — BUSPIRONE HYDROCHLORIDE 10 MG: 10 TABLET ORAL at 21:28

## 2019-04-17 RX ADMIN — TRAZODONE HYDROCHLORIDE 150 MG: 150 TABLET ORAL at 21:26

## 2019-04-17 RX ADMIN — MOMETASONE FUROATE AND FORMOTEROL FUMARATE DIHYDRATE 2 PUFF: 200; 5 AEROSOL RESPIRATORY (INHALATION) at 21:25

## 2019-04-17 RX ADMIN — BUSPIRONE HYDROCHLORIDE 5 MG: 5 TABLET ORAL at 21:27

## 2019-04-17 RX ADMIN — QUETIAPINE FUMARATE 300 MG: 300 TABLET ORAL at 21:26

## 2019-04-17 RX ADMIN — SENNOSIDES 8.6 MG: 8.6 TABLET, FILM COATED ORAL at 21:28

## 2019-04-17 RX ADMIN — MOMETASONE FUROATE AND FORMOTEROL FUMARATE DIHYDRATE 2 PUFF: 200; 5 AEROSOL RESPIRATORY (INHALATION) at 09:42

## 2019-04-17 RX ADMIN — BUSPIRONE HYDROCHLORIDE 5 MG: 5 TABLET ORAL at 09:43

## 2019-04-17 RX ADMIN — ACETAMINOPHEN 650 MG: 325 TABLET, FILM COATED ORAL at 12:10

## 2019-04-17 ASSESSMENT — PAIN SCALES - GENERAL
PAINLEVEL_OUTOF10: 9
PAINLEVEL_OUTOF10: 5
PAINLEVEL_OUTOF10: 8
PAINLEVEL_OUTOF10: 0

## 2019-04-17 ASSESSMENT — PAIN DESCRIPTION - LOCATION: LOCATION: BACK

## 2019-04-17 NOTE — BH NOTE
Patient requesting muscle relaxer saying he is having back spasms. Informed patient he does not have one ordered. Patient then asks for Haldol injection. Explained it is not for spasms. Patient then begins to argue with nurse. Firm limits set. Patient stormed off to his room stating. \"they never give you what you want! I should have stayed at home! \".  Annabelle Saavedra RN

## 2019-04-17 NOTE — GROUP NOTE
Group Therapy Note    Date: April 17    Group Start Time: 0400  Group End Time: 0430  Group Topic: Healthy Living/Wellness    FAYE Garcia        Group Therapy Note    Pt. Did not attend 4:00 wellness group despite staff encouragement.

## 2019-04-17 NOTE — BH NOTE
Patient reported earlier in shift that he had not had a bowel movement since Friday,  sennokot given with HS medications.   Patient woke up with diarrhea

## 2019-04-17 NOTE — PLAN OF CARE
Problem: Falls - Risk of:  Goal: Will remain free from falls  Description  Will remain free from falls  Outcome: Met This Shift  Note:   Pt remains free of falls and verbalizes understanding of individual fall risks. Pt wearing non skid footwear and encouraged to seek out staff for any assistance needed. Problem: Falls - Risk of:  Goal: Absence of physical injury  Description  Absence of physical injury  Outcome: Met This Shift  Note:   No falls no injuries. Problem: Risk for Impaired Skin Integrity  Goal: Tissue integrity - skin and mucous membranes  Description  Structural intactness and normal physiological function of skin and  mucous membranes. Outcome: Met This Shift  Note:   Skin integrity maintained     Problem: Altered Mood, Depressive Behavior:  Goal: Ability to disclose and discuss suicidal ideas will improve  Description  Ability to disclose and discuss suicidal ideas will improve  Outcome: Ongoing  Note:   Patient is flat and depressed. Isolative to room. Somatic. Complained of chest pain. Vitals stable. Discussed anxiety. Patient admits to a family history and that he had been thinking about that. Patient accepting of medications and support and reassurance. Problem: Altered Mood, Psychotic Behavior:  Goal: Able to verbalize decrease in frequency and intensity of hallucinations  Description  Able to verbalize decrease in frequency and intensity of hallucinations  Outcome: Ongoing  Note:   Admits to voices that are mumbles.

## 2019-04-17 NOTE — PLAN OF CARE
Problem: Falls - Risk of:  Goal: Will remain free from falls  Description  Will remain free from falls  4/16/2019 2243 by Charlotte Moran RN  Outcome: Ongoing   Pt remains free of falls and verbalizes understanding of individual fall risks. Pt wearing non skid footwear and encouraged to seek out staff for any assistance needed. Problem: Falls - Risk of:  Goal: Absence of physical injury  Description  Absence of physical injury  Outcome: Ongoing  Pt. Remains on q15 min checks and frequent spontaneous checks throughout shift. Pt. Safety maintained. Problem: Altered Mood, Psychotic Behavior:  Goal: Able to verbalize decrease in frequency and intensity of hallucinations  Description  Able to verbalize decrease in frequency and intensity of hallucinations  Outcome: Ongoing  Patient denies auditory and visual hallucinations  Problem: Pain:  Goal: Control of chronic pain  Description  Control of chronic pain  Outcome: Ongoing   Patient is able to communicate pain level. Patient is able to identify 0-10 on pain scale, patient agreeable to inform nurse when she is having pain. Nurse rounds every hour to evaluate pt's pain   Problem: Risk for Impaired Skin Integrity  Goal: Tissue integrity - skin and mucous membranes  Description  Structural intactness and normal physiological function of skin and  mucous membranes. Outcome: Ongoing    Waffle mattress placed on bed, cushion placed in wheelchair to prevent skin breakdown.

## 2019-04-17 NOTE — PLAN OF CARE
machine    Spinal stenosis     Substance abuse (HCC)        Risk:  Fall RiskTotal: 96  Jack Scale Jack Scale Score: 17  BVC Total: 0  Change in scores no Changes to plan of Care no    Status EXAM:   Status and Exam  Normal: No  Facial Expression: Expressionless, Flat  Affect: Incongruent  Level of Consciousness: Alert  Mood:Normal: No  Mood: Anxious, Helpless, Depressed  Motor Activity:Normal: No  Motor Activity: Decreased  Interview Behavior: Cooperative, Evasive  Preception: [place] to Person, [place] to Time, [place] to Place, [place] to Situation  Attention:Normal: No  Attention: Distractible  Thought Processes: Circumstantial  Thought Content:Normal: No  Thought Content: Preoccupations  Hallucinations: Auditory (Comment)  Delusions: No  Memory:Normal: No  Memory: Poor Recent, Poor Remote  Insight and Judgment: No  Insight and Judgment: Poor Insight, Unmotivated, Poor Judgment  Present Suicidal Ideation: No  Present Homicidal Ideation: No    Daily Assessment Last Entry:   Daily Sleep (WDL): Within Defined Limits         Patient Currently in Pain: Yes  Daily Nutrition (WDL): Within Defined Limits    Patient Monitoring:  Frequency of Checks: 4 times per hour, close    Psychiatric Symptoms:   Depression Symptoms  Depression Symptoms: Isolative  Anxiety Symptoms  Anxiety Symptoms: Generalized  [place] Symptoms  [place] Symptoms: No problems reported or observed.      Psychosis Symptoms  Delusion Type: Somatic    Suicide Risk CSSR-S:  1) Within the past month, have you wished you were dead or wished you could go to sleep and not wake up? : No  2) Have you actually had any thoughts of killing yourself? : No  6) Have you ever done anything, started to do anything, or prepared to do anything to end your life?: No  Change in Result no Change in Plan of care no      EDUCATION:   EDUCATION:   Learner Progress Toward Treatment Goals: Reviewed results and recommendations of this team, Reviewed group plan and strategies, Reviewed signs, symptoms and risk of self harm and violent behavior, Reviewed goals and plan of care    Method:small group, individual verbal education    Outcome:verbalized by patient, but needs reinforcement to obtain goals    PATIENT GOALS:  Short term: Stable housing and speak with CM  Long term:  Follow up with [facility]    PLAN/TREATMENT RECOMMENDATIONS UPDATE: continue with group therapies, increased socialization, continue planning for after discharge goals, continue with medication compliance    SHORT-TERM GOALS UPDATE:   Time frame for Short-Term Goals: 5-7 days    LONG-TERM GOALS UPDATE:   Time frame for Long-Term Goals: 6 months  Members Present in Team Meeting: See Signature Sheet    LYDIA Apple

## 2019-04-17 NOTE — GROUP NOTE
Group Therapy Note    Date: April 17    Group Start Time: 1430  Group End Time: 4229  Group Topic: Recovery    [facility]    TONYA Baxter, LYDIA    Group Therapy Note    Attendees: 6    Patient's Goal:  Increase socialization and understanding of recovery process. Notes:  Pt is making progress AEB participating in group discussion about positive coping skills and prioritizing mental health needs. However, pt was preoccupied with medication and states, \"nurses are not doing their job\".     Status After Intervention:  Improved    Participation Level: Monopolized    Participation Quality: Inappropriate      Speech:  normal      Thought Process/Content: Logical      Affective Functioning: Congruent      Mood: Anxious      Level of consciousness:  Alert, Oriented x4 and Attentive      Response to Learning: Able to verbalize current knowledge/experience, Able to verbalize/acknowledge new learning, Able to retain information, Capable of insight, Able to change behavior and Progressing to goal      Endings: None Reported    Modes of Intervention: Education, Support, Socialization and Problem-solving      Discipline Responsible: /Counselor      Signature:  TONYA Baxter, LYDIA

## 2019-04-17 NOTE — PROGRESS NOTES
solution 2.5 mg  2.5 mg Nebulization Q6H PRN Ernestina Salter MD   2.5 mg at 04/16/19 2057    hydrOXYzine (ATARAX) tablet 25 mg  25 mg Oral TID PRN Para Brown, APRN - CNP   25 mg at 04/16/19 0859    benztropine mesylate (COGENTIN) injection 2 mg  2 mg Intramuscular BID PRN Para Brown, APRN - CNP        magnesium hydroxide (MILK OF MAGNESIA) 400 MG/5ML suspension 30 mL  30 mL Oral Daily PRN Para Rbown, APRN - CNP        nicotine (NICODERM CQ) 14 MG/24HR 1 patch  1 patch Transdermal Daily Para Brown, APRN - CNP   1 patch at 04/17/19 0944    nicotine polacrilex (NICORETTE) gum 2 mg  2 mg Oral Q2H PRN Para Brown, APRN - CNP        vitamin B-12 (CYANOCOBALAMIN) tablet 500 mcg  500 mcg Oral Daily Ernestina Salter MD        divalproex (DEPAKOTE ER) extended release tablet 250 mg  250 mg Oral QAM Ernestina Salter MD   250 mg at 04/17/19 0943    divalproex (DEPAKOTE ER) extended release tablet 500 mg  500 mg Oral Nightly Ernestina Salter MD   500 mg at 04/16/19 2037    mometasone-formoterol (DULERA) 200-5 MCG/ACT inhaler 2 puff  2 puff Inhalation BID Ernestina Salter MD   2 puff at 04/17/19 0942    ipratropium (ATROVENT) 0.02 % nebulizer solution 0.5 mg  0.5 mg Nebulization 4x Daily PRN Ernestina Salter MD        lidocaine (LMX) 4 % cream   Topical TID PRN Ernestina Salter MD        pantoprazole (PROTONIX) tablet 40 mg  40 mg Oral Daily Ernestina Salter MD   40 mg at 04/17/19 0943    polyethylene glycol (GLYCOLAX) packet 17 g  17 g Oral Daily PRN Ernestina Salter MD        pregabalin (LYRICA) capsule 400 mg  400 mg Oral BID Ernestina Salter MD   400 mg at 04/17/19 0943    QUEtiapine (SEROQUEL) tablet 300 mg  300 mg Oral Nightly Ernestina Salter MD   300 mg at 04/16/19 2037    famotidine (PEPCID) tablet 20 mg  20 mg Oral Daily Ernestina Salter MD   20 mg at 04/17/19 0944    senna (SENOKOT) tablet 8.6 mg  1 tablet Oral Nightly PRN Ernestina Salter MD   8.6 mg at 04/16/19 2057    simvastatin (ZOCOR) tablet 40 mg  40 mg Oral Nightly Dieter Lenz MD   40 mg at 04/16/19 2037    albuterol sulfate  (90 Base) MCG/ACT inhaler 1 puff  1 puff Inhalation Q6H PRN Dieter Lenz MD        acetaminophen (TYLENOL) tablet 650 mg  650 mg Oral Q4H PRN Dieter Lenz MD   650 mg at 04/17/19 1210    haloperidol lactate (HALDOL) injection 5 mg  5 mg Intramuscular Q4H PRN Dieter Lenz MD   5 mg at 04/16/19 1346    insulin lispro (HUMALOG) injection vial 0-12 Units  0-12 Units Subcutaneous TID WC Dieter Lenz MD   2 Units at 04/16/19 1751    insulin lispro (HUMALOG) injection vial 0-6 Units  0-6 Units Subcutaneous Nightly Dieter Lenz MD        glucose (GLUTOSE) 40 % oral gel 15 g  15 g Oral PRN Dieter Lenz MD        dextrose 50 % solution 12.5 g  12.5 g Intravenous PRN Dieter Lenz MD        glucagon (rDNA) injection 1 mg  1 mg Intramuscular PRN Dieter Lenz MD        dextrose 5 % solution  100 mL/hr Intravenous PRN Dieter Lenz MD        traZODone (DESYREL) tablet 150 mg  150 mg Oral Nightly PRN Dieter Lenz MD   150 mg at 04/16/19 2036    traZODone (DESYREL) tablet 50 mg  50 mg Oral Nightly PRN Dieter Lenz MD   50 mg at 04/16/19 2038         busPIRone  10 mg Oral TID    And    busPIRone  5 mg Oral TID    nicotine  1 patch Transdermal Daily    cyanocobalamin  500 mcg Oral Daily    divalproex  250 mg Oral QAM    divalproex  500 mg Oral Nightly    mometasone-formoterol  2 puff Inhalation BID    pantoprazole  40 mg Oral Daily    pregabalin  400 mg Oral BID    QUEtiapine  300 mg Oral Nightly    famotidine  20 mg Oral Daily    simvastatin  40 mg Oral Nightly    insulin lispro  0-12 Units Subcutaneous TID WC    insulin lispro  0-6 Units Subcutaneous Nightly       ASSESSMENT  Bipolar 1 disorder (HCC)     Patient's Response to Treatment: positive    PLAN:     1.  Continue inpatient psychiatric treatment  2. Supportive therapy with medication management. Reviewed risks and benefits as well as potential side effects with patient. 3. Therapeutic activities and groups  4. Follow up at [facility] after symptoms stabilized. 5. Social work for discharge planning. 6. Restart home medications. Valproic Acid level low, possible noncompliance with medications.        Electronically signed by CHAD Fraser CNP on 4/17/2019 at 12:57 PM.    Dragon voice recognition software used in portions of this document.
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
## 2019-04-17 NOTE — GROUP NOTE
Group Therapy Note    Date: April 17    Group Start Time: 0900  Group End Time: 0930  Group Topic: Community Meeting    [address] [place], [place]        Group Therapy Note    Attendees: 7/14         Patient's Goal:  Patient will demonstrate increased interpersonal interaction     Notes:  Patient attended group and participated fully. Status After Intervention:  Improved    Participation Level:  Active Listener and Interactive    Participation Quality: Appropriate, Attentive and Sharing      Speech:  normal      Thought Process/Content: Logical      Affective Functioning: Congruent      Mood: euthymic      Level of consciousness:  Alert and Oriented x4      Response to Learning: Progressing to goal      Endings: None Reported    Modes of Intervention: Education, Support, Socialization, Clarifying, Problem-solving and Reality-testing      Discipline Responsible: Psychoeducational Specialist      Signature:  Peggy Cortes

## 2019-04-17 NOTE — GROUP NOTE
Group Therapy Note    Date: April 17    Group Start Time: 1000  Group End Time: 5759  Group Topic: Cognitive Skills    [facility] Gilford Cyphers, CTRS        Group Therapy Note  Pt did not attend Therapeutic Recreation at 1000 d/t resting in room despite staff invitation to attend.

## 2019-04-17 NOTE — GROUP NOTE
Group Therapy Note      Pt did not participate in Cognitive Skills Group at 1330 due to pt was resting in room et declined to attend group when encouraged.

## 2019-04-17 NOTE — BH NOTE
Patient ambulates out to nurses station for afternoon snack at this time. No distress noted.  Juani Camacho RN

## 2019-04-17 NOTE — CARE COORDINATION
CC spoke with Nicolas Bower at [address] [place] [address] requesting a prescription fill history for [employer]. Nicolas Bower reports she will fax a year history to [phone]. Plan:  Scan document when it arrives and route to [employer].

## 2019-04-17 NOTE — DISCHARGE SUMMARY
CDU Discharge Summary        Patient:  Sonia Shen. YOB: 1970    MRN: [id number]   Acct: [de-identified]    Primary Care Physician: CHAD Awan CNP    Admit date:  4/14/2019  8:47 PM  Discharge date: 4/15/2019  5:15 PM     Discharge Diagnoses:     1. Acute onset persistent vomiting, likely secondary to drug abuse, resolved spontaneously. 2.  Acute onset diffuse epigastric, right lower quadrant and diffuse abdominal pain unknown etiology, treated with IV Toradol. 3. Acute elevated lactic acidosis, likely related to persistent vomiting, treated with iVF, resolved.        1. Workup in emergency department showed no cardiopulmonary pathology, electrolyte within normal limits  2.  CT abdomen pelvis within normal limits. Aortoiliac iliac atherosclerotic calcification found on image study. 3. Psychiatry evaluation               - plan for placement of patient for rehabilitative facility         Follow-up:  Call today/tomorrow for a follow up appointment with CHAD Awan CNP , or return to the Emergency Room with worsening symptoms    Stressed to patient the importance of following up with primary care doctor for further workup/management of symptoms. Pt verbalizes understanding and agrees with plan. Discharge Medication Changes:       Medication List      CHANGE how you take these medications    * divalproex 500 MG extended release tablet  Commonly known as:  DEPAKOTE ER  Take 1 tablet by mouth nightly  What changed:  Another medication with the same name was changed. Make sure you understand how and when to take each.      * divalproex 250 MG extended release tablet  Commonly known as:  DEPAKOTE ER  Take 1 tablet by mouth every morning  What changed:  how much to take     DULoxetine 30 MG extended release capsule  Commonly known as:  CYMBALTA  TAKE 3 CAPSULES BY MOUTH DAILY  What changed:    · how much to take  · when to take this  · additional instructions     naloxegol 25 MG Tabs tablet  Commonly known as:  MOVANTIK  Take 1 tablet by mouth every morning  What changed:    · when to take this  · reasons to take this     traZODone 150 MG tablet  Commonly known as:  DESYREL  Take 1 tablet by mouth nightly as needed for Sleep  What changed:    · how much to take  · when to take this         * This list has 2 medication(s) that are the same as other medications prescribed for you. Read the directions carefully, and ask your doctor or other care provider to review them with you. CONTINUE taking these medications    * albuterol (2.5 MG/3ML) 0.083% nebulizer solution  Commonly known as:  PROVENTIL  Take 3 mLs by nebulization every 6 hours as needed for Wheezing     * VENTOLIN  (90 Base) MCG/ACT inhaler  Generic drug:  albuterol sulfate HFA  INHALE 2 PUFFS INTO THE LUNGS EVERY 6 HOURS AS NEEDED FOR WHEEZING     BOOST Liqd  Two a day. Chocolate lactose free nutritional drinks. buprenorphine-naloxone 2-0.5 MG Subl  Commonly known as:  SUBOXONE  Place 2 tablets under the tongue 2 times daily .      * busPIRone 15 MG tablet  Commonly known as:  BUSPAR     * busPIRone 30 MG tablet  Commonly known as:  BUSPAR     cloNIDine 0.1 MG tablet  Commonly known as:  CATAPRES     cyanocobalamin 500 MCG tablet     ENULOSE 10 GM/15ML Soln solution  Generic drug:  lactulose encephalopathy  TAKE 15 MLS BY MOUTH 3 TIMES DAILY AS NEEDED (CONSTIPATION)     fluticasone-vilanterol 100-25 MCG/INH Aepb inhaler  Commonly known as:  BREO ELLIPTA  Inhale 1 puff into the lungs daily     FREESTYLE LITE strip  Generic drug:  blood glucose test strips  1 each by Other route 3 times daily     * Gel-Foam Cushion Misc  1 Units by Does not apply route daily     * Gel-Foam Cushion Misc  1 Units by Does not apply route daily     * hydrOXYzine 25 MG capsule  Commonly known as:  VISTARIL  Take 1 capsule by mouth 2 times daily     * hydrOXYzine 50 MG capsule  Commonly known as:  VISTARIL     insulin regular 100 tablet  Commonly known as:  ADVIL;MOTRIN            Diet:  No diet orders on file, advance as tolerated     Activity:  As tolerated    Consultants: IP CONSULT TO PSYCHIATRY  IP CONSULT TO PSYCHIATRY    Procedures:  Not indicated     Diagnostic Test:   Results for orders placed or performed during the hospital encounter of 04/14/19   CBC Auto Differential   Result Value Ref Range    WBC 22.7 (H) 3.5 - 11.3 k/uL    RBC 5.92 (H) 4.21 - 5.77 m/uL    Hemoglobin 17.1 (H) 13.0 - 17.0 g/dL    Hematocrit 51.3 (H) 40.7 - 50.3 %    MCV 86.7 82.6 - 102.9 fL    MCH 28.9 25.2 - 33.5 pg    MCHC 33.3 28.4 - 34.8 g/dL    RDW 14.8 (H) 11.8 - 14.4 %    Platelets See Reflexed IPF Result 138 - 453 k/uL    MPV NOT REPORTED 8.1 - 13.5 fL    NRBC Automated 0.0 0.0 per 100 WBC    Differential Type NOT REPORTED     WBC Morphology NOT REPORTED     RBC Morphology ANISOCYTOSIS PRESENT     Platelet Estimate NOT REPORTED     Seg Neutrophils 81 (H) 36 - 65 %    Lymphocytes 11 (L) 24 - 43 %    Monocytes 7 3 - 12 %    Eosinophils % 0 (L) 1 - 4 %    Basophils 0 0 - 2 %    Immature Granulocytes 1 (H) 0 %    Segs Absolute 18.43 (H) 1.50 - 8.10 k/uL    Absolute Lymph # 2.57 1.10 - 3.70 k/uL    Absolute Mono # 1.50 (H) 0.10 - 1.20 k/uL    Absolute Eos # <0.03 0.00 - 0.44 k/uL    Basophils # 0.07 0.00 - 0.20 k/uL    Absolute Immature Granulocyte 0.11 0.00 - 0.30 k/uL   BASIC METABOLIC PANEL   Result Value Ref Range    Glucose 121 (H) 70 - 99 mg/dL    BUN 13 6 - 20 mg/dL    CREATININE 0.81 0.70 - 1.20 mg/dL    Bun/Cre Ratio NOT REPORTED 9 - 20    Calcium 10.2 8.6 - 10.4 mg/dL    Sodium 142 135 - 144 mmol/L    Potassium 3.6 (L) 3.7 - 5.3 mmol/L    Chloride 105 98 - 107 mmol/L    CO2 19 (L) 20 - 31 mmol/L    Anion Gap 18 (H) 9 - 17 mmol/L    GFR Non-African American >60 >60 mL/min    GFR African American >60 >60 mL/min    GFR Comment          GFR Staging NOT REPORTED    HEPATIC FUNCTION PANEL   Result Value Ref Range    Alb 4.6 3.5 - 5.2 g/dL    Alkaline Phosphatase 87 40 - 129 U/L    ALT 27 5 - 41 U/L    AST 25 <40 U/L    Total Bilirubin 0.32 0.3 - 1.2 mg/dL    Bilirubin, Direct 0.11 <0.31 mg/dL    Bilirubin, Indirect 0.21 0.00 - 1.00 mg/dL    Total Protein 8.2 6.4 - 8.3 g/dL    Globulin NOT REPORTED 1.5 - 3.8 g/dL    Albumin/Globulin Ratio 1.3 1.0 - 2.5   Protime-INR   Result Value Ref Range    Protime 11.0 9.0 - 12.0 sec    INR 1.0    MAGNESIUM   Result Value Ref Range    Magnesium 2.0 1.6 - 2.6 mg/dL   PHOSPHORUS   Result Value Ref Range    Phosphorus 1.7 (L) 2.5 - 4.5 mg/dL   TOX SCR, BLD, ED   Result Value Ref Range    Ethanol <10 <10 mg/dL    Ethanol percent <8.199 <4.620 %    Salicylate Lvl <1 (L) 3 - 10 mg/dL    Acetaminophen Level <5 (L) 10 - 30 ug/mL    Toxic Tricyclic Sc,Blood NEGATIVE NEGATIVE   Immature Platelet Fraction   Result Value Ref Range    Platelet, Immature Fraction  1.1 - 10.3 %    Platelet, Fluorescence Platelet clumps present, count appears adequate. 138 - 453 k/uL   SPECIMEN REJECTION   Result Value Ref Range    Specimen Source . BLOOD     Ordered Test LACWB     Reason for Rejection Unable to perform testing: Specimen clotted.      - NOT REPORTED    Lactic Acid, Whole Blood   Result Value Ref Range    Lactic Acid, Whole Blood 2.4 (H) 0.7 - 2.1 mmol/L   Lactic Acid, Whole Blood   Result Value Ref Range    Lactic Acid, Whole Blood 0.8 0.7 - 2.1 mmol/L   CBC   Result Value Ref Range    WBC 11.0 3.5 - 11.3 k/uL    RBC 5.22 4.21 - 5.77 m/uL    Hemoglobin 14.7 13.0 - 17.0 g/dL    Hematocrit 45.8 40.7 - 50.3 %    MCV 87.7 82.6 - 102.9 fL    MCH 28.2 25.2 - 33.5 pg    MCHC 32.1 28.4 - 34.8 g/dL    RDW 15.0 (H) 11.8 - 14.4 %    Platelets 995 955 - 637 k/uL    MPV 9.7 8.1 - 13.5 fL    NRBC Automated 0.0 0.0 per 100 WBC   Valproic acid level, total   Result Value Ref Range    Valproic Acid Lvl 5 (L) 50 - 125 ug/mL    Valproic Dose amount NOT REPORTED     Valproic Date last dose NOT REPORTED     Valproic Time last dose NOT REPORTED    EKG 12 Lead Result Value Ref Range    Ventricular Rate 87 BPM    Atrial Rate 87 BPM    P-R Interval 156 ms    QRS Duration 96 ms    Q-T Interval 338 ms    QTc Calculation (Bazett) 406 ms    R Axis 146 degrees    T Axis 141 degrees     Xr Hip Right (2-3 Views)    Result Date: 4/16/2019  EXAMINATION: 2 XRAY VIEWS OF THE RIGHT HIP 4/16/2019 7:43 pm COMPARISON: September 28, 2017 HISTORY: ORDERING SYSTEM PROVIDED HISTORY: pain TECHNOLOGIST PROVIDED HISTORY: pain FINDINGS: Right hip in anatomic alignment. Cortical margins intact. Degenerative changes and postop changes L5-S1. No acute disease           Physical Exam:    General appearance - NAD, AOx 3   Lungs -CTAB, no R/R/R  Heart - RRR, no M/R/G  Abdomen - Soft, NT/ND  Neurological:  MAEx4, No focal motor deficit, sensory loss  Extremities - Cap refil <2 sec in all ext., no edema  Skin -warm, dry      Hospital Course:  Clinical course has improved, labs and imaging reviewed. Asael Ann. originally presented to the hospital on 4/14/2019  8:47 PM with persisent vomiting . At that time it was determined that He required further observation and testing and consultation. Labs and imaging were followed daily. Imaging results as above. He is medically stable to be discharged. Disposition: Home    Patient stated that they will not drive themselves home from the hospital if they have gotten pain killers/ narcotics earlier that day and that they will arrange for transportation on their own or work with the  for a ride. Patient counseled NOT to drive while under the influence of narcotics/ pain killers. Condition: Good    Patient stable and ready for discharge home. I have discussed plan of care with patient and they are in understanding. They were instructed to read discharge paperwork. All of their questions and concerns were addressed.      Time Spent: 0 day      --  Nate Rogers, DO  Emergency Medicine Resident Physician    This dictation was generated by voice recognition computer software. Although all attempts are made to edit the dictation for accuracy, there may be errors in the transcription that are not intended.

## 2019-04-17 NOTE — FLOWSHEET NOTE
04/17/19 1057   Encounter Summary   Services provided to: Patient   Referral/Consult From: Rounding   Continue Visiting   (4/17/19)   Complexity of Encounter Low   Length of Encounter 15 minutes   Routine   Type Initial   Spiritual/Jain   Type Spiritual support   Intervention Anointing   Sacraments   Sacrament of Sick-Anointing Anointed  (Fr Araya 4/17/19)

## 2019-04-17 NOTE — PROGRESS NOTES
Take 1 tablet by mouth 3 times daily 4/12/19  Yes CHAD Young CNP   ranitidine (ZANTAC) 150 MG tablet Take 1 tablet by mouth 2 times daily as needed for Heartburn 4/12/19  Yes CHAD Young CNP   pregabalin (LYRICA) 200 MG capsule Take 2 capsules by mouth 2 times daily.  4/12/19 4/11/20 Yes CHAD Young CNP   VENTOLIN  (90 Base) MCG/ACT inhaler INHALE 2 PUFFS INTO THE LUNGS EVERY 6 HOURS AS NEEDED FOR WHEEZING 3/27/19  Yes CHAD Young CNP   QUEtiapine (SEROQUEL) 200 MG tablet Take 1.5 tablets by mouth nightly 3/20/19  Yes CHAD Young CNP   cloNIDine (CATAPRES) 0.1 MG tablet Take 0.1 mg by mouth nightly  1/24/19  Yes Historical Provider, MD   fluticasone-vilanterol (BREO ELLIPTA) 100-25 MCG/INH AEPB inhaler Inhale 1 puff into the lungs daily 2/18/19  Yes CHAD Young CNP   Insulin Syringe-Needle U-100 31G X 5/16\" 0.5 ML MISC 1 each by Does not apply route 3 times daily 1/23/19  Yes CHAD Young CNP   simvastatin (ZOCOR) 40 MG tablet Take 40 mg by mouth nightly    Yes Historical Provider, MD   albuterol (PROVENTIL) (2.5 MG/3ML) 0.083% nebulizer solution Take 3 mLs by nebulization every 6 hours as needed for Wheezing 12/20/18  Yes CHAD Young CNP   nicotine (NICODERM CQ) 21 MG/24HR Place 1 patch onto the skin daily as needed (if pateint smokes) 12/13/18  Yes Brett Nguyen MD   divalproex (DEPAKOTE ER) 500 MG extended release tablet Take 1 tablet by mouth nightly 9/5/18  Yes Alejandra Burroughs MD   divalproex (DEPAKOTE ER) 250 MG extended release tablet Take 1 tablet by mouth every morning  Patient taking differently: Take 750 mg by mouth every morning  9/5/18  Yes Alejandra Burroughs MD   traZODone (DESYREL) 150 MG tablet Take 1 tablet by mouth nightly as needed for Sleep  Patient taking differently: Take 300 mg by mouth nightly  4/12/18  Yes Jim Cuff, APRN - CNP   Alcohol Swabs (PRO COMFORT ALCOHOL) 70 % PADS  3/30/19 (MOVANTIK) 25 MG TABS tablet Take 1 tablet by mouth every morning  Patient taking differently: Take 25 mg by mouth as needed  9/7/18   Young Hale MD   polyethylene glycol (GLYCOLAX) powder Take 17 g by mouth daily as needed    Historical Provider, MD   buprenorphine-naloxone (SUBOXONE) 2-0.5 MG SUBL Place 2 tablets under the tongue 2 times daily . 11/17/17   Rianna Zeng MD   topiramate (TOPAMAX) 100 MG tablet Take 200 mg by mouth 2 times daily     Historical Provider, MD       Allergies:  Lactose intolerance (gi); Sulfa antibiotics; Sulfasalazine; Bactrim; Levofloxacin; Levofloxacin; Phenobarbital; Sulfamethoxazole-trimethoprim; Sulfur; Tessalon [benzonatate]; and Wellbutrin [bupropion]    Social History:   reports that he has been smoking cigarettes. He started smoking about 36 years ago. He has a 35.00 pack-year smoking history. He has never used smokeless tobacco. He reports that he has current or past drug history. Drugs: Marijuana and Opiates . He reports that he does not drink alcohol. Family History: family history includes Coronary Art Dis in his maternal uncle; Diabetes in his mother; Seizures in his maternal cousin and sister. REVIEW OF SYSTEMS:    · Constitutional: Negative for weight loss  · Eyes: Negative for visual changes, diplopia, scleral icterus. · ENT: Negative for Headaches, hearing loss, vertigo, mouth sores, sore throat. · Cardiovascular: Negative for lightheadedness/orthostatic symptoms ,chest pain, dyspnea on exertion, palpitations or loss of consciousness. · Respiratory: Negative for cough or wheezing, sputum production, hemoptysis, pleuritic pain. · Gastrointestinal: Negative for nausea/vomiting, change in bowel habits, abdominal pain, dysphagia/appetite loss, hematemesis, blood in stools. · Genitourinary:Negative for change in bladder habits, dysuria, trouble voiding, hematuria. · Musculoskeletal: Negative for gait disturbance, weakness, joint complaints. pos hip Labs     04/16/19 0719   LABA1C 5.3     INR:   Recent Labs     04/14/19 2109   INR 1.0     Hepatic functions:   Recent Labs     04/14/19 2109 04/16/19 0719   ALKPHOS 87 76   ALT 27 21   AST 25 26   PROT 8.2 6.3*   BILITOT 0.32 <0.15*   BILIDIR 0.11  --    LABALBU 4.6 3.4*     Pancreatic functions:No results for input(s): LACTA, AMYLASE in the last 72 hours. S. Lactic Acid: No results for input(s): LACTA in the last 72 hours. Cardiac enzymes:No results for input(s): CKTOTAL, CKMB, CKMBINDEX, TROPONINI in the last 72 hours. BNP:No results for input(s): BNP in the last 72 hours. Lipid profile:   Recent Labs     04/16/19 0719   CHOL 137   TRIG 162*   HDL 32*     Blood Gases: No results found for: PH, PCO2, PO2, HCO3, O2SAT  Thyroid functions:   Lab Results   Component Value Date    TSH 0.76 04/16/2019        Imaging/Diagonstics:    Ct Abdomen Pelvis W Iv Contrast    Result Date: 4/15/2019  EXAMINATION: CT OF THE ABDOMEN AND PELVIS WITH CONTRAST, 4/14/2019 10:44 pm TECHNIQUE: CT of the abdomen and pelvis was performed with the administration of intravenous contrast. Multiplanar reformatted images are provided for review. Dose modulation, iterative reconstruction, and/or weight based adjustment of the mA/kV was utilized to reduce the radiation dose to as low as reasonably achievable. COMPARISON: CTA of the abdomen and pelvis 09/02/2018 HISTORY: ORDERING SYSTEM PROVIDED HISTORY: Abdominal pain, leukocytosis, vomiting TECHNOLOGIST PROVIDED HISTORY: IV Only Contrast Ordering Physician Provided Reason for Exam: Abdominal pain, leukocytosis, vomiting Acuity: Unknown Type of Exam: Unknown FINDINGS: Lower Chest: Visualized lungs are clear. There is no basilar pericardial or pleural effusion. Organs: Liver is normal in morphology. No biliary duct dilatation. Normal gallbladder. Spleen, adrenal glands, and pancreas are normal.  Kidneys are symmetric in size and enhancement. No hydronephrosis.  GI/Bowel: Stomach, small bowel, and colon are nondilated. Post appendectomy. Pelvis: Urinary bladder and pelvic organs are unremarkable. No free fluid in the pelvis. Peritoneum/Retroperitoneum: Aortoiliac atherosclerotic calcification. Abdominal aorta is normal in caliber. No abdominal or retroperitoneal adenopathy. No free fluid in the abdomen. Bones/Soft Tissues: No acute osseous abnormality. No acute inflammatory process in the abdomen or pelvis. Aortoiliac atherosclerotic calcification. Abdominal aorta is normal in caliber. ASSESSMENT:    Patient Active Problem List   Diagnosis    Anxiety    Seizure ([address]    Myofascial muscle pain    Chronic pain    Spinal stenosis    Hyperlipidemia    Depression    Panlobular emphysema (HCC)    Bipolar disorder (HCC)    Chronic back pain    Schizophrenia ([address]    Fibromyalgia    Type 2 diabetes mellitus with diabetic polyneuropathy, with long-term current use of insulin (HCC)    Onychomycosis    Pain in lower limb    Disc disease, degenerative, cervical    Cervical stenosis of spine    Generalized tonic-clonic seizure (HCC)    Superficial laceration of scalp    Altered mental status    Decubitus ulcer of coccyx, stage 2    Cocaine substance abuse ([address]    Opiate abuse, episodic (HCC)    Closed fracture of nasal bone    Drug overdose    Uncomplicated opioid dependence ([address]    Paraplegia ([address]    Schizoaffective disorder ([address]    Non-dose-related adverse reaction to medication wellbutrin    Cannabis abuse    Encephalopathy    Hyperammonemia (HCC)    Constipation    Cellulitis and abscess of right leg    Interstitial lung disease (HCC)    Vomiting    Bipolar 1 disorder (HCC)    MDD (major depressive disorder), recurrent episode (HCC)    neuropathy    From etoh  More than dm    Has spinal stenosis     myofacial pain syndrome      fibromyalgia     organic brain syndrome      dm2 controlled     On insulin   cont home dose     Copd baseline     ?  Hip fx

## 2019-04-18 LAB
ANION GAP SERPL CALCULATED.3IONS-SCNC: 11 MMOL/L (ref 9–17)
BUN BLDV-MCNC: 7 MG/DL (ref 6–20)
BUN/CREAT BLD: ABNORMAL (ref 9–20)
CALCIUM SERPL-MCNC: 8.7 MG/DL (ref 8.6–10.4)
CHLORIDE BLD-SCNC: 105 MMOL/L (ref 98–107)
CO2: 24 MMOL/L (ref 20–31)
CREAT SERPL-MCNC: 0.79 MG/DL (ref 0.7–1.2)
GFR AFRICAN AMERICAN: >60 ML/MIN
GFR NON-AFRICAN AMERICAN: >60 ML/MIN
GFR SERPL CREATININE-BSD FRML MDRD: ABNORMAL ML/MIN/{1.73_M2}
GFR SERPL CREATININE-BSD FRML MDRD: ABNORMAL ML/MIN/{1.73_M2}
GLUCOSE BLD-MCNC: 104 MG/DL (ref 75–110)
GLUCOSE BLD-MCNC: 125 MG/DL (ref 75–110)
GLUCOSE BLD-MCNC: 132 MG/DL (ref 70–99)
GLUCOSE BLD-MCNC: 143 MG/DL (ref 75–110)
GLUCOSE BLD-MCNC: 154 MG/DL (ref 75–110)
POTASSIUM SERPL-SCNC: 4.1 MMOL/L (ref 3.7–5.3)
SODIUM BLD-SCNC: 140 MMOL/L (ref 135–144)

## 2019-04-18 PROCEDURE — 36415 COLL VENOUS BLD VENIPUNCTURE: CPT

## 2019-04-18 PROCEDURE — 80048 BASIC METABOLIC PNL TOTAL CA: CPT

## 2019-04-18 PROCEDURE — 6370000000 HC RX 637 (ALT 250 FOR IP): Performed by: PSYCHIATRY & NEUROLOGY

## 2019-04-18 PROCEDURE — 1240000000 HC EMOTIONAL WELLNESS R&B

## 2019-04-18 PROCEDURE — 99232 SBSQ HOSP IP/OBS MODERATE 35: CPT | Performed by: NURSE PRACTITIONER

## 2019-04-18 PROCEDURE — 6370000000 HC RX 637 (ALT 250 FOR IP): Performed by: NURSE PRACTITIONER

## 2019-04-18 PROCEDURE — 99231 SBSQ HOSP IP/OBS SF/LOW 25: CPT | Performed by: INTERNAL MEDICINE

## 2019-04-18 PROCEDURE — 82947 ASSAY GLUCOSE BLOOD QUANT: CPT

## 2019-04-18 RX ORDER — HALOPERIDOL 10 MG/1
5 TABLET ORAL EVERY 8 HOURS PRN
Status: DISCONTINUED | OUTPATIENT
Start: 2019-04-18 | End: 2019-04-23 | Stop reason: HOSPADM

## 2019-04-18 RX ORDER — CLONIDINE HYDROCHLORIDE 0.1 MG/1
0.1 TABLET ORAL NIGHTLY
Status: DISCONTINUED | OUTPATIENT
Start: 2019-04-18 | End: 2019-04-23 | Stop reason: HOSPADM

## 2019-04-18 RX ADMIN — BUSPIRONE HYDROCHLORIDE 5 MG: 5 TABLET ORAL at 21:16

## 2019-04-18 RX ADMIN — DIVALPROEX SODIUM 250 MG: 250 TABLET, EXTENDED RELEASE ORAL at 09:09

## 2019-04-18 RX ADMIN — MOMETASONE FUROATE AND FORMOTEROL FUMARATE DIHYDRATE 2 PUFF: 200; 5 AEROSOL RESPIRATORY (INHALATION) at 21:16

## 2019-04-18 RX ADMIN — HYDROXYZINE HYDROCHLORIDE 25 MG: 25 TABLET, FILM COATED ORAL at 21:11

## 2019-04-18 RX ADMIN — SIMVASTATIN 40 MG: 40 TABLET, FILM COATED ORAL at 21:12

## 2019-04-18 RX ADMIN — BUSPIRONE HYDROCHLORIDE 10 MG: 10 TABLET ORAL at 21:10

## 2019-04-18 RX ADMIN — CLONIDINE HYDROCHLORIDE 0.1 MG: 0.1 TABLET ORAL at 21:10

## 2019-04-18 RX ADMIN — DIVALPROEX SODIUM 500 MG: 500 TABLET, EXTENDED RELEASE ORAL at 21:12

## 2019-04-18 RX ADMIN — PANTOPRAZOLE SODIUM 40 MG: 40 TABLET, DELAYED RELEASE ORAL at 09:09

## 2019-04-18 RX ADMIN — PREGABALIN 400 MG: 150 CAPSULE ORAL at 09:08

## 2019-04-18 RX ADMIN — PREGABALIN 400 MG: 150 CAPSULE ORAL at 21:10

## 2019-04-18 RX ADMIN — MOMETASONE FUROATE AND FORMOTEROL FUMARATE DIHYDRATE 2 PUFF: 200; 5 AEROSOL RESPIRATORY (INHALATION) at 09:07

## 2019-04-18 RX ADMIN — BUSPIRONE HYDROCHLORIDE 5 MG: 5 TABLET ORAL at 09:11

## 2019-04-18 RX ADMIN — BUSPIRONE HYDROCHLORIDE 10 MG: 10 TABLET ORAL at 09:09

## 2019-04-18 RX ADMIN — HYDROXYZINE HYDROCHLORIDE 25 MG: 25 TABLET, FILM COATED ORAL at 09:09

## 2019-04-18 RX ADMIN — QUETIAPINE FUMARATE 300 MG: 300 TABLET ORAL at 21:11

## 2019-04-18 RX ADMIN — INSULIN LISPRO 2 UNITS: 100 INJECTION, SOLUTION INTRAVENOUS; SUBCUTANEOUS at 17:20

## 2019-04-18 RX ADMIN — INSULIN LISPRO 2 UNITS: 100 INJECTION, SOLUTION INTRAVENOUS; SUBCUTANEOUS at 09:08

## 2019-04-18 RX ADMIN — FAMOTIDINE 20 MG: 20 TABLET ORAL at 09:09

## 2019-04-18 RX ADMIN — BUSPIRONE HYDROCHLORIDE 10 MG: 10 TABLET ORAL at 13:33

## 2019-04-18 RX ADMIN — BUSPIRONE HYDROCHLORIDE 5 MG: 5 TABLET ORAL at 13:33

## 2019-04-18 RX ADMIN — SENNOSIDES 8.6 MG: 8.6 TABLET, FILM COATED ORAL at 21:10

## 2019-04-18 RX ADMIN — TRAZODONE HYDROCHLORIDE 150 MG: 150 TABLET ORAL at 21:10

## 2019-04-18 NOTE — PLAN OF CARE
Problem: Altered Mood, Depressive Behavior:  Goal: Ability to disclose and discuss suicidal ideas will improve  Description  Ability to disclose and discuss suicidal ideas will improve  4/17/2019 2202 by Keri Becker  Outcome: Ongoing  4/17/2019 1523 by Shanda Olivera RN  Outcome: Ongoing  Note:   Patient is flat and depressed. Isolative to room. Somatic. Complained of chest pain. Vitals stable. Discussed anxiety. Patient admits to a family history and that he had been thinking about that. Patient accepting of medications and support and reassurance. Problem: Altered Mood, Psychotic Behavior:  Goal: Able to verbalize decrease in frequency and intensity of hallucinations  Description  Able to verbalize decrease in frequency and intensity of hallucinations  4/17/2019 2202 by Nick Becker  Outcome: Ongoing  4/17/2019 1523 by Shanda Olivera RN  Outcome: Ongoing  Note:   Admits to voices that are mumbles. Patient has been seclusive to his room all shift. Focused on medication for agitation but has been resting or sleeping quietly most of the shift. Flat affect and depressed mood.  No SI this shift

## 2019-04-18 NOTE — GROUP NOTE
Group Therapy Note    Date: April 18    Group Start Time: 1600  Group End Time: 1268  Group Topic: Healthy Living/Wellness    [address]    Group Therapy Note    Attendees: 9/14    Patient's Goal:  Increased awareness of physical and mental wellness.     Status After Intervention:  Improved    Participation Level: Interactive    Participation Quality: Appropriate, Attentive and Supportive    Speech:  normal    Thought Process/Content: Logical  Linear    Affective Functioning: Congruent    Mood: anxious and depressed    Level of consciousness:  Alert and Oriented x4    Response to Learning: Able to verbalize current knowledge/experience, Able to verbalize/acknowledge new learning, Able to retain information, Capable of insight and Progressing to goal    Endings: None Reported    Modes of Intervention: Education, Support, Socialization, Activity and Movement    Discipline Responsible: [place]    Signature:  Lorne Tariq

## 2019-04-18 NOTE — FLOWSHEET NOTE
Patient asked to speak with Writer after [employer]; patient tearful and discussed the multiple losses in his life; patient discussed his family dynamics and their lack of support in his life; patient discussed a spiritual struggle he is having; listening presence and support provided;     04/18/19 1420   Encounter Summary   Services provided to: Patient   Referral/Consult From: Patient   Continue Visiting   (4/18/19)   Complexity of Encounter Moderate   Length of Encounter 15 minutes   Spiritual Assessment Completed Yes   Grief and Life Adjustment   Type Grief and loss   Assessment Approachable;Tearful;Helplessness   Intervention Active listening;Nurtured hope;Sustaining presence/ Ministry of presence; Discussed belief system/Temple practices/anna;Discussed illness/injury and it's impact   Outcome Comfort;Expressed gratitude;Engaged in conversation;Expressed feelings/needs/concerns;Receptive

## 2019-04-18 NOTE — PROGRESS NOTES
Department of Psychiatry  Attending Progress Note  Chief Complaint: Bipolar 1 disorder ([facility] [address]     SUBJECTIVE:  Agapito Pena is seen today in the day area. He has numerous somatic complaints including his chronic pain. States his anxiety and depression are still \"real bad\" and he becomes angry and irritable easily. He feels that people are not listening to him. He denies SI/HI/AVH. He feels helpless and hopeless at times. He is anxious about finding new housing. He is upset with himself for recent relapse of heroin and cocaine. He is willing to pursue inpatient rehab. His focus on his chronic pain makes him more depressed. He cannot contract for safety outside of the hospital setting. There is no safe alternative than continued hospitalization.      OBJECTIVE    Physical  BP (!) 105/48   Pulse 92   Temp 98.4 °F (36.9 °C) (Oral)   Resp 14   Ht 6' 3\" (1.905 m)   Wt 180 lb (81.6 kg)   SpO2 95%   BMI 22.50 kg/m²      Mental Status Evaluation:  Orientation: alertness: alert   Mood:. anxious and depressed      Affect:  labile      Appearance:  disheveled and older than stated age   Activity:  Psychomotor Retardation   Gait/Posture: Uses wheelchair   Speech:  normal pitch and normal volume   Thought Process:  circumstantial   Thought Content:  hallucinations and suicidal   Sensorium:  person, place, time/date and situation   Cognition:  grossly intact   Memory: intact   Insight:  limited   Judgment: limited   Suicidal Ideations: active and without plan   Homicidal Ideations: Negative for homicidal ideation      Medication Side Effects: absent       Attention Span attention span appeared shorter than expected for age     Medications  Current Facility-Administered Medications   Medication Dose Route Frequency Provider Last Rate Last Dose    cloNIDine (CATAPRES) tablet 0.1 mg  0.1 mg Oral Nightly Silvano Din, APRN - CNP        haloperidol (HALDOL) tablet 5 mg  5 mg Oral Q8H PRN Silvano Din, APRN - CNP        busPIRone (BUSPAR) tablet 10 mg  10 mg Oral TID Dorothy Hunt MD   10 mg at 04/18/19 2806    And    busPIRone (BUSPAR) tablet 5 mg  5 mg Oral TID Dorothy Hunt MD   5 mg at 04/18/19 0911    albuterol (PROVENTIL) nebulizer solution 2.5 mg  2.5 mg Nebulization Q6H PRN Dorothy Hunt MD   2.5 mg at 04/16/19 2057    hydrOXYzine (ATARAX) tablet 25 mg  25 mg Oral TID PRN Girtha Purbritton, APRN - CNP   25 mg at 04/18/19 9139    benztropine mesylate (COGENTIN) injection 2 mg  2 mg Intramuscular BID PRN Girtha Purclif, APRN - CNP        magnesium hydroxide (MILK OF MAGNESIA) 400 MG/5ML suspension 30 mL  30 mL Oral Daily PRN Girtha Purclif, APRN - CNP        nicotine (NICODERM CQ) 14 MG/24HR 1 patch  1 patch Transdermal Daily Girtha Purclif, APRN - CNP   1 patch at 04/18/19 9828    nicotine polacrilex (NICORETTE) gum 2 mg  2 mg Oral Q2H PRN Girtha Purbritton, APRN - CNP        vitamin B-12 (CYANOCOBALAMIN) tablet 500 mcg  500 mcg Oral Daily Dorothy Hunt MD        divalproex (DEPAKOTE ER) extended release tablet 250 mg  250 mg Oral QAM Dorothy Hunt MD   250 mg at 04/18/19 0909    divalproex (DEPAKOTE ER) extended release tablet 500 mg  500 mg Oral Nightly Dorothy Hunt MD   500 mg at 04/17/19 2128    mometasone-formoterol (DULERA) 200-5 MCG/ACT inhaler 2 puff  2 puff Inhalation BID Dorothy Hunt MD   2 puff at 04/18/19 0907    ipratropium (ATROVENT) 0.02 % nebulizer solution 0.5 mg  0.5 mg Nebulization 4x Daily PRN Dorothy Hunt MD        lidocaine (LMX) 4 % cream   Topical TID PRN Dorothy Hunt MD        pantoprazole (PROTONIX) tablet 40 mg  40 mg Oral Daily Dorothy Hunt MD   40 mg at 04/18/19 0909    polyethylene glycol (GLYCOLAX) packet 17 g  17 g Oral Daily PRN Dorothy Hunt MD        pregabalin (LYRICA) capsule 400 mg  400 mg Oral BID Dorothy Hunt MD   400 mg at 04/18/19 0908    QUEtiapine (SEROQUEL) tablet 300 mg  300 mg Oral Nightly Dorothy Hunt MD ASSESSMENT  Bipolar 1 disorder ([facility] [address]     Patient's Response to Treatment: positive    PLAN:     1. Continue inpatient psychiatric treatment  2. Supportive therapy with medication management. Reviewed risks and benefits as well as potential side effects with patient. 3. Therapeutic activities and groups  4. Follow up at [facility] after symptoms stabilized. 5. Social work for discharge planning. 6. Add clonidine 0.1 mg PO QHS for PTSD, nightmares. Check LFT's, Ammonia, and Depakote level in AM. Change Haldol to PO.        Electronically signed by CHAD Tee CNP on 4/18/2019 at 12:52 PM.    Dragon voice recognition software used in portions of this document.

## 2019-04-18 NOTE — GROUP NOTE
Group Therapy Note    Date: April 18    Group Start Time: 1430  Group End Time: 4438  Group Topic: Cognitive Skills    [facility]    [place], [address]        Group Therapy Note    Attendees: 7         Patient's Goal:  To demonstrate interpersonal interaction     Notes:  Pt attended and participated in group     Status After Intervention:  Improved    Participation Level:  Active Listener and Interactive    Participation Quality: Appropriate, Attentive and Sharing      Speech:  normal      Thought Process/Content: Logical      Affective Functioning: Congruent      Mood: euthymic      Level of consciousness:  Alert and Attentive      Response to Learning: Progressing to goal      Endings: None Reported    Modes of Intervention: Socialization, Problem-solving, Activity, Media and Reality-testing      Discipline Responsible: Psychoeducational Specialist      Signature:  Zack Johnson

## 2019-04-18 NOTE — PLAN OF CARE
pain  Description  Control of chronic pain  4/18/2019 1137 by Navarro Levine RN  Outcome: Ongoing     Problem: Risk for Impaired Skin Integrity  Goal: Tissue integrity - skin and mucous membranes  Description  Structural intactness and normal physiological function of skin and  mucous membranes.   Outcome: Ongoing

## 2019-04-18 NOTE — FLOWSHEET NOTE
Patient attended [employer]. 04/18/19 6289   Encounter Summary   Services provided to: Patient   Referral/Consult From: Rounding   Continue Visiting   (4/18/19)   Complexity of Encounter Moderate   Length of Encounter 30 minutes   Spiritual Assessment Completed Yes   Spiritual/Mandaen   Type Spiritual support   Assessment Approachable;Tearful   Intervention Active listening;Prayer;Sustaining presence/ Ministry of presence; Discussed illness/injury and it's impact; Discussed belief system/Hindu practices/anna   Outcome Comfort;Expressed gratitude;Engaged in conversation;Expressed feelings/needs/concerns;Receptive

## 2019-04-18 NOTE — GROUP NOTE
Group Therapy Note    Date: April 18    Group Start Time: 1100  Group End Time: 1130  Group Topic: Psychoeducation    [facility]    [facility], [address]        Group Therapy Note    Attendees: 7         Patient's Goal:  To demonstrate interpersonal interaction     Notes:  Pt attended and participated in group     Status After Intervention:  Unchanged    Participation Level:  Active Listener and Interactive    Participation Quality: Appropriate, Attentive and Sharing      Speech:  normal      Thought Process/Content: Logical      Affective Functioning: Congruent      Mood: euthymic      Level of consciousness:  Alert and Attentive      Response to Learning: Progressing to goal      Endings: None Reported    Modes of Intervention: Socialization, Problem-solving, Activity and Reality-testing      Discipline Responsible: Psychoeducational Specialist      Signature:  Jacqueline Lara

## 2019-04-18 NOTE — GROUP NOTE
Group Therapy Note    Date: April 18    Group Start Time: 1000  Group End Time: 5814  Group Topic: Psychotherapy    LYDIA Garcia        Group Therapy Note    Attendees: 7/13         Patient's Goal:  To discuss emotions and support system. Notes:  Pt participated in group discussion. Status After Intervention:  Unchanged    Participation Level:  Active Listener    Participation Quality: Appropriate, Attentive and Sharing      Speech:  normal      Thought Process/Content: Logical      Affective Functioning: Congruent      Mood: anxious      Level of consciousness:  Alert, Oriented x4 and Attentive      Response to Learning: Able to verbalize current knowledge/experience, Able to verbalize/acknowledge new learning and Able to change behavior      Endings: None Reported    Modes of Intervention: Education, Support and Socialization      Discipline Responsible: /Counselor      Signature:  Shubham Xavier

## 2019-04-18 NOTE — PROGRESS NOTES
[address] [facility].                Date:   4/18/2019  Patient name:  Dipak Fay. Date of admission:  4/15/2019  5:55 PM  MRN:   [id number]  YOB: 1970    Pt seen at the request of Rae Stapleton MD    CHIEF COMPLAINT:     History Obtained From:  Patient and chart review. HISTORY OF PRESENT ILLNESS:      The patient is a 57 y.o.  male who is admitted to the hospital for medical management   chr pain all over     dm2     sizures  Copd    4/17   bs controlled   chr pain issues     4/18   no chnge  Past Medical History:   has a past medical history of Anxiety, Arthritis, Bipolar disorder ([address] Bronchitis, chronic (HCC), Chronic back pain, Chronic pain, Claustrophobia, COPD (chronic obstructive pulmonary disease) ([address] Depression, Diabetes mellitus ([address] Emphysema of lung ([address] History of irregular heartbeat, Hyperlipidemia, Liver disease, MRSA (methicillin resistant staph aureus) culture positive, Myofacial muscle pain, Osteomyelitis ([address] Peripheral neuropathy, Pressure ulcer, Schizophrenia ([address] Seizures ([address] Sleep apnea, Spinal stenosis, and Substance abuse ([address] Past Surgical History:   has a past surgical history that includes Appendectomy; Stomach surgery; lumbar fusion; Septoplasty (01/13/2017); Turbinoplasties (01/13/2017); and fracture surgery (01/28/2017). Home Medications:    Prior to Admission medications    Medication Sig Start Date End Date Taking?  Authorizing Provider   busPIRone (BUSPAR) 30 MG tablet  3/25/19  Yes Historical Provider, MD   hydrOXYzine (VISTARIL) 25 MG capsule Take 1 capsule by mouth 2 times daily 4/12/19  Yes CHAD Owens CNP   lidocaine (LMX) 4 % cream Apply topically 3 times daily as needed for Pain 4/12/19  Yes CHAD Owens CNP   pantoprazole (PROTONIX) 40 MG tablet Take 1 tablet by mouth daily 4/12/19  Yes CHAD Owens CNP   tiZANidine (ZANAFLEX) 4 MG tablet Take 1 tablet by mouth 3 times daily 4/12/19  Yes Arvella Bound, APRN - CNP   ranitidine (ZANTAC) 150 MG tablet Take 1 tablet by mouth 2 times daily as needed for Heartburn 4/12/19  Yes Arvella Bound, APRN - CNP   pregabalin (LYRICA) 200 MG capsule Take 2 capsules by mouth 2 times daily.  4/12/19 4/11/20 Yes Arvella Bound, APRN - CNP   VENTOLIN  (90 Base) MCG/ACT inhaler INHALE 2 PUFFS INTO THE LUNGS EVERY 6 HOURS AS NEEDED FOR WHEEZING 3/27/19  Yes Arvella Bound, APRN - CNP   QUEtiapine (SEROQUEL) 200 MG tablet Take 1.5 tablets by mouth nightly 3/20/19  Yes Arvella Bound, APRN - CNP   cloNIDine (CATAPRES) 0.1 MG tablet Take 0.1 mg by mouth nightly  1/24/19  Yes Historical Provider, MD   fluticasone-vilanterol (BREO ELLIPTA) 100-25 MCG/INH AEPB inhaler Inhale 1 puff into the lungs daily 2/18/19  Yes Arvella Bound, APRN - CNP   Insulin Syringe-Needle U-100 31G X 5/16\" 0.5 ML MISC 1 each by Does not apply route 3 times daily 1/23/19  Yes Arvella Bound, APRN - CNP   simvastatin (ZOCOR) 40 MG tablet Take 40 mg by mouth nightly    Yes Historical Provider, MD   albuterol (PROVENTIL) (2.5 MG/3ML) 0.083% nebulizer solution Take 3 mLs by nebulization every 6 hours as needed for Wheezing 12/20/18  Yes Arvella Bound, APRN - CNP   nicotine (NICODERM CQ) 21 MG/24HR Place 1 patch onto the skin daily as needed (if pateint smokes) 12/13/18  Yes Brandon Fang MD   divalproex (DEPAKOTE ER) 500 MG extended release tablet Take 1 tablet by mouth nightly 9/5/18  Yes Livan Montesinos MD   divalproex (DEPAKOTE ER) 250 MG extended release tablet Take 1 tablet by mouth every morning  Patient taking differently: Take 750 mg by mouth every morning  9/5/18  Yes Livan Montesinos MD   traZODone (DESYREL) 150 MG tablet Take 1 tablet by mouth nightly as needed for Sleep  Patient taking differently: Take 300 mg by mouth nightly  4/12/18  Yes CHAD Doll - CNP   Alcohol Swabs (PRO COMFORT ALCOHOL) 70 % PADS  3/30/19   Historical Provider, MD   meloxicam (MOBIC) 15 MG tablet Take 15 mg by mouth    Historical Provider, MD   Misc. Devices (GEL-FOAM CUSHION) MISC 1 Units by Does not apply route daily 4/12/19   CHAD Gannon CNP   FREESTYLE LITE strip 1 each by Other route 3 times daily 4/12/19   CHAD Gannon CNPc. Devices (GEL-FOAM CUSHION) MISC 1 Units by Does not apply route daily 4/12/19   CHAD Gannon CNP   Nutritional Supplements (BOOST) LIQD Two a day. Chocolate lactose free nutritional drinks. 4/11/19   CHAD Gannon CNP   hydrOXYzine (VISTARIL) 50 MG capsule Take 50 mg by mouth nightly    Historical Provider, MD   insulin regular (HUMULIN R;NOVOLIN R) 100 UNIT/ML injection Inject 2 Units into the skin See Admin Instructions Sliding scale depending on meals 4/3/19   CHAD Gannon CNP   TRUEPLUS LANCETS 33G MISC  2/8/19   Historical Provider, MD   ondansetron (ZOFRAN) 4 MG tablet TAKE 1 TABLET EVERY 8 HOURS AS NEEDED FOR NAUSEA OR VOMITING 1/8/19   CHAD Gannon CNP   busPIRone (BUSPAR) 15 MG tablet Take 15 mg by mouth 3 times daily  8/15/18   Historical Provider, MD   cyanocobalamin 500 MCG tablet Take 500 mcg by mouth 10/26/18   Historical Provider, MD   senna (SENNA-TIME) 8.6 MG tablet twice daily as needed.   9/4/18   Historical Provider, MD   ipratropium (ATROVENT) 0.02 % nebulizer solution Take 2.5 mLs by nebulization 4 times daily as needed for Wheezing 12/20/18   CHAD Gannon CNP   ENULOSE 10 GM/15ML SOLN solution TAKE 15 MLS BY MOUTH 3 TIMES DAILY AS NEEDED (CONSTIPATION) 11/27/18   Carisa Ferris MD   DULoxetine (CYMBALTA) 30 MG extended release capsule TAKE 3 CAPSULES BY MOUTH DAILY  Patient taking differently: Take 60 mg by mouth every morning Takes 2 of the 60 mg every AM 11/26/18   Carisa Ferris MD   SM PAIN RELIEVER 325 MG tablet TAKE 2 TABLETS BY MOUTH EVERY 4 HOURS AS NEEDED FOR PAIN 10/5/18 weakness, joint complaints. pos hip pain  · Integumentary: Negative for rash, pruritis. · Neurological:pos for headache, dizziness, change in muscle strength,pos  numbness/tingling, change in gait, balance, coordination,   · Endocrine: negative for temperature intolerance, excessive thirst, fluid intake, or urination, tremor. · Hematologic/Lymphatic: negative for abnormal bruising or bleeding, blood clots, swollen lymph nodes. · Allergic/Immunologic: negative for nasal congestion, pruritis, hives. PHYSICAL EXAM:    BP (!) 105/48   Pulse 92   Temp 98.4 °F (36.9 °C) (Oral)   Resp 14   Ht 6' 3\" (1.905 m)   Wt 180 lb (81.6 kg)   SpO2 95%   BMI 22.50 kg/m²      · General appearance: well nourished  · HEENT: Head: Normocephalic, no lesions, without obvious abnormality. · Lungs: clear to auscultation bilaterally  · Heart: regular rate and rhythm, S1, S2 normal, no murmur, click, rub or gallop  · Abdomen: soft, non-tender; bowel sounds normal; no masses,  no organomegaly  · Extremities: extremities normal, atraumatic, no cyanosis or edema hip tenderness rt  · Neurological:  Unable to ambulateReflexes normal and symmetric. Sensation grossly  hypersensitive tender all over  · Skin - no rash, no lump   · Eye no icterus no redness  · Lymphatic system-no lymphadenopathy no splenomegaly       DIAGNOSTICS:    Laboratory Testing:  CBC:   Recent Labs     04/16/19  0719   WBC 9.0   HGB 15.3        BMP:    Recent Labs     04/16/19  0719 04/18/19  0811    140   K 3.6* 4.1    105   CO2 25 24   BUN 9 7   CREATININE 0.80 0.79   GLUCOSE 111* 132*     S. Calcium:  Recent Labs     04/18/19  0811   CALCIUM 8.7     S. Ionized Calcium:No results for input(s): IONCA in the last 72 hours. S. Magnesium:  No results for input(s): MG in the last 72 hours. S. Phosphorus:  No results for input(s): PHOS in the last 72 hours.   S. Glucose:  Recent Labs     04/18/19  0727 04/18/19  1122 04/18/19  1700   POCGLU 143* 125* bowel, and colon are nondilated. Post appendectomy. Pelvis: Urinary bladder and pelvic organs are unremarkable. No free fluid in the pelvis. Peritoneum/Retroperitoneum: Aortoiliac atherosclerotic calcification. Abdominal aorta is normal in caliber. No abdominal or retroperitoneal adenopathy. No free fluid in the abdomen. Bones/Soft Tissues: No acute osseous abnormality. No acute inflammatory process in the abdomen or pelvis. Aortoiliac atherosclerotic calcification. Abdominal aorta is normal in caliber. ASSESSMENT:    Patient Active Problem List   Diagnosis    Anxiety    Seizure ([address]    Myofascial muscle pain    Chronic pain    Spinal stenosis    Hyperlipidemia    Depression    Panlobular emphysema (HCC)    Bipolar disorder (HCC)    Chronic back pain    Schizophrenia ([address]    Fibromyalgia    Type 2 diabetes mellitus with diabetic polyneuropathy, with long-term current use of insulin (HCC)    Onychomycosis    Pain in lower limb    Disc disease, degenerative, cervical    Cervical stenosis of spine    Generalized tonic-clonic seizure (HCC)    Superficial laceration of scalp    Altered mental status    Decubitus ulcer of coccyx, stage 2    Cocaine substance abuse ([address]    Opiate abuse, episodic (HCC)    Closed fracture of nasal bone    Drug overdose    Uncomplicated opioid dependence ([address]    Paraplegia ([address]    Schizoaffective disorder ([address]    Non-dose-related adverse reaction to medication wellbutrin    Cannabis abuse    Encephalopathy    Hyperammonemia (HCC)    Constipation    Cellulitis and abscess of right leg    Interstitial lung disease (HCC)    Vomiting    Bipolar 1 disorder (HCC)    MDD (major depressive disorder), recurrent episode (HCC)    neuropathy    From etoh  More than dm    Has spinal stenosis     myofacial pain syndrome      fibromyalgia     organic brain syndrome      dm2 controlled     On insulin   cont home dose     Copd baseline     ?  Hip fx 1 year  Non ambulating has seen ortho no intervention planned     PLAN:    Cont lyrica    Cont depakote       was on cymbalta at home   would advise to continue     cont zanaflex   Cont home insulin      xrya rt hip     On meloxicam   cont   cr nl      bp controlled       4/17   bp controlled   cont lyrica for chr pain syndrome   k ws 3.6 recheck     bc controlled     4/18   no change   bp controlled   na corrected   bs controlled   cont same  Virginia Maciel MD  [address], [address] [place] [address].    Phone [phone]   Fax: [phone]  Answering Service: [phone]

## 2019-04-18 NOTE — GROUP NOTE
Group Therapy Note    Date: April 18    Group Start Time: 0738  Group End Time: 0910  Group Topic: Community Meeting    FAYE [place], [place]        Group Therapy Note    Attendees: 6         Patient's Goal: Pt's goal is to talk to the NP about discharge today     Notes:  Pt attended group, however, needed redirection to let other people talk during group     Status After Intervention:  Unchanged    Participation Level: Monopolizing    Participation Quality: Intrusive      Speech:  pressured      Thought Process/Content: Perseverating      Affective Functioning: Constricted/Restricted      Mood: elevated      Level of consciousness:  Alert and Preoccupied      Response to Learning: Progressing to goal      Endings: None Reported    Modes of Intervention: Education, Support, Socialization, Problem-solving and Reality-testing      Discipline Responsible: Psychoeducational Specialist      Signature:  Jennifer Razo

## 2019-04-19 LAB
ALBUMIN SERPL-MCNC: 3.1 G/DL (ref 3.5–5.2)
ALBUMIN/GLOBULIN RATIO: ABNORMAL (ref 1–2.5)
ALP BLD-CCNC: 81 U/L (ref 40–129)
ALT SERPL-CCNC: 23 U/L (ref 5–41)
AMMONIA: 74 UMOL/L (ref 16–60)
AST SERPL-CCNC: 24 U/L
BILIRUB SERPL-MCNC: <0.15 MG/DL (ref 0.3–1.2)
BILIRUBIN DIRECT: <0.08 MG/DL
BILIRUBIN, INDIRECT: ABNORMAL MG/DL (ref 0–1)
GLOBULIN: ABNORMAL G/DL (ref 1.5–3.8)
GLUCOSE BLD-MCNC: 134 MG/DL (ref 75–110)
GLUCOSE BLD-MCNC: 155 MG/DL (ref 75–110)
TOTAL PROTEIN: 5.8 G/DL (ref 6.4–8.3)
VALPROIC ACID LEVEL: 47 UG/ML (ref 50–125)
VALPROIC DATE LAST DOSE: ABNORMAL
VALPROIC DOSE AMOUNT: ABNORMAL
VALPROIC TIME LAST DOSE: 2112

## 2019-04-19 PROCEDURE — 80076 HEPATIC FUNCTION PANEL: CPT

## 2019-04-19 PROCEDURE — 99232 SBSQ HOSP IP/OBS MODERATE 35: CPT | Performed by: NURSE PRACTITIONER

## 2019-04-19 PROCEDURE — 6370000000 HC RX 637 (ALT 250 FOR IP): Performed by: PSYCHIATRY & NEUROLOGY

## 2019-04-19 PROCEDURE — 82947 ASSAY GLUCOSE BLOOD QUANT: CPT

## 2019-04-19 PROCEDURE — 1240000000 HC EMOTIONAL WELLNESS R&B

## 2019-04-19 PROCEDURE — 36415 COLL VENOUS BLD VENIPUNCTURE: CPT

## 2019-04-19 PROCEDURE — 6370000000 HC RX 637 (ALT 250 FOR IP): Performed by: NURSE PRACTITIONER

## 2019-04-19 PROCEDURE — 82140 ASSAY OF AMMONIA: CPT

## 2019-04-19 PROCEDURE — 80164 ASSAY DIPROPYLACETIC ACD TOT: CPT

## 2019-04-19 PROCEDURE — 99231 SBSQ HOSP IP/OBS SF/LOW 25: CPT | Performed by: INTERNAL MEDICINE

## 2019-04-19 RX ORDER — LACTULOSE 10 G/15ML
20 SOLUTION ORAL 3 TIMES DAILY PRN
Status: DISCONTINUED | OUTPATIENT
Start: 2019-04-19 | End: 2019-04-23 | Stop reason: HOSPADM

## 2019-04-19 RX ORDER — TRAZODONE HYDROCHLORIDE 100 MG/1
200 TABLET ORAL NIGHTLY PRN
Status: DISCONTINUED | OUTPATIENT
Start: 2019-04-19 | End: 2019-04-23 | Stop reason: HOSPADM

## 2019-04-19 RX ORDER — QUETIAPINE FUMARATE 200 MG/1
400 TABLET, FILM COATED ORAL NIGHTLY
Status: DISCONTINUED | OUTPATIENT
Start: 2019-04-19 | End: 2019-04-20

## 2019-04-19 RX ADMIN — LACTULOSE 20 G: 20 SOLUTION ORAL at 09:45

## 2019-04-19 RX ADMIN — DIVALPROEX SODIUM 500 MG: 500 TABLET, EXTENDED RELEASE ORAL at 20:29

## 2019-04-19 RX ADMIN — DIVALPROEX SODIUM 250 MG: 250 TABLET, EXTENDED RELEASE ORAL at 08:34

## 2019-04-19 RX ADMIN — PANTOPRAZOLE SODIUM 40 MG: 40 TABLET, DELAYED RELEASE ORAL at 08:34

## 2019-04-19 RX ADMIN — HALOPERIDOL 5 MG: 10 TABLET ORAL at 13:38

## 2019-04-19 RX ADMIN — LACTULOSE 20 G: 20 SOLUTION ORAL at 18:49

## 2019-04-19 RX ADMIN — HALOPERIDOL 5 MG: 10 TABLET ORAL at 21:33

## 2019-04-19 RX ADMIN — FAMOTIDINE 20 MG: 20 TABLET ORAL at 08:34

## 2019-04-19 RX ADMIN — PREGABALIN 400 MG: 150 CAPSULE ORAL at 08:34

## 2019-04-19 RX ADMIN — BUSPIRONE HYDROCHLORIDE 10 MG: 10 TABLET ORAL at 20:30

## 2019-04-19 RX ADMIN — MOMETASONE FUROATE AND FORMOTEROL FUMARATE DIHYDRATE 2 PUFF: 200; 5 AEROSOL RESPIRATORY (INHALATION) at 20:29

## 2019-04-19 RX ADMIN — QUETIAPINE FUMARATE 400 MG: 200 TABLET ORAL at 20:29

## 2019-04-19 RX ADMIN — BUSPIRONE HYDROCHLORIDE 5 MG: 5 TABLET ORAL at 13:33

## 2019-04-19 RX ADMIN — HYDROXYZINE HYDROCHLORIDE 25 MG: 25 TABLET, FILM COATED ORAL at 18:49

## 2019-04-19 RX ADMIN — SIMVASTATIN 40 MG: 40 TABLET, FILM COATED ORAL at 20:29

## 2019-04-19 RX ADMIN — BUSPIRONE HYDROCHLORIDE 10 MG: 10 TABLET ORAL at 13:31

## 2019-04-19 RX ADMIN — PREGABALIN 400 MG: 150 CAPSULE ORAL at 20:28

## 2019-04-19 RX ADMIN — BUSPIRONE HYDROCHLORIDE 5 MG: 5 TABLET ORAL at 20:28

## 2019-04-19 RX ADMIN — BUSPIRONE HYDROCHLORIDE 5 MG: 5 TABLET ORAL at 08:39

## 2019-04-19 RX ADMIN — MOMETASONE FUROATE AND FORMOTEROL FUMARATE DIHYDRATE 2 PUFF: 200; 5 AEROSOL RESPIRATORY (INHALATION) at 08:33

## 2019-04-19 RX ADMIN — BUSPIRONE HYDROCHLORIDE 10 MG: 10 TABLET ORAL at 08:34

## 2019-04-19 ASSESSMENT — PAIN SCALES - GENERAL
PAINLEVEL_OUTOF10: 9
PAINLEVEL_OUTOF10: 8

## 2019-04-19 NOTE — PLAN OF CARE
Problem: Falls - Risk of:  Goal: Will remain free from falls  Description  Will remain free from falls  Outcome: Ongoing  Note:   No falls reported this shift, patient has been in the day area and groups for most of the day in the staffs sight. Problem: Altered Mood, Depressive Behavior:  Goal: Ability to disclose and discuss suicidal ideas will improve  Description  Ability to disclose and discuss suicidal ideas will improve  Outcome: Ongoing  Note:   Patient denies the urge to commit suicide, also denies having any thoughts relating to it. Patient is agreeable to seek the help of staff should any thoughts arise. Problem: Altered Mood, Psychotic Behavior:  Goal: Able to verbalize decrease in frequency and intensity of hallucinations  Description  Able to verbalize decrease in frequency and intensity of hallucinations  Outcome: Ongoing  Note:   Patient would not talk in depth about hallucinations, only that they were present and he could hear them clearly.

## 2019-04-19 NOTE — PROGRESS NOTES
[address] [facility] [[email or web address]].                Date:   4/19/2019  Patient name:  Elvira Leon. Date of admission:  4/15/2019  5:55 PM  MRN:   [id number]  YOB: 1970    Pt seen at the request of Dorothy Hunt MD    CHIEF COMPLAINT:     History Obtained From:  Patient and chart review. HISTORY OF PRESENT ILLNESS:      The patient is a 57 y.o.  male who is admitted to the hospital for medical management   chr pain all over     dm2     sizures  Copd    4/17   bs controlled   chr pain issues     4/18   no chnge    stable  Past Medical History:   has a past medical history of Anxiety, Arthritis, Bipolar disorder ([address] Bronchitis, chronic (HCC), Chronic back pain, Chronic pain, Claustrophobia, COPD (chronic obstructive pulmonary disease) ([address] Depression, Diabetes mellitus ([address] Emphysema of lung ([address] History of irregular heartbeat, Hyperlipidemia, Liver disease, MRSA (methicillin resistant staph aureus) culture positive, Myofacial muscle pain, Osteomyelitis ([address] Peripheral neuropathy, Pressure ulcer, Schizophrenia ([address] Seizures ([address] Sleep apnea, Spinal stenosis, and Substance abuse ([address] Past Surgical History:   has a past surgical history that includes Appendectomy; Stomach surgery; lumbar fusion; Septoplasty (01/13/2017); Turbinoplasties (01/13/2017); and fracture surgery (01/28/2017). Home Medications:    Prior to Admission medications    Medication Sig Start Date End Date Taking?  Authorizing Provider   busPIRone (BUSPAR) 30 MG tablet  3/25/19  Yes Historical Provider, MD   hydrOXYzine (VISTARIL) 25 MG capsule Take 1 capsule by mouth 2 times daily 4/12/19  Yes Nelta Nipple, APRN - CNP   lidocaine (LMX) 4 % cream Apply topically 3 times daily as needed for Pain 4/12/19  Yes Nelta Nipple, APRN - CNP   pantoprazole (PROTONIX) 40 MG tablet Take 1 tablet by mouth daily 4/12/19  Yes Nelta Nipple, APRN - CNP tiZANidine (ZANAFLEX) 4 MG tablet Take 1 tablet by mouth 3 times daily 4/12/19  Yes CHAD Liang CNP   ranitidine (ZANTAC) 150 MG tablet Take 1 tablet by mouth 2 times daily as needed for Heartburn 4/12/19  Yes CHAD Liang CNP   pregabalin (LYRICA) 200 MG capsule Take 2 capsules by mouth 2 times daily.  4/12/19 4/11/20 Yes CHAD Liang CNP   VENTOLIN  (90 Base) MCG/ACT inhaler INHALE 2 PUFFS INTO THE LUNGS EVERY 6 HOURS AS NEEDED FOR WHEEZING 3/27/19  Yes CHAD Liang CNP   QUEtiapine (SEROQUEL) 200 MG tablet Take 1.5 tablets by mouth nightly 3/20/19  Yes CHAD Linag CNP   cloNIDine (CATAPRES) 0.1 MG tablet Take 0.1 mg by mouth nightly  1/24/19  Yes Historical Provider, MD   fluticasone-vilanterol (BREO ELLIPTA) 100-25 MCG/INH AEPB inhaler Inhale 1 puff into the lungs daily 2/18/19  Yes CHAD Liang CNP   Insulin Syringe-Needle U-100 31G X 5/16\" 0.5 ML MISC 1 each by Does not apply route 3 times daily 1/23/19  Yes CHAD Liang CNP   simvastatin (ZOCOR) 40 MG tablet Take 40 mg by mouth nightly    Yes Historical Provider, MD   albuterol (PROVENTIL) (2.5 MG/3ML) 0.083% nebulizer solution Take 3 mLs by nebulization every 6 hours as needed for Wheezing 12/20/18  Yes CHAD Liang CNP   nicotine (NICODERM CQ) 21 MG/24HR Place 1 patch onto the skin daily as needed (if pateint smokes) 12/13/18  Yes Barbara Randolph MD   divalproex (DEPAKOTE ER) 500 MG extended release tablet Take 1 tablet by mouth nightly 9/5/18  Yes Charo Mcknight MD   divalproex (DEPAKOTE ER) 250 MG extended release tablet Take 1 tablet by mouth every morning  Patient taking differently: Take 750 mg by mouth every morning  9/5/18  Yes Charo Mcknight MD   traZODone (DESYREL) 150 MG tablet Take 1 tablet by mouth nightly as needed for Sleep  Patient taking differently: Take 300 mg by mouth nightly  4/12/18  Yes CHAD Waterman - CNP   Alcohol Swabs (PRO COMFORT ALCOHOL) 70 % PADS  3/30/19   Historical Provider, MD   meloxicam (MOBIC) 15 MG tablet Take 15 mg by mouth    Historical Provider, MD   Misc. Devices (GEL-FOAM CUSHION) MISC 1 Units by Does not apply route daily 4/12/19   CHAD Owens CNP   FREESTYLE LITE strip 1 each by Other route 3 times daily 4/12/19   CHAD Owens CNP   [facility] [place]. Devices (GEL-FOAM CUSHION) MISC 1 Units by Does not apply route daily 4/12/19   CHAD Owens CNP   Nutritional Supplements (BOOST) LIQD Two a day. Chocolate lactose free nutritional drinks. 4/11/19   CHAD Owens CNP   hydrOXYzine (VISTARIL) 50 MG capsule Take 50 mg by mouth nightly    Historical Provider, MD   insulin regular (HUMULIN R;NOVOLIN R) 100 UNIT/ML injection Inject 2 Units into the skin See Admin Instructions Sliding scale depending on meals 4/3/19   CHAD Owens CNP   TRUEPLUS LANCETS 33G MISC  2/8/19   Historical Provider, MD   ondansetron (ZOFRAN) 4 MG tablet TAKE 1 TABLET EVERY 8 HOURS AS NEEDED FOR NAUSEA OR VOMITING 1/8/19   CHAD Owens CNP   busPIRone (BUSPAR) 15 MG tablet Take 15 mg by mouth 3 times daily  8/15/18   Historical Provider, MD   cyanocobalamin 500 MCG tablet Take 500 mcg by mouth 10/26/18   Historical Provider, MD   senna (SENNA-TIME) 8.6 MG tablet twice daily as needed.   9/4/18   Historical Provider, MD   ipratropium (ATROVENT) 0.02 % nebulizer solution Take 2.5 mLs by nebulization 4 times daily as needed for Wheezing 12/20/18   CHAD Owens CNP   ENULOSE 10 GM/15ML SOLN solution TAKE 15 MLS BY MOUTH 3 TIMES DAILY AS NEEDED (CONSTIPATION) 11/27/18   Beltran Hallman MD   DULoxetine (CYMBALTA) 30 MG extended release capsule TAKE 3 CAPSULES BY MOUTH DAILY  Patient taking differently: Take 60 mg by mouth every morning Takes 2 of the 60 mg every AM 11/26/18   Beltran Hallman MD   SM PAIN RELIEVER 325 MG tablet TAKE 2 TABLETS BY MOUTH EVERY 4 HOURS AS NEEDED FOR PAIN 10/5/18 Rox Sebastian MD   naloxegol (MOVANTIK) 25 MG TABS tablet Take 1 tablet by mouth every morning  Patient taking differently: Take 25 mg by mouth as needed  9/7/18   Zev Merchant MD   polyethylene glycol (GLYCOLAX) powder Take 17 g by mouth daily as needed    Historical Provider, MD   buprenorphine-naloxone (SUBOXONE) 2-0.5 MG SUBL Place 2 tablets under the tongue 2 times daily . 11/17/17   Ochoa Montoya MD   topiramate (TOPAMAX) 100 MG tablet Take 200 mg by mouth 2 times daily     Historical Provider, MD       Allergies:  Lactose intolerance (gi); Sulfa antibiotics; Sulfasalazine; Bactrim; Levofloxacin; Levofloxacin; Phenobarbital; Sulfamethoxazole-trimethoprim; Sulfur; Tessalon [benzonatate]; and Wellbutrin [bupropion]    Social History:   reports that he has been smoking cigarettes. He started smoking about 36 years ago. He has a 35.00 pack-year smoking history. He has never used smokeless tobacco. He reports that he has current or past drug history. Drugs: Marijuana and Opiates . He reports that he does not drink alcohol. Family History: family history includes Coronary Art Dis in his maternal uncle; Diabetes in his mother; Seizures in his maternal cousin and sister. REVIEW OF SYSTEMS:    · Constitutional: Negative for weight loss  · Eyes: Negative for visual changes, diplopia, scleral icterus. · ENT: Negative for Headaches, hearing loss, vertigo, mouth sores, sore throat. · Cardiovascular: Negative for lightheadedness/orthostatic symptoms ,chest pain, dyspnea on exertion, palpitations or loss of consciousness. · Respiratory: Negative for cough or wheezing, sputum production, hemoptysis, pleuritic pain. · Gastrointestinal: Negative for nausea/vomiting, change in bowel habits, abdominal pain, dysphagia/appetite loss, hematemesis, blood in stools. · Genitourinary:Negative for change in bladder habits, dysuria, trouble voiding, hematuria.   · Musculoskeletal: Negative for gait disturbance, weakness, joint complaints. pos hip pain  · Integumentary: Negative for rash, pruritis. · Neurological:pos for headache, dizziness, change in muscle strength,pos  numbness/tingling, change in gait, balance, coordination,   · Endocrine: negative for temperature intolerance, excessive thirst, fluid intake, or urination, tremor. · Hematologic/Lymphatic: negative for abnormal bruising or bleeding, blood clots, swollen lymph nodes. · Allergic/Immunologic: negative for nasal congestion, pruritis, hives. PHYSICAL EXAM:    /72   Pulse 76   Temp 97.8 °F (36.6 °C) (Oral)   Resp 16   Ht 6' 3\" (1.905 m)   Wt 180 lb (81.6 kg)   SpO2 99%   BMI 22.50 kg/m²      · General appearance: well nourished  · HEENT: Head: Normocephalic, no lesions, without obvious abnormality. · Lungs: clear to auscultation bilaterally  · Heart: regular rate and rhythm, S1, S2 normal, no murmur, click, rub or gallop  · Abdomen: soft, non-tender; bowel sounds normal; no masses,  no organomegaly  · Extremities: extremities normal, atraumatic, no cyanosis or edema hip tenderness rt  · Neurological:  Unable to ambulateReflexes normal and symmetric. Sensation grossly  hypersensitive tender all over  · Skin - no rash, no lump   · Eye no icterus no redness  · Lymphatic system-no lymphadenopathy no splenomegaly       DIAGNOSTICS:    Laboratory Testing:  CBC:   No results for input(s): WBC, HGB, PLT in the last 72 hours. BMP:    Recent Labs     04/18/19  0811      K 4.1      CO2 24   BUN 7   CREATININE 0.79   GLUCOSE 132*     S. Calcium:  Recent Labs     04/18/19  0811   CALCIUM 8.7     S. Ionized Calcium:No results for input(s): IONCA in the last 72 hours. S. Magnesium:  No results for input(s): MG in the last 72 hours. S. Phosphorus:  No results for input(s): PHOS in the last 72 hours.   S. Glucose:  Recent Labs     04/18/19  1700 04/18/19  2022 04/19/19  1651   POCGLU 154* 104 134*     Glycosylated hemoglobin A1C:   No results for input(s): LABA1C in the last 72 hours. INR:   No results for input(s): INR in the last 72 hours. Hepatic functions:   Recent Labs     04/19/19  0702   ALKPHOS 81   ALT 23   AST 24   PROT 5.8*   BILITOT <0.15*   BILIDIR <0.08   LABALBU 3.1*     Pancreatic functions:No results for input(s): LACTA, AMYLASE in the last 72 hours. S. Lactic Acid: No results for input(s): LACTA in the last 72 hours. Cardiac enzymes:No results for input(s): CKTOTAL, CKMB, CKMBINDEX, TROPONINI in the last 72 hours. BNP:No results for input(s): BNP in the last 72 hours. Lipid profile:   No results for input(s): CHOL, TRIG, HDL, LDLCALC in the last 72 hours. Invalid input(s): LDL  Blood Gases: No results found for: PH, PCO2, PO2, HCO3, O2SAT  Thyroid functions:   Lab Results   Component Value Date    TSH 0.76 04/16/2019        Imaging/Diagonstics:    Ct Abdomen Pelvis W Iv Contrast    Result Date: 4/15/2019  EXAMINATION: CT OF THE ABDOMEN AND PELVIS WITH CONTRAST, 4/14/2019 10:44 pm TECHNIQUE: CT of the abdomen and pelvis was performed with the administration of intravenous contrast. Multiplanar reformatted images are provided for review. Dose modulation, iterative reconstruction, and/or weight based adjustment of the mA/kV was utilized to reduce the radiation dose to as low as reasonably achievable. COMPARISON: CTA of the abdomen and pelvis 09/02/2018 HISTORY: ORDERING SYSTEM PROVIDED HISTORY: Abdominal pain, leukocytosis, vomiting TECHNOLOGIST PROVIDED HISTORY: IV Only Contrast Ordering Physician Provided Reason for Exam: Abdominal pain, leukocytosis, vomiting Acuity: Unknown Type of Exam: Unknown FINDINGS: Lower Chest: Visualized lungs are clear. There is no basilar pericardial or pleural effusion. Organs: Liver is normal in morphology. No biliary duct dilatation. Normal gallbladder. Spleen, adrenal glands, and pancreas are normal.  Kidneys are symmetric in size and enhancement. No hydronephrosis.  GI/Bowel: Stomach, small bowel, and colon are nondilated. Post appendectomy. Pelvis: Urinary bladder and pelvic organs are unremarkable. No free fluid in the pelvis. Peritoneum/Retroperitoneum: Aortoiliac atherosclerotic calcification. Abdominal aorta is normal in caliber. No abdominal or retroperitoneal adenopathy. No free fluid in the abdomen. Bones/Soft Tissues: No acute osseous abnormality. No acute inflammatory process in the abdomen or pelvis. Aortoiliac atherosclerotic calcification. Abdominal aorta is normal in caliber. ASSESSMENT:    Patient Active Problem List   Diagnosis    Anxiety    Seizure ([address]    Myofascial muscle pain    Chronic pain    Spinal stenosis    Hyperlipidemia    Depression    Panlobular emphysema (HCC)    Bipolar disorder (HCC)    Chronic back pain    Schizophrenia ([address]    Fibromyalgia    Type 2 diabetes mellitus with diabetic polyneuropathy, with long-term current use of insulin (HCC)    Onychomycosis    Pain in lower limb    Disc disease, degenerative, cervical    Cervical stenosis of spine    Generalized tonic-clonic seizure (HCC)    Superficial laceration of scalp    Altered mental status    Decubitus ulcer of coccyx, stage 2    Cocaine substance abuse ([address]    Opiate abuse, episodic (HCC)    Closed fracture of nasal bone    Drug overdose    Uncomplicated opioid dependence ([address]    Paraplegia ([address]    Schizoaffective disorder ([address]    Non-dose-related adverse reaction to medication wellbutrin    Cannabis abuse    Encephalopathy    Hyperammonemia (HCC)    Constipation    Cellulitis and abscess of right leg    Interstitial lung disease (HCC)    Vomiting    Bipolar 1 disorder (HCC)    MDD (major depressive disorder), recurrent episode (HCC)    neuropathy    From etoh  More than dm    Has spinal stenosis     myofacial pain syndrome      fibromyalgia     organic brain syndrome      dm2 controlled     On insulin   cont home dose     Copd baseline     ?  Hip fx 1 year  Non ambulating has seen ortho no intervention planned     PLAN:    Cont lyrica    Cont depakote       was on cymbalta at home   would advise to continue     cont zanaflex   Cont home insulin      xrya rt hip     On meloxicam   cont   cr nl      bp controlled       4/17   bp controlled   cont lyrica for chr pain syndrome   k ws 3.6 recheck     bc controlled     4/18   no change   bp controlled   na corrected   bs controlled   cont same    4/19   bp controlled   bs controlled   cont same  Indiana Duran MD  [facility]  [address]    Phone [phone]   Fax: [phone]  Answering Service: [phone]

## 2019-04-19 NOTE — GROUP NOTE
Group Therapy Note    Date: April 19    Group Start Time: 1430  Group End Time: 1505  Group Topic: Recovery    [facility]    Bela Rosa, CTRS        Group Therapy Note    Attendees: 5         Patient's Goal:  To demonstrate interpersonal interaction     Notes:  Pt attended and participated in group     Status After Intervention:  Unchanged    Participation Level:  Active Listener and Interactive    Participation Quality: Appropriate, Attentive and Sharing      Speech:  normal      Thought Process/Content: Logical      Affective Functioning: Congruent      Mood: euthymic      Level of consciousness:  Alert and Attentive      Response to Learning: Progressing to goal      Endings: None Reported    Modes of Intervention: Support, Socialization, Exploration, Activity and Reality-testing      Discipline Responsible: Psychoeducational Specialist      Signature:  Dionte Nelson

## 2019-04-19 NOTE — GROUP NOTE
Group Therapy Note    Date: April 19    Group Start Time: 1000  Group End Time: 0050  Group Topic: Group Therapy    [facility]    TONYA Lynn, LYDIA        Group Therapy Note    Attendees: 6         Patient's Goal:  Patient will demonstrate increased interpersonal interaction     Notes:  Pt was active in group, somewhat monopolizing over other group members. Status After Intervention:  Unchanged    Participation Level:  Active Listener, Interactive and Monopolizing    Participation Quality: attentive      Speech:  normal      Thought Process/Content: Logical      Affective Functioning: Congruent      Mood: anxious      Level of consciousness:  Alert, Oriented x4 and Attentive      Response to Learning: Progressing to goal      Endings: None Reported    Modes of Intervention: Support, Socialization, Clarifying and Problem-solving      Discipline Responsible: /Counselor      Signature:  TONYA Lynn, LYDIA

## 2019-04-19 NOTE — GROUP NOTE
Group Therapy Note    Date: April 18    Group Start Time: 2100  Group End Time: 2130  Group Topic: Wrap-Up    [facility]    Martha De Jesus LPN        Group Therapy Note    Attendees: 5/15         Patient's Goal:  ***To review accomplished daily goals and encourage patient to set a new goal for the next day. Notes:  ***    Status After Intervention:  {Status After Intervention:[id number]}    Participation Level:  Active Listener and Interactive    Participation Quality: Appropriate, Attentive and Sharing      Speech:  normal      Thought Process/Content: Logical      Affective Functioning: Blunted      Mood: anxious      Level of consciousness:  Oriented x4      Response to Learning: Able to verbalize current knowledge/experience, Able to verbalize/acknowledge new learning and Able to retain information      Endings: None Reported    Modes of Intervention: Education, Support, Socialization and Problem-solving      Discipline Responsible: Licensed Practical Nurse      Signature:  Martha De Jesus LPN

## 2019-04-19 NOTE — GROUP NOTE
Group Therapy Note    Date: April 19    Group Start Time: 1620  Group End Time: 1645  Group Topic: Group Therapy    [facility]    Kanchan Rod RN        Group Therapy Note    Attendees: 8/15         Patient's Goal:  Be ready for discharge    Notes:  Patient stated at the beginning of group \"I already know about discharge. I have been here 100s of times before. \" writer replied that not every patient in group was knowledgeable about discharge. Status After Intervention:  Unchanged    Participation Level:  Active Listener and Minimal    Participation Quality: Inappropriate and Resistant      Speech:  normal      Thought Process/Content: Logical      Affective Functioning: Flat      Mood: anxious and depressed      Level of consciousness:  Alert, Oriented x4 and Attentive      Response to Learning: Able to verbalize current knowledge/experience, Able to verbalize/acknowledge new learning, Able to retain information, Capable of insight, Able to change behavior and Progressing to goal      Endings: None Reported    Modes of Intervention: Education, Support, Socialization, Exploration, Clarifying and Limit-setting      Discipline Responsible: Registered Nurse      Signature:  Kanchan Rod RN

## 2019-04-19 NOTE — GROUP NOTE
Group Therapy Note    Date: April 19    Group Start Time: 4119  Group End Time: 1191  Group Topic: Community Meeting    [facility]    Bela Yady [facility], [address]        Group Therapy Note    Attendees: 7         Patient's Goal:  Pt's goal is  To work on discharge planning     Notes:  Pt attended and participated     Status After Intervention:  Unchanged    Participation Level:  Active Listener and Interactive    Participation Quality: Appropriate, Attentive and Sharing      Speech:  normal      Thought Process/Content: Logical      Affective Functioning: Congruent      Mood: euthymic      Level of consciousness:  Alert and Attentive      Response to Learning: Progressing to goal      Endings: None Reported    Modes of Intervention: Education, Support, Socialization, Problem-solving and Reality-testing      Discipline Responsible: Psychoeducational Specialist      Signature:  Luis Escobar

## 2019-04-19 NOTE — GROUP NOTE
Group Therapy Note    Date: April 19    Group Start Time: 1330  Group End Time: 8953  Group Topic: Cognitive Skills    [facility], [address]        Group Therapy Note    Attendees: 5/13         Patient's Goal:  To increase socialization    Notes:  Pt. Attended group and participated fully. Status After Intervention:  Improved    Participation Level:  Active Listener and Interactive    Participation Quality: Appropriate and Attentive      Speech:  normal      Thought Process/Content: Logical      Affective Functioning: Congruent      Mood: euthymic      Level of consciousness:  Alert and Oriented x4      Response to Learning: Progressing to goal      Endings: None Reported    Modes of Intervention: Education, Socialization, Problem-solving and Activity      Discipline Responsible: Psychoeducational Specialist      Signature:  Inder Cruz

## 2019-04-19 NOTE — PROGRESS NOTES
Department of Psychiatry  Attending Progress Note  Chief Complaint: Bipolar 1 disorder ([facility] [address]     SUBJECTIVE:  Madan Figueroa is seen today in the day area. He is still focused on his various medical issues. He states he is not sleeping well and he is still very depressed and anxious. He feels hopeless and helpless at times, specifically regarding his current living situation. His focus on his chronic pain makes him more depressed. He states he has not had a bowel movement since admission and he usually takes lactulose. He cannot contract for safety outside of the hospital setting. There is no safe alternative than continued hospitalization.      OBJECTIVE    Physical  /72   Pulse 76   Temp 97.8 °F (36.6 °C) (Oral)   Resp 16   Ht 6' 3\" (1.905 m)   Wt 180 lb (81.6 kg)   SpO2 99%   BMI 22.50 kg/m²      Mental Status Evaluation:  Orientation: alertness: alert   Mood:. anxious and depressed      Affect:  labile      Appearance:  disheveled and older than stated age   Activity:  Psychomotor Retardation   Gait/Posture: Uses wheelchair   Speech:  normal pitch and normal volume   Thought Process:  circumstantial   Thought Content:  hallucinations and suicidal   Sensorium:  person, place, time/date and situation   Cognition:  grossly intact   Memory: intact   Insight:  limited   Judgment: limited   Suicidal Ideations: active and without plan   Homicidal Ideations: Negative for homicidal ideation      Medication Side Effects: absent       Attention Span attention span appeared shorter than expected for age     Medications  Current Facility-Administered Medications   Medication Dose Route Frequency Provider Last Rate Last Dose    lactulose (CHRONULAC) 10 GM/15ML solution 20 g  20 g Oral TID PRN CHAD Thomas CNP   20 g at 04/19/19 0945    traZODone (DESYREL) tablet 200 mg  200 mg Oral Nightly PRN Holger Pickens APRN - DEE        QUEtiapine (SEROQUEL) tablet 400 mg  400 mg Oral Nightly Holger Pickens APRN - CNP Arabella Hill MD        pregabalin (LYRICA) capsule 400 mg  400 mg Oral BID Arabella Hill MD   400 mg at 04/19/19 0834    famotidine (PEPCID) tablet 20 mg  20 mg Oral Daily Arabella Hill MD   20 mg at 04/19/19 0834    senna (SENOKOT) tablet 8.6 mg  1 tablet Oral Nightly PRN Arabella Hill MD   8.6 mg at 04/18/19 2110    simvastatin (ZOCOR) tablet 40 mg  40 mg Oral Nightly Arabella Hill MD   40 mg at 04/18/19 2112    albuterol sulfate  (90 Base) MCG/ACT inhaler 1 puff  1 puff Inhalation Q6H PRN Arabella Hill MD        acetaminophen (TYLENOL) tablet 650 mg  650 mg Oral Q4H PRN Arabella Hill MD   650 mg at 04/17/19 1210    insulin lispro (HUMALOG) injection vial 0-12 Units  0-12 Units Subcutaneous TID WC Arabella Hill MD   Stopped at 04/19/19 0681    insulin lispro (HUMALOG) injection vial 0-6 Units  0-6 Units Subcutaneous Nightly Arabella Hill MD        glucose (GLUTOSE) 40 % oral gel 15 g  15 g Oral PRN Arabella Hill MD        dextrose 50 % solution 12.5 g  12.5 g Intravenous PRN Arabella Hill MD        glucagon (rDNA) injection 1 mg  1 mg Intramuscular PRN Arabella Hill MD        dextrose 5 % solution  100 mL/hr Intravenous PRN Arabella Hill MD             QUEtiapine  400 mg Oral Nightly    cloNIDine  0.1 mg Oral Nightly    busPIRone  10 mg Oral TID    And    busPIRone  5 mg Oral TID    nicotine  1 patch Transdermal Daily    cyanocobalamin  500 mcg Oral Daily    divalproex  250 mg Oral QAM    divalproex  500 mg Oral Nightly    mometasone-formoterol  2 puff Inhalation BID    pantoprazole  40 mg Oral Daily    pregabalin  400 mg Oral BID    famotidine  20 mg Oral Daily    simvastatin  40 mg Oral Nightly    insulin lispro  0-12 Units Subcutaneous TID WC    insulin lispro  0-6 Units Subcutaneous Nightly       ASSESSMENT  Bipolar 1 disorder (HCC)     Patient's Response to Treatment: positive    PLAN:     1.  Continue inpatient psychiatric treatment  2. Supportive therapy with medication management. Reviewed risks and benefits as well as potential side effects with patient. 3. Therapeutic activities and groups  4. Follow up at [facility] [place] after symptoms stabilized. 5. Social work for discharge planning. 6. Increase Trazodone 200 mg Po QHS PRN. Increase Seroquel 400 mg PO QHS. Add lactulose TID PRN for constipation, patient uses at home.      Electronically signed by CHAD Rehman CNP on 4/19/2019 at 12:31 PM.    Dragon voice recognition software used in portions of this document.

## 2019-04-20 LAB
GLUCOSE BLD-MCNC: 121 MG/DL (ref 75–110)
GLUCOSE BLD-MCNC: 138 MG/DL (ref 75–110)
GLUCOSE BLD-MCNC: 158 MG/DL (ref 75–110)
GLUCOSE BLD-MCNC: 174 MG/DL (ref 75–110)

## 2019-04-20 PROCEDURE — 6370000000 HC RX 637 (ALT 250 FOR IP): Performed by: NURSE PRACTITIONER

## 2019-04-20 PROCEDURE — 99232 SBSQ HOSP IP/OBS MODERATE 35: CPT | Performed by: INTERNAL MEDICINE

## 2019-04-20 PROCEDURE — 6370000000 HC RX 637 (ALT 250 FOR IP): Performed by: PSYCHIATRY & NEUROLOGY

## 2019-04-20 PROCEDURE — 99232 SBSQ HOSP IP/OBS MODERATE 35: CPT | Performed by: NURSE PRACTITIONER

## 2019-04-20 PROCEDURE — 6370000000 HC RX 637 (ALT 250 FOR IP): Performed by: INTERNAL MEDICINE

## 2019-04-20 PROCEDURE — 97110 THERAPEUTIC EXERCISES: CPT

## 2019-04-20 PROCEDURE — 1240000000 HC EMOTIONAL WELLNESS R&B

## 2019-04-20 PROCEDURE — 82947 ASSAY GLUCOSE BLOOD QUANT: CPT

## 2019-04-20 RX ORDER — DIVALPROEX SODIUM 500 MG/1
500 TABLET, EXTENDED RELEASE ORAL EVERY MORNING
Status: DISCONTINUED | OUTPATIENT
Start: 2019-04-21 | End: 2019-04-23 | Stop reason: HOSPADM

## 2019-04-20 RX ORDER — BUSPIRONE HYDROCHLORIDE 10 MG/1
20 TABLET ORAL 3 TIMES DAILY
Status: DISCONTINUED | OUTPATIENT
Start: 2019-04-20 | End: 2019-04-23 | Stop reason: HOSPADM

## 2019-04-20 RX ORDER — PALIPERIDONE 3 MG/1
3 TABLET, EXTENDED RELEASE ORAL DAILY
Status: DISCONTINUED | OUTPATIENT
Start: 2019-04-20 | End: 2019-04-22

## 2019-04-20 RX ORDER — QUETIAPINE FUMARATE 300 MG/1
300 TABLET, FILM COATED ORAL NIGHTLY
Status: DISCONTINUED | OUTPATIENT
Start: 2019-04-20 | End: 2019-04-22

## 2019-04-20 RX ORDER — TIZANIDINE 4 MG/1
4 TABLET ORAL EVERY 6 HOURS PRN
Status: DISCONTINUED | OUTPATIENT
Start: 2019-04-20 | End: 2019-04-23 | Stop reason: HOSPADM

## 2019-04-20 RX ORDER — CALCIUM CARBONATE 200(500)MG
500 TABLET,CHEWABLE ORAL 3 TIMES DAILY PRN
Status: DISCONTINUED | OUTPATIENT
Start: 2019-04-20 | End: 2019-04-23 | Stop reason: HOSPADM

## 2019-04-20 RX ADMIN — TIZANIDINE 4 MG: 4 TABLET ORAL at 19:59

## 2019-04-20 RX ADMIN — BUSPIRONE HYDROCHLORIDE 20 MG: 10 TABLET ORAL at 14:51

## 2019-04-20 RX ADMIN — DIVALPROEX SODIUM 500 MG: 500 TABLET, EXTENDED RELEASE ORAL at 21:06

## 2019-04-20 RX ADMIN — MOMETASONE FUROATE AND FORMOTEROL FUMARATE DIHYDRATE 2 PUFF: 200; 5 AEROSOL RESPIRATORY (INHALATION) at 21:03

## 2019-04-20 RX ADMIN — SENNOSIDES 8.6 MG: 8.6 TABLET, FILM COATED ORAL at 21:06

## 2019-04-20 RX ADMIN — PANTOPRAZOLE SODIUM 40 MG: 40 TABLET, DELAYED RELEASE ORAL at 09:03

## 2019-04-20 RX ADMIN — PREGABALIN 400 MG: 150 CAPSULE ORAL at 09:02

## 2019-04-20 RX ADMIN — HALOPERIDOL 5 MG: 10 TABLET ORAL at 09:13

## 2019-04-20 RX ADMIN — LIDOCAINE: 40 CREAM TOPICAL at 15:44

## 2019-04-20 RX ADMIN — PALIPERIDONE 3 MG: 3 TABLET, EXTENDED RELEASE ORAL at 12:15

## 2019-04-20 RX ADMIN — MOMETASONE FUROATE AND FORMOTEROL FUMARATE DIHYDRATE 2 PUFF: 200; 5 AEROSOL RESPIRATORY (INHALATION) at 09:27

## 2019-04-20 RX ADMIN — PREGABALIN 400 MG: 150 CAPSULE ORAL at 21:06

## 2019-04-20 RX ADMIN — CLONIDINE HYDROCHLORIDE 0.1 MG: 0.1 TABLET ORAL at 21:06

## 2019-04-20 RX ADMIN — INSULIN LISPRO 2 UNITS: 100 INJECTION, SOLUTION INTRAVENOUS; SUBCUTANEOUS at 09:04

## 2019-04-20 RX ADMIN — FAMOTIDINE 20 MG: 20 TABLET ORAL at 09:03

## 2019-04-20 RX ADMIN — INSULIN LISPRO 1 UNITS: 100 INJECTION, SOLUTION INTRAVENOUS; SUBCUTANEOUS at 21:02

## 2019-04-20 RX ADMIN — QUETIAPINE FUMARATE 300 MG: 300 TABLET ORAL at 21:06

## 2019-04-20 RX ADMIN — HYDROXYZINE HYDROCHLORIDE 25 MG: 25 TABLET, FILM COATED ORAL at 09:04

## 2019-04-20 RX ADMIN — SIMVASTATIN 40 MG: 40 TABLET, FILM COATED ORAL at 21:06

## 2019-04-20 RX ADMIN — BUSPIRONE HYDROCHLORIDE 10 MG: 10 TABLET ORAL at 09:07

## 2019-04-20 RX ADMIN — HALOPERIDOL 5 MG: 10 TABLET ORAL at 17:53

## 2019-04-20 RX ADMIN — HYDROXYZINE HYDROCHLORIDE 25 MG: 25 TABLET, FILM COATED ORAL at 16:20

## 2019-04-20 RX ADMIN — BUSPIRONE HYDROCHLORIDE 20 MG: 10 TABLET ORAL at 21:11

## 2019-04-20 RX ADMIN — TRAZODONE HYDROCHLORIDE 200 MG: 100 TABLET ORAL at 21:06

## 2019-04-20 RX ADMIN — DIVALPROEX SODIUM 250 MG: 250 TABLET, EXTENDED RELEASE ORAL at 09:03

## 2019-04-20 RX ADMIN — BUSPIRONE HYDROCHLORIDE 5 MG: 5 TABLET ORAL at 09:02

## 2019-04-20 RX ADMIN — LACTULOSE 20 G: 20 SOLUTION ORAL at 12:15

## 2019-04-20 RX ADMIN — ACETAMINOPHEN 650 MG: 325 TABLET, FILM COATED ORAL at 15:44

## 2019-04-20 ASSESSMENT — PAIN DESCRIPTION - LOCATION
LOCATION: BACK
LOCATION: BACK

## 2019-04-20 ASSESSMENT — PAIN SCALES - GENERAL
PAINLEVEL_OUTOF10: 10
PAINLEVEL_OUTOF10: 10
PAINLEVEL_OUTOF10: 9
PAINLEVEL_OUTOF10: 9
PAINLEVEL_OUTOF10: 0

## 2019-04-20 ASSESSMENT — PAIN DESCRIPTION - DIRECTION: RADIATING_TOWARDS: RIGHT HIP

## 2019-04-20 ASSESSMENT — PAIN DESCRIPTION - DESCRIPTORS
DESCRIPTORS: ACHING;DISCOMFORT
DESCRIPTORS: ACHING;CRAMPING;SPASM

## 2019-04-20 ASSESSMENT — PAIN DESCRIPTION - ORIENTATION
ORIENTATION: RIGHT
ORIENTATION: RIGHT

## 2019-04-20 ASSESSMENT — PAIN DESCRIPTION - PAIN TYPE
TYPE: CHRONIC PAIN
TYPE: CHRONIC PAIN

## 2019-04-20 ASSESSMENT — PAIN DESCRIPTION - FREQUENCY
FREQUENCY: CONTINUOUS
FREQUENCY: INTERMITTENT

## 2019-04-20 NOTE — GROUP NOTE
Group Therapy Note    Date: April 20    Group Start Time: 1330  Group End Time: 1430  Group Topic: Recreational    [facility] [place], [place]        Group Therapy Note    Attendees: 11/16         Patient's Goal:  Patient will demonstrate increased interpersonal interaction       Notes:  Patient attended group and participated fully. Status After Intervention:  Improved    Participation Level:  Active Listener and Interactive    Participation Quality: Appropriate, Attentive and Sharing      Speech:  normal      Thought Process/Content: Logical      Affective Functioning: Congruent      Mood: euthymic      Level of consciousness:  Alert and Oriented x4      Response to Learning: Progressing to goal      Endings: None Reported    Modes of Intervention: Education, Socialization and Activity      Discipline Responsible: Psychoeducational Specialist      Signature:  Tatiana Noble

## 2019-04-20 NOTE — PROGRESS NOTES
Physical Therapy  [address] [facility]    Physical Therapy Progress Note    Date: 19  Patient Name: Asael Ann. Room: 0213/0213-01  MRN: [id number]   Account: [de-identified]   : 1970  (51 y.o.)   Gender: male     Discharge Recommendations   Home with Home health PT, Home with assist PRN             Restrictions/Precautions: Seizure, Fall Risk  Implants present? : Metal implants  Other position/activity restrictions: He has a progressive functional decline and he uses wheelchair since . Pt reports he broke his R hip ~ 2 yrs ago and did not get it repaired as he was w/c bound. Pt reports he goes to [address] [place] [address] for testing for MS. Pt also reports he is supposed to go for physical therapy at [facility]. Past Medical History:  has a past medical history of Anxiety, Arthritis, Bipolar disorder ([address] Bronchitis, chronic ([address] Chronic back pain, Chronic pain, Claustrophobia, COPD (chronic obstructive pulmonary disease) ([address] Depression, Diabetes mellitus ([address] Emphysema of lung ([address] History of irregular heartbeat, Hyperlipidemia, Liver disease, MRSA (methicillin resistant staph aureus) culture positive, Myofacial muscle pain, Osteomyelitis ([address] Peripheral neuropathy, Pressure ulcer, Schizophrenia ([address] Seizures ([address] Sleep apnea, Spinal stenosis, and Substance abuse ([address] Past Surgical History:   has a past surgical history that includes Appendectomy; Stomach surgery; lumbar fusion; Septoplasty (2017); Turbinoplasties (2017); and fracture surgery (2017).   Additional Pertinent Hx: Per H and P:   Tolu Chisholm is a 57 yr old male who was admitted from [facility] after using heroin this weekend,   He is crying, Says he is so disgusted with himself and his life and he wants to die, He lives in an apt and says it is filthy, He wanted to clean it last weekend but his back hurt, He uses a wheelchair and cannot walk, He has fibromyalgia and Diabetic ;Increased Pain  Assessment: He has a progressive functional decline and he uses wheelchair since 2007. Pt reports severe pain all over, especially with R Hip movement, unable to tolerate any WB on R LE. Will attmept WB R LE if able safely. Specific instructions for Next Treatment: Have 2 to 3 person assist when attempting Standing/WB B LE, pt has been /wc bound for years, but reports takes 2 to3 steps with crutches to use the toilet, W/C does not fit through the door. Prognosis: Fair  Discharge Recommendations: Home with Home health PT;Home with assist PRN     Type of devices: Left in chair(WC)     Plan  Times per week: 2 x/wk    Patient Education  New Education Provided: PT POC  Learner:patient  Method: explanation       Outcome: asked questions     Goals  Short term goals  Time Frame for Short term goals: 4 to 6 visits  Short term goal 1: Pt able to tolerate strengthening B LE/B UE ex's to be able to be independent for ADL/self care   Short term goal 2: Progress pt to WB on R LE if able , to progress to standing up with RW. Short term goal 3: Progress pt to take steps with RW if able, (start with 2 to 3 person for safety), and progress as able. Short term goal 4: Pt to tolerate R LE ex's at pain < 8/10 to promote increase activity gradually.     PT Individual Minutes  Time In: (P) 2163 [id number]  Time Out: (P) 3063  Minutes: (P) 15    Electronically signed by Pretty Douglas PTA on 4/20/19 at 12:45 PM         04/20/19 1243   PT Individual Minutes   Time In 4193   Time Out 6319   Minutes 15

## 2019-04-20 NOTE — PLAN OF CARE
Problem: Falls - Risk of:  Goal: Will remain free from falls  Description  Will remain free from falls  Outcome: Met This Shift  Note:   Pt remains free of falls and verbalizes understanding of individual fall risks. Pt wearing non skid footwear and encouraged to seek out staff for any assistance needed. Problem: Falls - Risk of:  Goal: Absence of physical injury  Description  Absence of physical injury  Outcome: Met This Shift  Note:   No falls or injuries noted. Problem: Risk for Impaired Skin Integrity  Goal: Tissue integrity - skin and mucous membranes  Description  Structural intactness and normal physiological function of skin and  mucous membranes. Outcome: Met This Shift  Note:   Skin integrity maintained. Problem: Altered Mood, Depressive Behavior:  Goal: Ability to disclose and discuss suicidal ideas will improve  Description  Ability to disclose and discuss suicidal ideas will improve  Outcome: Ongoing  Note:   Patient is irritable and demanding. Very focused on medications. Frequently asking for as needed medications. Denies suicidal or homicidal ideations. Admits to voices. Patient isolative to room at times. Refused group therapies. Problem: Altered Mood, Psychotic Behavior:  Goal: Able to verbalize decrease in frequency and intensity of hallucinations  Description  Able to verbalize decrease in frequency and intensity of hallucinations  Outcome: Ongoing  Note:   Patient states he still has voices. Problem: Pain:  Goal: Pain level will decrease  Description  Pain level will decrease  Outcome: Not Met This Shift  Note:   Patient complaining of back spasms and pain. Patient took hot shower. Lidocaine cream and tylenol given.

## 2019-04-20 NOTE — GROUP NOTE
Group Therapy Note    Date: April 20    Group Start Time: 0900  Group End Time: 0915  Group Topic: [address] [facility], [address]        Group Therapy Note    Attendees: 5/16           Patient's Goal:  Patient will demonstrate increased interpersonal interaction       Notes:  Patient attended group and participated fully. Status After Intervention:  Improved    Participation Level:  Active Listener and Interactive    Participation Quality: Appropriate, Attentive and Sharing      Speech:  normal      Thought Process/Content: Logical      Affective Functioning: Congruent      Mood: euthymic      Level of consciousness:  Alert and Oriented x4      Response to Learning: Progressing to goal      Endings: None Reported    Modes of Intervention: Education, Socialization, Clarifying, Problem-solving and Reality-testing      Discipline Responsible: Psychoeducational Specialist      Signature:  Peggy Cortes

## 2019-04-20 NOTE — PLAN OF CARE
Problem: Falls - Risk of:  Goal: Will remain free from falls  Description  Will remain free from falls  4/19/2019 2105 by Paige Rosas RN  Outcome: Ongoing  Note:   Pt remains free of falls and verbalizes understanding of individual fall risks. Pt wearing non skid footwear and encouraged to seek out staff for any assistance needed. Problem: Altered Mood, Depressive Behavior:  Goal: Ability to disclose and discuss suicidal ideas will improve  Description  Ability to disclose and discuss suicidal ideas will improve  4/19/2019 2105 by Paige Rosas RN  Outcome: Ongoing  Note:   Patient denies suicidal and homicidal thoughts. Patient denies auditory and visual hallucinations. Patient is taking meds and eating well. No self harm noted this shift. Patient agrees to seek staff out if negative thoughts arise. Will continue to monitor Q15 minute and intermittently. Problem: Altered Mood, Psychotic Behavior:  Goal: Able to verbalize decrease in frequency and intensity of hallucinations  Description  Able to verbalize decrease in frequency and intensity of hallucinations  4/19/2019 2105 by aPige Rosas RN  Outcome: Ongoing  Note:   Patient has been calm, controlled and med complaint. Accepting of 1:1 talk time with staff. 1:1 with pt x ten minutes. Pt encouraged to attend unit programming and interact with peers and staff. Pt also encouraged to tend to hygiene and ADLs. Pt encouraged to discuss feelings with staff and feedback and reassurance provided. Reports anxiety and depression 10/10. Patient out in day room watching TV with peers and talking/joking with peers.

## 2019-04-20 NOTE — GROUP NOTE
Group Therapy Note    Date: April 19    Group Start Time: 2015  Group End Time: 2045  Group Topic: Wrap-Up    FAYE Narvaez RN        Group Therapy Note    Attendees: 8/15      Group Therapy Note    Date: April 19    Group Start Time: 2015  Group End Time: 2045  Group Topic: Wrap-Up    FAYE Narvaez RN        Group Therapy Note    Attendees: 8/15       Patient attended evening wrap-up group and participated appropriately.       Signature:  Erika Yarbrough RN      Signature:  Erika Yarbrough RN

## 2019-04-20 NOTE — PROGRESS NOTES
[address]                Date:   4/20/2019  Patient name:  Helen Lesches. Date of admission:  4/15/2019  5:55 PM  MRN:   [id number]  YOB: 1970    Pt seen at the request of Rao Perez MD    CHIEF COMPLAINT:     History Obtained From:  Patient and chart review. HISTORY OF PRESENT ILLNESS:      The patient is a 57 y.o.  male who is admitted to the hospital for medical management   chr pain all over     dm2     sizures  Copd    4/17   bs controlled   chr pain issues     4/18   no chnge    Stable     still c/o pin   Past Medical History:   has a past medical history of Anxiety, Arthritis, Bipolar disorder ([address] Bronchitis, chronic (HCC), Chronic back pain, Chronic pain, Claustrophobia, COPD (chronic obstructive pulmonary disease) ([address] Depression, Diabetes mellitus ([address] Emphysema of lung ([address] History of irregular heartbeat, Hyperlipidemia, Liver disease, MRSA (methicillin resistant staph aureus) culture positive, Myofacial muscle pain, Osteomyelitis ([address] Peripheral neuropathy, Pressure ulcer, Schizophrenia ([address] Seizures ([address] Sleep apnea, Spinal stenosis, and Substance abuse ([address] Past Surgical History:   has a past surgical history that includes Appendectomy; Stomach surgery; lumbar fusion; Septoplasty (01/13/2017); Turbinoplasties (01/13/2017); and fracture surgery (01/28/2017). Home Medications:    Prior to Admission medications    Medication Sig Start Date End Date Taking?  Authorizing Provider   busPIRone (BUSPAR) 30 MG tablet  3/25/19  Yes Historical Provider, MD   hydrOXYzine (VISTARIL) 25 MG capsule Take 1 capsule by mouth 2 times daily 4/12/19  Yes CHAD Ludwig CNP   lidocaine (LMX) 4 % cream Apply topically 3 times daily as needed for Pain 4/12/19  Yes CHAD Ludwig CNP   pantoprazole (PROTONIX) 40 MG tablet Take 1 tablet by mouth daily 4/12/19  Yes Yennifer Carter CHAD Joseph CNP   tiZANidine (ZANAFLEX) 4 MG tablet Take 1 tablet by mouth 3 times daily 4/12/19  Yes CHAD Soriano CNP   ranitidine (ZANTAC) 150 MG tablet Take 1 tablet by mouth 2 times daily as needed for Heartburn 4/12/19  Yes CHAD Soriano CNP   pregabalin (LYRICA) 200 MG capsule Take 2 capsules by mouth 2 times daily.  4/12/19 4/11/20 Yes CHAD Soriano CNP   VENTOLIN  (90 Base) MCG/ACT inhaler INHALE 2 PUFFS INTO THE LUNGS EVERY 6 HOURS AS NEEDED FOR WHEEZING 3/27/19  Yes CHAD Soriano CNP   QUEtiapine (SEROQUEL) 200 MG tablet Take 1.5 tablets by mouth nightly 3/20/19  Yes CHAD Soriano CNP   cloNIDine (CATAPRES) 0.1 MG tablet Take 0.1 mg by mouth nightly  1/24/19  Yes Historical Provider, MD   fluticasone-vilanterol (BREO ELLIPTA) 100-25 MCG/INH AEPB inhaler Inhale 1 puff into the lungs daily 2/18/19  Yes CHAD Soriano CNP   Insulin Syringe-Needle U-100 31G X 5/16\" 0.5 ML MISC 1 each by Does not apply route 3 times daily 1/23/19  Yes CHAD Soriano CNP   simvastatin (ZOCOR) 40 MG tablet Take 40 mg by mouth nightly    Yes Historical Provider, MD   albuterol (PROVENTIL) (2.5 MG/3ML) 0.083% nebulizer solution Take 3 mLs by nebulization every 6 hours as needed for Wheezing 12/20/18  Yes CHAD Soriano CNP   nicotine (NICODERM CQ) 21 MG/24HR Place 1 patch onto the skin daily as needed (if pateint smokes) 12/13/18  Yes Glenroy Howard MD   divalproex (DEPAKOTE ER) 500 MG extended release tablet Take 1 tablet by mouth nightly 9/5/18  Yes Mabel Escobedo MD   divalproex (DEPAKOTE ER) 250 MG extended release tablet Take 1 tablet by mouth every morning  Patient taking differently: Take 750 mg by mouth every morning  9/5/18  Yes Mabel Escobedo MD   traZODone (DESYREL) 150 MG tablet Take 1 tablet by mouth nightly as needed for Sleep  Patient taking differently: Take 300 mg by mouth nightly  4/12/18  Yes CHAD Edwards - CNP Alcohol Swabs (PRO COMFORT ALCOHOL) 70 % PADS  3/30/19   Historical Provider, MD   meloxicam (MOBIC) 15 MG tablet Take 15 mg by mouth    Historical Provider, MD   Misc. Devices (GEL-FOAM CUSHION) MISC 1 Units by Does not apply route daily 4/12/19   CHAD Duffy CNP   FREESTYLE LITE strip 1 each by Other route 3 times daily 4/12/19   CHAD Duffy CNP   Misc. Devices (GEL-FOAM CUSHION) MISC 1 Units by Does not apply route daily 4/12/19   CHAD Duffy CNP   Nutritional Supplements (BOOST) LIQD Two a day. Chocolate lactose free nutritional drinks. 4/11/19   CHAD Duffy CNP   hydrOXYzine (VISTARIL) 50 MG capsule Take 50 mg by mouth nightly    Historical Provider, MD   insulin regular (HUMULIN R;NOVOLIN R) 100 UNIT/ML injection Inject 2 Units into the skin See Admin Instructions Sliding scale depending on meals 4/3/19   CHAD Duffy CNP   TRUEPLUS LANCETS 33G MISC  2/8/19   Historical Provider, MD   ondansetron (ZOFRAN) 4 MG tablet TAKE 1 TABLET EVERY 8 HOURS AS NEEDED FOR NAUSEA OR VOMITING 1/8/19   CHAD Duffy CNP   busPIRone (BUSPAR) 15 MG tablet Take 15 mg by mouth 3 times daily  8/15/18   Historical Provider, MD   cyanocobalamin 500 MCG tablet Take 500 mcg by mouth 10/26/18   Historical Provider, MD   senna (SENNA-TIME) 8.6 MG tablet twice daily as needed.   9/4/18   Historical Provider, MD   ipratropium (ATROVENT) 0.02 % nebulizer solution Take 2.5 mLs by nebulization 4 times daily as needed for Wheezing 12/20/18   CHAD Duffy CNP   ENULOSE 10 GM/15ML SOLN solution TAKE 15 MLS BY MOUTH 3 TIMES DAILY AS NEEDED (CONSTIPATION) 11/27/18   Jayden Babb MD   DULoxetine (CYMBALTA) 30 MG extended release capsule TAKE 3 CAPSULES BY MOUTH DAILY  Patient taking differently: Take 60 mg by mouth every morning Takes 2 of the 60 mg every AM 11/26/18   Jayden Babb MD   SM PAIN RELIEVER 325 MG tablet TAKE 2 TABLETS BY MOUTH EVERY 4 HOURS AS NEEDED FOR PAIN 10/5/18   Cristina Kelly MD   naloxegol (MOVANTIK) 25 MG TABS tablet Take 1 tablet by mouth every morning  Patient taking differently: Take 25 mg by mouth as needed  9/7/18   Charo Mcknight MD   polyethylene glycol (GLYCOLAX) powder Take 17 g by mouth daily as needed    Historical Provider, MD   buprenorphine-naloxone (SUBOXONE) 2-0.5 MG SUBL Place 2 tablets under the tongue 2 times daily . 11/17/17   Radha Wolff MD   topiramate (TOPAMAX) 100 MG tablet Take 200 mg by mouth 2 times daily     Historical Provider, MD       Allergies:  Sulfa antibiotics; Sulfasalazine; Bactrim; Levofloxacin; Levofloxacin; Phenobarbital; Sulfamethoxazole-trimethoprim; Sulfur; Tessalon [benzonatate]; and Wellbutrin [bupropion]    Social History:   reports that he has been smoking cigarettes. He started smoking about 36 years ago. He has a 35.00 pack-year smoking history. He has never used smokeless tobacco. He reports that he has current or past drug history. Drugs: Marijuana and Opiates . He reports that he does not drink alcohol. Family History: family history includes Coronary Art Dis in his maternal uncle; Diabetes in his mother; Seizures in his maternal cousin and sister. REVIEW OF SYSTEMS:    · Constitutional: Negative for weight loss  · Eyes: Negative for visual changes, diplopia, scleral icterus. · ENT: Negative for Headaches, hearing loss, vertigo, mouth sores, sore throat. · Cardiovascular: Negative for lightheadedness/orthostatic symptoms ,chest pain, dyspnea on exertion, palpitations or loss of consciousness. · Respiratory: Negative for cough or wheezing, sputum production, hemoptysis, pleuritic pain. · Gastrointestinal: Negative for nausea/vomiting, change in bowel habits, abdominal pain, dysphagia/appetite loss, hematemesis, blood in stools. · Genitourinary:Negative for change in bladder habits, dysuria, trouble voiding, hematuria.   · Musculoskeletal: Negative for gait disturbance, weakness, joint complaints. pos hip pain  · Integumentary: Negative for rash, pruritis. · Neurological:pos for headache, dizziness, change in muscle strength,pos  numbness/tingling, change in gait, balance, coordination,   · Endocrine: negative for temperature intolerance, excessive thirst, fluid intake, or urination, tremor. · Hematologic/Lymphatic: negative for abnormal bruising or bleeding, blood clots, swollen lymph nodes. · Allergic/Immunologic: negative for nasal congestion, pruritis, hives. PHYSICAL EXAM:    /67   Pulse 83   Temp 97.9 °F (36.6 °C) (Oral)   Resp 14   Ht 6' 3\" (1.905 m)   Wt 180 lb (81.6 kg)   SpO2 98%   BMI 22.50 kg/m²      · General appearance: well nourished  · HEENT: Head: Normocephalic, no lesions, without obvious abnormality. · Lungs: clear to auscultation bilaterally  · Heart: regular rate and rhythm, S1, S2 normal, no murmur, click, rub or gallop  · Abdomen: soft, non-tender; bowel sounds normal; no masses,  no organomegaly  · Extremities: extremities normal, atraumatic, no cyanosis or edema hip tenderness rt  · Neurological:  Unable to ambulateReflexes normal and symmetric. Sensation grossly  hypersensitive tender all over  · Skin - no rash, no lump   · Eye no icterus no redness  · Lymphatic system-no lymphadenopathy no splenomegaly       DIAGNOSTICS:    Laboratory Testing:  CBC:   No results for input(s): WBC, HGB, PLT in the last 72 hours. BMP:    Recent Labs     04/18/19  0811      K 4.1      CO2 24   BUN 7   CREATININE 0.79   GLUCOSE 132*     S. Calcium:  Recent Labs     04/18/19  0811   CALCIUM 8.7     S. Ionized Calcium:No results for input(s): IONCA in the last 72 hours. S. Magnesium:  No results for input(s): MG in the last 72 hours. S. Phosphorus:  No results for input(s): PHOS in the last 72 hours.   S. Glucose:  Recent Labs     04/20/19  0735 04/20/19  1123 04/20/19  1654   POCGLU 158* 138* 121*     Glycosylated hemoglobin A1C:   No results bowel, and colon are nondilated. Post appendectomy. Pelvis: Urinary bladder and pelvic organs are unremarkable. No free fluid in the pelvis. Peritoneum/Retroperitoneum: Aortoiliac atherosclerotic calcification. Abdominal aorta is normal in caliber. No abdominal or retroperitoneal adenopathy. No free fluid in the abdomen. Bones/Soft Tissues: No acute osseous abnormality. No acute inflammatory process in the abdomen or pelvis. Aortoiliac atherosclerotic calcification. Abdominal aorta is normal in caliber. ASSESSMENT:    Patient Active Problem List   Diagnosis    Anxiety    Seizure ([address]    Myofascial muscle pain    Chronic pain    Spinal stenosis    Hyperlipidemia    Depression    Panlobular emphysema (HCC)    Bipolar disorder (HCC)    Chronic back pain    Schizophrenia ([address]    Fibromyalgia    Type 2 diabetes mellitus with diabetic polyneuropathy, with long-term current use of insulin (HCC)    Onychomycosis    Pain in lower limb    Disc disease, degenerative, cervical    Cervical stenosis of spine    Generalized tonic-clonic seizure (HCC)    Superficial laceration of scalp    Altered mental status    Decubitus ulcer of coccyx, stage 2    Cocaine substance abuse ([address]    Opiate abuse, episodic (HCC)    Closed fracture of nasal bone    Drug overdose    Uncomplicated opioid dependence ([address]    Paraplegia ([address]    Schizoaffective disorder ([address]    Non-dose-related adverse reaction to medication wellbutrin    Cannabis abuse    Encephalopathy    Hyperammonemia (HCC)    Constipation    Cellulitis and abscess of right leg    Interstitial lung disease (HCC)    Vomiting    Bipolar 1 disorder (HCC)    MDD (major depressive disorder), recurrent episode (HCC)    neuropathy    From etoh  More than dm    Has spinal stenosis     myofacial pain syndrome      fibromyalgia     organic brain syndrome      dm2 controlled     On insulin   cont home dose     Copd baseline     ?  Hip fx 1 year  Non ambulating has seen ortho no intervention planned     PLAN:    Cont lyrica    Cont depakote       was on cymbalta at home   would advise to continue     cont zanaflex   Cont home insulin      xrya rt hip     On meloxicam   cont   cr nl      bp controlled       4/17   bp controlled   cont lyrica for chr pain syndrome   k ws 3.6 recheck     bc controlled     4/18   no change   bp controlled   na corrected   bs controlled   cont same    4/19   bp controlled   bs controlled   cont same    4/20 chr pain issues   asking for pain meds  On lyrica/ meloxicam/     Add zanaflex   bs controled   MD ROSARIO Juan[address], [address] [place] [address].    Phone [phone]   Fax: [phone]  Answering Service: [phone]

## 2019-04-20 NOTE — PROGRESS NOTES
Shar Mc, APRN - CNP        lactulose (CHRONULAC) 10 GM/15ML solution 20 g  20 g Oral TID PRN CHAD Valerio CNP   20 g at 04/19/19 1849    traZODone (DESYREL) tablet 200 mg  200 mg Oral Nightly PRN CHAD Valerio CNP        cloNIDine (CATAPRES) tablet 0.1 mg  0.1 mg Oral Nightly CHAD Valerio CNP   0.1 mg at 04/18/19 2110    haloperidol (HALDOL) tablet 5 mg  5 mg Oral Q8H PRN HCAD Valerio CNP   5 mg at 04/20/19 0913    busPIRone (BUSPAR) tablet 10 mg  10 mg Oral TID Royce Badillo MD   10 mg at 04/20/19 1257    And    busPIRone (BUSPAR) tablet 5 mg  5 mg Oral TID Royce Badillo MD   5 mg at 04/20/19 0902    albuterol (PROVENTIL) nebulizer solution 2.5 mg  2.5 mg Nebulization Q6H PRN Royce Badillo MD   2.5 mg at 04/16/19 2057    hydrOXYzine (ATARAX) tablet 25 mg  25 mg Oral TID PRN CHAD Buitrago CNP   25 mg at 04/20/19 7687    benztropine mesylate (COGENTIN) injection 2 mg  2 mg Intramuscular BID PRN CHAD Buitrago CNP        magnesium hydroxide (MILK OF MAGNESIA) 400 MG/5ML suspension 30 mL  30 mL Oral Daily PRN CHAD Buitrago CNP        nicotine (NICODERM CQ) 14 MG/24HR 1 patch  1 patch Transdermal Daily CHAD Buitrago CNP   1 patch at 04/20/19 3961    nicotine polacrilex (NICORETTE) gum 2 mg  2 mg Oral Q2H PRN CHAD Buitrago CNP        vitamin B-12 (CYANOCOBALAMIN) tablet 500 mcg  500 mcg Oral Daily Royce Badillo MD        divalproex (DEPAKOTE ER) extended release tablet 250 mg  250 mg Oral QAM Royce Badillo MD   250 mg at 04/20/19 0903    divalproex (DEPAKOTE ER) extended release tablet 500 mg  500 mg Oral Nightly Royce Badillo MD   500 mg at 04/19/19 2029    mometasone-formoterol (DULERA) 200-5 MCG/ACT inhaler 2 puff  2 puff Inhalation BID Royce Badillo MD   2 puff at 04/20/19 0927    ipratropium (ATROVENT) 0.02 % nebulizer solution 0.5 mg  0.5 mg Nebulization 4x Daily PRN Royce Badillo MD       [facility] lidocaine (LMX) 4 % cream   Topical TID PRN Kymberly Butler MD        pantoprazole (PROTONIX) tablet 40 mg  40 mg Oral Daily Kymberly Butler MD   40 mg at 04/20/19 0903    polyethylene glycol (GLYCOLAX) packet 17 g  17 g Oral Daily PRN Kymberly Butler MD        pregabalin (LYRICA) capsule 400 mg  400 mg Oral BID Kymberly Butler MD   400 mg at 04/20/19 0902    famotidine (PEPCID) tablet 20 mg  20 mg Oral Daily Kymberly Butler MD   20 mg at 04/20/19 0903    senna (SENOKOT) tablet 8.6 mg  1 tablet Oral Nightly PRN Kymberly Butler MD   8.6 mg at 04/18/19 2110    simvastatin (ZOCOR) tablet 40 mg  40 mg Oral Nightly Kymberly Butler MD   40 mg at 04/19/19 2029    albuterol sulfate  (90 Base) MCG/ACT inhaler 1 puff  1 puff Inhalation Q6H PRN Kymberly Butler MD        acetaminophen (TYLENOL) tablet 650 mg  650 mg Oral Q4H PRN Kymberly Butler MD   650 mg at 04/17/19 1210    insulin lispro (HUMALOG) injection vial 0-12 Units  0-12 Units Subcutaneous TID WC Kymberly Butler MD   2 Units at 04/20/19 0904    insulin lispro (HUMALOG) injection vial 0-6 Units  0-6 Units Subcutaneous Nightly Kymberly Butler MD        glucose (GLUTOSE) 40 % oral gel 15 g  15 g Oral PRN Kymberly Butler MD        dextrose 50 % solution 12.5 g  12.5 g Intravenous PRN Kymberly Butler MD        glucagon (rDNA) injection 1 mg  1 mg Intramuscular PRMARY ANN Butler MD        dextrose 5 % solution  100 mL/hr Intravenous PATO Butler MD             QUEtiapine  300 mg Oral Nightly    paliperidone  3 mg Oral Daily    cloNIDine  0.1 mg Oral Nightly    busPIRone  10 mg Oral TID    And    busPIRone  5 mg Oral TID    nicotine  1 patch Transdermal Daily    cyanocobalamin  500 mcg Oral Daily    divalproex  250 mg Oral QAM    divalproex  500 mg Oral Nightly    mometasone-formoterol  2 puff Inhalation BID    pantoprazole  40 mg Oral Daily    pregabalin  400 mg Oral BID    famotidine 20 mg Oral Daily    simvastatin  40 mg Oral Nightly    insulin lispro  0-12 Units Subcutaneous TID WC    insulin lispro  0-6 Units Subcutaneous Nightly       ASSESSMENT  Bipolar 1 disorder (HCC)     Patient's Response to Treatment: positive    PLAN:     1. Continue inpatient psychiatric treatment  2. Supportive therapy with medication management. Reviewed risks and benefits as well as potential side effects with patient. 3. Therapeutic activities and groups  4. Follow up at [facility] after symptoms stabilized. 5. Social work for discharge planning. 6.Decrease Seroquel to 300 mg PO QHS, plan to taper off. Start Invega 3 mg PO Qday and plan to titrate for efficacy. Patient agreeable to [facility]. Electronically signed by CHAD Gill CNP on 4/20/2019 at 11:32 AM.    Dragon voice recognition software used in portions of this document.

## 2019-04-20 NOTE — GROUP NOTE
Group Therapy Note    Date: April 20    Group Start Time: 1000  Group End Time: 1055  Group Topic: Psychoeducation    [facility]    TONYA Vegas, LSW        Group Therapy Note    Attendees: 7         Patient's Goal:  To demonstrate increased interpersonal interaction. Notes:  Pt came to group.     Status After Intervention:  Improved    Participation Level: Interactive    Participation Quality: Appropriate      Speech:  normal      Thought Process/Content: Logical      Affective Functioning: Congruent      Mood: dysphoric      Level of consciousness:  Alert      Response to Learning: Progressing to goal      Endings: None Reported    Modes of Intervention: Education      Discipline Responsible: /Counselor      Signature:  TONYA Vegas, LYDIA

## 2019-04-21 LAB
GLUCOSE BLD-MCNC: 104 MG/DL (ref 75–110)
GLUCOSE BLD-MCNC: 139 MG/DL (ref 75–110)
GLUCOSE BLD-MCNC: 147 MG/DL (ref 75–110)
GLUCOSE BLD-MCNC: 156 MG/DL (ref 75–110)

## 2019-04-21 PROCEDURE — 99232 SBSQ HOSP IP/OBS MODERATE 35: CPT | Performed by: NURSE PRACTITIONER

## 2019-04-21 PROCEDURE — 1240000000 HC EMOTIONAL WELLNESS R&B

## 2019-04-21 PROCEDURE — 99231 SBSQ HOSP IP/OBS SF/LOW 25: CPT | Performed by: INTERNAL MEDICINE

## 2019-04-21 PROCEDURE — 6370000000 HC RX 637 (ALT 250 FOR IP): Performed by: NURSE PRACTITIONER

## 2019-04-21 PROCEDURE — 82947 ASSAY GLUCOSE BLOOD QUANT: CPT

## 2019-04-21 PROCEDURE — 6370000000 HC RX 637 (ALT 250 FOR IP): Performed by: PSYCHIATRY & NEUROLOGY

## 2019-04-21 PROCEDURE — 6370000000 HC RX 637 (ALT 250 FOR IP): Performed by: INTERNAL MEDICINE

## 2019-04-21 RX ADMIN — DIVALPROEX SODIUM 500 MG: 500 TABLET, EXTENDED RELEASE ORAL at 08:00

## 2019-04-21 RX ADMIN — QUETIAPINE FUMARATE 300 MG: 300 TABLET ORAL at 21:04

## 2019-04-21 RX ADMIN — INSULIN LISPRO 2 UNITS: 100 INJECTION, SOLUTION INTRAVENOUS; SUBCUTANEOUS at 11:24

## 2019-04-21 RX ADMIN — FAMOTIDINE 20 MG: 20 TABLET ORAL at 08:00

## 2019-04-21 RX ADMIN — ACETAMINOPHEN 650 MG: 325 TABLET, FILM COATED ORAL at 10:40

## 2019-04-21 RX ADMIN — PREGABALIN 400 MG: 150 CAPSULE ORAL at 08:00

## 2019-04-21 RX ADMIN — MOMETASONE FUROATE AND FORMOTEROL FUMARATE DIHYDRATE 2 PUFF: 200; 5 AEROSOL RESPIRATORY (INHALATION) at 07:59

## 2019-04-21 RX ADMIN — TRAZODONE HYDROCHLORIDE 200 MG: 100 TABLET ORAL at 21:04

## 2019-04-21 RX ADMIN — BUSPIRONE HYDROCHLORIDE 20 MG: 10 TABLET ORAL at 08:00

## 2019-04-21 RX ADMIN — TIZANIDINE 4 MG: 4 TABLET ORAL at 21:04

## 2019-04-21 RX ADMIN — INSULIN LISPRO 2 UNITS: 100 INJECTION, SOLUTION INTRAVENOUS; SUBCUTANEOUS at 17:15

## 2019-04-21 RX ADMIN — PREGABALIN 400 MG: 150 CAPSULE ORAL at 21:04

## 2019-04-21 RX ADMIN — BUSPIRONE HYDROCHLORIDE 20 MG: 10 TABLET ORAL at 14:11

## 2019-04-21 RX ADMIN — HYDROXYZINE HYDROCHLORIDE 25 MG: 25 TABLET, FILM COATED ORAL at 18:05

## 2019-04-21 RX ADMIN — DIVALPROEX SODIUM 500 MG: 500 TABLET, EXTENDED RELEASE ORAL at 21:04

## 2019-04-21 RX ADMIN — PALIPERIDONE 3 MG: 3 TABLET, EXTENDED RELEASE ORAL at 08:00

## 2019-04-21 RX ADMIN — MOMETASONE FUROATE AND FORMOTEROL FUMARATE DIHYDRATE 2 PUFF: 200; 5 AEROSOL RESPIRATORY (INHALATION) at 21:04

## 2019-04-21 RX ADMIN — HYDROXYZINE HYDROCHLORIDE 25 MG: 25 TABLET, FILM COATED ORAL at 08:00

## 2019-04-21 RX ADMIN — LACTULOSE 20 G: 20 SOLUTION ORAL at 11:46

## 2019-04-21 RX ADMIN — SIMVASTATIN 40 MG: 40 TABLET, FILM COATED ORAL at 21:04

## 2019-04-21 RX ADMIN — TIZANIDINE 4 MG: 4 TABLET ORAL at 14:10

## 2019-04-21 RX ADMIN — CLONIDINE HYDROCHLORIDE 0.1 MG: 0.1 TABLET ORAL at 21:05

## 2019-04-21 RX ADMIN — BUSPIRONE HYDROCHLORIDE 20 MG: 10 TABLET ORAL at 21:04

## 2019-04-21 RX ADMIN — PANTOPRAZOLE SODIUM 40 MG: 40 TABLET, DELAYED RELEASE ORAL at 08:00

## 2019-04-21 RX ADMIN — TIZANIDINE 4 MG: 4 TABLET ORAL at 08:00

## 2019-04-21 ASSESSMENT — PAIN SCALES - GENERAL
PAINLEVEL_OUTOF10: 1
PAINLEVEL_OUTOF10: 9
PAINLEVEL_OUTOF10: 3
PAINLEVEL_OUTOF10: 0

## 2019-04-21 NOTE — PLAN OF CARE
Problem: Falls - Risk of:  Goal: Will remain free from falls  Description  Will remain free from falls  Outcome: Ongoing  Note:   Pt remains free of falls and verbalizes understanding of individual fall risks. Pt wearing non skid footwear and encouraged to seek out staff for any assistance needed. Problem: Falls - Risk of:  Goal: Absence of physical injury  Description  Absence of physical injury  Outcome: Ongoing     Problem: Altered Mood, Depressive Behavior:  Goal: Ability to disclose and discuss suicidal ideas will improve  Description  Ability to disclose and discuss suicidal ideas will improve  Outcome: Ongoing  Note:   Patient admits to depression, anxiety, auditory hallucinations. Denies suicidal and homicidal thoughts. Cooperative, med compliant, has many somatic complaints - states he has low blood pressure, frequent requests for water. States his ammonia level is high which is causing him to not feel good and was requesting lactulose, then states he is also constipated but admits to have bowel movement yesterday. Frequent complaints of back pain and muscle spasms, frequently asking when next dose of zanaflex is due. Attends select groups, has poor hygiene, reports poor sleep, has good appetite. Patient safety maintained through Q15 min and random safety checks.       Problem: Altered Mood, Psychotic Behavior:  Goal: Able to verbalize decrease in frequency and intensity of hallucinations  Description  Able to verbalize decrease in frequency and intensity of hallucinations  Outcome: Ongoing     Problem: Pain:  Goal: Pain level will decrease  Description  Pain level will decrease  Outcome: Ongoing     Problem: Pain:  Goal: Control of acute pain  Description  Control of acute pain  Outcome: Ongoing     Problem: Pain:  Goal: Control of chronic pain  Description  Control of chronic pain  Outcome: Ongoing     Problem: Risk for Impaired Skin Integrity  Goal: Tissue integrity - skin and mucous membranes  Description  Structural intactness and normal physiological function of skin and  mucous membranes.   Outcome: Ongoing

## 2019-04-21 NOTE — GROUP NOTE
Group Therapy Note    Date: April 21    Group Start Time: 1000  Group End Time: 9580  Group Topic: Group Therapy    [facility]    TONYA Bryant, LYDIA        Group Therapy Note    Attendees: 4         Patient's Goal:  Patient will demonstrate increased interpersonal interaction    Notes:  Pt was an active listener, minimal participation in discussion on emotional regulation    Status After Intervention:  Unchanged    Participation Level:  Active Listener and Minimal    Participation Quality: Appropriate, Attentive and Supportive      Speech:  normal      Thought Process/Content: Logical      Affective Functioning: Congruent      Mood: depressed      Level of consciousness:  Alert, Oriented x4 and Attentive      Response to Learning: Able to change behavior and Progressing to goal      Endings: None Reported    Modes of Intervention: Support, Socialization, Clarifying and Problem-solving      Discipline Responsible: /Counselor      Signature:  TONYA Bryant LSW

## 2019-04-21 NOTE — GROUP NOTE
Group Therapy Note    Date: April 21    Group Start Time: 0900  Group End Time: 0915  Group Topic: Community Meeting    FAYE Escobedo    Pt did not participate in community meeting/ goals group at 0900 despite staff encouragement to attend.            Signature:  Jose F Escobedo

## 2019-04-21 NOTE — PROGRESS NOTES
Ariana Carranza MD        divalproex (DEPAKOTE ER) extended release tablet 500 mg  500 mg Oral Nightly Royce Badillo MD   500 mg at 04/20/19 2106    mometasone-formoterol (DULERA) 200-5 MCG/ACT inhaler 2 puff  2 puff Inhalation BID Royce Badillo MD   2 puff at 04/21/19 0759    ipratropium (ATROVENT) 0.02 % nebulizer solution 0.5 mg  0.5 mg Nebulization 4x Daily PRN Royce Badillo MD        lidocaine (LMX) 4 % cream   Topical TID PRN Royce Badillo MD        pantoprazole (PROTONIX) tablet 40 mg  40 mg Oral Daily Royce Badillo MD   40 mg at 04/21/19 0800    polyethylene glycol (GLYCOLAX) packet 17 g  17 g Oral Daily PRN Royce Badillo MD        pregabalin (LYRICA) capsule 400 mg  400 mg Oral BID Royce Badillo MD   400 mg at 04/21/19 0800    famotidine (PEPCID) tablet 20 mg  20 mg Oral Daily Royce Badillo MD   20 mg at 04/21/19 0800    senna (SENOKOT) tablet 8.6 mg  1 tablet Oral Nightly PRN Royce Badillo MD   8.6 mg at 04/20/19 2106    simvastatin (ZOCOR) tablet 40 mg  40 mg Oral Nightly Royce Badillo MD   40 mg at 04/20/19 2106    albuterol sulfate  (90 Base) MCG/ACT inhaler 1 puff  1 puff Inhalation Q6H PRN Royce Badillo MD        acetaminophen (TYLENOL) tablet 650 mg  650 mg Oral Q4H PRN Royce Badillo MD   650 mg at 04/21/19 1040    insulin lispro (HUMALOG) injection vial 0-12 Units  0-12 Units Subcutaneous TID WC Royce Badillo MD   2 Units at 04/20/19 0904    insulin lispro (HUMALOG) injection vial 0-6 Units  0-6 Units Subcutaneous Nightly Royce Badillo MD   1 Units at 04/20/19 2102    glucose (GLUTOSE) 40 % oral gel 15 g  15 g Oral PRN Royce Badillo MD        dextrose 50 % solution 12.5 g  12.5 g Intravenous PRN Royce Badillo MD        glucagon (rDNA) injection 1 mg  1 mg Intramuscular PRN Royce Badillo MD        dextrose 5 % solution  100 mL/hr Intravenous PRN Royce Badillo MD             QUEtiapine  300 mg Oral Nightly    paliperidone  3 mg Oral Daily    busPIRone  20 mg Oral TID    divalproex  500 mg Oral QAM    cloNIDine  0.1 mg Oral Nightly    nicotine  1 patch Transdermal Daily    cyanocobalamin  500 mcg Oral Daily    divalproex  500 mg Oral Nightly    mometasone-formoterol  2 puff Inhalation BID    pantoprazole  40 mg Oral Daily    pregabalin  400 mg Oral BID    famotidine  20 mg Oral Daily    simvastatin  40 mg Oral Nightly    insulin lispro  0-12 Units Subcutaneous TID WC    insulin lispro  0-6 Units Subcutaneous Nightly       ASSESSMENT  Bipolar 1 disorder (HCC)     Patient's Response to Treatment: positive    PLAN:     1. Continue inpatient psychiatric treatment  2. Supportive therapy with medication management. Reviewed risks and benefits as well as potential side effects with patient. 3. Therapeutic activities and groups  4. Follow up at [facility] after symptoms stabilized. 5. Social work for discharge planning. 6. On 4/20/19 Seroquel was decreased to 300 mg PO QHS, with a plan to taper off. Also on 4/20/19 Invega 3 mg PO Qday was started with plan to titrate to effective dose. Patient is agreeable to starting Lajuanda Baars if medication is effective. 7. Consult for internal medicine continues and he was seen 4/20/19 by Dr. Liliana Varghese and Zanaflex was added.     Electronically signed by CHAD Rahman CNP on 4/21/2019 at 10:54 AM.

## 2019-04-21 NOTE — GROUP NOTE
Group Therapy Note    Date: April 21    Group Start Time: 1330  Group End Time: 1114  Group Topic: Recreational    [facility]    Adriane Morton         Patient's Goal:  Pt will demonstrate increased interpersonal interaction     Notes:      Status After Intervention:  Improved    Participation Level:  Active Listener and Interactive    Participation Quality: Appropriate, Attentive and Sharing      Speech:  normal      Thought Process/Content: Logical      Affective Functioning: Congruent      Mood: euthymic      Level of consciousness:  Alert, Oriented x4 and Attentive      Response to Learning: Able to verbalize current knowledge/experience, Able to verbalize/acknowledge new learning, Able to retain information and Progressing to goal      Endings: None Reported    Modes of Intervention: Education, Support, Socialization, Exploration, Clarifying, Problem-solving and Activity      Discipline Responsible: Psychoeducational Specialist      Signature:  Adriane Morton

## 2019-04-21 NOTE — PROGRESS NOTES
[address]                Date:   4/21/2019  Patient name:  Sonia Shen. Date of admission:  4/15/2019  5:55 PM  MRN:   [id number]  YOB: 1970    Pt seen at the request of Shea White MD    CHIEF COMPLAINT:     History Obtained From:  Patient and chart review. HISTORY OF PRESENT ILLNESS:      The patient is a 57 y.o.  male who is admitted to the hospital for medical management   chr pain all over     dm2     sizures  Copd    4/17   bs controlled   chr pain issues     4/18   no chnge    Stable     still c/o pin   Past Medical History:   has a past medical history of Anxiety, Arthritis, Bipolar disorder ([address] Bronchitis, chronic (HCC), Chronic back pain, Chronic pain, Claustrophobia, COPD (chronic obstructive pulmonary disease) ([address] Depression, Diabetes mellitus ([address] Emphysema of lung ([address] History of irregular heartbeat, Hyperlipidemia, Liver disease, MRSA (methicillin resistant staph aureus) culture positive, Myofacial muscle pain, Osteomyelitis ([address] Peripheral neuropathy, Pressure ulcer, Schizophrenia ([address] Seizures ([address] Sleep apnea, Spinal stenosis, and Substance abuse ([address] Past Surgical History:   has a past surgical history that includes Appendectomy; Stomach surgery; lumbar fusion; Septoplasty (01/13/2017); Turbinoplasties (01/13/2017); and fracture surgery (01/28/2017). Home Medications:    Prior to Admission medications    Medication Sig Start Date End Date Taking?  Authorizing Provider   busPIRone (BUSPAR) 30 MG tablet  3/25/19  Yes Historical Provider, MD   hydrOXYzine (VISTARIL) 25 MG capsule Take 1 capsule by mouth 2 times daily 4/12/19  Yes CHAD Awan CNP   lidocaine (LMX) 4 % cream Apply topically 3 times daily as needed for Pain 4/12/19  Yes CHAD Awan CNP   pantoprazole (PROTONIX) 40 MG tablet Take 1 tablet by mouth daily 4/12/19  Yes Mirella Holloway Josi Reyez, CHAD - DEE   tiZANidine (ZANAFLEX) 4 MG tablet Take 1 tablet by mouth 3 times daily 4/12/19  Yes Minta Killings, CHAD - CNP   ranitidine (ZANTAC) 150 MG tablet Take 1 tablet by mouth 2 times daily as needed for Heartburn 4/12/19  Yes Minta Killings, CHAD - CNP   pregabalin (LYRICA) 200 MG capsule Take 2 capsules by mouth 2 times daily.  4/12/19 4/11/20 Yes Minta Killings, APRN - CNP   VENTOLIN  (90 Base) MCG/ACT inhaler INHALE 2 PUFFS INTO THE LUNGS EVERY 6 HOURS AS NEEDED FOR WHEEZING 3/27/19  Yes Minta Killings, APRN - CNP   QUEtiapine (SEROQUEL) 200 MG tablet Take 1.5 tablets by mouth nightly 3/20/19  Yes Minta Killings, CHAD - CNP   cloNIDine (CATAPRES) 0.1 MG tablet Take 0.1 mg by mouth nightly  1/24/19  Yes Historical Provider, MD   fluticasone-vilanterol (BREO ELLIPTA) 100-25 MCG/INH AEPB inhaler Inhale 1 puff into the lungs daily 2/18/19  Yes Minta Killings, APRN - CNP   Insulin Syringe-Needle U-100 31G X 5/16\" 0.5 ML MISC 1 each by Does not apply route 3 times daily 1/23/19  Yes Minta Killings, APRN - CNP   simvastatin (ZOCOR) 40 MG tablet Take 40 mg by mouth nightly    Yes Historical Provider, MD   albuterol (PROVENTIL) (2.5 MG/3ML) 0.083% nebulizer solution Take 3 mLs by nebulization every 6 hours as needed for Wheezing 12/20/18  Yes Minta Killings, APRN - CNP   nicotine (NICODERM CQ) 21 MG/24HR Place 1 patch onto the skin daily as needed (if pateint smokes) 12/13/18  Yes Jessica Carson MD   divalproex (DEPAKOTE ER) 500 MG extended release tablet Take 1 tablet by mouth nightly 9/5/18  Yes Nan Nguyen MD   divalproex (DEPAKOTE ER) 250 MG extended release tablet Take 1 tablet by mouth every morning  Patient taking differently: Take 750 mg by mouth every morning  9/5/18  Yes Nan Nguyen MD   traZODone (DESYREL) 150 MG tablet Take 1 tablet by mouth nightly as needed for Sleep  Patient taking differently: Take 300 mg by mouth nightly  4/12/18  Yes Clementina Wilson, APRN - CNP Alcohol Swabs (PRO COMFORT ALCOHOL) 70 % PADS  3/30/19   Historical Provider, MD   meloxicam (MOBIC) 15 MG tablet Take 15 mg by mouth    Historical Provider, MD   Misc. Devices (GEL-FOAM CUSHION) MISC 1 Units by Does not apply route daily 4/12/19   CHAD Gannon CNP   FREESTYLE LITE strip 1 each by Other route 3 times daily 4/12/19   CHAD Gannon CNPc. Devices (GEL-FOAM CUSHION) MISC 1 Units by Does not apply route daily 4/12/19   CHAD Gannon CNP   Nutritional Supplements (BOOST) LIQD Two a day. Chocolate lactose free nutritional drinks. 4/11/19   CHAD Gannon CNP   hydrOXYzine (VISTARIL) 50 MG capsule Take 50 mg by mouth nightly    Historical Provider, MD   insulin regular (HUMULIN R;NOVOLIN R) 100 UNIT/ML injection Inject 2 Units into the skin See Admin Instructions Sliding scale depending on meals 4/3/19   CHAD Gannon CNP   TRUEPLUS LANCETS 33G MISC  2/8/19   Historical Provider, MD   ondansetron (ZOFRAN) 4 MG tablet TAKE 1 TABLET EVERY 8 HOURS AS NEEDED FOR NAUSEA OR VOMITING 1/8/19   CHAD Gannon CNP   busPIRone (BUSPAR) 15 MG tablet Take 15 mg by mouth 3 times daily  8/15/18   Historical Provider, MD   cyanocobalamin 500 MCG tablet Take 500 mcg by mouth 10/26/18   Historical Provider, MD   senna (SENNA-TIME) 8.6 MG tablet twice daily as needed.   9/4/18   Historical Provider, MD   ipratropium (ATROVENT) 0.02 % nebulizer solution Take 2.5 mLs by nebulization 4 times daily as needed for Wheezing 12/20/18   CHAD Gannon CNP   ENULOSE 10 GM/15ML SOLN solution TAKE 15 MLS BY MOUTH 3 TIMES DAILY AS NEEDED (CONSTIPATION) 11/27/18   Carisa Ferris MD   DULoxetine (CYMBALTA) 30 MG extended release capsule TAKE 3 CAPSULES BY MOUTH DAILY  Patient taking differently: Take 60 mg by mouth every morning Takes 2 of the 60 mg every AM 11/26/18   Carias Ferris MD   SM PAIN RELIEVER 325 MG tablet TAKE 2 TABLETS BY MOUTH EVERY 4 HOURS AS LABA1C in the last 72 hours. INR:   No results for input(s): INR in the last 72 hours. Hepatic functions:   Recent Labs     04/19/19  0702   ALKPHOS 81   ALT 23   AST 24   PROT 5.8*   BILITOT <0.15*   BILIDIR <0.08   LABALBU 3.1*     Pancreatic functions:No results for input(s): LACTA, AMYLASE in the last 72 hours. S. Lactic Acid: No results for input(s): LACTA in the last 72 hours. Cardiac enzymes:No results for input(s): CKTOTAL, CKMB, CKMBINDEX, TROPONINI in the last 72 hours. BNP:No results for input(s): BNP in the last 72 hours. Lipid profile:   No results for input(s): CHOL, TRIG, HDL, LDLCALC in the last 72 hours. Invalid input(s): LDL  Blood Gases: No results found for: PH, PCO2, PO2, HCO3, O2SAT  Thyroid functions:   Lab Results   Component Value Date    TSH 0.76 04/16/2019        Imaging/Diagonstics:    Ct Abdomen Pelvis W Iv Contrast    Result Date: 4/15/2019  EXAMINATION: CT OF THE ABDOMEN AND PELVIS WITH CONTRAST, 4/14/2019 10:44 pm TECHNIQUE: CT of the abdomen and pelvis was performed with the administration of intravenous contrast. Multiplanar reformatted images are provided for review. Dose modulation, iterative reconstruction, and/or weight based adjustment of the mA/kV was utilized to reduce the radiation dose to as low as reasonably achievable. COMPARISON: CTA of the abdomen and pelvis 09/02/2018 HISTORY: ORDERING SYSTEM PROVIDED HISTORY: Abdominal pain, leukocytosis, vomiting TECHNOLOGIST PROVIDED HISTORY: IV Only Contrast Ordering Physician Provided Reason for Exam: Abdominal pain, leukocytosis, vomiting Acuity: Unknown Type of Exam: Unknown FINDINGS: Lower Chest: Visualized lungs are clear. There is no basilar pericardial or pleural effusion. Organs: Liver is normal in morphology. No biliary duct dilatation. Normal gallbladder. Spleen, adrenal glands, and pancreas are normal.  Kidneys are symmetric in size and enhancement. No hydronephrosis.  GI/Bowel: Stomach, small bowel, and colon are nondilated. Post appendectomy. Pelvis: Urinary bladder and pelvic organs are unremarkable. No free fluid in the pelvis. Peritoneum/Retroperitoneum: Aortoiliac atherosclerotic calcification. Abdominal aorta is normal in caliber. No abdominal or retroperitoneal adenopathy. No free fluid in the abdomen. Bones/Soft Tissues: No acute osseous abnormality. No acute inflammatory process in the abdomen or pelvis. Aortoiliac atherosclerotic calcification. Abdominal aorta is normal in caliber. ASSESSMENT:    Patient Active Problem List   Diagnosis    Anxiety    Seizure ([address]    Myofascial muscle pain    Chronic pain    Spinal stenosis    Hyperlipidemia    Depression    Panlobular emphysema (HCC)    Bipolar disorder (HCC)    Chronic back pain    Schizophrenia ([address]    Fibromyalgia    Type 2 diabetes mellitus with diabetic polyneuropathy, with long-term current use of insulin (HCC)    Onychomycosis    Pain in lower limb    Disc disease, degenerative, cervical    Cervical stenosis of spine    Generalized tonic-clonic seizure (HCC)    Superficial laceration of scalp    Altered mental status    Decubitus ulcer of coccyx, stage 2    Cocaine substance abuse ([address]    Opiate abuse, episodic (HCC)    Closed fracture of nasal bone    Drug overdose    Uncomplicated opioid dependence ([address]    Paraplegia ([address]    Schizoaffective disorder ([address]    Non-dose-related adverse reaction to medication wellbutrin    Cannabis abuse    Encephalopathy    Hyperammonemia (HCC)    Constipation    Cellulitis and abscess of right leg    Interstitial lung disease (HCC)    Vomiting    Bipolar 1 disorder (HCC)    MDD (major depressive disorder), recurrent episode (HCC)    neuropathy    From etoh  More than dm    Has spinal stenosis     myofacial pain syndrome      fibromyalgia     organic brain syndrome      dm2 controlled     On insulin   cont home dose     Copd baseline     ?  Hip fx     1 year  Non ambulating has seen ortho no intervention planned     PLAN:    Cont lyrica    Cont depakote       was on cymbalta at home   would advise to continue     cont zanaflex   Cont home insulin      xrya rt hip     On meloxicam   cont   cr nl      bp controlled       4/17   bp controlled   cont lyrica for chr pain syndrome   k ws 3.6 recheck     bc controlled     4/18   no change   bp controlled   na corrected   bs controlled   cont same    4/19   bp controlled   bs controlled   cont same    4/20 chr pain issues   asking for pain meds  On lyrica/ meloxicam/     Add zanaflex   bs controled     4/21   bp controlled   bs controlled   chr pain   cont zanaflex   MD ROSARIO Kenyon [address], [address] [place] [address].    Phone [phone]   Fax: [phone]  Answering Service: [phone]

## 2019-04-21 NOTE — GROUP NOTE
Group Therapy Note    Date: April 20    Group Start Time: 2020  Group End Time: 2055  Group Topic: Wrap-Up/Relaxation    [facility]    Luba Woods RN        Group Therapy Note    Attendees: 8/16       Patient's Goal:    1. To be able to reflect on daily unit activities/experiences. 2.  To review accomplished daily goals and encourage patient to set a new goal for the next day. 3.  To demonstrate increased interpersonal interaction      Notes:  Pt attended and actively participated in Wrap-Up/Relaxation groups this evening. Status After Intervention:  Improved     Participation Level:  Active Listener and Interactive     Participation Quality: Appropriate, Attentive and Sharing     Speech:  normal     Thought Process/Content: Logical     Affective Functioning: Congruent     Mood: euthymic     Level of consciousness:  Alert, Oriented x4 and Attentive     Response to Learning: Able to verbalize current knowledge/experience, Able to verbalize/acknowledge new learning, Able to retain information and Capable of insight     Endings: None Reported     Modes of Intervention: Education, Support and Socialization      Discipline Responsible: Registered Nurse        Signature:  Luba Woods RN

## 2019-04-22 LAB
GLUCOSE BLD-MCNC: 118 MG/DL (ref 75–110)
GLUCOSE BLD-MCNC: 140 MG/DL (ref 75–110)
GLUCOSE BLD-MCNC: 152 MG/DL (ref 75–110)
GLUCOSE BLD-MCNC: 163 MG/DL (ref 75–110)

## 2019-04-22 PROCEDURE — 6370000000 HC RX 637 (ALT 250 FOR IP): Performed by: INTERNAL MEDICINE

## 2019-04-22 PROCEDURE — 82947 ASSAY GLUCOSE BLOOD QUANT: CPT

## 2019-04-22 PROCEDURE — 6370000000 HC RX 637 (ALT 250 FOR IP): Performed by: NURSE PRACTITIONER

## 2019-04-22 PROCEDURE — 1240000000 HC EMOTIONAL WELLNESS R&B

## 2019-04-22 PROCEDURE — 99232 SBSQ HOSP IP/OBS MODERATE 35: CPT | Performed by: NURSE PRACTITIONER

## 2019-04-22 PROCEDURE — 6370000000 HC RX 637 (ALT 250 FOR IP): Performed by: PSYCHIATRY & NEUROLOGY

## 2019-04-22 RX ORDER — QUETIAPINE FUMARATE 200 MG/1
200 TABLET, FILM COATED ORAL NIGHTLY
Status: DISCONTINUED | OUTPATIENT
Start: 2019-04-22 | End: 2019-04-23 | Stop reason: HOSPADM

## 2019-04-22 RX ORDER — PALIPERIDONE 6 MG/1
6 TABLET, EXTENDED RELEASE ORAL DAILY
Status: DISCONTINUED | OUTPATIENT
Start: 2019-04-22 | End: 2019-04-23 | Stop reason: HOSPADM

## 2019-04-22 RX ADMIN — DIVALPROEX SODIUM 500 MG: 500 TABLET, EXTENDED RELEASE ORAL at 20:37

## 2019-04-22 RX ADMIN — INSULIN LISPRO 2 UNITS: 100 INJECTION, SOLUTION INTRAVENOUS; SUBCUTANEOUS at 11:43

## 2019-04-22 RX ADMIN — INSULIN LISPRO 2 UNITS: 100 INJECTION, SOLUTION INTRAVENOUS; SUBCUTANEOUS at 08:43

## 2019-04-22 RX ADMIN — PANTOPRAZOLE SODIUM 40 MG: 40 TABLET, DELAYED RELEASE ORAL at 08:42

## 2019-04-22 RX ADMIN — TIZANIDINE 4 MG: 4 TABLET ORAL at 08:42

## 2019-04-22 RX ADMIN — PREGABALIN 400 MG: 150 CAPSULE ORAL at 20:36

## 2019-04-22 RX ADMIN — TRAZODONE HYDROCHLORIDE 200 MG: 100 TABLET ORAL at 20:36

## 2019-04-22 RX ADMIN — BUSPIRONE HYDROCHLORIDE 20 MG: 10 TABLET ORAL at 21:24

## 2019-04-22 RX ADMIN — MOMETASONE FUROATE AND FORMOTEROL FUMARATE DIHYDRATE 2 PUFF: 200; 5 AEROSOL RESPIRATORY (INHALATION) at 08:40

## 2019-04-22 RX ADMIN — ACETAMINOPHEN 650 MG: 325 TABLET, FILM COATED ORAL at 08:44

## 2019-04-22 RX ADMIN — SIMVASTATIN 40 MG: 40 TABLET, FILM COATED ORAL at 20:36

## 2019-04-22 RX ADMIN — TIZANIDINE 4 MG: 4 TABLET ORAL at 13:52

## 2019-04-22 RX ADMIN — PALIPERIDONE 6 MG: 6 TABLET, FILM COATED, EXTENDED RELEASE ORAL at 09:00

## 2019-04-22 RX ADMIN — ACETAMINOPHEN 650 MG: 325 TABLET, FILM COATED ORAL at 19:18

## 2019-04-22 RX ADMIN — DIVALPROEX SODIUM 500 MG: 500 TABLET, EXTENDED RELEASE ORAL at 08:42

## 2019-04-22 RX ADMIN — BUSPIRONE HYDROCHLORIDE 20 MG: 10 TABLET ORAL at 13:52

## 2019-04-22 RX ADMIN — ACETAMINOPHEN 650 MG: 325 TABLET, FILM COATED ORAL at 15:08

## 2019-04-22 RX ADMIN — MOMETASONE FUROATE AND FORMOTEROL FUMARATE DIHYDRATE 2 PUFF: 200; 5 AEROSOL RESPIRATORY (INHALATION) at 20:37

## 2019-04-22 RX ADMIN — BUSPIRONE HYDROCHLORIDE 20 MG: 10 TABLET ORAL at 08:42

## 2019-04-22 RX ADMIN — CLONIDINE HYDROCHLORIDE 0.1 MG: 0.1 TABLET ORAL at 20:36

## 2019-04-22 RX ADMIN — FAMOTIDINE 20 MG: 20 TABLET ORAL at 08:42

## 2019-04-22 RX ADMIN — PREGABALIN 400 MG: 150 CAPSULE ORAL at 08:41

## 2019-04-22 RX ADMIN — INSULIN LISPRO 1 UNITS: 100 INJECTION, SOLUTION INTRAVENOUS; SUBCUTANEOUS at 20:35

## 2019-04-22 RX ADMIN — QUETIAPINE FUMARATE 200 MG: 200 TABLET ORAL at 20:37

## 2019-04-22 RX ADMIN — TIZANIDINE 4 MG: 4 TABLET ORAL at 19:59

## 2019-04-22 RX ADMIN — HYDROXYZINE HYDROCHLORIDE 25 MG: 25 TABLET, FILM COATED ORAL at 18:12

## 2019-04-22 ASSESSMENT — PAIN DESCRIPTION - LOCATION: LOCATION: GENERALIZED

## 2019-04-22 ASSESSMENT — PAIN SCALES - GENERAL
PAINLEVEL_OUTOF10: 3
PAINLEVEL_OUTOF10: 0
PAINLEVEL_OUTOF10: 8
PAINLEVEL_OUTOF10: 4
PAINLEVEL_OUTOF10: 0
PAINLEVEL_OUTOF10: 9
PAINLEVEL_OUTOF10: 3

## 2019-04-22 ASSESSMENT — PAIN DESCRIPTION - PAIN TYPE: TYPE: CHRONIC PAIN

## 2019-04-22 NOTE — PLAN OF CARE
Problem: Falls - Risk of:  Goal: Will remain free from falls  Description  Will remain free from falls  4/21/2019 2005 by Delaney Duarte RN  Outcome: Ongoing  Note:   Patient remains free from falls. Problem: Altered Mood, Depressive Behavior:  Goal: Ability to disclose and discuss suicidal ideas will improve  Description  Ability to disclose and discuss suicidal ideas will improve  4/21/2019 2005 by Delaney Duarte RN  Outcome: Ongoing  Note:   Patient denies suicidal/homicidal thoughts, admits to \"some\" voices but improving, medication compliant, remains safe on 15 minute checks. Problem: Pain:  Goal: Pain level will decrease  Description  Pain level will decrease  4/21/2019 2005 by Delaney Duarte RN  Outcome: Ongoing  Note:   Patient admits to pain but states he can wait till time pain medication is due.

## 2019-04-22 NOTE — PROGRESS NOTES
Department of Psychiatry  Psychiatric Nurse Practitioner Progress Note    Chief Complaint: Bipolar 1 disorder ([facility] [address]     SUBJECTIVE:  Aleshia Malin was interviewed in the day room today. Aleshia Malin is casually dressed with adequate hygiene. He is utilizing a wheelchair. He states he does feel better with the medication changes. He reports he received news his son is back and town and this is worrisome him. He does not want to stay on the unit too long. He denies suicidal or homicidal thoughts today. He is agreeable to KIM and will schedule first dose prior to discharge. He is less focused on somatic complaints today. Patient thanks writer for working with him and apologizes if he had any inappropriate behavior earlier in stay on unit. Chart and medications reviewed. Therapeutic support, empathetic care and psycho education provided. At this time there is no safe alternative other than inpatient care.     OBJECTIVE    Physical  BP (!) 98/53   Pulse 82   Temp 99.1 °F (37.3 °C) (Oral)   Resp 14   Ht 6' 3\" (1.905 m)   Wt 180 lb (81.6 kg)   SpO2 95%   BMI 22.50 kg/m²      Mental Status Evaluation:  Orientation: alertness: alert   Mood:. brightened      Affect:  brightented      Appearance:  disheveled and older than stated age   Activity:  Psychomotor Retardation   Gait/Posture: Uses wheelchair   Speech:  normal pitch and normal volume   Thought Process:  circumstantial   Thought Content:  hallucinations    Sensorium:  person, place, time/date and situation   Cognition:  grossly intact   Memory: intact   Insight:  limited   Judgment: limited   Suicidal Ideations: Denies   Homicidal Ideations: Negative for homicidal ideation      Medication Side Effects: absent       Attention Span attention span appeared shorter than expected for age     Medications  Current Facility-Administered Medications   Medication Dose Route Frequency Provider Last Rate Last Dose    paliperidone (INVEGA) extended release tablet 6 mg  6 mg Oral Daily Elsalies South Carver, APRN - CNP   6 mg at 04/22/19 0900    QUEtiapine (SEROQUEL) tablet 200 mg  200 mg Oral Nightly Charlies South Carver, APRN - CNP        [START ON 4/23/2019] paliperidone palmitate ER (INVEGA SUSTENNA) IM injection 234 mg  234 mg Intramuscular Once Charlies South Carver, APRN - CNP        [START ON 4/30/2019] paliperidone palmitate ER (INVEGA SUSTENNA) IM injection 156 mg  156 mg Intramuscular Q30 Days Charlies South Carver, APRN - CNP        busPIRone (BUSPAR) tablet 20 mg  20 mg Oral TID Elsalies [place] Carver, APRN - CNP   20 mg at 04/22/19 0842    divalproex (DEPAKOTE ER) extended release tablet 500 mg  500 mg Oral QAM Abdifatahes South Carver, APRN - CNP   500 mg at 04/22/19 0842    tiZANidine (ZANAFLEX) tablet 4 mg  4 mg Oral Q6H PRN Dang Silver MD   4 mg at 04/22/19 0842    calcium carbonate (TUMS) chewable tablet 500 mg  500 mg Oral TID PRN Sandra Crawley APRN - CNP        lactulose (CHRONULAC) 10 GM/15ML solution 20 g  20 g Oral TID PRN Abdifatahes South Carver, APRN - CNP   20 g at 04/21/19 1146    traZODone (DESYREL) tablet 200 mg  200 mg Oral Nightly PRN Elsalies South Carver, APRN - CNP   200 mg at 04/21/19 2104    cloNIDine (CATAPRES) tablet 0.1 mg  0.1 mg Oral Nightly Charlies South Carver, APRN - CNP   0.1 mg at 04/21/19 2105    haloperidol (HALDOL) tablet 5 mg  5 mg Oral Q8H PRN Elsalies [place] Carver, APRN - CNP   5 mg at 04/20/19 1753    albuterol (PROVENTIL) nebulizer solution 2.5 mg  2.5 mg Nebulization Q6H PRN Delmi Hansen MD   2.5 mg at 04/16/19 2057    hydrOXYzine (ATARAX) tablet 25 mg  25 mg Oral TID PRN CHAD Shane - CNP   25 mg at 04/21/19 1805    benztropine mesylate (COGENTIN) injection 2 mg  2 mg Intramuscular BID PRN CHAD Shane CNP        magnesium hydroxide (MILK OF MAGNESIA) 400 MG/5ML suspension 30 mL  30 mL Oral Daily PRN CHAD Shane CNP        nicotine (NICODERM CQ) 14 MG/24HR 1 patch  1 patch Transdermal Daily CHAD Shane CNP   1 patch at 04/22/19 0841    nicotine polacrilex (NICORETTE) gum 2 mg  2 mg Oral Q2H PRN CHAD Velasco - CNP        vitamin B-12 (CYANOCOBALAMIN) tablet 500 mcg  500 mcg Oral Daily Hilary Hua MD        divalproex (DEPAKOTE ER) extended release tablet 500 mg  500 mg Oral Nightly Hilary Hua MD   500 mg at 04/21/19 2104    mometasone-formoterol (DULERA) 200-5 MCG/ACT inhaler 2 puff  2 puff Inhalation BID Hilary Hua MD   2 puff at 04/22/19 0840    ipratropium (ATROVENT) 0.02 % nebulizer solution 0.5 mg  0.5 mg Nebulization 4x Daily PRN Hilary Hua MD        lidocaine (LMX) 4 % cream   Topical TID PRN Hilary Hua MD        pantoprazole (PROTONIX) tablet 40 mg  40 mg Oral Daily Hilary Hua MD   40 mg at 04/22/19 0842    polyethylene glycol (GLYCOLAX) packet 17 g  17 g Oral Daily PRN Hilary Hua MD        pregabalin (LYRICA) capsule 400 mg  400 mg Oral BID Hilary Hua MD   400 mg at 04/22/19 0841    famotidine (PEPCID) tablet 20 mg  20 mg Oral Daily Hilary Hua MD   20 mg at 04/22/19 0842    senna (SENOKOT) tablet 8.6 mg  1 tablet Oral Nightly PRN Hilary Hua MD   8.6 mg at 04/20/19 2106    simvastatin (ZOCOR) tablet 40 mg  40 mg Oral Nightly Hilary Hua MD   40 mg at 04/21/19 2104    albuterol sulfate  (90 Base) MCG/ACT inhaler 1 puff  1 puff Inhalation Q6H PRN Hilary Hua MD        acetaminophen (TYLENOL) tablet 650 mg  650 mg Oral Q4H PRN Hilary Hua MD   650 mg at 04/22/19 0844    insulin lispro (HUMALOG) injection vial 0-12 Units  0-12 Units Subcutaneous TID WC Hilary Hua MD   2 Units at 04/22/19 1143    insulin lispro (HUMALOG) injection vial 0-6 Units  0-6 Units Subcutaneous Nightly Hilary Hua MD   1 Units at 04/20/19 2102    glucose (GLUTOSE) 40 % oral gel 15 g  15 g Oral PRN Hilary Hua MD        dextrose 50 % solution 12.5 g  12.5 g Intravenous PRN Hilary Hua MD        glucagon (rDNA) injection 1 mg  1 mg Intramuscular PRN Sherri Arsen Mcginnis MD        dextrose 5 % solution  100 mL/hr Intravenous PRN Denton Sims MD             paliperidone  6 mg Oral Daily    QUEtiapine  200 mg Oral Nightly    [START ON 4/23/2019] paliperidone palmitate  234 mg Intramuscular Once    [START ON 4/30/2019] paliperidone palmitate  156 mg Intramuscular Q30 Days    busPIRone  20 mg Oral TID    divalproex  500 mg Oral QAM    cloNIDine  0.1 mg Oral Nightly    nicotine  1 patch Transdermal Daily    cyanocobalamin  500 mcg Oral Daily    divalproex  500 mg Oral Nightly    mometasone-formoterol  2 puff Inhalation BID    pantoprazole  40 mg Oral Daily    pregabalin  400 mg Oral BID    famotidine  20 mg Oral Daily    simvastatin  40 mg Oral Nightly    insulin lispro  0-12 Units Subcutaneous TID WC    insulin lispro  0-6 Units Subcutaneous Nightly       ASSESSMENT  Bipolar 1 disorder (HCC)     Patient's Response to Treatment: positive    PLAN:     1. Continue inpatient psychiatric treatment  2. Supportive therapy with medication management. Reviewed risks and benefits as well as potential side effects with patient. 3. Therapeutic activities and groups  4. Follow up at [facility] after symptoms stabilized. 5. Social work for discharge planning. 6. On 4/22/19 Seroquel was decreased to 200 mg PO QHS, with a plan to taper off. Also on 4/22/19 Invega 6 mg PO Qday was started with plan to titrate to effective dose. Tangela Childers on 4/23. 7. Consult for internal medicine continues and he was seen 4/20/19 by Dr. Ismael Snider and Zanaflex was added.     Electronically signed by CHAD Kowalski CNP on 4/22/2019 at 12:57 PM.

## 2019-04-22 NOTE — GROUP NOTE
Group Therapy Note    Date: April 22    Group Start Time: 0900  Group End Time: 0915  Group Topic: Community Meeting    FAYE BHMEGAN G    [facility] [place], [place]        Group Therapy Note    Attendees: 3         Patient's Goal:  Pt's goal was to talk the NP about discharge     Notes:  Pt attended and participated in group     Status After Intervention:  Unchanged    Participation Level:  Active Listener and Interactive    Participation Quality: Appropriate, Attentive and Sharing      Speech:  normal      Thought Process/Content: Logical      Affective Functioning: Congruent      Mood: euthymic      Level of consciousness:  Alert and Attentive      Response to Learning: Progressing to goal      Endings: None Reported    Modes of Intervention: Education, Support, Socialization, Problem-solving and Reality-testing      Discipline Responsible: Psychoeducational Specialist      Signature:  Teresa Vanessa

## 2019-04-22 NOTE — GROUP NOTE
Group Therapy Note    Date: April 22    Group Start Time: 1000  Group End Time: 1031  Group Topic: Psychotherapy    [facility]    TONYA Raymundo LSW        Group Therapy Note    Attendees: 7         Patient's Goal:  Pt will display increased interpersonal interactions. Notes:  Pt participated appropriately in group.     Status After Intervention:  Improved    Participation Level: Interactive    Participation Quality: Appropriate      Speech:  normal      Thought Process/Content: Logical      Affective Functioning: Congruent      Mood: Normal/content      Level of consciousness:  Alert      Response to Learning: Capable of insight      Endings: None Reported    Modes of Intervention: Support      Discipline Responsible: /Counselor      Signature:  TONYA Raymundo LSW

## 2019-04-23 VITALS
RESPIRATION RATE: 14 BRPM | BODY MASS INDEX: 22.38 KG/M2 | HEIGHT: 75 IN | TEMPERATURE: 99.1 F | SYSTOLIC BLOOD PRESSURE: 125 MMHG | OXYGEN SATURATION: 95 % | HEART RATE: 79 BPM | DIASTOLIC BLOOD PRESSURE: 66 MMHG | WEIGHT: 180 LBS

## 2019-04-23 PROBLEM — F33.9 MDD (MAJOR DEPRESSIVE DISORDER), RECURRENT EPISODE (HCC): Status: RESOLVED | Noted: 2019-04-15 | Resolved: 2019-04-23

## 2019-04-23 LAB
GLUCOSE BLD-MCNC: 114 MG/DL (ref 75–110)
GLUCOSE BLD-MCNC: 168 MG/DL (ref 75–110)

## 2019-04-23 PROCEDURE — 99238 HOSP IP/OBS DSCHRG MGMT 30/<: CPT | Performed by: NURSE PRACTITIONER

## 2019-04-23 PROCEDURE — 97110 THERAPEUTIC EXERCISES: CPT

## 2019-04-23 PROCEDURE — 5130000000 HC BRIDGE APPOINTMENT

## 2019-04-23 PROCEDURE — 6370000000 HC RX 637 (ALT 250 FOR IP): Performed by: PSYCHIATRY & NEUROLOGY

## 2019-04-23 PROCEDURE — 6370000000 HC RX 637 (ALT 250 FOR IP): Performed by: NURSE PRACTITIONER

## 2019-04-23 PROCEDURE — 82947 ASSAY GLUCOSE BLOOD QUANT: CPT

## 2019-04-23 PROCEDURE — 97116 GAIT TRAINING THERAPY: CPT

## 2019-04-23 PROCEDURE — 6370000000 HC RX 637 (ALT 250 FOR IP): Performed by: INTERNAL MEDICINE

## 2019-04-23 RX ORDER — DIVALPROEX SODIUM 500 MG/1
500 TABLET, EXTENDED RELEASE ORAL NIGHTLY
Qty: 14 TABLET | Refills: 0 | Status: SHIPPED | OUTPATIENT
Start: 2019-04-23 | End: 2020-03-23 | Stop reason: SDUPTHER

## 2019-04-23 RX ORDER — PALIPERIDONE 6 MG/1
6 TABLET, EXTENDED RELEASE ORAL DAILY
Qty: 7 TABLET | Refills: 0 | Status: SHIPPED | OUTPATIENT
Start: 2019-04-24 | End: 2019-05-16

## 2019-04-23 RX ORDER — HYDROXYZINE HYDROCHLORIDE 25 MG/1
25 TABLET, FILM COATED ORAL 3 TIMES DAILY PRN
Qty: 42 TABLET | Refills: 0 | Status: SHIPPED | OUTPATIENT
Start: 2019-04-23 | End: 2019-05-07

## 2019-04-23 RX ORDER — TRAZODONE HYDROCHLORIDE 100 MG/1
200 TABLET ORAL NIGHTLY PRN
Qty: 14 TABLET | Refills: 0 | Status: SHIPPED | OUTPATIENT
Start: 2019-04-23

## 2019-04-23 RX ORDER — QUETIAPINE FUMARATE 200 MG/1
200 TABLET, FILM COATED ORAL NIGHTLY
Qty: 14 TABLET | Refills: 0 | Status: SHIPPED | OUTPATIENT
Start: 2019-04-23 | End: 2020-01-08 | Stop reason: SDUPTHER

## 2019-04-23 RX ORDER — DIVALPROEX SODIUM 500 MG/1
500 TABLET, EXTENDED RELEASE ORAL EVERY MORNING
Qty: 14 TABLET | Refills: 0 | Status: SHIPPED | OUTPATIENT
Start: 2019-04-24 | End: 2022-05-26 | Stop reason: SDUPTHER

## 2019-04-23 RX ORDER — BUSPIRONE HYDROCHLORIDE 10 MG/1
20 TABLET ORAL 3 TIMES DAILY
Qty: 42 TABLET | Refills: 0 | Status: ON HOLD | OUTPATIENT
Start: 2019-04-23 | End: 2019-04-24

## 2019-04-23 RX ADMIN — ACETAMINOPHEN 650 MG: 325 TABLET, FILM COATED ORAL at 02:36

## 2019-04-23 RX ADMIN — INSULIN LISPRO 1 UNITS: 100 INJECTION, SOLUTION INTRAVENOUS; SUBCUTANEOUS at 07:46

## 2019-04-23 RX ADMIN — BUSPIRONE HYDROCHLORIDE 20 MG: 10 TABLET ORAL at 07:46

## 2019-04-23 RX ADMIN — TIZANIDINE 4 MG: 4 TABLET ORAL at 08:40

## 2019-04-23 RX ADMIN — DIVALPROEX SODIUM 500 MG: 500 TABLET, EXTENDED RELEASE ORAL at 07:47

## 2019-04-23 RX ADMIN — CYANOCOBALAMIN TAB 500 MCG 500 MCG: 500 TAB at 07:47

## 2019-04-23 RX ADMIN — TIZANIDINE 4 MG: 4 TABLET ORAL at 15:00

## 2019-04-23 RX ADMIN — ACETAMINOPHEN 650 MG: 325 TABLET, FILM COATED ORAL at 06:39

## 2019-04-23 RX ADMIN — FAMOTIDINE 20 MG: 20 TABLET ORAL at 07:47

## 2019-04-23 RX ADMIN — PREGABALIN 400 MG: 150 CAPSULE ORAL at 07:47

## 2019-04-23 RX ADMIN — TIZANIDINE 4 MG: 4 TABLET ORAL at 02:12

## 2019-04-23 RX ADMIN — HYDROXYZINE HYDROCHLORIDE 25 MG: 25 TABLET, FILM COATED ORAL at 03:14

## 2019-04-23 RX ADMIN — HYDROXYZINE HYDROCHLORIDE 25 MG: 25 TABLET, FILM COATED ORAL at 12:10

## 2019-04-23 RX ADMIN — PALIPERIDONE 6 MG: 6 TABLET, FILM COATED, EXTENDED RELEASE ORAL at 07:48

## 2019-04-23 RX ADMIN — BUSPIRONE HYDROCHLORIDE 20 MG: 10 TABLET ORAL at 13:43

## 2019-04-23 RX ADMIN — MOMETASONE FUROATE AND FORMOTEROL FUMARATE DIHYDRATE 2 PUFF: 200; 5 AEROSOL RESPIRATORY (INHALATION) at 07:46

## 2019-04-23 RX ADMIN — LIDOCAINE: 40 CREAM TOPICAL at 02:53

## 2019-04-23 RX ADMIN — PANTOPRAZOLE SODIUM 40 MG: 40 TABLET, DELAYED RELEASE ORAL at 07:47

## 2019-04-23 ASSESSMENT — PAIN DESCRIPTION - DIRECTION: RADIATING_TOWARDS: R HIP

## 2019-04-23 ASSESSMENT — PAIN DESCRIPTION - DESCRIPTORS: DESCRIPTORS: CRAMPING;SPASM

## 2019-04-23 ASSESSMENT — PAIN SCALES - GENERAL
PAINLEVEL_OUTOF10: 10
PAINLEVEL_OUTOF10: 2
PAINLEVEL_OUTOF10: 6
PAINLEVEL_OUTOF10: 8
PAINLEVEL_OUTOF10: 8

## 2019-04-23 ASSESSMENT — PAIN DESCRIPTION - PROGRESSION: CLINICAL_PROGRESSION: NOT CHANGED

## 2019-04-23 ASSESSMENT — PAIN DESCRIPTION - ORIENTATION: ORIENTATION: RIGHT;MID;LOWER

## 2019-04-23 ASSESSMENT — PAIN DESCRIPTION - LOCATION: LOCATION: BACK;HIP

## 2019-04-23 ASSESSMENT — PAIN DESCRIPTION - FREQUENCY: FREQUENCY: INTERMITTENT

## 2019-04-23 ASSESSMENT — PAIN DESCRIPTION - PAIN TYPE: TYPE: ACUTE PAIN;CHRONIC PAIN

## 2019-04-23 ASSESSMENT — PAIN DESCRIPTION - ONSET: ONSET: ON-GOING

## 2019-04-23 NOTE — PLAN OF CARE
Problem: Falls - Risk of:  Goal: Will remain free from falls  Description  Will remain free from falls  4/23/2019 0848 by Mariam Joseph  Outcome: Ongoing  Note:   Pt remains free of falls and verbalizes understanding of individual fall risks. Pt using wheelchair. Pt wearing non skid footwear and encouraged to seek out staff for any assistance needed. Problem: Altered Mood, Psychotic Behavior:  Goal: Able to verbalize decrease in frequency and intensity of hallucinations  Description  Able to verbalize decrease in frequency and intensity of hallucinations  Outcome: Ongoing  Note:   Pt is accepting of 1:1 talk time with staff. Pt denies SI/HI and A/V hallucinations and verbally agrees to approach staff if thoughts of self harm arises. Pt is out and social with peers in the milieu. Pt is med compliant. Reassurance and support provided. Q15 min checks maintained. Pt remains safe at this time.

## 2019-04-23 NOTE — BH NOTE
Patient given tobacco quitline number 88158602958 at this time, refusing to call at this time, states \" I just dont want to quit now\"- patient given information as to the dangers of long term tobacco use. Continue to reinforce the importance of tobacco cessation.

## 2019-04-23 NOTE — CARE COORDINATION
Bridge Appointment completed: Reviewed Discharge Instructions with patient. Patient verbalizes understanding and agreement with the discharge plan using the teachback method. Discharge Arrangements: Pt's follow up appointment is scheduled for 4/25/19 with Dr. Girma London for medication management at 1:45 pm.  Per Dr. Katerina Salas , [facility], pt and Dr. Girma London will discuss pt's invega injection at the 4/25/19 appointment.     Guardian notified: N/A  Discharge destination/address: Home, [address] [place] [address], [address] [place] [address]  Transported Amanuel Ayala

## 2019-04-23 NOTE — BH NOTE
[facility]  Discharge Note    Pt discharged with followings belongings:   Dentures: None  Vision - Corrective Lenses: Glasses  Hearing Aid: None  Jewelry: Necklace  Body Piercings Removed: N/A  Clothing: Footwear, Jacket / coat, Pants, Shirt, Undergarments (Comment), Socks  Were All Patient Medications Collected?: Not Applicable  Other Valuables: Cell phone, Raford Crooked, Money (Comment)(phone, , $1 in [place], wallet)   Valuables sent home with patient. Valuables retrieved from safe and returned to patient. Patient left department with Departure Mode: By self, In cab via Mobility at Departure: Wheelchair, discharged to Discharged to: Private Residence. Patient education on aftercare instructions: yes Patient verbalize understanding of AVS:  yes. Status EXAM upon discharge:  Status and Exam  Normal: No  Facial Expression: Flat  Affect: Appropriate  Level of Consciousness: Alert  Mood:Normal: No  Mood: Depressed, Helpless  Motor Activity:Normal: No  Motor Activity: Unusual Posture/Gait  Interview Behavior: Cooperative  Preception: [place] to Person, [place] to Time, [place] to Place, [place] to Situation  Attention:Normal: No  Attention: Distractible  Thought Processes: Circumstantial  Thought Content:Normal: Yes  Thought Content: Poverty of Content  Hallucinations: None  Delusions: No  Delusions:  Other(See Comment)(somatic)  Memory:Normal: No  Memory: Confabulation  Insight and Judgment: No  Insight and Judgment: Poor Judgment, Poor Insight  Present Suicidal Ideation: No  Present Homicidal Ideation: No    Jayden Estevez RN

## 2019-04-23 NOTE — PLAN OF CARE
[facility] Interdisciplinary Treatment Plan Note     Review Date & Time: 4/23/2019  0928    Admission Type:   Admission Type: Voluntary    Reason for admission:  Reason for Admission: Pt feels like everything in life is falling apart, his house, relapsed, fearful of others. Tactile hallucinations, racing thoughts, pressured speech. hopeless, worthless.      Estimated Length of Stay :  5-7 days  Estimated Discharge Date Update: to be determined by physician    PATIENT STRENGTHS:  Patient Strengths:Strengths: Connection to output provider  Patient Strengths and Limitations:Limitations: Difficult relationships / poor social skills, General negative or hopeless attitude about future/recovery, Multiple barriers to leisure interests, Apathetic / unmotivated, External locus of control, Tendency to isolate self, Unrealistic self-view, Inappropriate/potentially harmful leisure interests, Difficulty problem solving/relies on others to help solve problems  Addictive Behavior:Addictive Behavior  In the past 3 months, have you felt or has someone told you that you have a problem with:  : None  Do you have a history of Chemical Use?: Yes  Do you have a history of Alcohol Use?: No  Do you have a history of Street Drug Abuse?: Yes  Histroy of Prescripton Drug Abuse?: Yes  Medical Problems:   Past Medical History:   Diagnosis Date    Anxiety     Arthritis     osteoarthritis    Bipolar disorder (HCC)     Bronchitis, chronic (HCC)     Chronic back pain     Chronic pain     Claustrophobia     COPD (chronic obstructive pulmonary disease) ([address]     Depression     Diabetes mellitus ([address]     Emphysema of lung ([address]     History of irregular heartbeat     Hyperlipidemia     Liver disease     MRSA (methicillin resistant staph aureus) culture positive     Myofacial muscle pain     Osteomyelitis ([address]     Peripheral neuropathy     bilateral feet    Pressure ulcer     Schizophrenia ([address]     Seizures ([address]     Sleep apnea no machine    Spinal stenosis     Substance abuse (HCC)        Risk:  Fall RiskTotal: 80  Jack Scale Jack Scale Score: 20  BVC Total: 0    Change in scores no. Changes to plan of Care no    Status EXAM:   Status and Exam  Normal: No  Facial Expression: Flat  Affect: Appropriate  Level of Consciousness: Alert  Mood:Normal: No  Mood: Depressed, Helpless  Motor Activity:Normal: No  Motor Activity: Unusual Posture/Gait  Interview Behavior: Cooperative  Preception: [place] to Person, Genita Brunt to Time, [place] to Place, [place] to Situation  Attention:Normal: No  Attention: Distractible  Thought Processes: Circumstantial  Thought Content:Normal: Yes  Thought Content: Poverty of Content  Hallucinations: None  Delusions: No  Delusions: Other(See Comment)(somatic)  Memory:Normal: No  Memory: Confabulation  Insight and Judgment: No  Insight and Judgment: Poor Judgment, Poor Insight  Present Suicidal Ideation: No  Present Homicidal Ideation: No    Daily Assessment Last Entry:   Daily Sleep (WDL): Within Defined Limits         Patient Currently in Pain: (P) Yes  Daily Nutrition (WDL): Within Defined Limits    Patient Monitoring:  Frequency of Checks: 4 times per hour, close    Psychiatric Symptoms:   Depression Symptoms  Depression Symptoms: Feelings of hopelessess, Feelings of helplessness  Anxiety Symptoms  Anxiety Symptoms: Generalized  [place] Symptoms  [place] Symptoms: No problems reported or observed. Psychosis Symptoms  Delusion Type: No problems reported or observed.     Suicide Risk CSSR-S:  1) Within the past month, have you wished you were dead or wished you could go to sleep and not wake up? : No  2) Have you actually had any thoughts of killing yourself? : No  6) Have you ever done anything, started to do anything, or prepared to do anything to end your life?: No  Change in Result no Change in Plan of care no    EDUCATION:   Learner Progress Toward Treatment Goals: Reviewed results and recommendations of this team, Reviewed group plan and strategies, Reviewed signs, symptoms and risk of self harm and violent behavior, Reviewed goals and plan of care    Method: individual education, small group, verbal education    Outcome: verbalized understanding, but needs reinforcement to obtain goals    PATIENT GOALS:  Short term: Discharge planning  Long term: Follow up with [facility], Dr. Mary Jo Garrett, for [facility],    PLAN/TREATMENT RECOMMENDATIONS UPDATE:   Continue with group therapies, education of coping skills   Continue to monitor patient on unit. Medications provided/ medication compliance by patient. Continue for plans to obtain long term goals after discharge.     SHORT-TERM GOALS UPDATE:  Time frame for Short-Term Goals: 8-14days     LONG-TERM GOALS UPDATE:  Time frame for Long-Term Goals: 6 months  Members Present in Team Meeting: See Signature Sheet    Sandor Iverson

## 2019-04-23 NOTE — GROUP NOTE
Group Therapy Note    Date: April 23    Group Start Time: 1100  Group End Time: 1130  Group Topic: Cognitive Skills    [address] [facility], [address]        Group Therapy Note    Attendees: 6/14         Patient's Goal: Patient will demonstrate increased interpersonal interaction     Notes:  Patient attended group and participated fully. Status After Intervention:  Improved    Participation Level:  Active Listener and Interactive    Participation Quality: Appropriate, Attentive and Sharing      Speech:  normal      Thought Process/Content: Logical      Affective Functioning: Congruent      Mood: euthymic      Level of consciousness:  Alert and Oriented x4      Response to Learning: Progressing to goal      Endings: None Reported    Modes of Intervention: Education, Socialization, Problem-solving, Activity and Reality-testing      Discipline Responsible: Psychoeducational Specialist      Signature:  Rolo Guzmán

## 2019-04-23 NOTE — GROUP NOTE
Group Therapy Note    Date: April 23    Group Start Time: 0900  Group End Time: 0856  Group Topic: [address] [place], [place]        Group Therapy Note    Attendees: 3/15    Pt did not attend Community Meeting at 0900 d/t resting in room despite staff invitation to attend.

## 2019-04-23 NOTE — PROGRESS NOTES
Patient given tobacco quitline number 02128214897 at this time, refusing to call at this time, states \" I just dont want to quit now\"- patient given information as to the dangers of long term tobacco use. Continue to reinforce the importance of tobacco cessation.

## 2019-04-23 NOTE — PLAN OF CARE
Problem: Falls - Risk of:  Goal: Will remain free from falls  Description  Will remain free from falls  Outcome: Met This Shift  Note:   Pt remains free of falls and verbalizes understanding of individual fall risks. Pt wearing non skid footwear and encouraged to seek out staff for any assistance needed.          Problem: Altered Mood, Depressive Behavior:  Goal: Ability to disclose and discuss suicidal ideas will improve  Description  Ability to disclose and discuss suicidal ideas will improve  Outcome: Ongoing  Note:   Patient denies suicidal/homicidal ideations, denies hallucinations, out and social with peers and staff in dayroom, pleasant and cooperative, interactive with own care, denies nutrition or sleep issues, medication compliant and behaviorally controlled

## 2019-04-23 NOTE — FLOWSHEET NOTE
has not used Heroin for 2 1/2 yr until this weekend, but his life is so bad he just decided to use it again. Overall Orientation Status: Within Functional Limits  Restrictions/Precautions  Restrictions/Precautions: Seizure; Fall Risk  Implants present? : Metal implants  Position Activity Restriction  Other position/activity restrictions: He has a progressive functional decline and he uses wheelchair since 2007. Pt reports he broke his R hip ~ 2 yrs ago and did not get it repaired as he was w/c bound. Pt reports he goes to [address] [place] [address] for testing for MS. Pt also reports he is supposed to go for physical therapy at [facility]. Subjective: Pt reports pain >10/10 in mid-low back originating from a \"knot\" beside right shoulder blade; pt says air matress deflated during night and now feels like a \"broken rib\"; GILBERT Lam aware of pt complaints  Comments: Pt seated in commons area upon arrival, transfered self to W/C mod-I, Pt repeatedly c/o pain in back and RLE to a lesser degree, agreeable to attempt sit>stand w/ RW but impulsive and unsteady, palpated medial/inferior aspect of R scapula where pt describes \"knot\", however no \"knot\" noted. Pt mentioned Hep C and concern that pain may be from liver, GILBERT Lam notified. Vital Signs  Patient Currently in Pain: Yes  Pain Assessment: 0-10  Pain Level: 10  Pain Type: Acute pain;Chronic pain  Pain Location: Back; Hip  Pain Orientation: Right;Mid;Lower  Pain Radiating Towards: R hip  Pain Descriptors: Cramping;Spasm  Pain Frequency: Intermittent  Pain Onset: On-going(worsened during the night when air matress deflated)  Clinical Progression: Not changed  Non-Pharmaceutical Pain Intervention(s): Ambulation/Increased Activity; Rest;Repositioned  Response to Pain Intervention: Patient Satisfied(received pain meds at end of tx)        Patient Observation  Observations: preoccupied with pain but able to perform PT       Bed Mobility:        Transfers:  Sit to Stand: Contact guard assistance;2 Person Assistance(W/C to RW; pt impulsive)  Stand to sit: Contact guard assistance;2 Person Assistance(CGA x2 for safety as R knee gwen)        Squat Pivot Transfers: Modified independent   Comments: Sit to stands completed with RW. Pt impulsive and slightly unsteady during transfers/ use RW CGA x2 for safety R knee tend to buckle. Pt reports completing squat pivot transfers at home to increase independence.       Ambulation 1  Surface: level tile  Device: Rolling Walker  Other Apparatus: Wheelchair follow  Assistance: 2 Person assistance;Minimal assistance(unsteady)  Quality of Gait: antalgic gait pattern, short step length, unsteady, forward flexed posture  Distance: 5ft  Comments: pt reports neuropathy and noted pain in hands on RW, could minimally detect floor beneath feet while standing        Stairs/Curb  Stairs?: No              Wheelchair Activities  Propulsion: Yes  Propulsion 1  Propulsion: Manual  Level: Level Tile  Method: RUE;LUE;RLE;LLE  Level of Assistance: Modified independent  Description/ Details: Pt initially propelled to room using BUE then from room to commons using BLE  Distance: 20ft x2                                   Posture: Fair  Sitting - Static: Good  Sitting - Dynamic: Good(able to take socks off and put on independantly)  Standing - Static: Fair;-(limited by pain)  Standing - Dynamic: Poor(pt failed to reach back for arms of W/C during stand to sit)  Comments: standing w/ RW seated in W/C - impulsive disregards multiple vc's to reach back for arms of W/C x2     Other exercises?: Yes  Other exercises 1: seated BLE therex 10-20reps  Other exercises 2: resistance band (green) therex in W/C x2 BLE 10reps each  Other exercises 3: sit>stand;stand>sit transfers x2 each  Other exercises 4: standing tolerance w/ RW 1min x2  Other exercises 5: pregait weightshifting to RLE as tolerated           Activity Tolerance: Patient limited by pain     Other Comments  Comments: pt requests RW for home use    Assessment  Activity Tolerance: Patient limited by pain   Body structures, Functions, Activity limitations: Decreased functional mobility ; Increased Pain  Specific instructions for Next Treatment: Have 2 to 3 person assist when attempting Standing/WB B LE, pt has been /wc bound for years, but reports takes 2 to3 steps with crutches to use the toilet, W/C does not fit through the door. Prognosis: Fair  Discharge Recommendations: Home with Home health PT;Home with assist PRN     Type of devices: Left in chair;Nurse notified(left in W/C returned to commons area for pain meds via RN)     Plan  Times per week: 2 x/wk    Patient Education  New Education Provided: PT POC  Learner:patient  Method: demonstration and explanation       Outcome: needs reinforcement     Goals  Short term goals  Time Frame for Short term goals: 4 to 6 visits  Short term goal 1: Pt able to tolerate strengthening B LE/B UE ex's to be able to be independent for ADL/self care   Short term goal 2: Progress pt to WB on R LE if able , to progress to standing up with RW. Short term goal 3: Progress pt to take steps with RW if able, (start with 2 to 3 person for safety), and progress as able. Short term goal 4: Pt to tolerate R LE ex's at pain < 8/10 to promote increase activity gradually. PT Individual Minutes  Time In: 0665  Time Out: 6255  Minutes: 32    Electronically signed by ANTON Spicer// on 4/23/19 at 9:51 AM    The above documentation and treatment completed by Cortez Yu Student Physical Therapist Assistant (SPTA)  was provided under the supervision in the direct line of sight and documention by student was reviewed and accepted by supervising Clinical Instructor Jeffrie Boeck, PTA.

## 2019-04-23 NOTE — GROUP NOTE
Group Therapy Note    Date: April 22    Group Start Time: 2000  Group End Time: 2015  Group Topic: Healthy Living/Wellness    [facility] M[facility] Crooks, GILBERT        Group Therapy Note    Patient attended evening relaxation group and participated appropriately.

## 2019-04-23 NOTE — FLOWSHEET NOTE
Patient participated in Spirituality Group       04/23/19 1515   Encounter Summary   Services provided to: Patient   Referral/Consult From: Rounding   Continue Visiting   (4/23/19)   Complexity of Encounter Moderate   Length of Encounter 30 minutes   Spiritual Assessment Completed Yes   Spiritual/Anabaptism   Type Spiritual support   Assessment Calm; Approachable   Intervention Active listening;Sustaining presence/ Ministry of presence   Outcome Expressed gratitude;Engaged in conversation;Expressed feelings/needs/concerns; Hopeful;Receptive

## 2019-04-23 NOTE — GROUP NOTE
Group Therapy Note    Date: April 23    Group Start Time: 1430  Group End Time: 1510  Group Topic: Cognitive Skills    [facility]    Bela Callejas [place], [place]        Group Therapy Note    Attendees: 4         Patient's Goal:  To demonstrate interpersonal interaction     Notes:  Pt attended and participated in group. Status After Intervention:  Unchanged    Participation Level:  Active Listener and Interactive    Participation Quality: Appropriate and Attentive      Speech:  normal      Thought Process/Content: Logical      Affective Functioning: Congruent      Mood: euthymic      Level of consciousness:  Alert and Attentive      Response to Learning: Progressing to goal      Endings: None Reported    Modes of Intervention: Socialization, Problem-solving, Activity and Reality-testing      Discipline Responsible: Psychoeducational Specialist      Signature:  Jacqueline Lara

## 2019-04-23 NOTE — DISCHARGE SUMMARY
DISCHARGE SUMMARY NURSE PRACTITIONER  Patient ID:  Igor Sepulveda  [id number]  57 y.o.  1970    Admit date: 4/15/2019    Discharge date and time: 4/23/2019  11:27 AM     Admitting Physician: Neyda Del Toro MD     Discharge Physician:  CHAD Beth CNP    Admission Diagnoses: Bipolar 1 disorder ([facility] [F31.9]  MDD (major depressive disorder), recurrent episode ([facility] [F33.9]    Discharge Diagnoses:   Bipolar 1 disorder ([facility]     Patient Active Problem List   Diagnosis Code    Anxiety F41.9    Seizure ([facility] R56.9    Myofascial muscle pain M79.18    Chronic pain G89.29    Spinal stenosis M48.00    Hyperlipidemia E78.5    Depression F32.9    Panlobular emphysema ([facility] J43.1    Bipolar disorder ([facility] F31.9    Chronic back pain M54.9, G89.29    Schizophrenia ([facility] F20.9    Fibromyalgia M79.7    Type 2 diabetes mellitus with diabetic polyneuropathy, with long-term current use of insulin ([facility] E11.42, Z79.4    Onychomycosis B35.1    Pain in lower limb M79.606    Disc disease, degenerative, cervical M50.30    Cervical stenosis of spine M48.02    Generalized tonic-clonic seizure (HCC) G40.409    Superficial laceration of scalp S01. 01XA    Altered mental status R41.82    Decubitus ulcer of coccyx, stage 2 L89.152    Cocaine substance abuse ([facility] F14.10    Opiate abuse, episodic (HCC) F11.10    Closed fracture of nasal bone S02. 2XXA    Drug overdose D45.271I    Uncomplicated opioid dependence ([facility] F11.20    Paraplegia ([address] G82.20    Schizoaffective disorder ([facility] F25.9    Non-dose-related adverse reaction to medication wellbutrin T88. 7XXA    Cannabis abuse F12.10    Encephalopathy G93.40    Hyperammonemia (HCC) E72.20    Constipation K59.00    Cellulitis and abscess of right leg L03.115, L02.415    Interstitial lung disease (HCC) J84.9    Vomiting R11.10    Bipolar 1 disorder ([facility] F31.9        Admission Condition: poor    Discharged Condition: stable.  Admission Hx: Joss Franklin is a 57 y.o. male who was admitted from medical unit at [facility]. Dhaval's. He had initially presented to the ER with complaints of abdominal pain and vomiting. Pateint stated that he had used \"white china\" and smoked crack. He was admitted for observation and endorsed suicidal ideations. Adri was evaluated and transferred to the [facility]. Today he is focused on somatic complaints such as back spasms and chronic pain. Mateot initially denied substance use, but then admitted to his relapse. Adri was in a suboxone clinic but has not been on suboxone since December. Pateint states he is depressed. Pateint also states he is paranoid that someone is going to hurt him and he gets angry and might hurt other people. Patient is vague and guarded when asked to elaborate on his statements. Pateint states he hears voices that are \"mumbles\" and talks to his dead relatives, as well as seeing \"shadows\". Patient states that he is anxious and has panic attacks. Today patient is brightened verbalizing readiness for discharge. He denies all including SI, HI, anxiety, hallucinations and depression. He has been medication compliant attending and participating in groups. Patient reports improved sleep and good appetite. Provider discussed importance of follow-up and medication compliance. Chart and medications reviewed. Therapeutic support provided. Indication for Admission: threat to self    History of Present Illnes (present tense wording indicates findings from admission exam on 4/15/2019 and are not necessarily indicative of current findings): Hospital Course:   Upon admission, Joss Franklin was provided a safe secure environment, introduced to unit milieu. Patient participated in groups and individual therapies. Meds were adjusted. After few days of hospital care, patient began to feel improvement. Depression lifted, thoughts to harm self ceased. Sleep improved, appetite was good.  On morning rounds 4/23/2019, patient endorsed feeling ready for discharge. Patient denies suicidal or homicidal ideations, denies hallucinations or delusions. Denies SE's from meds. It was decided that pt had achieved maximum benefit from hospital care and can be discharged     Consults: Internal Medicine and Physical Therapy    Significant Diagnostic Studies: Routine labs and diagnostics    Treatments: Psychotropic medications, therapy with group, milieu, and 1:1 with nurses, social workers, PAQianaC/CNP, and Attending physician. Discharge Medications:  Current Discharge Medication List      START taking these medications    Details   hydrOXYzine (ATARAX) 25 MG tablet Take 1 tablet by mouth 3 times daily as needed for Anxiety  Qty: 42 tablet, Refills: 0      !! insulin lispro (HUMALOG) 100 UNIT/ML injection vial Inject 0-12 Units into the skin 3 times daily (with meals)  Qty: 1 vial, Refills: 0      !! insulin lispro (HUMALOG) 100 UNIT/ML injection vial Inject 0-6 Units into the skin nightly  Qty: 1 vial, Refills: 0      paliperidone (INVEGA) 6 MG extended release tablet Take 1 tablet by mouth daily for 7 days  Qty: 7 tablet, Refills: 0    Comments: One pill for 7 days until next injection. Invega 156mg to be administered on 4/30/19. paliperidone palmitate ER (INVEGA SUSTENNA) 156 MG/ML LYUBOV IM injection Inject 156 mg into the muscle every 30 days  Qty: 0.9 mg, Refills: 0       !! - Potential duplicate medications found. Please discuss with provider.       CONTINUE these medications which have CHANGED    Details   busPIRone (BUSPAR) 10 MG tablet Take 2 tablets by mouth 3 times daily  Qty: 42 tablet, Refills: 0      !! divalproex (DEPAKOTE ER) 500 MG extended release tablet Take 1 tablet by mouth nightly  Qty: 14 tablet, Refills: 0      !! divalproex (DEPAKOTE ER) 500 MG extended release tablet Take 1 tablet by mouth every morning  Qty: 14 tablet, Refills: 0      traZODone (DESYREL) 100 MG tablet Take 2 tablets Associated Diagnoses: Paraplegia ([facility]      FREESTYLE LITE strip 1 each by Other route 3 times daily  Qty: 100 each, Refills: 5    Associated Diagnoses: Type 2 diabetes mellitus with diabetic polyneuropathy, with long-term current use of insulin ([facility]      ! ! Misc. Devices (GEL-FOAM CUSHION) MISC 1 Units by Does not apply route daily  Qty: 1 each, Refills: 0    Associated Diagnoses: Paraplegia ([facility]      Nutritional Supplements (BOOST) LIQD Two a day. Chocolate lactose free nutritional drinks. Qty: 60 Can, Refills: 5    Comments: High caloire  Associated Diagnoses: Panlobular emphysema ([address] Paraplegia (HCC)      TRUEPLUS LANCETS 33G MISC       cyanocobalamin 500 MCG tablet Take 500 mcg by mouth      senna (SENNA-TIME) 8.6 MG tablet twice daily as needed. ipratropium (ATROVENT) 0.02 % nebulizer solution Take 2.5 mLs by nebulization 4 times daily as needed for Wheezing  Qty: 100 mL, Refills: 3    Associated Diagnoses: Panlobular emphysema (HCC)      SM PAIN RELIEVER 325 MG tablet TAKE 2 TABLETS BY MOUTH EVERY 4 HOURS AS NEEDED FOR PAIN  Qty: 120 tablet, Refills: 3      polyethylene glycol (GLYCOLAX) powder Take 17 g by mouth daily as needed       !! - Potential duplicate medications found. Please discuss with provider.       STOP taking these medications       hydrOXYzine (VISTARIL) 25 MG capsule Comments:   Reason for Stopping:         tiZANidine (ZANAFLEX) 4 MG tablet Comments:   Reason for Stopping:         hydrOXYzine (VISTARIL) 50 MG capsule Comments:   Reason for Stopping:         insulin regular (HUMULIN R;NOVOLIN R) 100 UNIT/ML injection Comments:   Reason for Stopping:         cloNIDine (CATAPRES) 0.1 MG tablet Comments:   Reason for Stopping:         ondansetron (ZOFRAN) 4 MG tablet Comments:   Reason for Stopping:         nicotine (NICODERM CQ) 21 MG/24HR Comments:   Reason for Stopping:         ENULOSE 10 GM/15ML SOLN solution Comments:   Reason for Stopping:         DULoxetine (CYMBALTA) 30 MG extended release capsule Comments:   Reason for Stopping:         naloxegol (MOVANTIK) 25 MG TABS tablet Comments:   Reason for Stopping:         buprenorphine-naloxone (SUBOXONE) 2-0.5 MG SUBL Comments:   Reason for Stopping:         topiramate (TOPAMAX) 100 MG tablet Comments:   Reason for Stopping:           Patient discharged on two or more antipsychotics because: Documented hx of three failed mono therapies. Medication introduction. Discharge Exam:  Level of consciousness:  Within normal limits  Appearance: Street clothes, seated, with fair grooming  Behavior/Motor: No abnormalities noted  Attitude toward examiner:  Cooperative, attentive, good eye contact  Speech:  spontaneous, normal rate, normal volume and well articulated  Mood:  euthymic  Affect:  mood congruent  Thought processes:  linear, goal directed and coherent  Thought content:  Homocidal ideation denies  Suicidal Ideation:  denies suicidal ideation  Delusions:  no evidence of delusions  Perceptual Disturbance:  denies any perceptual disturbance  Cognition:  In tact  Memory: age appropriate  Insight & Judgement: fair  Medication side effects:  denies     Disposition: home    Patient Instructions: Activity: activity as tolerated    Follow-up as scheduled with [facility]    Time Spent: 20 minutes    Engagement: Patient displayed a good level of engagement with the treatments offered during this admission.      Discharge planning, findings, and recommendations were discussed with patient and treatment team.    Signed:  Yolanda Schmid   4/23/2019  11:27 AM

## 2019-04-24 ENCOUNTER — HOSPITAL ENCOUNTER (INPATIENT)
Age: 49
LOS: 3 days | Discharge: HOME HEALTH | DRG: 263 | End: 2019-04-27
Attending: EMERGENCY MEDICINE | Admitting: SURGERY
Payer: COMMERCIAL

## 2019-04-24 ENCOUNTER — APPOINTMENT (OUTPATIENT)
Dept: GENERAL RADIOLOGY | Age: 49
DRG: 263 | End: 2019-04-24
Payer: COMMERCIAL

## 2019-04-24 ENCOUNTER — APPOINTMENT (OUTPATIENT)
Dept: CT IMAGING | Age: 49
DRG: 263 | End: 2019-04-24
Payer: COMMERCIAL

## 2019-04-24 ENCOUNTER — ANESTHESIA (OUTPATIENT)
Dept: OPERATING ROOM | Age: 49
DRG: 263 | End: 2019-04-24
Payer: COMMERCIAL

## 2019-04-24 ENCOUNTER — APPOINTMENT (OUTPATIENT)
Dept: ULTRASOUND IMAGING | Age: 49
DRG: 263 | End: 2019-04-24
Payer: COMMERCIAL

## 2019-04-24 ENCOUNTER — ANESTHESIA EVENT (OUTPATIENT)
Dept: OPERATING ROOM | Age: 49
DRG: 263 | End: 2019-04-24
Payer: COMMERCIAL

## 2019-04-24 VITALS — TEMPERATURE: 98.6 F | DIASTOLIC BLOOD PRESSURE: 96 MMHG | SYSTOLIC BLOOD PRESSURE: 135 MMHG | OXYGEN SATURATION: 100 %

## 2019-04-24 DIAGNOSIS — R11.2 NON-INTRACTABLE VOMITING WITH NAUSEA, UNSPECIFIED VOMITING TYPE: ICD-10-CM

## 2019-04-24 DIAGNOSIS — Z90.49 S/P CHOLE: ICD-10-CM

## 2019-04-24 DIAGNOSIS — R07.9 CHEST PAIN, UNSPECIFIED TYPE: ICD-10-CM

## 2019-04-24 DIAGNOSIS — R10.84 GENERALIZED ABDOMINAL PAIN: Primary | ICD-10-CM

## 2019-04-24 PROBLEM — K81.0 ACUTE CHOLECYSTITIS: Status: ACTIVE | Noted: 2019-04-24

## 2019-04-24 LAB
ABSOLUTE EOS #: <0.03 K/UL (ref 0–0.44)
ABSOLUTE IMMATURE GRANULOCYTE: 0.09 K/UL (ref 0–0.3)
ABSOLUTE LYMPH #: 2 K/UL (ref 1.1–3.7)
ABSOLUTE MONO #: 1.33 K/UL (ref 0.1–1.2)
ALBUMIN SERPL-MCNC: 3.9 G/DL (ref 3.5–5.2)
ALBUMIN/GLOBULIN RATIO: 1.1 (ref 1–2.5)
ALLEN TEST: ABNORMAL
ALP BLD-CCNC: 98 U/L (ref 40–129)
ALT SERPL-CCNC: 57 U/L (ref 5–41)
ANION GAP SERPL CALCULATED.3IONS-SCNC: 16 MMOL/L (ref 9–17)
AST SERPL-CCNC: 38 U/L
BASOPHILS # BLD: 0 % (ref 0–2)
BASOPHILS ABSOLUTE: 0.04 K/UL (ref 0–0.2)
BILIRUB SERPL-MCNC: 0.24 MG/DL (ref 0.3–1.2)
BILIRUBIN URINE: NEGATIVE
BUN BLDV-MCNC: 8 MG/DL (ref 6–20)
BUN/CREAT BLD: ABNORMAL (ref 9–20)
CALCIUM SERPL-MCNC: 9.3 MG/DL (ref 8.6–10.4)
CARBOXYHEMOGLOBIN: 1.5 % (ref 0–5)
CHLORIDE BLD-SCNC: 100 MMOL/L (ref 98–107)
CO2: 23 MMOL/L (ref 20–31)
COLOR: YELLOW
COMMENT UA: ABNORMAL
CREAT SERPL-MCNC: 0.63 MG/DL (ref 0.7–1.2)
DIFFERENTIAL TYPE: ABNORMAL
EOSINOPHILS RELATIVE PERCENT: 0 % (ref 1–4)
FIO2: ABNORMAL
GFR AFRICAN AMERICAN: >60 ML/MIN
GFR NON-AFRICAN AMERICAN: >60 ML/MIN
GFR SERPL CREATININE-BSD FRML MDRD: ABNORMAL ML/MIN/{1.73_M2}
GFR SERPL CREATININE-BSD FRML MDRD: ABNORMAL ML/MIN/{1.73_M2}
GLUCOSE BLD-MCNC: 169 MG/DL (ref 70–99)
GLUCOSE URINE: NEGATIVE
HCO3 ARTERIAL: 20.9 MMOL/L (ref 22–27)
HCT VFR BLD CALC: 45 % (ref 40.7–50.3)
HEMOGLOBIN: 14.3 G/DL (ref 13–17)
IMMATURE GRANULOCYTES: 1 %
KETONES, URINE: NEGATIVE
LACTIC ACID, WHOLE BLOOD: 2.1 MMOL/L (ref 0.7–2.1)
LEUKOCYTE ESTERASE, URINE: NEGATIVE
LIPASE: 18 U/L (ref 13–60)
LYMPHOCYTES # BLD: 11 % (ref 24–43)
MCH RBC QN AUTO: 28.1 PG (ref 25.2–33.5)
MCHC RBC AUTO-ENTMCNC: 31.8 G/DL (ref 28.4–34.8)
MCV RBC AUTO: 88.4 FL (ref 82.6–102.9)
METHEMOGLOBIN: ABNORMAL % (ref 0–1.5)
MODE: ABNORMAL
MONOCYTES # BLD: 7 % (ref 3–12)
NEGATIVE BASE EXCESS, ART: 2.8 MMOL/L (ref 0–2)
NITRITE, URINE: NEGATIVE
NOTIFICATION TIME: ABNORMAL
NOTIFICATION: ABNORMAL
NRBC AUTOMATED: 0 PER 100 WBC
O2 DEVICE/FLOW/%: ABNORMAL
O2 SAT, ARTERIAL: 99.1 % (ref 94–100)
OXYHEMOGLOBIN: ABNORMAL % (ref 95–98)
PATIENT TEMP: 37
PCO2 ARTERIAL: 35 MMHG (ref 32–45)
PCO2, ART, TEMP ADJ: ABNORMAL (ref 32–45)
PDW BLD-RTO: 14.6 % (ref 11.8–14.4)
PEEP/CPAP: ABNORMAL
PH ARTERIAL: 7.39 (ref 7.35–7.45)
PH UA: 8.5 (ref 5–8)
PH, ART, TEMP ADJ: ABNORMAL (ref 7.35–7.45)
PLATELET # BLD: 228 K/UL (ref 138–453)
PLATELET ESTIMATE: ABNORMAL
PMV BLD AUTO: 9.9 FL (ref 8.1–13.5)
PO2 ARTERIAL: 149 MMHG (ref 75–95)
PO2, ART, TEMP ADJ: ABNORMAL MMHG (ref 75–95)
POSITIVE BASE EXCESS, ART: ABNORMAL MMOL/L (ref 0–2)
POTASSIUM SERPL-SCNC: 4.1 MMOL/L (ref 3.7–5.3)
PROTEIN UA: NEGATIVE
PSV: ABNORMAL
PT. POSITION: ABNORMAL
RBC # BLD: 5.09 M/UL (ref 4.21–5.77)
RBC # BLD: ABNORMAL 10*6/UL
RESPIRATORY RATE: ABNORMAL
SAMPLE SITE: ABNORMAL
SEG NEUTROPHILS: 81 % (ref 36–65)
SEGMENTED NEUTROPHILS ABSOLUTE COUNT: 14.74 K/UL (ref 1.5–8.1)
SET RATE: ABNORMAL
SODIUM BLD-SCNC: 139 MMOL/L (ref 135–144)
SPECIFIC GRAVITY UA: 1 (ref 1–1.03)
TEXT FOR RESPIRATORY: ABNORMAL
TOTAL HB: ABNORMAL G/DL (ref 12–16)
TOTAL PROTEIN: 7.3 G/DL (ref 6.4–8.3)
TOTAL RATE: ABNORMAL
TROPONIN INTERP: NORMAL
TROPONIN INTERP: NORMAL
TROPONIN T: NORMAL NG/ML
TROPONIN T: NORMAL NG/ML
TROPONIN, HIGH SENSITIVITY: 9 NG/L (ref 0–22)
TROPONIN, HIGH SENSITIVITY: 9 NG/L (ref 0–22)
TURBIDITY: CLEAR
URINE HGB: NEGATIVE
UROBILINOGEN, URINE: NORMAL
VT: ABNORMAL
WBC # BLD: 18.2 K/UL (ref 3.5–11.3)
WBC # BLD: ABNORMAL 10*3/UL

## 2019-04-24 PROCEDURE — 6360000002 HC RX W HCPCS: Performed by: NURSE ANESTHETIST, CERTIFIED REGISTERED

## 2019-04-24 PROCEDURE — 71045 X-RAY EXAM CHEST 1 VIEW: CPT

## 2019-04-24 PROCEDURE — 80053 COMPREHEN METABOLIC PANEL: CPT

## 2019-04-24 PROCEDURE — 0FT40ZZ RESECTION OF GALLBLADDER, OPEN APPROACH: ICD-10-PCS | Performed by: SURGERY

## 2019-04-24 PROCEDURE — 88304 TISSUE EXAM BY PATHOLOGIST: CPT

## 2019-04-24 PROCEDURE — 3700000000 HC ANESTHESIA ATTENDED CARE: Performed by: SURGERY

## 2019-04-24 PROCEDURE — 82805 BLOOD GASES W/O2 SATURATION: CPT

## 2019-04-24 PROCEDURE — 7100000000 HC PACU RECOVERY - FIRST 15 MIN: Performed by: SURGERY

## 2019-04-24 PROCEDURE — 2500000003 HC RX 250 WO HCPCS: Performed by: EMERGENCY MEDICINE

## 2019-04-24 PROCEDURE — 6360000002 HC RX W HCPCS: Performed by: EMERGENCY MEDICINE

## 2019-04-24 PROCEDURE — 2500000003 HC RX 250 WO HCPCS: Performed by: NURSE ANESTHETIST, CERTIFIED REGISTERED

## 2019-04-24 PROCEDURE — 2780000010 HC IMPLANT OTHER: Performed by: SURGERY

## 2019-04-24 PROCEDURE — 87086 URINE CULTURE/COLONY COUNT: CPT

## 2019-04-24 PROCEDURE — 2709999900 HC NON-CHARGEABLE SUPPLY: Performed by: SURGERY

## 2019-04-24 PROCEDURE — 2580000003 HC RX 258: Performed by: EMERGENCY MEDICINE

## 2019-04-24 PROCEDURE — 6360000004 HC RX CONTRAST MEDICATION: Performed by: EMERGENCY MEDICINE

## 2019-04-24 PROCEDURE — 96375 TX/PRO/DX INJ NEW DRUG ADDON: CPT

## 2019-04-24 PROCEDURE — 3700000001 HC ADD 15 MINUTES (ANESTHESIA): Performed by: SURGERY

## 2019-04-24 PROCEDURE — 85025 COMPLETE CBC W/AUTO DIFF WBC: CPT

## 2019-04-24 PROCEDURE — 81003 URINALYSIS AUTO W/O SCOPE: CPT

## 2019-04-24 PROCEDURE — 96374 THER/PROPH/DIAG INJ IV PUSH: CPT

## 2019-04-24 PROCEDURE — 83690 ASSAY OF LIPASE: CPT

## 2019-04-24 PROCEDURE — 84484 ASSAY OF TROPONIN QUANT: CPT

## 2019-04-24 PROCEDURE — 74177 CT ABD & PELVIS W/CONTRAST: CPT

## 2019-04-24 PROCEDURE — 6360000002 HC RX W HCPCS: Performed by: STUDENT IN AN ORGANIZED HEALTH CARE EDUCATION/TRAINING PROGRAM

## 2019-04-24 PROCEDURE — 83605 ASSAY OF LACTIC ACID: CPT

## 2019-04-24 PROCEDURE — 3600000014 HC SURGERY LEVEL 4 ADDTL 15MIN: Performed by: SURGERY

## 2019-04-24 PROCEDURE — 7100000001 HC PACU RECOVERY - ADDTL 15 MIN: Performed by: SURGERY

## 2019-04-24 PROCEDURE — 2580000003 HC RX 258: Performed by: SURGERY

## 2019-04-24 PROCEDURE — 93005 ELECTROCARDIOGRAM TRACING: CPT

## 2019-04-24 PROCEDURE — 1200000000 HC SEMI PRIVATE

## 2019-04-24 PROCEDURE — 99285 EMERGENCY DEPT VISIT HI MDM: CPT

## 2019-04-24 PROCEDURE — 76705 ECHO EXAM OF ABDOMEN: CPT

## 2019-04-24 PROCEDURE — 2580000003 HC RX 258: Performed by: STUDENT IN AN ORGANIZED HEALTH CARE EDUCATION/TRAINING PROGRAM

## 2019-04-24 PROCEDURE — 3600000004 HC SURGERY LEVEL 4 BASE: Performed by: SURGERY

## 2019-04-24 PROCEDURE — 6360000002 HC RX W HCPCS: Performed by: ANESTHESIOLOGY

## 2019-04-24 DEVICE — SEALANT HEMSTAT 5ML HUM FIBRIN THROM 2 VI APPL DEV EVICEL: Type: IMPLANTABLE DEVICE | Status: FUNCTIONAL

## 2019-04-24 RX ORDER — MORPHINE SULFATE 4 MG/ML
2 INJECTION, SOLUTION INTRAMUSCULAR; INTRAVENOUS
Status: DISCONTINUED | OUTPATIENT
Start: 2019-04-24 | End: 2019-04-25

## 2019-04-24 RX ORDER — DICYCLOMINE HYDROCHLORIDE 10 MG/ML
20 INJECTION INTRAMUSCULAR ONCE
Status: COMPLETED | OUTPATIENT
Start: 2019-04-24 | End: 2019-04-24

## 2019-04-24 RX ORDER — KETOROLAC TROMETHAMINE 30 MG/ML
30 INJECTION, SOLUTION INTRAMUSCULAR; INTRAVENOUS EVERY 6 HOURS
Status: DISCONTINUED | OUTPATIENT
Start: 2019-04-25 | End: 2019-04-27 | Stop reason: HOSPADM

## 2019-04-24 RX ORDER — CEFAZOLIN SODIUM 1 G/3ML
INJECTION, POWDER, FOR SOLUTION INTRAMUSCULAR; INTRAVENOUS PRN
Status: DISCONTINUED | OUTPATIENT
Start: 2019-04-24 | End: 2019-04-24 | Stop reason: SDUPTHER

## 2019-04-24 RX ORDER — DIPHENHYDRAMINE HYDROCHLORIDE 50 MG/ML
12.5 INJECTION INTRAMUSCULAR; INTRAVENOUS
Status: ACTIVE | OUTPATIENT
Start: 2019-04-24 | End: 2019-04-24

## 2019-04-24 RX ORDER — ROCURONIUM BROMIDE 10 MG/ML
INJECTION, SOLUTION INTRAVENOUS PRN
Status: DISCONTINUED | OUTPATIENT
Start: 2019-04-24 | End: 2019-04-24 | Stop reason: SDUPTHER

## 2019-04-24 RX ORDER — LABETALOL HYDROCHLORIDE 5 MG/ML
5 INJECTION, SOLUTION INTRAVENOUS EVERY 10 MIN PRN
Status: DISCONTINUED | OUTPATIENT
Start: 2019-04-24 | End: 2019-04-25 | Stop reason: HOSPADM

## 2019-04-24 RX ORDER — SODIUM CHLORIDE 0.9 % (FLUSH) 0.9 %
10 SYRINGE (ML) INJECTION PRN
Status: DISCONTINUED | OUTPATIENT
Start: 2019-04-24 | End: 2019-04-27 | Stop reason: HOSPADM

## 2019-04-24 RX ORDER — SIMVASTATIN 40 MG
40 TABLET ORAL NIGHTLY
Status: DISCONTINUED | OUTPATIENT
Start: 2019-04-24 | End: 2019-04-27 | Stop reason: HOSPADM

## 2019-04-24 RX ORDER — DIVALPROEX SODIUM 500 MG/1
500 TABLET, EXTENDED RELEASE ORAL NIGHTLY
Status: DISCONTINUED | OUTPATIENT
Start: 2019-04-24 | End: 2019-04-27 | Stop reason: HOSPADM

## 2019-04-24 RX ORDER — ONDANSETRON 2 MG/ML
4 INJECTION INTRAMUSCULAR; INTRAVENOUS ONCE
Status: COMPLETED | OUTPATIENT
Start: 2019-04-24 | End: 2019-04-24

## 2019-04-24 RX ORDER — DIVALPROEX SODIUM 500 MG/1
500 TABLET, EXTENDED RELEASE ORAL EVERY MORNING
Status: DISCONTINUED | OUTPATIENT
Start: 2019-04-25 | End: 2019-04-27 | Stop reason: HOSPADM

## 2019-04-24 RX ORDER — ACETAMINOPHEN 500 MG
1000 TABLET ORAL EVERY 8 HOURS SCHEDULED
Status: DISCONTINUED | OUTPATIENT
Start: 2019-04-24 | End: 2019-04-27 | Stop reason: HOSPADM

## 2019-04-24 RX ORDER — LACTULOSE 10 G/15ML
10 SOLUTION ORAL 3 TIMES DAILY PRN
COMMUNITY

## 2019-04-24 RX ORDER — FENTANYL CITRATE 50 UG/ML
50 INJECTION, SOLUTION INTRAMUSCULAR; INTRAVENOUS EVERY 5 MIN PRN
Status: DISCONTINUED | OUTPATIENT
Start: 2019-04-24 | End: 2019-04-25 | Stop reason: HOSPADM

## 2019-04-24 RX ORDER — SODIUM CHLORIDE, SODIUM LACTATE, POTASSIUM CHLORIDE, CALCIUM CHLORIDE 600; 310; 30; 20 MG/100ML; MG/100ML; MG/100ML; MG/100ML
INJECTION, SOLUTION INTRAVENOUS CONTINUOUS
Status: DISCONTINUED | OUTPATIENT
Start: 2019-04-24 | End: 2019-04-25

## 2019-04-24 RX ORDER — SODIUM CHLORIDE 0.9 % (FLUSH) 0.9 %
10 SYRINGE (ML) INJECTION EVERY 12 HOURS SCHEDULED
Status: DISCONTINUED | OUTPATIENT
Start: 2019-04-24 | End: 2019-04-27 | Stop reason: HOSPADM

## 2019-04-24 RX ORDER — NEOSTIGMINE METHYLSULFATE 5 MG/5 ML
SYRINGE (ML) INTRAVENOUS PRN
Status: DISCONTINUED | OUTPATIENT
Start: 2019-04-24 | End: 2019-04-24 | Stop reason: SDUPTHER

## 2019-04-24 RX ORDER — PANTOPRAZOLE SODIUM 40 MG/1
40 TABLET, DELAYED RELEASE ORAL DAILY
Status: DISCONTINUED | OUTPATIENT
Start: 2019-04-24 | End: 2019-04-27 | Stop reason: HOSPADM

## 2019-04-24 RX ORDER — SENNA PLUS 8.6 MG/1
1 TABLET ORAL NIGHTLY
Status: DISCONTINUED | OUTPATIENT
Start: 2019-04-24 | End: 2019-04-25

## 2019-04-24 RX ORDER — PROMETHAZINE HYDROCHLORIDE 25 MG/ML
6.25 INJECTION, SOLUTION INTRAMUSCULAR; INTRAVENOUS
Status: ACTIVE | OUTPATIENT
Start: 2019-04-24 | End: 2019-04-24

## 2019-04-24 RX ORDER — FENTANYL CITRATE 50 UG/ML
25 INJECTION, SOLUTION INTRAMUSCULAR; INTRAVENOUS EVERY 5 MIN PRN
Status: DISCONTINUED | OUTPATIENT
Start: 2019-04-24 | End: 2019-04-25 | Stop reason: HOSPADM

## 2019-04-24 RX ORDER — HYDRALAZINE HYDROCHLORIDE 20 MG/ML
5 INJECTION INTRAMUSCULAR; INTRAVENOUS EVERY 10 MIN PRN
Status: DISCONTINUED | OUTPATIENT
Start: 2019-04-24 | End: 2019-04-25 | Stop reason: HOSPADM

## 2019-04-24 RX ORDER — PROPOFOL 10 MG/ML
INJECTION, EMULSION INTRAVENOUS PRN
Status: DISCONTINUED | OUTPATIENT
Start: 2019-04-24 | End: 2019-04-24 | Stop reason: SDUPTHER

## 2019-04-24 RX ORDER — MAGNESIUM HYDROXIDE 1200 MG/15ML
LIQUID ORAL CONTINUOUS PRN
Status: COMPLETED | OUTPATIENT
Start: 2019-04-24 | End: 2019-04-24

## 2019-04-24 RX ORDER — QUETIAPINE FUMARATE 200 MG/1
200 TABLET, FILM COATED ORAL NIGHTLY
Status: DISCONTINUED | OUTPATIENT
Start: 2019-04-24 | End: 2019-04-27 | Stop reason: HOSPADM

## 2019-04-24 RX ORDER — TRAZODONE HYDROCHLORIDE 100 MG/1
200 TABLET ORAL NIGHTLY PRN
Status: DISCONTINUED | OUTPATIENT
Start: 2019-04-24 | End: 2019-04-27 | Stop reason: HOSPADM

## 2019-04-24 RX ORDER — MIDAZOLAM HYDROCHLORIDE 1 MG/ML
INJECTION INTRAMUSCULAR; INTRAVENOUS PRN
Status: DISCONTINUED | OUTPATIENT
Start: 2019-04-24 | End: 2019-04-24 | Stop reason: SDUPTHER

## 2019-04-24 RX ORDER — 0.9 % SODIUM CHLORIDE 0.9 %
1000 INTRAVENOUS SOLUTION INTRAVENOUS ONCE
Status: COMPLETED | OUTPATIENT
Start: 2019-04-24 | End: 2019-04-24

## 2019-04-24 RX ORDER — RANITIDINE 150 MG/1
150 TABLET ORAL 2 TIMES DAILY PRN
Status: DISCONTINUED | OUTPATIENT
Start: 2019-04-24 | End: 2019-04-27 | Stop reason: HOSPADM

## 2019-04-24 RX ORDER — ONDANSETRON 2 MG/ML
4 INJECTION INTRAMUSCULAR; INTRAVENOUS EVERY 6 HOURS PRN
Status: DISCONTINUED | OUTPATIENT
Start: 2019-04-24 | End: 2019-04-27 | Stop reason: HOSPADM

## 2019-04-24 RX ORDER — FENTANYL CITRATE 50 UG/ML
INJECTION, SOLUTION INTRAMUSCULAR; INTRAVENOUS PRN
Status: DISCONTINUED | OUTPATIENT
Start: 2019-04-24 | End: 2019-04-24 | Stop reason: SDUPTHER

## 2019-04-24 RX ORDER — OXYCODONE HYDROCHLORIDE AND ACETAMINOPHEN 5; 325 MG/1; MG/1
1 TABLET ORAL EVERY 4 HOURS PRN
Status: DISCONTINUED | OUTPATIENT
Start: 2019-04-24 | End: 2019-04-25 | Stop reason: HOSPADM

## 2019-04-24 RX ORDER — BUSPIRONE HYDROCHLORIDE 10 MG/1
20 TABLET ORAL 3 TIMES DAILY
COMMUNITY

## 2019-04-24 RX ORDER — FLUTICASONE FUROATE AND VILANTEROL 100; 25 UG/1; UG/1
1 POWDER RESPIRATORY (INHALATION) DAILY
Status: DISCONTINUED | OUTPATIENT
Start: 2019-04-24 | End: 2019-04-24 | Stop reason: CLARIF

## 2019-04-24 RX ORDER — DEXAMETHASONE SODIUM PHOSPHATE 10 MG/ML
INJECTION INTRAMUSCULAR; INTRAVENOUS PRN
Status: DISCONTINUED | OUTPATIENT
Start: 2019-04-24 | End: 2019-04-24 | Stop reason: SDUPTHER

## 2019-04-24 RX ORDER — BUSPIRONE HYDROCHLORIDE 10 MG/1
20 TABLET ORAL 3 TIMES DAILY
Status: DISCONTINUED | OUTPATIENT
Start: 2019-04-24 | End: 2019-04-27 | Stop reason: HOSPADM

## 2019-04-24 RX ORDER — LIDOCAINE HYDROCHLORIDE 10 MG/ML
INJECTION, SOLUTION EPIDURAL; INFILTRATION; INTRACAUDAL; PERINEURAL PRN
Status: DISCONTINUED | OUTPATIENT
Start: 2019-04-24 | End: 2019-04-24 | Stop reason: SDUPTHER

## 2019-04-24 RX ORDER — GLYCOPYRROLATE 1 MG/5 ML
SYRINGE (ML) INTRAVENOUS PRN
Status: DISCONTINUED | OUTPATIENT
Start: 2019-04-24 | End: 2019-04-24 | Stop reason: SDUPTHER

## 2019-04-24 RX ORDER — PREGABALIN 100 MG/1
400 CAPSULE ORAL 2 TIMES DAILY
Status: DISCONTINUED | OUTPATIENT
Start: 2019-04-24 | End: 2019-04-27 | Stop reason: HOSPADM

## 2019-04-24 RX ORDER — 0.9 % SODIUM CHLORIDE 0.9 %
500 INTRAVENOUS SOLUTION INTRAVENOUS
Status: ACTIVE | OUTPATIENT
Start: 2019-04-24 | End: 2019-04-24

## 2019-04-24 RX ORDER — CYCLOBENZAPRINE HCL 10 MG
10 TABLET ORAL 3 TIMES DAILY
Status: DISCONTINUED | OUTPATIENT
Start: 2019-04-24 | End: 2019-04-25

## 2019-04-24 RX ORDER — ONDANSETRON 2 MG/ML
INJECTION INTRAMUSCULAR; INTRAVENOUS PRN
Status: DISCONTINUED | OUTPATIENT
Start: 2019-04-24 | End: 2019-04-24 | Stop reason: SDUPTHER

## 2019-04-24 RX ORDER — ACETAMINOPHEN 10 MG/ML
1000 INJECTION, SOLUTION INTRAVENOUS ONCE
Status: COMPLETED | OUTPATIENT
Start: 2019-04-24 | End: 2019-04-24

## 2019-04-24 RX ORDER — ALBUTEROL SULFATE 2.5 MG/3ML
2.5 SOLUTION RESPIRATORY (INHALATION) EVERY 6 HOURS PRN
Status: DISCONTINUED | OUTPATIENT
Start: 2019-04-24 | End: 2019-04-27 | Stop reason: HOSPADM

## 2019-04-24 RX ORDER — ONDANSETRON 2 MG/ML
4 INJECTION INTRAMUSCULAR; INTRAVENOUS
Status: ACTIVE | OUTPATIENT
Start: 2019-04-24 | End: 2019-04-24

## 2019-04-24 RX ADMIN — ROCURONIUM BROMIDE 10 MG: 10 INJECTION INTRAVENOUS at 20:55

## 2019-04-24 RX ADMIN — Medication 0.6 MG: at 22:26

## 2019-04-24 RX ADMIN — FENTANYL CITRATE 50 MCG: 50 INJECTION INTRAMUSCULAR; INTRAVENOUS at 20:16

## 2019-04-24 RX ADMIN — ONDANSETRON 4 MG: 2 INJECTION, SOLUTION INTRAMUSCULAR; INTRAVENOUS at 22:26

## 2019-04-24 RX ADMIN — CEFAZOLIN 2000 MG: 330 INJECTION, POWDER, FOR SOLUTION INTRAMUSCULAR; INTRAVENOUS at 19:02

## 2019-04-24 RX ADMIN — FENTANYL CITRATE 50 MCG: 50 INJECTION INTRAMUSCULAR; INTRAVENOUS at 21:18

## 2019-04-24 RX ADMIN — PIPERACILLIN AND TAZOBACTAM 3.38 G: 3; .375 INJECTION, POWDER, FOR SOLUTION INTRAVENOUS at 13:01

## 2019-04-24 RX ADMIN — DEXAMETHASONE SODIUM PHOSPHATE 10 MG: 10 INJECTION INTRAMUSCULAR; INTRAVENOUS at 18:44

## 2019-04-24 RX ADMIN — PROPOFOL 200 MG: 10 INJECTION, EMULSION INTRAVENOUS at 18:39

## 2019-04-24 RX ADMIN — Medication 3 MG: at 22:26

## 2019-04-24 RX ADMIN — LIDOCAINE HYDROCHLORIDE 50 MG: 10 INJECTION, SOLUTION EPIDURAL; INFILTRATION; INTRACAUDAL; PERINEURAL at 18:39

## 2019-04-24 RX ADMIN — SODIUM CHLORIDE 1000 ML: 9 INJECTION, SOLUTION INTRAVENOUS at 05:49

## 2019-04-24 RX ADMIN — FAMOTIDINE 20 MG: 10 INJECTION, SOLUTION INTRAVENOUS at 05:50

## 2019-04-24 RX ADMIN — MORPHINE SULFATE 2 MG: 4 INJECTION INTRAVENOUS at 13:03

## 2019-04-24 RX ADMIN — ROCURONIUM BROMIDE 50 MG: 10 INJECTION INTRAVENOUS at 18:39

## 2019-04-24 RX ADMIN — ACETAMINOPHEN 1000 MG: 10 INJECTION, SOLUTION INTRAVENOUS at 22:09

## 2019-04-24 RX ADMIN — ONDANSETRON 4 MG: 2 INJECTION INTRAMUSCULAR; INTRAVENOUS at 05:50

## 2019-04-24 RX ADMIN — SODIUM CHLORIDE, POTASSIUM CHLORIDE, SODIUM LACTATE AND CALCIUM CHLORIDE: 600; 310; 30; 20 INJECTION, SOLUTION INTRAVENOUS at 22:37

## 2019-04-24 RX ADMIN — IOHEXOL 75 ML: 350 INJECTION, SOLUTION INTRAVENOUS at 11:26

## 2019-04-24 RX ADMIN — MORPHINE SULFATE 2 MG: 4 INJECTION INTRAVENOUS at 16:31

## 2019-04-24 RX ADMIN — KETOROLAC TROMETHAMINE 30 MG: 30 INJECTION, SOLUTION INTRAMUSCULAR at 23:35

## 2019-04-24 RX ADMIN — FENTANYL CITRATE 100 MCG: 50 INJECTION INTRAMUSCULAR; INTRAVENOUS at 18:39

## 2019-04-24 RX ADMIN — MIDAZOLAM HYDROCHLORIDE 2 MG: 1 INJECTION, SOLUTION INTRAMUSCULAR; INTRAVENOUS at 18:37

## 2019-04-24 RX ADMIN — SODIUM CHLORIDE, POTASSIUM CHLORIDE, SODIUM LACTATE AND CALCIUM CHLORIDE: 600; 310; 30; 20 INJECTION, SOLUTION INTRAVENOUS at 19:20

## 2019-04-24 RX ADMIN — ROCURONIUM BROMIDE 30 MG: 10 INJECTION INTRAVENOUS at 20:03

## 2019-04-24 RX ADMIN — DICYCLOMINE HYDROCHLORIDE 20 MG: 10 INJECTION INTRAMUSCULAR at 05:53

## 2019-04-24 RX ADMIN — ONDANSETRON 4 MG: 2 INJECTION INTRAMUSCULAR; INTRAVENOUS at 13:03

## 2019-04-24 RX ADMIN — ROCURONIUM BROMIDE 10 MG: 10 INJECTION INTRAVENOUS at 21:25

## 2019-04-24 ASSESSMENT — PULMONARY FUNCTION TESTS
PIF_VALUE: 20
PIF_VALUE: 18
PIF_VALUE: 19
PIF_VALUE: 20
PIF_VALUE: 19
PIF_VALUE: 8
PIF_VALUE: 19
PIF_VALUE: 19
PIF_VALUE: 20
PIF_VALUE: 22
PIF_VALUE: 3
PIF_VALUE: 21
PIF_VALUE: 18
PIF_VALUE: 20
PIF_VALUE: 18
PIF_VALUE: 20
PIF_VALUE: 19
PIF_VALUE: 19
PIF_VALUE: 21
PIF_VALUE: 20
PIF_VALUE: 19
PIF_VALUE: 21
PIF_VALUE: 19
PIF_VALUE: 18
PIF_VALUE: 21
PIF_VALUE: 18
PIF_VALUE: 19
PIF_VALUE: 19
PIF_VALUE: 21
PIF_VALUE: 21
PIF_VALUE: 18
PIF_VALUE: 20
PIF_VALUE: 21
PIF_VALUE: 20
PIF_VALUE: 23
PIF_VALUE: 20
PIF_VALUE: 18
PIF_VALUE: 19
PIF_VALUE: 22
PIF_VALUE: 20
PIF_VALUE: 18
PIF_VALUE: 21
PIF_VALUE: 21
PIF_VALUE: 18
PIF_VALUE: 19
PIF_VALUE: 1
PIF_VALUE: 19
PIF_VALUE: 20
PIF_VALUE: 20
PIF_VALUE: 21
PIF_VALUE: 21
PIF_VALUE: 22
PIF_VALUE: 5
PIF_VALUE: 20
PIF_VALUE: 20
PIF_VALUE: 18
PIF_VALUE: 18
PIF_VALUE: 21
PIF_VALUE: 18
PIF_VALUE: 20
PIF_VALUE: 20
PIF_VALUE: 24
PIF_VALUE: 21
PIF_VALUE: 21
PIF_VALUE: 3
PIF_VALUE: 22
PIF_VALUE: 19
PIF_VALUE: 21
PIF_VALUE: 18
PIF_VALUE: 19
PIF_VALUE: 20
PIF_VALUE: 21
PIF_VALUE: 20
PIF_VALUE: 18
PIF_VALUE: 21
PIF_VALUE: 19
PIF_VALUE: 18
PIF_VALUE: 22
PIF_VALUE: 20
PIF_VALUE: 21
PIF_VALUE: 0
PIF_VALUE: 20
PIF_VALUE: 20
PIF_VALUE: 8
PIF_VALUE: 20
PIF_VALUE: 18
PIF_VALUE: 21
PIF_VALUE: 20
PIF_VALUE: 0
PIF_VALUE: 21
PIF_VALUE: 20
PIF_VALUE: 21
PIF_VALUE: 22
PIF_VALUE: 18
PIF_VALUE: 20
PIF_VALUE: 20
PIF_VALUE: 19
PIF_VALUE: 20
PIF_VALUE: 21
PIF_VALUE: 0
PIF_VALUE: 21
PIF_VALUE: 20
PIF_VALUE: 20
PIF_VALUE: 19
PIF_VALUE: 18
PIF_VALUE: 20
PIF_VALUE: 21
PIF_VALUE: 20
PIF_VALUE: 18
PIF_VALUE: 19
PIF_VALUE: 18
PIF_VALUE: 19
PIF_VALUE: 20
PIF_VALUE: 18
PIF_VALUE: 20
PIF_VALUE: 21
PIF_VALUE: 1
PIF_VALUE: 1
PIF_VALUE: 20
PIF_VALUE: 21
PIF_VALUE: 20
PIF_VALUE: 18
PIF_VALUE: 20
PIF_VALUE: 21
PIF_VALUE: 19
PIF_VALUE: 21
PIF_VALUE: 18
PIF_VALUE: 21
PIF_VALUE: 19
PIF_VALUE: 18
PIF_VALUE: 20
PIF_VALUE: 20
PIF_VALUE: 21
PIF_VALUE: 20
PIF_VALUE: 19
PIF_VALUE: 21
PIF_VALUE: 20
PIF_VALUE: 18
PIF_VALUE: 0
PIF_VALUE: 20
PIF_VALUE: 23
PIF_VALUE: 19
PIF_VALUE: 20
PIF_VALUE: 20
PIF_VALUE: 24
PIF_VALUE: 24
PIF_VALUE: 20
PIF_VALUE: 18
PIF_VALUE: 20
PIF_VALUE: 19
PIF_VALUE: 21
PIF_VALUE: 23
PIF_VALUE: 18
PIF_VALUE: 21
PIF_VALUE: 0
PIF_VALUE: 20
PIF_VALUE: 0
PIF_VALUE: 20
PIF_VALUE: 1
PIF_VALUE: 1
PIF_VALUE: 25
PIF_VALUE: 20
PIF_VALUE: 19
PIF_VALUE: 7
PIF_VALUE: 21
PIF_VALUE: 20
PIF_VALUE: 18
PIF_VALUE: 18
PIF_VALUE: 20
PIF_VALUE: 19
PIF_VALUE: 21
PIF_VALUE: 26
PIF_VALUE: 19
PIF_VALUE: 19
PIF_VALUE: 7
PIF_VALUE: 20
PIF_VALUE: 18
PIF_VALUE: 20
PIF_VALUE: 19
PIF_VALUE: 21
PIF_VALUE: 18
PIF_VALUE: 20
PIF_VALUE: 21
PIF_VALUE: 1
PIF_VALUE: 19
PIF_VALUE: 21
PIF_VALUE: 21
PIF_VALUE: 19
PIF_VALUE: 19
PIF_VALUE: 18
PIF_VALUE: 19
PIF_VALUE: 21
PIF_VALUE: 20
PIF_VALUE: 20
PIF_VALUE: 21
PIF_VALUE: 20
PIF_VALUE: 21
PIF_VALUE: 18
PIF_VALUE: 22
PIF_VALUE: 21
PIF_VALUE: 21
PIF_VALUE: 18
PIF_VALUE: 19
PIF_VALUE: 21
PIF_VALUE: 22
PIF_VALUE: 7
PIF_VALUE: 20
PIF_VALUE: 6
PIF_VALUE: 18
PIF_VALUE: 0
PIF_VALUE: 21
PIF_VALUE: 20
PIF_VALUE: 0
PIF_VALUE: 18
PIF_VALUE: 20
PIF_VALUE: 1
PIF_VALUE: 19
PIF_VALUE: 19
PIF_VALUE: 20
PIF_VALUE: 19
PIF_VALUE: 21
PIF_VALUE: 18
PIF_VALUE: 18
PIF_VALUE: 19
PIF_VALUE: 21
PIF_VALUE: 20
PIF_VALUE: 19
PIF_VALUE: 5
PIF_VALUE: 20
PIF_VALUE: 27

## 2019-04-24 ASSESSMENT — ENCOUNTER SYMPTOMS
COLOR CHANGE: 0
ABDOMINAL PAIN: 1
SHORTNESS OF BREATH: 0
COUGH: 0
VOMITING: 1
SORE THROAT: 0
NAUSEA: 1
RHINORRHEA: 0
EYE PAIN: 0

## 2019-04-24 ASSESSMENT — PAIN SCALES - GENERAL
PAINLEVEL_OUTOF10: 10
PAINLEVEL_OUTOF10: 4
PAINLEVEL_OUTOF10: 4
PAINLEVEL_OUTOF10: 10
PAINLEVEL_OUTOF10: 10
PAINLEVEL_OUTOF10: 0
PAINLEVEL_OUTOF10: 4
PAINLEVEL_OUTOF10: 5
PAINLEVEL_OUTOF10: 10
PAINLEVEL_OUTOF10: 0
PAINLEVEL_OUTOF10: 8

## 2019-04-24 ASSESSMENT — PAIN DESCRIPTION - FREQUENCY
FREQUENCY: CONTINUOUS
FREQUENCY: CONTINUOUS

## 2019-04-24 ASSESSMENT — PAIN DESCRIPTION - LOCATION
LOCATION: ABDOMEN
LOCATION: ABDOMEN;BACK

## 2019-04-24 ASSESSMENT — HEART SCORE: ECG: 0

## 2019-04-24 ASSESSMENT — LIFESTYLE VARIABLES: SMOKING_STATUS: 1

## 2019-04-24 ASSESSMENT — PAIN DESCRIPTION - PROGRESSION: CLINICAL_PROGRESSION: NOT CHANGED

## 2019-04-24 ASSESSMENT — PAIN DESCRIPTION - ORIENTATION: ORIENTATION: LEFT;UPPER

## 2019-04-24 ASSESSMENT — PAIN DESCRIPTION - PAIN TYPE
TYPE: ACUTE PAIN
TYPE: SURGICAL PAIN
TYPE: ACUTE PAIN
TYPE: SURGICAL PAIN

## 2019-04-24 ASSESSMENT — PAIN DESCRIPTION - DESCRIPTORS
DESCRIPTORS: ACHING;SPASM;CRAMPING
DESCRIPTORS: ACHING;DULL

## 2019-04-24 ASSESSMENT — PAIN DESCRIPTION - ONSET: ONSET: ON-GOING

## 2019-04-24 NOTE — ED PROVIDER NOTES
[address]  ED  eMERGENCY dEPARTMENT eNCOUnter   Attending Attestation     Pt Name: Jose Lin MRN: [id number]  Janaygfsharita 1970  Date of evaluation: 4/24/19       Jose Lin is a 57 y.o. male who presents with Abdominal Pain; Emesis; and Back Pain      History: Pt presents with right sided abdominal pain and chest pain and is straining to defecate. Pt states that he was told to go right to his doctor from the inpatient psych facility. Pt unable to do so due to transportation. Exam: HRRR, lungs CTABL, abdomen soft and non tender. Pt well appearing. Pt has tenderness over the right abdomen. Plan for abdominal workup/cardiac workup. Will treat symptoms     I performed a history and physical examination of the patient and discussed management with the resident. I reviewed the residents note and agree with the documented findings and plan of care. Any areas of disagreement are noted on the chart. I was personally present for the key portions of any procedures. I have documented in the chart those procedures where I was not present during the key portions. I have personally reviewed all images and agree with the resident's interpretation. I have reviewed the emergency nurses triage note. I agree with the chief complaint, past medical history, past surgical history, allergies, medications, social and family history as documented unless otherwise noted below. Documentation of the HPI, Physical Exam and Medical Decision Making performed by medical students or scribes is based on my personal performance of the HPI, PE and MDM. For Phys Assistant/ Nurse Practitioner cases/documentation I have had a face to face evaluation of this patient and have completed at least one if not all key elements of the E/M (history, physical exam, and MDM). Additional findings are as noted.     For APC cases I have personally evaluated and examined the patient in conjunction with the APC and agree with the treatment plan and disposition of the patient as recorded by the APC.     Jamel Roberto MD  Attending Emergency  Physician       Terrell Sauceda MD  04/24/19 [id number]

## 2019-04-24 NOTE — ED NOTES
Pt resting in bed. Given ice chips. Given PRN Morphine and Zofran per pt request. Pain 10/10 per pt.       Jamel Encinas RN  04/24/19 6845

## 2019-04-24 NOTE — CARE COORDINATION
Name: Amor Castaneda. : 1970    Discharge Date: 19    Primary Auth/Cert #: [id number]    Pt was discharged to private residence. Discharge Medications:      Medication List      START taking these medications    hydrOXYzine 25 MG tablet  Commonly known as:  ATARAX  Take 1 tablet by mouth 3 times daily as needed for Anxiety  Notes to patient:  anxiety     * insulin lispro 100 UNIT/ML injection vial  Commonly known as:  HUMALOG  Inject 0-12 Units into the skin 3 times daily (with meals)  Notes to patient:  Control blood sugar     * insulin lispro 100 UNIT/ML injection vial  Commonly known as:  HUMALOG  Inject 0-6 Units into the skin nightly  Notes to patient:  Control blood sugar     paliperidone 6 MG extended release tablet  Commonly known as:  INVEGA  Take 1 tablet by mouth daily for 7 days  Notes to patient:  Clear thoughts     paliperidone palmitate  MG/ML Isabel IM injection  Commonly known as:  INVEGA SUSTENNA  Inject 156 mg into the muscle every 30 days  Start taking on:  2019  Notes to patient:  Clear thoughts         * This list has 2 medication(s) that are the same as other medications prescribed for you. Read the directions carefully, and ask your doctor or other care provider to review them with you. CHANGE how you take these medications    busPIRone 10 MG tablet  Commonly known as:  BUSPAR  Take 2 tablets by mouth 3 times daily  What changed:    · medication strength  · how much to take  · Another medication with the same name was removed. Continue taking this medication, and follow the directions you see here. Notes to patient:  anxiety     * divalproex 500 MG extended release tablet  Commonly known as:  DEPAKOTE ER  Take 1 tablet by mouth nightly  What changed:  Another medication with the same name was changed. Make sure you understand how and when to take each.   Notes to patient:  Mood stabilizer     * divalproex 500 MG extended release tablet  Commonly known as:  DEPAKOTE ER  Take 1 tablet by mouth every morning  What changed:    · medication strength  · how much to take  Notes to patient:  Mood stabilizer     QUEtiapine 200 MG tablet  Commonly known as:  SEROQUEL  Take 1 tablet by mouth nightly  What changed:  how much to take  Notes to patient:  Clear thoughts     traZODone 100 MG tablet  Commonly known as:  DESYREL  Take 2 tablets by mouth nightly as needed for Sleep  What changed:    · medication strength  · how much to take  Notes to patient:  Sleep aid         * This list has 2 medication(s) that are the same as other medications prescribed for you. Read the directions carefully, and ask your doctor or other care provider to review them with you. CONTINUE taking these medications    * albuterol (2.5 MG/3ML) 0.083% nebulizer solution  Commonly known as:  PROVENTIL  Take 3 mLs by nebulization every 6 hours as needed for Wheezing  Notes to patient:  Breathing aid     * VENTOLIN  (90 Base) MCG/ACT inhaler  Generic drug:  albuterol sulfate HFA  INHALE 2 PUFFS INTO THE LUNGS EVERY 6 HOURS AS NEEDED FOR WHEEZING  Notes to patient:  Breathing aid     BOOST Liqd  Two a day. Chocolate lactose free nutritional drinks.      cyanocobalamin 500 MCG tablet  Notes to patient:  Vitamin     fluticasone-vilanterol 100-25 MCG/INH Aepb inhaler  Commonly known as:  BREO ELLIPTA  Inhale 1 puff into the lungs daily  Notes to patient:  Breathing aid     FREESTYLE LITE strip  Generic drug:  blood glucose test strips  1 each by Other route 3 times daily     * Gel-Foam Cushion Misc  1 Units by Does not apply route daily     * Gel-Foam Cushion Misc  1 Units by Does not apply route daily     Insulin Syringe-Needle U-100 31G X 5/16\" 0.5 ML Misc  1 each by Does not apply route 3 times daily     ipratropium 0.02 % nebulizer solution  Commonly known as:  ATROVENT  Take 2.5 mLs by nebulization 4 times daily as needed for Wheezing  Notes to patient:  Breathing aid     lidocaine 4 % cream  Commonly known as:  LMX  Apply topically 3 times daily as needed for Pain  Notes to patient:  pain     meloxicam 15 MG tablet  Commonly known as:  MOBIC  Notes to patient:  Muscle spasms     pantoprazole 40 MG tablet  Commonly known as:  PROTONIX  Take 1 tablet by mouth daily  Notes to patient:  Stomach health     polyethylene glycol powder  Commonly known as:  GLYCOLAX  Notes to patient:  constipation     pregabalin 200 MG capsule  Commonly known as:  LYRICA  Take 2 capsules by mouth 2 times daily. Notes to patient:  pain     PRO COMFORT ALCOHOL 70 % Pads     ranitidine 150 MG tablet  Commonly known as:  ZANTAC  Take 1 tablet by mouth 2 times daily as needed for Heartburn  Notes to patient:  Stomach health     SENNA-TIME 8.6 MG tablet  Generic drug:  senna  Notes to patient:  constipation     simvastatin 40 MG tablet  Commonly known as:  ZOCOR  Notes to patient:  Control cholesterol     SM PAIN RELIEVER 325 MG tablet  Generic drug:  acetaminophen  TAKE 2 TABLETS BY MOUTH EVERY 4 HOURS AS NEEDED FOR PAIN  Notes to patient:  pain     TRUEPLUS LANCETS 33G Misc         * This list has 4 medication(s) that are the same as other medications prescribed for you. Read the directions carefully, and ask your doctor or other care provider to review them with you.             STOP taking these medications    buprenorphine-naloxone 2-0.5 MG Subl  Commonly known as:  SUBOXONE     cloNIDine 0.1 MG tablet  Commonly known as:  CATAPRES     DULoxetine 30 MG extended release capsule  Commonly known as:  CYMBALTA     ENULOSE 10 GM/15ML Soln solution  Generic drug:  lactulose encephalopathy     hydrOXYzine 25 MG capsule  Commonly known as:  VISTARIL     hydrOXYzine 50 MG capsule  Commonly known as:  VISTARIL     insulin regular 100 UNIT/ML injection  Commonly known as:  HUMULIN R;NOVOLIN R     naloxegol 25 MG Tabs tablet  Commonly known as:  MOVANTIK     nicotine 21 MG/24HR  Commonly known as:  NICODERM CQ     ondansetron 4 MG tablet  Commonly known as:  ZOFRAN     tiZANidine 4 MG tablet  Commonly known as:  ZANAFLEX     topiramate 100 MG tablet  Commonly known as:  TOPAMAX           Where to Get Your Medications      These medications were sent to [address] [place] [address], [address] [facility] [place], [place] [address]    Phone:  [phone]   · busPIRone 10 MG tablet  · divalproex 500 MG extended release tablet  · divalproex 500 MG extended release tablet  · hydrOXYzine 25 MG tablet  · insulin lispro 100 UNIT/ML injection vial  · insulin lispro 100 UNIT/ML injection vial  · paliperidone 6 MG extended release tablet  · QUEtiapine 200 MG tablet  · traZODone 100 MG tablet     You can get these medications from any pharmacy    Bring a paper prescription for each of these medications  · paliperidone palmitate  MG/ML Isabel IM injection         Follow Up Appointment: CHAD Soriano - CNP  [address]  [address]      As needed    [place] [address], [address]     [phone]    Go on 4/25/2019  Dr. Josselyn Camejo @ 1:45 pm for medication management. Dr. Josselyn Camejo will discuss pt's injection with pt at this appointment per [place], . [address], [address]     [phone]  Schedule an appointment as soon as possible for a visit on 4/30/2019  Pt will need appointment for 156mg of Invega injection. Pt will need to  injection from pharmacy and take it to appointment to be administered by Dr. Josselyn Camejo.

## 2019-04-24 NOTE — ED PROVIDER NOTES
S/o from Dr. Canseco Risk    abd pain, RUQ tenderness    CT shows cholecystitis  Surgery has seen and will take to OR tonight  ABX and pain control     Viridiana Pham MD  04/30/19 2065

## 2019-04-24 NOTE — ANESTHESIA PRE PROCEDURE
meloxicam (MOBIC) 15 MG tablet Take 15 mg by mouth    Historical Provider, MD   Misc. Devices (GEL-FOAM CUSHION) MISC 1 Units by Does not apply route daily 4/12/19   CHAD Burch CNP   FREESTYLE LITE strip 1 each by Other route 3 times daily 4/12/19   CHAD Burch CNP   lidocaine (LMX) 4 % cream Apply topically 3 times daily as needed for Pain 4/12/19   CHAD Burch CNP   pantoprazole (PROTONIX) 40 MG tablet Take 1 tablet by mouth daily 4/12/19   CHAD Burch CNP   ranitidine (ZANTAC) 150 MG tablet Take 1 tablet by mouth 2 times daily as needed for Heartburn 4/12/19   CHAD Burch CNP   Misc. Devices (GEL-FOAM CUSHION) MISC 1 Units by Does not apply route daily 4/12/19   CHAD Burch CNP   pregabalin (LYRICA) 200 MG capsule Take 2 capsules by mouth 2 times daily. 4/12/19 4/11/20  CHAD Burch CNP   Nutritional Supplements (BOOST) LIQD Two a day. Chocolate lactose free nutritional drinks. 4/11/19   CHAD Burch CNP   VENTOLIN  (90 Base) MCG/ACT inhaler INHALE 2 PUFFS INTO THE LUNGS EVERY 6 HOURS AS NEEDED FOR WHEEZING 3/27/19   CHAD Burch CNP   TRUEPLUS LANCETS 33G MISC  2/8/19   Historical Provider, MD   fluticasone-vilanterol (BREO ELLIPTA) 100-25 MCG/INH AEPB inhaler Inhale 1 puff into the lungs daily 2/18/19   CHAD Burch CNP   Insulin Syringe-Needle U-100 31G X 5/16\" 0.5 ML MISC 1 each by Does not apply route 3 times daily 1/23/19   CHAD Burch CNP   cyanocobalamin 500 MCG tablet Take 500 mcg by mouth 10/26/18   Historical Provider, MD   senna (SENNA-TIME) 8.6 MG tablet twice daily as needed.   9/4/18   Historical Provider, MD   simvastatin (ZOCOR) 40 MG tablet Take 40 mg by mouth nightly     Historical Provider, MD   albuterol (PROVENTIL) (2.5 MG/3ML) 0.083% nebulizer solution Take 3 mLs by nebulization every 6 hours as needed for Wheezing 12/20/18   CHAD Burch - CNP ipratropium (ATROVENT) 0.02 % nebulizer solution Take 2.5 mLs by nebulization 4 times daily as needed for Wheezing 12/20/18   CHAD Moreira CNP    PAIN RELIEVER 325 MG tablet TAKE 2 TABLETS BY MOUTH EVERY 4 HOURS AS NEEDED FOR PAIN 10/5/18   Michoacano Choi MD   polyethylene glycol [facility]) powder Take 17 g by mouth daily as needed    Historical Provider, MD       Current medications:    Current Facility-Administered Medications   Medication Dose Route Frequency Provider Last Rate Last Dose    morphine injection 2 mg  2 mg Intravenous Q2H PRN Gentry Armenta MD   2 mg at 04/24/19 1631    ondansetron (ZOFRAN) injection 4 mg  4 mg Intravenous Q6H PRN Marino Be MD   4 mg at 04/24/19 1303    albuterol (PROVENTIL) nebulizer solution 2.5 mg  2.5 mg Nebulization Q6H PRN Marino Be MD        busPIRone (BUSPAR) tablet 20 mg  20 mg Oral TID Marino Be MD        divalproex (DEPAKOTE ER) extended release tablet 500 mg  500 mg Oral Nightly Marino Be MD        [START ON 4/25/2019] divalproex (DEPAKOTE ER) extended release tablet 500 mg  500 mg Oral QAM Marino Be MD        ipratropium (ATROVENT) 0.02 % nebulizer solution 0.5 mg  0.5 mg Nebulization 4x Daily PRN Marino Be MD        pantoprazole (PROTONIX) tablet 40 mg  40 mg Oral Daily Marino Be MD        pregabalin (LYRICA) capsule 400 mg  400 mg Oral BID Marino Be MD        QUEtiapine (SEROQUEL) tablet 200 mg  200 mg Oral Nightly Marino Be MD        ranitidine (ZANTAC) tablet 150 mg  150 mg Oral BID PRN Marino Be MD        [facility]) tablet 8.6 mg  1 tablet Oral Nightly Marino Be MD        simvastatin (ZOCOR) tablet 40 mg  40 mg Oral Nightly Marino Be MD        traZODone (DESYREL) tablet 200 mg  200 mg Oral Nightly PRN Marino Be MD        sodium chloride flush 0.9 % injection 10 mL  10 mL Intravenous 2 times per day Marino Be MD        sodium chloride flush 0.9 % injection 10 mL  10 mL Intravenous PRN Deepti Carlisle MD        acetaminophen (TYLENOL) tablet 1,000 mg  1,000 mg Oral 3 times per day Deepti Carlisle MD        lactated ringers infusion   Intravenous Continuous Deepti Carlisle  mL/hr at 04/24/19 1625      cyclobenzaprine (FLEXERIL) tablet 10 mg  10 mg Oral TID Deepti Carlisle MD        mometasone-formoterol [facility]) 200-5 MCG/ACT inhaler 2 puff  2 puff Inhalation BID Deepti Carlisle MD        piperacillin-tazobactam (ZOSYN) 3.375 g in dextrose 5 % 50 mL IVPB extended infusion (mini-bag)  3.375 g Intravenous Q8H Joseph Kuo MD        cefepime (MAXIPIME) 2 g IVPB minibag  2 g Intravenous [facility], [facility]           Allergies: Allergies   Allergen Reactions    Chantix [Varenicline Tartrate] Anaphylaxis    Sulfa Antibiotics Anaphylaxis    Sulfasalazine Anaphylaxis    Bactrim Swelling    Levofloxacin Swelling    Levofloxacin Swelling    Phenobarbital Swelling    Sulfamethoxazole-Trimethoprim     Sulfur     Tessalon [Benzonatate] Swelling    Wellbutrin [Bupropion] Other (See Comments)     Delirium         Problem List:    Patient Active Problem List   Diagnosis Code    Anxiety F41.9    Seizure ([facility]) R56.9    Myofascial muscle pain M79.18    Chronic pain G89.29    Spinal stenosis M48.00    Hyperlipidemia E78.5    Depression F32.9    Panlobular emphysema ([facility]) J43.1    Bipolar disorder ([facility]) F31.9    Chronic back pain M54.9, G89.29    Schizophrenia ([facility]) F20.9    Fibromyalgia M79.7    Type 2 diabetes mellitus with diabetic polyneuropathy, with long-term current use of insulin ([facility]) E11.42, Z79.4    Onychomycosis B35.1    Pain in lower limb M79.606    Disc disease, degenerative, cervical M50.30    Cervical stenosis of spine M48.02    Generalized tonic-clonic seizure ([facility]) G40.409    Superficial laceration of scalp S01. 01XA    Altered mental status R41.82    Decubitus ulcer of coccyx, stage 2 L89.152    Cocaine substance abuse ([facility] [address] F14.10    Opiate abuse, episodic ([facility]) F11.10    Closed fracture of nasal bone S02. 2XXA    Drug overdose U71.481S    Uncomplicated opioid dependence ([facility] [address] F11.20    Paraplegia ([facility] [address] G82.20    Schizoaffective disorder ([facility] [address] F25.9    Non-dose-related adverse reaction to medication wellbutrin T88. 7XXA    Cannabis abuse F12.10    Encephalopathy G93.40    Hyperammonemia (HCC) E72.20    Constipation K59.00    Cellulitis and abscess of right leg L03.115, L02.415    Interstitial lung disease ([facility]) J84.9    Vomiting R11.10    Bipolar 1 disorder ([facility]) F31.9    Acute cholecystitis K81.0       Past Medical History:        Diagnosis Date    Anxiety     Arthritis     osteoarthritis    Bipolar disorder (HCC)     Bronchitis, chronic (HCC)     Chronic back pain     Chronic pain     Claustrophobia     COPD (chronic obstructive pulmonary disease) (HCC)     Depression     Diabetes mellitus (HCC)     Emphysema of lung (HCC)     History of irregular heartbeat     Hyperlipidemia     Liver disease     MRSA (methicillin resistant staph aureus) culture positive     Myofacial muscle pain     Osteomyelitis (HCC)     Peripheral neuropathy     bilateral feet    Pressure ulcer     Schizophrenia (HCC)     Seizures (HCC)     Sleep apnea     no machine    Spinal stenosis     Substance abuse ([facility] [address]        Past Surgical History:        Procedure Laterality Date    APPENDECTOMY      FRACTURE SURGERY  01/28/2017    closed reduction nasal fracture    LUMBAR FUSION      SEPTOPLASTY  01/13/2017    STOMACH SURGERY      TURBINOPLASTIES  01/13/2017       Social History:    Social History     Tobacco Use    Smoking status: Current Every Day Smoker     Packs/day: 1.00     Years: 35.00     Pack years: 35.00     Types: Cigarettes     Start date: 1/1/1983    Smokeless tobacco: Never Used    Tobacco comment: Not at the present time however   Substance Use Topics    Alcohol use:  No                                Ready to quit: Not Answered  Counseling given: Not Answered  Comment: Not at the present time however      Vital Signs (Current):   Vitals:    04/24/19 0615 04/24/19 1026 04/24/19 1400 04/24/19 1619   BP: 114/81 120/83 118/76 121/76   Pulse: 101 103 106 80   Resp:  18 18 18   Temp:   98.2 °F (36.8 °C) 98.1 °F (36.7 °C)   TempSrc:   Oral Oral   SpO2:  98% 98% 97%   Weight:       Height:                                                  BP Readings from Last 3 Encounters:   04/24/19 121/76   04/15/19 100/65   04/12/19 108/69       NPO Status:                                                                                 BMI:   Wt Readings from Last 3 Encounters:   04/24/19 180 lb (81.6 kg)   04/15/19 180 lb (81.6 kg)   12/11/18 165 lb (74.8 kg)     Body mass index is 22.5 kg/m².     CBC:   Lab Results   Component Value Date    WBC 18.2 04/24/2019    RBC 5.09 04/24/2019    HGB 14.3 04/24/2019    HCT 45.0 04/24/2019    MCV 88.4 04/24/2019    RDW 14.6 04/24/2019     04/24/2019       CMP:   Lab Results   Component Value Date     04/24/2019    K 4.1 04/24/2019     04/24/2019    CO2 23 04/24/2019    BUN 8 04/24/2019    CREATININE 0.63 04/24/2019    GFRAA >60 04/24/2019    LABGLOM >60 04/24/2019    GLUCOSE 169 04/24/2019    PROT 7.3 04/24/2019    CALCIUM 9.3 04/24/2019    BILITOT 0.24 04/24/2019    ALKPHOS 98 04/24/2019    AST 38 04/24/2019    ALT 57 04/24/2019       POC Tests:   Recent Labs     04/23/19  1134   POCGLU 114*       Coags:   Lab Results   Component Value Date    PROTIME 11.0 04/14/2019    INR 1.0 04/14/2019    APTT 29.2 03/10/2015       HCG (If Applicable): No results found for: PREGTESTUR, PREGSERUM, HCG, HCGQUANT     ABGs: No results found for: PHART, PO2ART, XGD5NXP, YSK9VKR, BEART, G6KNBEDI     Type & Screen (If Applicable):  No results found for: LABABO, [address]    Anesthesia Evaluation  Patient summary reviewed no history of anesthetic complications:   Airway: Mallampati: III       Mouth opening: > = 3 FB Dental:          Pulmonary:normal exam  breath sounds clear to auscultation  (+) COPD:  sleep apnea:  current smoker          Patient smoked on day of surgery. Cardiovascular:Negative CV ROS  Exercise tolerance: good (>4 METS),           Rhythm: regular  Rate: normal                    Neuro/Psych:   (+) seizures:, neuromuscular disease:, psychiatric history:             ROS comment: Cocaine substance abuse (HCC)  Opiate abuse, episodic (HCC)     GI/Hepatic/Renal:   (+) liver disease:,           Endo/Other:    (+) DiabetesType II DM, , .                 Abdominal:           Vascular:                                        Anesthesia Plan      general     ASA 4       Induction: intravenous. Anesthetic plan and risks discussed with patient. Plan discussed with CRNA.                   Elizabeth Hunt MD   4/24/2019

## 2019-04-24 NOTE — ED NOTES
Pt resting on cot respirations even and unlabored, pt denies needs at this time, pt talking on the phone, pt to go for surgery later today to have gallbladder removed     Kathi Langston RN  04/24/19 8178

## 2019-04-24 NOTE — H&P
History and Physical - General Surgery    PATIENT NAME: Arron Cummings. AGE: 57 y.o. MEDICAL RECORD NO. [id number]  DATE: 4/24/2019  SURGEON: Maria E Gomes  PRIMARY CARE PHYSICIAN: CHAD Yañez CNP    Patient evaluated at the request of  Dr. Sybil Tsai  Reason for evaluation: Abd pain    Patient information was obtained from patient. History/Exam limitations: none. Patient presented to the Emergency Department by ambulance where the patient received see Ambulance Run Sheet prior to arrival.    IMPRESSION:     Patient Active Problem List   Diagnosis    Anxiety    Seizure ([address]    Myofascial muscle pain    Chronic pain    Spinal stenosis    Hyperlipidemia    Depression    Panlobular emphysema ([address]    Bipolar disorder ([address]    Chronic back pain    Schizophrenia ([address]    Fibromyalgia    Type 2 diabetes mellitus with diabetic polyneuropathy, with long-term current use of insulin (HCC)    Onychomycosis    Pain in lower limb    Disc disease, degenerative, cervical    Cervical stenosis of spine    Generalized tonic-clonic seizure ([address]    Superficial laceration of scalp    Altered mental status    Decubitus ulcer of coccyx, stage 2    Cocaine substance abuse ([address]    Opiate abuse, episodic ([address]    Closed fracture of nasal bone    Drug overdose    Uncomplicated opioid dependence ([address]    Paraplegia ([address]    Schizoaffective disorder ([address]    Non-dose-related adverse reaction to medication wellbutrin    Cannabis abuse    Encephalopathy    Hyperammonemia ([address]    Constipation    Cellulitis and abscess of right leg    Interstitial lung disease (HCC)    Vomiting    Bipolar 1 disorder (HCC)     Acute cholecystitis      MEDICAL DECISION MAKING AND PLAN:   CT scan from today when compared to 2 weeks prior shows large distended gallbladder with wall thickening.   LFTs normal, WBC 18.2, UA -  Will obtain right upper quadrant ultrasound  Ultrasound shows concern for acute cholecystitis  We'll take to the OR today for cholecystectomy   NPO      HISTORY:   History of Chief Complaint:    Dipak Fay. is a 57 y.o. male who presents with 1 week of worsening right upper quadrant pain, nausea, vomiting. Was recently admitted to the observation unit for persistent vomiting and generalized abdominal pain. Had any normal CT scan at that time. White blood cell count was elevated at that time and 22.7 and had slight lactic acid elevation at 2.4. Does have history of drug abuse. Has not had a bowel movement in approximately 4 days. Bowel movements are not melanotic or bloody  Emesis is nonbloody nonbilious. Past Medical History   has a past medical history of Anxiety, Arthritis, Bipolar disorder ([address] Bronchitis, chronic ([address] Chronic back pain, Chronic pain, Claustrophobia, COPD (chronic obstructive pulmonary disease) ([address] Depression, Diabetes mellitus ([address] Emphysema of lung ([address] History of irregular heartbeat, Hyperlipidemia, Liver disease, MRSA (methicillin resistant staph aureus) culture positive, Myofacial muscle pain, Osteomyelitis ([address] Peripheral neuropathy, Pressure ulcer, Schizophrenia ([address] Seizures ([address] Sleep apnea, Spinal stenosis, and Substance abuse ([address] Past Surgical History   has a past surgical history that includes Appendectomy; Stomach surgery; lumbar fusion; Septoplasty (01/13/2017); Turbinoplasties (01/13/2017); and fracture surgery (01/28/2017). Medications  Prior to Admission medications    Medication Sig Start Date End Date Taking?  Authorizing Provider   busPIRone (BUSPAR) 10 MG tablet Take 2 tablets by mouth 3 times daily 4/23/19   Sangita CHAD Breaux CNP   hydrOXYzine (ATARAX) 25 MG tablet Take 1 tablet by mouth 3 times daily as needed for Anxiety 4/23/19 5/7/19  Sangita CHAD Breaux - CNP   divalproex (DEPAKOTE ER) 500 MG extended release tablet Take 1 tablet by mouth nightly 4/23/19   Sangita Namita APRMARY ANN - DEE divalproex (DEPAKOTE ER) 500 MG extended release tablet Take 1 tablet by mouth every morning 4/24/19   CHAD Duran CNP   traZODone (DESYREL) 100 MG tablet Take 2 tablets by mouth nightly as needed for Sleep 4/23/19   CHAD Duran CNP   insulin lispro (HUMALOG) 100 UNIT/ML injection vial Inject 0-12 Units into the skin 3 times daily (with meals) 4/23/19   CHAD Duran CNP   insulin lispro (HUMALOG) 100 UNIT/ML injection vial Inject 0-6 Units into the skin nightly 4/23/19   CHAD Duran CNP   paliperidone (INVEGA) 6 MG extended release tablet Take 1 tablet by mouth daily for 7 days 4/24/19 5/1/19  CHAD Duran CNP   paliperidone palmitate ER (INVEGA SUSTENNA) 156 MG/ML LYUBOV IM injection Inject 156 mg into the muscle every 30 days 4/30/19   CHAD Duran CNP   QUEtiapine (SEROQUEL) 200 MG tablet Take 1 tablet by mouth nightly 4/23/19   CHAD Duran CNP   Alcohol Swabs (PRO COMFORT ALCOHOL) 70 % PADS  3/30/19   Historical Provider, MD   meloxicam (MOBIC) 15 MG tablet Take 15 mg by mouth    Historical Provider, MD   Misc. Devices (GEL-FOAM CUSHION) MISC 1 Units by Does not apply route daily 4/12/19   Vearl Stairs, APRN - CNP   FREESTYLE LITE strip 1 each by Other route 3 times daily 4/12/19   Vearl Stairs, APRN - CNP   lidocaine (LMX) 4 % cream Apply topically 3 times daily as needed for Pain 4/12/19   Vearl Stairs, CHAD Kiran CNP   pantoprazole (PROTONIX) 40 MG tablet Take 1 tablet by mouth daily 4/12/19   Vearl Stairs, APRN - CNP   ranitidine (ZANTAC) 150 MG tablet Take 1 tablet by mouth 2 times daily as needed for Heartburn 4/12/19   Vearl Stairs, APRN - CNP   Misc. Devices (GEL-FOAM CUSHION) MISC 1 Units by Does not apply route daily 4/12/19   CHAD Toledo CNP   pregabalin (LYRICA) 200 MG capsule Take 2 capsules by mouth 2 times daily.  4/12/19 4/11/20  CHAD Toledo CNP   Nutritional Supplements (BOOST) LIQD Two a day. Chocolate lactose free nutritional drinks. 4/11/19   CHAD Burch CNP   VENTOLIN  (90 Base) MCG/ACT inhaler INHALE 2 PUFFS INTO THE LUNGS EVERY 6 HOURS AS NEEDED FOR WHEEZING 3/27/19   CHAD Burch CNP   TRUEPLUS LANCETS 33G MISC  2/8/19   Historical Provider, MD   fluticasone-vilanterol (BREO ELLIPTA) 100-25 MCG/INH AEPB inhaler Inhale 1 puff into the lungs daily 2/18/19   CHAD Burch CNP   Insulin Syringe-Needle U-100 31G X 5/16\" 0.5 ML MISC 1 each by Does not apply route 3 times daily 1/23/19   CHAD Burch CNP   cyanocobalamin 500 MCG tablet Take 500 mcg by mouth 10/26/18   Historical Provider, MD   senna (SENNA-TIME) 8.6 MG tablet twice daily as needed. 9/4/18   Historical Provider, MD   simvastatin (ZOCOR) 40 MG tablet Take 40 mg by mouth nightly     Historical Provider, MD   albuterol (PROVENTIL) (2.5 MG/3ML) 0.083% nebulizer solution Take 3 mLs by nebulization every 6 hours as needed for Wheezing 12/20/18   CHAD Burch CNP   ipratropium (ATROVENT) 0.02 % nebulizer solution Take 2.5 mLs by nebulization 4 times daily as needed for Wheezing 12/20/18   CHAD Burch CNP   SM PAIN RELIEVER 325 MG tablet TAKE 2 TABLETS BY MOUTH EVERY 4 HOURS AS NEEDED FOR PAIN 10/5/18   Percy Parrish MD   polyethylene glycol [facility]) powder Take 17 g by mouth daily as needed    Historical Provider, MD    Scheduled Meds:   piperacillin-tazobactam  3.375 g Intravenous Q6H     Continuous Infusions:  PRN Meds:.morphine, ondansetron  Allergies  is allergic to chantix [varenicline tartrate]; sulfa antibiotics; sulfasalazine; bactrim; levofloxacin; levofloxacin; phenobarbital; sulfamethoxazole-trimethoprim; sulfur; tessalon [benzonatate]; and wellbutrin [bupropion]. Family History  family history includes Coronary Art Dis in his maternal uncle; Diabetes in his mother; Seizures in his maternal cousin and sister.   Social History   reports that he has been smoking cigarettes. He started smoking about 36 years ago. He has a 35.00 pack-year smoking history. He has never used smokeless tobacco.   reports that he does not drink alcohol. reports that he has current or past drug history. Drugs: Marijuana and Opiates . Review of Systems  General Denies any fever or chills  HEENT Denies any diplopia, tinnitus or vertigo  Resp Denies any shortness of breath, cough or wheezing  Cardiac Denies any chest pain, palpitations, claudication or edema  GI Positive for nausea, vomiting and constipation  Denies any melena, hematochezia, hematemesis or pyrosis   Denies any frequency, urgency, hesitancy or incontinence  Heme Denies bruising or bleeding easily  Endocrine Denies any history of diabetes or thyroid disease  Neuro Denies any focal motor or sensory deficits    PHYSICAL:   VITALS:  height is 6' 3\" (1.905 m) and weight is 180 lb (81.6 kg). His oral temperature is 99.2 °F (37.3 °C). His blood pressure is 114/81 and his pulse is 101. His respiration is 15 and oxygen saturation is 93%. CONSTITUTIONAL: Alert and oriented times 3, no acute distress and cooperative to examination. HEENT: Head is normocephalic, atraumatic. EOMI, PERRLA  NECK: Soft, trachea midline and straight  LUNGS: Chest expands equally bilaterally upon respiration, no accessory muscle used. Ausculation reveals no wheezes, rales or rhonchi. CARDIOVASCULAR: Heart sounds are normal.  Regular rate and rhythm without murmur, gallop or rub. ABDOMEN: soft, tender to right upper quadrant palpation, nondistended, no masses or organomegaly, no hernias palpable, no guarding or peritoneal signs. Bowel sounds are present in all four quadrants Scars are consistent with previous surgical history. NEUROLOGIC: CN II-XII are grossly intact.  There are no focalizing motor or sensory deficits  EXTREMITIES: no cyanosis, clubbing or edema    LABS:     Recent Labs     04/24/19  3839 04/24/19  0625   WBC 18.2*  --    HGB 14.3  --    HCT 45.0  --      --      --    K 4.1  --      --    CO2 23  --    BUN 8  --    CREATININE 0.63*  --    CALCIUM 9.3  --    AST 38  --    ALT 57*  --    BILITOT 0.24*  --    NITRU  --  NEGATIVE   COLORU  --  YELLOW     Recent Labs     04/24/19  0539   ALKPHOS 98   ALT 57*   AST 38   BILITOT 0.24*   LABALBU 3.9   LIPASE 18       RADIOLOGY:       Ct Abdomen Pelvis W Iv Contrast Additional Contrast? None    Result Date: 4/24/2019  EXAMINATION: CT OF THE ABDOMEN AND PELVIS WITH CONTRAST 4/24/2019 11:18 am TECHNIQUE: CT of the abdomen and pelvis was performed with the administration of intravenous contrast. Multiplanar reformatted images are provided for review. Dose modulation, iterative reconstruction, and/or weight based adjustment of the mA/kV was utilized to reduce the radiation dose to as low as reasonably achievable. COMPARISON: 04/14/2019 HISTORY: Ordering Physician Provided Reason for Exam: abd pain Relevant Medical/Surgical History: mrsa, emphysema of lung, liver disease, chronic bronchitis, h/o stomach shikha, lumbar fusion , appy,  multiple allergies but not to IV contrast FINDINGS: Lower Chest: Normal heart size. Bibasilar atelectasis. Organs: Liver, spleen, pancreas and adrenal glands appear unremarkable. Multiple small well-circumscribed hypodense lesions in the bilateral kidneys are too small to characterize but appear benign likely cysts. Interval development of diffuse gallbladder dilatation with mild wall thickening/edema and punctate layering calcification suggesting stones in the fundus. GI/Bowel: No evidence of bowel obstruction or inflammation. Pelvis: Bladder and prostate appear unremarkable Peritoneum/Retroperitoneum: Normal caliber abdominal aorta. Aorto bi-iliac atherosclerosis. No suspicious lymphadenopathy. No ascites or free air. Bones/Soft Tissues: Posterior L5-S1 disc space and right-sided neural foraminal narrowing. Postsurgical changes along the L5-S1 posterior elements. 1. Interval development of diffuse gallbladder dilatation with mild wall thickening/edema and punctate layering calcification suggesting stones in the fundus. Findings suggest cholecystitis in the appropriate clinical setting. Recommend further evaluation with HIDA scan. 2. Multiple incidental/chronic findings as detailed above. Xr Chest Portable    Result Date: 4/24/2019  EXAMINATION: SINGLE XRAY VIEW OF THE CHEST 4/24/2019 6:15 am COMPARISON: December 12, 2018 HISTORY: ORDERING SYSTEM PROVIDED HISTORY: right sided chest pain TECHNOLOGIST PROVIDED HISTORY: right sided chest pain FINDINGS: Low lung volumes. No focal consolidation. No cardiomegaly. No pulmonary edema. No acute findings. Thank you for the interesting evaluation. Further recommendations to follow. Wendy Deutsch DO  4/24/19, 1:35 PM       I personally evaluated the patient and directed the medical decision making with Resident/PILI after the physical/radiologic exam and laboratory values were reviewed and confirmed. ANM    Patient has s/sx with cholecystits and hyropic gallbladder, wbc 18. Patient requires open cholecystectomy due to previous surgeries.      Ongoing fluids, and abx, will consult anesthesia for epidural placement secondary to significant hx of narcotic abuse    To OR this pm

## 2019-04-24 NOTE — ED NOTES
Pt resting in bed with eyes closed. Even and unlabored respirations noted. Will continue to monitor.       Delmi Tineo RN  04/24/19 [id number]

## 2019-04-24 NOTE — ED NOTES
Pt states pain is coming back, nauseated from pain, Dr Johanne Booker informed     Froy Paige, GILBERT  04/24/19 [id number]

## 2019-04-24 NOTE — ED NOTES
Report called to [facility] RN. All questions answered.       Fernandez Wilkinson RN  04/24/19 [id number]

## 2019-04-24 NOTE — ED NOTES
Pt resting in bed asking for a warm blanket. Warm blankets given and no other complaints at this time.       Angel Luis Lundy RN  04/24/19 1305

## 2019-04-24 NOTE — PLAN OF CARE
Problem: SAFETY  Goal: Free from accidental physical injury  Outcome: Met This Shift     Problem: Falls - Risk of:  Goal: Will remain free from falls  Description  Will remain free from falls  Outcome: Met This Shift  Goal: Absence of physical injury  Description  Absence of physical injury  Outcome: Met This Shift     Problem: DAILY CARE  Goal: Daily care needs are met  Outcome: Ongoing     Problem: PAIN  Goal: Patient's pain/discomfort is manageable  Outcome: Ongoing     Problem: SKIN INTEGRITY  Goal: Skin integrity is maintained or improved  Outcome: Ongoing     Problem: Pain:  Goal: Pain level will decrease  Description  Pain level will decrease  Outcome: Ongoing  Goal: Control of acute pain  Description  Control of acute pain  Outcome: Ongoing  Goal: Control of chronic pain  Description  Control of chronic pain  Outcome: Ongoing

## 2019-04-24 NOTE — ED PROVIDER NOTES
[facility]  ED  Emergency Department Encounter  EmergencyMedicine Resident     Pt Name:Julien Jung. MRN: [id number]  Birthdate 1970  Date of evaluation: 4/24/19  PCP:  CHAD Ludwig CNP    CHIEF COMPLAINT       Chief Complaint   Patient presents with    Abdominal Pain    Emesis    Back Pain       HISTORY OF PRESENT ILLNESS  (Location/Symptom, Timing/Onset, Context/Setting, Quality, Duration, Modifying Factors, Severity.)      Helen Lesches. is a 57 y.o. male who presents with multiple complaints including nausea, vomiting, belly pain or chest pain. Patient was admitted to the [facility] about 10 days ago, he was subsequently transferred to [facility]. Patient was just discharged yesterday evening. Patient states that he had been having nausea, vomiting, chest pain and belly pain while at [facility]. States his symptoms of an ongoing for the past 2 or 3 days. Denies any precipitating events. States the emesis is nonbloody in nature. States that he can't keep anything down even when trying to drink water or eat food. States it is a constant right-sided chest pain as well. Also states that he's had constant right sided abdominal pain as well. Denies any dysuria or hematuria. States that he hasn't had a bowel movement in about 4 days. States that sometimes he goes up to 7 days without a bowel movement.     PAST MEDICAL / SURGICAL / SOCIAL / FAMILY HISTORY      has a past medical history of Anxiety, Arthritis, Bipolar disorder ([address] Bronchitis, chronic (HCC), Chronic back pain, Chronic pain, Claustrophobia, COPD (chronic obstructive pulmonary disease) ([address] Depression, Diabetes mellitus ([address] Emphysema of lung ([address] History of irregular heartbeat, Hyperlipidemia, Liver disease, MRSA (methicillin resistant staph aureus) culture positive, Myofacial muscle pain, Osteomyelitis ([address] Peripheral neuropathy, Pressure ulcer, Schizophrenia ([address] Seizures ([address] Sleep apnea, Spinal stenosis, and Substance abuse ([facility] [address] has a past surgical history that includes Appendectomy; Stomach surgery; lumbar fusion; Septoplasty (01/13/2017); Turbinoplasties (01/13/2017); and fracture surgery (01/28/2017).     Social History     Socioeconomic History    Marital status:      Spouse name: Not on file    Number of children: Not on file    Years of education: Not on file    Highest education level: Not on file   Occupational History    Not on file   Social Needs    Financial resource strain: Not on file    Food insecurity:     Worry: Not on file     Inability: Not on file    Transportation needs:     Medical: Not on file     Non-medical: Not on file   Tobacco Use    Smoking status: Current Every Day Smoker     Packs/day: 1.00     Years: 35.00     Pack years: 35.00     Types: Cigarettes     Start date: 1/1/1983    Smokeless tobacco: Never Used    Tobacco comment: Not at the present time however   Substance and Sexual Activity    Alcohol use: No    Drug use: Yes     Types: Marijuana, Opiates      Comment: pt reported hx of drug abuse    Sexual activity: Never   Lifestyle    Physical activity:     Days per week: Not on file     Minutes per session: Not on file    Stress: Not on file   Relationships    Social connections:     Talks on phone: Not on file     Gets together: Not on file     Attends Taoist service: Not on file     Active member of club or organization: Not on file     Attends meetings of clubs or organizations: Not on file     Relationship status: Not on file    Intimate partner violence:     Fear of current or ex partner: Not on file     Emotionally abused: Not on file     Physically abused: Not on file     Forced sexual activity: Not on file   Other Topics Concern    Not on file   Social History Narrative    Not on file       Family History   Problem Relation Age of Onset    Diabetes Mother         many family members with DM    Seizures Sister    [facility] Coron[facility] Dis Maternal Uncle     Seizures Maternal Cousin        Allergies:  Chantix [varenicline tartrate]; Sulfa antibiotics; Sulfasalazine; Bactrim; Levofloxacin; Levofloxacin; Phenobarbital; Sulfamethoxazole-trimethoprim; Sulfur; Tessalon [benzonatate]; and Wellbutrin [bupropion]    Home Medications:  Prior to Admission medications    Medication Sig Start Date End Date Taking? Authorizing Provider   busPIRone (BUSPAR) 10 MG tablet Take 2 tablets by mouth 3 times daily 4/23/19   [facility] Romulo, APRN - CNP   hydrOXYzine (ATARAX) 25 MG tablet Take 1 tablet by mouth 3 times daily as needed for Anxiety 4/23/19 5/7/19  Wilene Lines, APRN - CNP   divalproex (DEPAKOTE ER) 500 MG extended release tablet Take 1 tablet by mouth nightly 4/23/19   Wilene Lines, APRN - CNP   divalproex (DEPAKOTE ER) 500 MG extended release tablet Take 1 tablet by mouth every morning 4/24/19   Wilene Lines, APRN - CNP   traZODone (DESYREL) 100 MG tablet Take 2 tablets by mouth nightly as needed for Sleep 4/23/19   Wilene Lines, APRN - CNP   insulin lispro (HUMALOG) 100 UNIT/ML injection vial Inject 0-12 Units into the skin 3 times daily (with meals) 4/23/19   Wilene Lines, APRN - CNP   insulin lispro (HUMALOG) 100 UNIT/ML injection vial Inject 0-6 Units into the skin nightly 4/23/19   Wilene Lines, APRN - CNP   paliperidone (INVEGA) 6 MG extended release tablet Take 1 tablet by mouth daily for 7 days 4/24/19 5/1/19  Wilene Lines, APRN - CNP   paliperidone palmitate ER (INVEGA SUSTENNA) 156 MG/ML LYUBOV IM injection Inject 156 mg into the muscle every 30 days 4/30/19   Wilene Lines, APRN - CNP   QUEtiapine (SEROQUEL) 200 MG tablet Take 1 tablet by mouth nightly 4/23/19   Wilene Lines, APRN - CNP   Alcohol Swabs (PRO COMFORT ALCOHOL) 70 % PADS  3/30/19   Historical Provider, MD   meloxicam (MOBIC) 15 MG tablet Take 15 mg by mouth    Historical Provider, MD   Misc.  Devices (GEL-FOAM CUSHION) MISC 1 Units by Does not apply route daily 4/12/19   CHAD Mei CNP   FREESTYLE LITE strip 1 each by Other route 3 times daily 4/12/19   CHAD Mei CNP   lidocaine (LMX) 4 % cream Apply topically 3 times daily as needed for Pain 4/12/19   CHAD Mei CNP   pantoprazole (PROTONIX) 40 MG tablet Take 1 tablet by mouth daily 4/12/19   CHAD Mei CNP   ranitidine (ZANTAC) 150 MG tablet Take 1 tablet by mouth 2 times daily as needed for Heartburn 4/12/19   CHAD Mei CNP   Misc. Devices (GEL-FOAM CUSHION) MISC 1 Units by Does not apply route daily 4/12/19   CHAD Mei CNP   pregabalin (LYRICA) 200 MG capsule Take 2 capsules by mouth 2 times daily. 4/12/19 4/11/20  CHAD Mei CNP   Nutritional Supplements (BOOST) LIQD Two a day. Chocolate lactose free nutritional drinks. 4/11/19   CHAD Mei CNP   VENTOLIN  (90 Base) MCG/ACT inhaler INHALE 2 PUFFS INTO THE LUNGS EVERY 6 HOURS AS NEEDED FOR WHEEZING 3/27/19   CHAD Mei CNP   TRUEPLUS LANCETS 33G MISC  2/8/19   Historical Provider, MD   fluticasone-vilanterol (BREO ELLIPTA) 100-25 MCG/INH AEPB inhaler Inhale 1 puff into the lungs daily 2/18/19   CHAD Mei CNP   Insulin Syringe-Needle U-100 31G X 5/16\" 0.5 ML MISC 1 each by Does not apply route 3 times daily 1/23/19   CHAD Mei CNP   cyanocobalamin 500 MCG tablet Take 500 mcg by mouth 10/26/18   Historical Provider, MD   senna (SENNA-TIME) 8.6 MG tablet twice daily as needed.   9/4/18   Historical Provider, MD   simvastatin (ZOCOR) 40 MG tablet Take 40 mg by mouth nightly     Historical Provider, MD   albuterol (PROVENTIL) (2.5 MG/3ML) 0.083% nebulizer solution Take 3 mLs by nebulization every 6 hours as needed for Wheezing 12/20/18   CHAD Mei CNP   ipratropium (ATROVENT) 0.02 % nebulizer solution Take 2.5 mLs by nebulization 4 times daily as needed for Wheezing 12/20/18   CHAD Young - CNP   SM PAIN RELIEVER 325 MG tablet TAKE 2 TABLETS BY MOUTH EVERY 4 HOURS AS NEEDED FOR PAIN 10/5/18   Génesis Payne MD   polyethylene glycol [facility]) powder Take 17 g by mouth daily as needed    Historical Provider, MD       REVIEW OF SYSTEMS    (2-9 systems for level 4, 10 or more for level 5)      Review of Systems   Constitutional: Negative for chills and fever. HENT: Negative for rhinorrhea and sore throat. Eyes: Negative for pain and visual disturbance. Respiratory: Negative for cough and shortness of breath. Cardiovascular: Positive for chest pain. Negative for palpitations. Gastrointestinal: Positive for abdominal pain, nausea and vomiting. Genitourinary: Negative for difficulty urinating and dysuria. Musculoskeletal: Negative for arthralgias and myalgias. Skin: Negative for color change and wound. Neurological: Negative for weakness, numbness and headaches. Psychiatric/Behavioral: Negative for behavioral problems and dysphoric mood. PHYSICAL EXAM   (up to 7 for level 4, 8 or more for level 5)      INITIAL VITALS:   /81   Pulse 101   Temp 99.2 °F (37.3 °C) (Oral)   Resp 15   Ht 6' 3\" (1.905 m)   Wt 180 lb (81.6 kg)   SpO2 93%   BMI 22.50 kg/m²     Physical Exam   Constitutional: He is oriented to person, place, and time. He appears well-developed and well-nourished. No distress. HENT:   Head: Normocephalic and atraumatic. Mouth/Throat: Oropharynx is clear and moist.   Eyes: Pupils are equal, round, and reactive to light. EOM are normal.   Neck: Normal range of motion. Cardiovascular: Normal rate and regular rhythm. Pulmonary/Chest: Effort normal. He has no wheezes. He has no rales. Abdominal: Soft. There is tenderness. There is no rebound and no guarding. Tenderness to palpation to right side of abdomen; some voluntary guarding; no rigidity; non-peritoneal   Musculoskeletal: Normal range of motion. guarding but no rigidity. Abdomen is non-peritoneal.  Given presentation, we'll plan to obtain abdominal labs, cardiac labs, chest, EKG, urinalysis and treated with Pepcid, Bentyl and Zofran. We'll consider obtaining a CT of the abdomen as well. RADIOLOGY:  Xr Hip Right (2-3 Views)    Result Date: 4/16/2019  EXAMINATION: 2 XRAY VIEWS OF THE RIGHT HIP 4/16/2019 7:43 pm COMPARISON: September 28, 2017 HISTORY: ORDERING SYSTEM PROVIDED HISTORY: pain TECHNOLOGIST PROVIDED HISTORY: pain FINDINGS: Right hip in anatomic alignment. Cortical margins intact. Degenerative changes and postop changes L5-S1. No acute disease     Ct Abdomen Pelvis W Iv Contrast    Result Date: 4/15/2019  EXAMINATION: CT OF THE ABDOMEN AND PELVIS WITH CONTRAST, 4/14/2019 10:44 pm TECHNIQUE: CT of the abdomen and pelvis was performed with the administration of intravenous contrast. Multiplanar reformatted images are provided for review. Dose modulation, iterative reconstruction, and/or weight based adjustment of the mA/kV was utilized to reduce the radiation dose to as low as reasonably achievable. COMPARISON: CTA of the abdomen and pelvis 09/02/2018 HISTORY: ORDERING SYSTEM PROVIDED HISTORY: Abdominal pain, leukocytosis, vomiting TECHNOLOGIST PROVIDED HISTORY: IV Only Contrast Ordering Physician Provided Reason for Exam: Abdominal pain, leukocytosis, vomiting Acuity: Unknown Type of Exam: Unknown FINDINGS: Lower Chest: Visualized lungs are clear. There is no basilar pericardial or pleural effusion. Organs: Liver is normal in morphology. No biliary duct dilatation. Normal gallbladder. Spleen, adrenal glands, and pancreas are normal.  Kidneys are symmetric in size and enhancement. No hydronephrosis. GI/Bowel: Stomach, small bowel, and colon are nondilated. Post appendectomy. Pelvis: Urinary bladder and pelvic organs are unremarkable. No free fluid in the pelvis. Peritoneum/Retroperitoneum: Aortoiliac atherosclerotic calcification. Abdominal aorta is normal in caliber. No abdominal or retroperitoneal adenopathy. No free fluid in the abdomen. Bones/Soft Tissues: No acute osseous abnormality. No acute inflammatory process in the abdomen or pelvis. Aortoiliac atherosclerotic calcification. Abdominal aorta is normal in caliber. Xr Chest Portable    No acute findings. EKG  Normal sinus rhythm with a ventricular rate of 92 bpm.  Axis is normal.  Intervals are within normal limits. No acute ST segment or T-wave changes. Impression: Nonspecific EKG. All EKG's are interpreted by the Emergency Department Physician who either signs or Co-signs this chart in the absence of a cardiologist.    EMERGENCY DEPARTMENT COURSE:  Patient found to have an elevated white count. Other lab work is unremarkable. Urinalysis negative. Chest x-ray negative. As such, plan is for a CT of the abdomen; currently pending. PROCEDURES:  None    CONSULTS:  None    CRITICAL CARE:  None    FINAL IMPRESSION      1. Generalized abdominal pain    2. Non-intractable vomiting with nausea, unspecified vomiting type    3. Chest pain, unspecified type          DISPOSITION / PLAN     DISPOSITION        PATIENT REFERRED TO:  No follow-up provider specified.     DISCHARGE MEDICATIONS:  New Prescriptions    No medications on file       Victor Manuel Bronson MD  Emergency Medicine Resident    (Please note that portions of thisnote were completed with a voice recognition program.  Efforts were made to edit the dictations but occasionally words are mis-transcribed.)       Lobo Ramos MD  Resident  04/24/19 3830

## 2019-04-25 LAB
ABSOLUTE EOS #: <0.03 K/UL (ref 0–0.44)
ABSOLUTE IMMATURE GRANULOCYTE: 0.07 K/UL (ref 0–0.3)
ABSOLUTE LYMPH #: 1.24 K/UL (ref 1.1–3.7)
ABSOLUTE MONO #: 1.3 K/UL (ref 0.1–1.2)
ALBUMIN SERPL-MCNC: 3.2 G/DL (ref 3.5–5.2)
ALBUMIN/GLOBULIN RATIO: 1 (ref 1–2.5)
ALP BLD-CCNC: 68 U/L (ref 40–129)
ALT SERPL-CCNC: 50 U/L (ref 5–41)
ANION GAP SERPL CALCULATED.3IONS-SCNC: 12 MMOL/L (ref 9–17)
AST SERPL-CCNC: 46 U/L
BASOPHILS # BLD: 0 % (ref 0–2)
BASOPHILS ABSOLUTE: <0.03 K/UL (ref 0–0.2)
BILIRUB SERPL-MCNC: 0.31 MG/DL (ref 0.3–1.2)
BILIRUBIN DIRECT: 0.11 MG/DL
BILIRUBIN, INDIRECT: 0.2 MG/DL (ref 0–1)
BUN BLDV-MCNC: 9 MG/DL (ref 6–20)
BUN/CREAT BLD: ABNORMAL (ref 9–20)
CALCIUM SERPL-MCNC: 8.4 MG/DL (ref 8.6–10.4)
CHLORIDE BLD-SCNC: 105 MMOL/L (ref 98–107)
CO2: 22 MMOL/L (ref 20–31)
CREAT SERPL-MCNC: 0.54 MG/DL (ref 0.7–1.2)
CULTURE: NO GROWTH
DIFFERENTIAL TYPE: ABNORMAL
EKG ATRIAL RATE: 92 BPM
EKG P AXIS: 47 DEGREES
EKG P-R INTERVAL: 146 MS
EKG Q-T INTERVAL: 346 MS
EKG QRS DURATION: 84 MS
EKG QTC CALCULATION (BAZETT): 427 MS
EKG R AXIS: 51 DEGREES
EKG T AXIS: 46 DEGREES
EKG VENTRICULAR RATE: 92 BPM
EOSINOPHILS RELATIVE PERCENT: 0 % (ref 1–4)
ESTIMATED AVERAGE GLUCOSE: 111 MG/DL
GFR AFRICAN AMERICAN: >60 ML/MIN
GFR NON-AFRICAN AMERICAN: >60 ML/MIN
GFR SERPL CREATININE-BSD FRML MDRD: ABNORMAL ML/MIN/{1.73_M2}
GFR SERPL CREATININE-BSD FRML MDRD: ABNORMAL ML/MIN/{1.73_M2}
GLOBULIN: ABNORMAL G/DL (ref 1.5–3.8)
GLUCOSE BLD-MCNC: 114 MG/DL (ref 75–110)
GLUCOSE BLD-MCNC: 122 MG/DL (ref 75–110)
GLUCOSE BLD-MCNC: 128 MG/DL (ref 75–110)
GLUCOSE BLD-MCNC: 141 MG/DL (ref 75–110)
GLUCOSE BLD-MCNC: 145 MG/DL (ref 75–110)
GLUCOSE BLD-MCNC: 152 MG/DL (ref 70–99)
GLUCOSE BLD-MCNC: 216 MG/DL (ref 75–110)
HBA1C MFR BLD: 5.5 % (ref 4–6)
HCT VFR BLD CALC: 42.4 % (ref 40.7–50.3)
HEMOGLOBIN: 13.2 G/DL (ref 13–17)
IMMATURE GRANULOCYTES: 0 %
LYMPHOCYTES # BLD: 7 % (ref 24–43)
Lab: NORMAL
MCH RBC QN AUTO: 28.1 PG (ref 25.2–33.5)
MCHC RBC AUTO-ENTMCNC: 31.1 G/DL (ref 28.4–34.8)
MCV RBC AUTO: 90.2 FL (ref 82.6–102.9)
MONOCYTES # BLD: 7 % (ref 3–12)
MRSA, DNA, NASAL: NORMAL
NRBC AUTOMATED: 0 PER 100 WBC
PDW BLD-RTO: 14.6 % (ref 11.8–14.4)
PLATELET # BLD: 210 K/UL (ref 138–453)
PLATELET ESTIMATE: ABNORMAL
PMV BLD AUTO: 9.6 FL (ref 8.1–13.5)
POTASSIUM SERPL-SCNC: 4.1 MMOL/L (ref 3.7–5.3)
RBC # BLD: 4.7 M/UL (ref 4.21–5.77)
RBC # BLD: ABNORMAL 10*6/UL
SEG NEUTROPHILS: 86 % (ref 36–65)
SEGMENTED NEUTROPHILS ABSOLUTE COUNT: 15.12 K/UL (ref 1.5–8.1)
SODIUM BLD-SCNC: 139 MMOL/L (ref 135–144)
SPECIMEN DESCRIPTION: NORMAL
SPECIMEN DESCRIPTION: NORMAL
TOTAL PROTEIN: 6.3 G/DL (ref 6.4–8.3)
WBC # BLD: 17.8 K/UL (ref 3.5–11.3)
WBC # BLD: ABNORMAL 10*3/UL

## 2019-04-25 PROCEDURE — 80048 BASIC METABOLIC PNL TOTAL CA: CPT

## 2019-04-25 PROCEDURE — 6370000000 HC RX 637 (ALT 250 FOR IP): Performed by: STUDENT IN AN ORGANIZED HEALTH CARE EDUCATION/TRAINING PROGRAM

## 2019-04-25 PROCEDURE — 2580000003 HC RX 258: Performed by: STUDENT IN AN ORGANIZED HEALTH CARE EDUCATION/TRAINING PROGRAM

## 2019-04-25 PROCEDURE — 82947 ASSAY GLUCOSE BLOOD QUANT: CPT

## 2019-04-25 PROCEDURE — 6360000002 HC RX W HCPCS: Performed by: STUDENT IN AN ORGANIZED HEALTH CARE EDUCATION/TRAINING PROGRAM

## 2019-04-25 PROCEDURE — 1200000000 HC SEMI PRIVATE

## 2019-04-25 PROCEDURE — 85025 COMPLETE CBC W/AUTO DIFF WBC: CPT

## 2019-04-25 PROCEDURE — 97530 THERAPEUTIC ACTIVITIES: CPT

## 2019-04-25 PROCEDURE — 97162 PT EVAL MOD COMPLEX 30 MIN: CPT

## 2019-04-25 PROCEDURE — 97166 OT EVAL MOD COMPLEX 45 MIN: CPT | Performed by: OCCUPATIONAL THERAPIST

## 2019-04-25 PROCEDURE — 97535 SELF CARE MNGMENT TRAINING: CPT | Performed by: OCCUPATIONAL THERAPIST

## 2019-04-25 PROCEDURE — 36415 COLL VENOUS BLD VENIPUNCTURE: CPT

## 2019-04-25 PROCEDURE — 94640 AIRWAY INHALATION TREATMENT: CPT

## 2019-04-25 PROCEDURE — 80076 HEPATIC FUNCTION PANEL: CPT

## 2019-04-25 PROCEDURE — 2500000003 HC RX 250 WO HCPCS: Performed by: STUDENT IN AN ORGANIZED HEALTH CARE EDUCATION/TRAINING PROGRAM

## 2019-04-25 PROCEDURE — 83036 HEMOGLOBIN GLYCOSYLATED A1C: CPT

## 2019-04-25 PROCEDURE — 87641 MR-STAPH DNA AMP PROBE: CPT

## 2019-04-25 RX ORDER — NICOTINE POLACRILEX 4 MG
15 LOZENGE BUCCAL PRN
Status: DISCONTINUED | OUTPATIENT
Start: 2019-04-25 | End: 2019-04-27 | Stop reason: HOSPADM

## 2019-04-25 RX ORDER — DEXTROSE MONOHYDRATE 25 G/50ML
12.5 INJECTION, SOLUTION INTRAVENOUS PRN
Status: DISCONTINUED | OUTPATIENT
Start: 2019-04-25 | End: 2019-04-27 | Stop reason: HOSPADM

## 2019-04-25 RX ORDER — LIDOCAINE 4 G/G
2 PATCH TOPICAL DAILY
Status: DISCONTINUED | OUTPATIENT
Start: 2019-04-25 | End: 2019-04-25

## 2019-04-25 RX ORDER — DEXTROSE MONOHYDRATE 50 MG/ML
100 INJECTION, SOLUTION INTRAVENOUS PRN
Status: DISCONTINUED | OUTPATIENT
Start: 2019-04-25 | End: 2019-04-27 | Stop reason: HOSPADM

## 2019-04-25 RX ORDER — DEXTROSE, SODIUM CHLORIDE, AND POTASSIUM CHLORIDE 5; .45; .15 G/100ML; G/100ML; G/100ML
INJECTION INTRAVENOUS CONTINUOUS
Status: DISCONTINUED | OUTPATIENT
Start: 2019-04-25 | End: 2019-04-26

## 2019-04-25 RX ORDER — LIDOCAINE 4 G/G
2 PATCH TOPICAL DAILY
Status: DISCONTINUED | OUTPATIENT
Start: 2019-04-25 | End: 2019-04-27 | Stop reason: HOSPADM

## 2019-04-25 RX ORDER — HEPARIN SODIUM 5000 [USP'U]/ML
5000 INJECTION, SOLUTION INTRAVENOUS; SUBCUTANEOUS EVERY 8 HOURS SCHEDULED
Status: DISCONTINUED | OUTPATIENT
Start: 2019-04-25 | End: 2019-04-27 | Stop reason: HOSPADM

## 2019-04-25 RX ORDER — TRAZODONE HYDROCHLORIDE 50 MG/1
50 TABLET ORAL NIGHTLY
Status: DISCONTINUED | OUTPATIENT
Start: 2019-04-25 | End: 2019-04-27 | Stop reason: HOSPADM

## 2019-04-25 RX ADMIN — ONDANSETRON 4 MG: 2 INJECTION INTRAMUSCULAR; INTRAVENOUS at 19:42

## 2019-04-25 RX ADMIN — PREGABALIN 400 MG: 100 CAPSULE ORAL at 08:33

## 2019-04-25 RX ADMIN — KETOROLAC TROMETHAMINE 30 MG: 30 INJECTION, SOLUTION INTRAMUSCULAR at 12:52

## 2019-04-25 RX ADMIN — SODIUM CHLORIDE, POTASSIUM CHLORIDE, SODIUM LACTATE AND CALCIUM CHLORIDE: 600; 310; 30; 20 INJECTION, SOLUTION INTRAVENOUS at 05:02

## 2019-04-25 RX ADMIN — TRAZODONE HYDROCHLORIDE 50 MG: 100 TABLET ORAL at 03:40

## 2019-04-25 RX ADMIN — DIAZEPAM 5 MG: 5 SOLUTION ORAL at 11:07

## 2019-04-25 RX ADMIN — BUSPIRONE HYDROCHLORIDE 20 MG: 10 TABLET ORAL at 20:36

## 2019-04-25 RX ADMIN — HEPARIN SODIUM 5000 UNITS: 5000 INJECTION INTRAVENOUS; SUBCUTANEOUS at 20:33

## 2019-04-25 RX ADMIN — SIMVASTATIN 40 MG: 40 TABLET, FILM COATED ORAL at 20:36

## 2019-04-25 RX ADMIN — DIVALPROEX SODIUM 500 MG: 500 TABLET, EXTENDED RELEASE ORAL at 20:37

## 2019-04-25 RX ADMIN — DIAZEPAM: 5 SOLUTION ORAL at 18:51

## 2019-04-25 RX ADMIN — TRAZODONE HYDROCHLORIDE 200 MG: 100 TABLET ORAL at 22:04

## 2019-04-25 RX ADMIN — ACETAMINOPHEN 1000 MG: 500 TABLET ORAL at 05:13

## 2019-04-25 RX ADMIN — KETOROLAC TROMETHAMINE 30 MG: 30 INJECTION, SOLUTION INTRAMUSCULAR at 06:00

## 2019-04-25 RX ADMIN — PIPERACILLIN AND TAZOBACTAM 3.38 G: 3; .375 INJECTION, POWDER, FOR SOLUTION INTRAVENOUS at 05:00

## 2019-04-25 RX ADMIN — HEPARIN SODIUM 5000 UNITS: 5000 INJECTION INTRAVENOUS; SUBCUTANEOUS at 13:39

## 2019-04-25 RX ADMIN — BUSPIRONE HYDROCHLORIDE 20 MG: 10 TABLET ORAL at 08:33

## 2019-04-25 RX ADMIN — BUSPIRONE HYDROCHLORIDE 20 MG: 10 TABLET ORAL at 13:38

## 2019-04-25 RX ADMIN — CYCLOBENZAPRINE 10 MG: 10 TABLET, FILM COATED ORAL at 08:33

## 2019-04-25 RX ADMIN — HEPARIN SODIUM 5000 UNITS: 5000 INJECTION INTRAVENOUS; SUBCUTANEOUS at 08:32

## 2019-04-25 RX ADMIN — PANTOPRAZOLE SODIUM 40 MG: 40 TABLET, DELAYED RELEASE ORAL at 08:33

## 2019-04-25 RX ADMIN — POTASSIUM CHLORIDE, DEXTROSE MONOHYDRATE AND SODIUM CHLORIDE: 150; 5; 450 INJECTION, SOLUTION INTRAVENOUS at 11:07

## 2019-04-25 RX ADMIN — DIVALPROEX SODIUM 500 MG: 500 TABLET, EXTENDED RELEASE ORAL at 08:33

## 2019-04-25 RX ADMIN — ACETAMINOPHEN 1000 MG: 500 TABLET ORAL at 20:36

## 2019-04-25 RX ADMIN — ACETAMINOPHEN 1000 MG: 500 TABLET ORAL at 13:39

## 2019-04-25 RX ADMIN — MOMETASONE FUROATE AND FORMOTEROL FUMARATE DIHYDRATE 2 PUFF: 200; 5 AEROSOL RESPIRATORY (INHALATION) at 20:20

## 2019-04-25 RX ADMIN — PREGABALIN 400 MG: 100 CAPSULE ORAL at 20:36

## 2019-04-25 RX ADMIN — MOMETASONE FUROATE AND FORMOTEROL FUMARATE DIHYDRATE 2 PUFF: 200; 5 AEROSOL RESPIRATORY (INHALATION) at 08:46

## 2019-04-25 RX ADMIN — IPRATROPIUM BROMIDE 0.5 MG: 0.5 SOLUTION RESPIRATORY (INHALATION) at 20:17

## 2019-04-25 RX ADMIN — KETOROLAC TROMETHAMINE 30 MG: 30 INJECTION, SOLUTION INTRAMUSCULAR at 18:53

## 2019-04-25 ASSESSMENT — PAIN DESCRIPTION - PAIN TYPE
TYPE: SURGICAL PAIN

## 2019-04-25 ASSESSMENT — PAIN DESCRIPTION - PROGRESSION

## 2019-04-25 ASSESSMENT — PAIN SCALES - GENERAL
PAINLEVEL_OUTOF10: 6
PAINLEVEL_OUTOF10: 10

## 2019-04-25 ASSESSMENT — PAIN DESCRIPTION - ONSET
ONSET: ON-GOING
ONSET: ON-GOING

## 2019-04-25 ASSESSMENT — PAIN DESCRIPTION - LOCATION
LOCATION: ABDOMEN

## 2019-04-25 ASSESSMENT — PAIN DESCRIPTION - FREQUENCY
FREQUENCY: CONTINUOUS

## 2019-04-25 ASSESSMENT — PAIN DESCRIPTION - DESCRIPTORS
DESCRIPTORS: ACHING
DESCRIPTORS: ACHING

## 2019-04-25 ASSESSMENT — PAIN DESCRIPTION - ORIENTATION: ORIENTATION: LEFT;UPPER

## 2019-04-25 ASSESSMENT — PAIN - FUNCTIONAL ASSESSMENT: PAIN_FUNCTIONAL_ASSESSMENT: PREVENTS OR INTERFERES SOME ACTIVE ACTIVITIES AND ADLS

## 2019-04-25 NOTE — FLOWSHEET NOTE
Assessment: Patient was reclining in his chair when  visited. Family was present at the time. Patient was in good spirit and seemed to be doing well. When asked how he was feeling, patient responded; \"a little bit better. \" Patient said he was raised Methodist but not affiliated with any parish. Intervention:  maintained listening presence, offered support, prayed with patient and family and reassured them that they were in good hands. Patient received sacrament of anointing of the sick. Outcome: Patient and family expressed appreciation for the prayer and support. Follow up visits recommended for more spiritual and emotional support. 04/25/19 1320   Encounter Summary   Services provided to: Patient and family together   Support System Family members   Place of Restorationism None   Continue Visiting   (04/25/2019)   Complexity of Encounter Moderate   Length of Encounter 30 minutes   Spiritual Assessment Completed Yes   Routine   Type Initial   Spiritual/Voodoo   Type Spiritual support   Assessment Calm; Approachable; Hopeful   Intervention Active listening;Nurtured hope;Prayer; Anointing   Outcome Expressed gratitude   Sacraments   Sacrament of Sick-Anointing Anointed

## 2019-04-25 NOTE — PLAN OF CARE
Problem: Falls - Risk of:  Goal: Will remain free from falls  Description  Will remain free from falls  4/25/2019 0258 by Cindy Hutchinson RN  Outcome: Met This Shift  Goal: Absence of physical injury  Description  Absence of physical injury  4/25/2019 0258 by Cindy Hutchinson RN  Outcome: Met This Shift     Problem: SAFETY  Goal: Free from accidental physical injury  4/25/2019 0258 by Cindy Hutchinson RN  Outcome: Ongoing       Problem: DAILY CARE  Goal: Daily care needs are met  4/25/2019 0258 by Cindy Hutchinson RN  Outcome: Ongoing       Problem: PAIN  Goal: Patient's pain/discomfort is manageable  4/25/2019 0258 by Cindy Hutchinson RN  Outcome: Ongoing     Problem: Pain:  Description  Pain management should include both nonpharmacologic and pharmacologic interventions.   Goal: Pain level will decrease  Description  Pain level will decrease  4/25/2019 0258 by Cindy Hutchinson RN  Outcome: Ongoing  Goal: Control of acute pain  Description  Control of acute pain  4/25/2019 0258 by Cindy Hutchinson RN  Outcome: Ongoing

## 2019-04-25 NOTE — PROGRESS NOTES
Occupational Therapy   Occupational Therapy Initial Assessment  Date: 2019   Patient Name: Helene Lakhani. MRN: [id number]     : 1970    Date of Service: 2019    Discharge Recommendations:    Further therapy recommended at discharge. Assessment   Performance deficits / Impairments: Decreased functional mobility ; Decreased balance;Decreased high-level IADLs;Decreased ADL status  Prognosis: Good  Decision Making: Medium Complexity  Patient Education: OT POC  REQUIRES OT FOLLOW UP: Yes  Activity Tolerance  Activity Tolerance: Patient Tolerated treatment well  Safety Devices  Safety Devices in place: Yes  Type of devices: Left in chair;Nurse notified; All fall risk precautions in place;Call light within reach           Patient Diagnosis(es): The primary encounter diagnosis was Generalized abdominal pain. Diagnoses of Non-intractable vomiting with nausea, unspecified vomiting type and Chest pain, unspecified type were also pertinent to this visit. has a past medical history of Anxiety, Arthritis, Bipolar disorder ([address] Bronchitis, chronic (HCC), Chronic back pain, Chronic pain, Claustrophobia, COPD (chronic obstructive pulmonary disease) ([address] Depression, Diabetes mellitus ([address] Emphysema of lung ([address] History of irregular heartbeat, Hyperlipidemia, Liver disease, MRSA (methicillin resistant staph aureus) culture positive, Myofacial muscle pain, Osteomyelitis ([address] Peripheral neuropathy, Pressure ulcer, Schizophrenia ([address] Seizures ([address] Sleep apnea, Spinal stenosis, and Substance abuse ([address] has a past surgical history that includes Appendectomy; Stomach surgery; lumbar fusion; Septoplasty (2017); Turbinoplasties (2017); fracture surgery (2017); Cholecystectomy (2019); and Cholecystectomy (N/A, 2019).            Restrictions  Restrictions/Precautions  Restrictions/Precautions: Up as Tolerated    Subjective   General  Patient assessed for rehabilitation services?: Yes  Family / Caregiver Present: Yes(2 sisters)  Pain Assessment  Pain Assessment: 0-10  Pain Level: 10  Pain Type: Surgical pain  Pain Location: Abdomen  Non-Pharmaceutical Pain Intervention(s): Ambulation/Increased Activity  Response to Pain Intervention: Patient Satisfied    Social/Functional History  Social/Functional History  Lives With: Other (comment)(Roommate who is home most of the time)  Type of Home: Apartment  Home Layout: One level  Home Access: Ramped entrance  Bathroom Shower/Tub: Tub/Shower unit(Pt reports he hasn't been into his tub/shower lately.)  Bathroom Toilet: Standard  Bathroom Equipment: Grab bars in shower, Grab bars around toilet  Home Equipment: Wheelchair-electric, Crutches(Hospital bed but it is broken, lift chair but it is broken, forearm cructhes)  ADL Assistance: Independent  Homemaking Assistance: Independent  Homemaking Responsibilities: Yes  Meal Prep Responsibility: Secondary  Laundry Responsibility: No(Pt reports washer is broken and he has been \"doing what he can with what he's got\")  Cleaning Responsibility: Secondary  Shopping Responsibility: No(Mobile meals)  Ambulation Assistance: Independent(ambulates short distances with loftstrand crutches- mostly just into restroom and out)  Transfer Assistance: Independent(Scoot over to electric w/c)  Active : No  Mode of Transportation: Netsize  Occupation: On disability  Leisure & Hobbies: Likes tv  Additional Comments: Roommate is home and able to assist and family lives close by to assist as well. Objective   Vision: Impaired  Vision Exceptions: Wears glasses at all times  Hearing: Within functional limits    Orientation  Overall Orientation Status: Within Functional Limits     Balance  Sitting Balance: Supervision  ADL  Feeding: Independent  Grooming: Setup; Independent  UE Bathing: Setup;Stand by assistance  LE Bathing: Setup;Stand by assistance  UE Dressing: Modified independent ;Setup  LE Dressing: Setup;Modified independent (Pt donned socks while supine in bed, able to compelte with figure 4 position)  Toileting: Stand by assistance        Bed mobility  Supine to Sit: Modified independent(with use of bed rail)  Transfers  Sit Pivot Transfers: Supervision(from EOB to chair)     Cognition  Overall Cognitive Status: WFL        Sensation  Overall Sensation Status: WFL        LUE AROM (degrees)  LUE AROM : Exceptions  L Shoulder Flexion 0-180: 0-90, pain to incision with flexion  Left Hand AROM (degrees)  Left Hand AROM: WFL  RUE AROM (degrees)  RUE AROM : WFL  Right Hand AROM (degrees)  Right Hand AROM: WFL  LUE Strength  Gross LUE Strength: WFL  RUE Strength  Gross RUE Strength: WFL         Plan   Plan  Times per week: 5x  Current Treatment Recommendations: Functional Mobility Training, Balance Training, Self-Care / ADL, Equipment Evaluation, Education, & procurement, Patient/Caregiver Education & Training, Safety Education & Training    AM-PAC Score   AM-PAC Inpatient Daily Activity Raw Score: 19  AM-PAC Inpatient ADL T-Scale Score : 40.22  ADL Inpatient CMS 0-100% Score: 42.8  ADL Inpatient CMS G-Code Modifier : CK    Goals  Short term goals  Time Frame for Short term goals: By discharge pt michael. Omi Martell term goal 1: demo independent supine<>sit transfer  Short term goal 2: demo functional transfers with mod I  Short term goal 3: demo mod I UB/LB ADL tasks   Short term goal 4: demo good regard for safety during functional activities without cueing  Short term goal 5: demo 10+ min dynamic EOB sitting balance independently       Therapy Time   Individual Concurrent Group Co-treatment   Time In  11:49         Time Out  12:15         Minutes  26     Co eval with PT         Chico Altman OTR/L

## 2019-04-25 NOTE — PROGRESS NOTES
Nutrition Assessment    Type and Reason for Visit: Initial, Positive Nutrition Screen(Nausea/Vomiting, Poor Appetite)    Nutrition Recommendations:   - Continue Clear Liquid diet - monitor tolerance and for diet advancement. - Will provide Ensure Clear Liquid ONS with meals. Nutrition Assessment: Pt nutritionally compromised as evidenced by hx of nausea/vomiting and poor appetite. Pt s/p open cholecystectomy. Tolerating some clears but having some hiccups. Will provide Ensure clear ONS with meals. Malnutrition Assessment:  · Malnutrition Status: At risk for malnutrition  · Context: Acute illness or injury  · Findings of the 6 clinical characteristics of malnutrition (Minimum of 2 out of 6 clinical characteristics is required to make the diagnosis of moderate or severe Protein Calorie Malnutrition based on AND/ASPEN Guidelines):  1. Energy Intake-Less than or equal to 75% of estimated energy requirement, Greater than or equal to 5 days    2. Weight Loss-No significant weight loss,    3. Fat Loss-Unable to assess,    4. Muscle Loss-Unable to assess,    5. Fluid Accumulation-No significant fluid accumulation,    6.  Strength-Not measured    Nutrition Risk Level: Moderate    Nutrient Needs:  · Estimated Daily Total Kcal: 3256-6110 kcal/day  · Estimated Daily Protein (g):  gm pro/day    Nutrition Diagnosis:   · Problem: Inadequate oral intake  · Etiology: related to (recent surgery, nausea)     Signs and symptoms:  as evidenced by Intake 0-25%, Intake 25-50%(current liquid diet)    Objective Information:  · Nutrition-Focused Physical Findings: Hypoactive bowel sounds.   · Wound Type: (Abdominal Incision)  · Current Nutrition Therapies:  · Oral Diet Orders: Clear Liquid   · Oral Diet intake: 26-50%  · Oral Nutrition Supplement (ONS) Orders: None  · Anthropometric Measures:  · Ht: 6' 3\" (190.5 cm)   · Current Body Wt: 189 lb 13.1 oz (86.1 kg)  · Admission Body Wt: 189 lb 13.1 oz (86.1 kg)  · Ideal Body Wt: 195 lb 8.8 oz (88.7 kg), % Ideal Body 97%  · BMI Classification: BMI 18.5 - 24.9 Normal Weight    Nutrition Interventions:   Continue current diet, Start ONS  Continued Inpatient Monitoring, Education Not Indicated    Nutrition Evaluation:   · Evaluation: Goals set   · Goals: Oral intakes to meet at least 75% of estimated nutrition needs.     · Monitoring: Meal Intake, Supplement Intake, Diet Tolerance, Skin Integrity, Weight, Pertinent Labs    Electronically signed by Feliberto Leung RD, LD on 4/25/19 at 3:24 PM    Contact Number: [phone]

## 2019-04-25 NOTE — PLAN OF CARE
Problem: SAFETY  Goal: Free from accidental physical injury  4/25/2019 1726 by Rich Shepherd RN  Outcome: Met This Shift  4/25/2019 1724 by Rich Shepherd RN  Outcome: Met This Shift     Problem: DAILY CARE  Goal: Daily care needs are met  4/25/2019 1726 by Rich Shepherd RN  Outcome: Met This Shift  4/25/2019 1724 by Rich Shepherd RN  Outcome: Met This Shift     Problem: PAIN  Goal: Patient's pain/discomfort is manageable  4/25/2019 1726 by Rich Shepherd RN  Outcome: Met This Shift  4/25/2019 1724 by Rich Shepherd RN  Outcome: Ongoing     Problem: SKIN INTEGRITY  Goal: Skin integrity is maintained or improved  4/25/2019 1726 by Rich Shepherd RN  Outcome: Met This Shift  4/25/2019 1724 by Rich Shepherd RN  Outcome: Met This Shift     Problem: Falls - Risk of:  Goal: Will remain free from falls  Description  Will remain free from falls  4/25/2019 1726 by Rich Shepherd RN  Outcome: Met This Shift  4/25/2019 1724 by Rich Shepherd RN  Outcome: Met This Shift  Goal: Absence of physical injury  Description  Absence of physical injury  4/25/2019 1726 by Rich Shepherd RN  Outcome: Met This Shift  4/25/2019 1724 by Rich Shepherd RN  Outcome: Met This Shift     Problem: Risk for Impaired Skin Integrity  Goal: Tissue integrity - skin and mucous membranes  Description  Structural intactness and normal physiological function of skin and  mucous membranes.   4/25/2019 1726 by Rich Shepherd RN  Outcome: Met This Shift  4/25/2019 1724 by Rich Shepherd RN  Outcome: Met This Shift     Problem: Pain:  Goal: Pain level will decrease  Description  Pain level will decrease  4/25/2019 1726 by Rich Shepherd RN  Outcome: Ongoing  4/25/2019 1724 by Rich Shepherd RN  Outcome: Met This Shift     Problem: Nutrition  Goal: Optimal nutrition therapy  4/25/2019 1525 by Bobo Crump RD, LD  Outcome: Ongoing

## 2019-04-25 NOTE — BRIEF OP NOTE
Brief Postoperative Note  ______________________________________________________________    Patient: Ghulam De Leon. YOB: 1970  MRN: [id number]  Date of Procedure: 4/24/2019    Pre-Op Diagnosis: Acute CHOLECYSTITIS    Post-Op Diagnosis: Same       Procedure(s):  OPEN CHOLECYSTECTOMY    Anesthesia: General     Surgeon(s):  Pravin Martin MD    Assistant: Saskia Alarcon PGY3    Estimated Blood Loss (mL): 062     Complications: None    Specimens:   ID Type Source Tests Collected by Time Destination   1 : URINE FROM NEW RIBERA INSERTION Urine Bladder URINE CULTURE Pravin Martin MD 4/24/2019 1904    A : GALLBLADDER AND CONTENTS Tissue Gallbladder SURGICAL PATHOLOGY Pravin Martin MD 4/24/2019 2153        Drains:   NG/OG/NJ/NE Tube Nasogastric 18 fr Right nostril (Active)       Urethral Catheter Double-lumen 16 fr (Active)       Findings: Wound class 2    Home Lin DO  Date: 4/24/2019  Time: 10:49 PM   I was present for the key elements of the case, and agree with above operative note.  ANM

## 2019-04-25 NOTE — PROGRESS NOTES
POST OP NOTE    SUBJECTIVE  Pt s/p open cholecystectomy. Pain is present but feels much less compared to how it was before surgery. No jaundice. No Nausea or vomiting. OBJECTIVE  VITALS:  BP (!) 103/57   Pulse 91   Temp 98.2 °F (36.8 °C) (Oral)   Resp 13   Ht 6' 3\" (1.905 m)   Wt 189 lb 13.1 oz (86.1 kg)   SpO2 98%   BMI 23.73 kg/m²         GENERAL:  awake and alert. No acute distress. NGT in place. CARDIOVASCULAR:  regular rate and rhythm   LUNGS:  CTA Bilaterally  ABDOMEN:   Abdomen soft, non-tender, non-distended  INCISION: Incision clean/dry/intact    ASSESSMENT  1. POD# 1 s/p open cholecystectomy    PLAN  1. Pain management- tylenol, flexeril, toradol, lidoacine patch  2. DVT proph- SCD  3. GI proph- Pepcid, protonix, Zantac. Clear liquid diet  4. LR @ 125 cc/hr  5.  Adequate U/O      [address]  Trauma/Surgery Service  4/25/2019 at 5:09 AM

## 2019-04-25 NOTE — PROGRESS NOTES
Physical Therapy    [address]  Initial Assessment    NAME: Violet Hancock. : 1970  MRN: [id number]    Date of Service: 2019    Discharge Recommendations:    Further therapy recommended at discharge. PT Equipment Recommendations  Equipment Needed: No    Assessment   Body structures, Functions, Activity limitations: Decreased functional mobility ; Decreased strength;Decreased endurance;Decreased balance; Increased Pain  Assessment: Pt able to stand pivot transfer to bedside chair SBA with good tolerance. Pt must be able to safely ambulate a short household distance for safe return home at discharge as pt's walker does not fit into his bathroom. Pt unable to tolerate the attempt of gait this date secondary to pain but very motivated to attempt next session. Will continue to address needs once able to attempt gait. Specific instructions for Next Treatment: Short distance gait- pt ambulates into/out of bathroom only at home  Prognosis: Good  Decision Making: Medium Complexity  Patient Education: Educated on importance of assisted mobility at this time  Barriers to Learning: None  REQUIRES PT FOLLOW UP: Yes  Activity Tolerance  Activity Tolerance: Patient limited by pain       Patient Diagnosis(es): The primary encounter diagnosis was Generalized abdominal pain. Diagnoses of Non-intractable vomiting with nausea, unspecified vomiting type and Chest pain, unspecified type were also pertinent to this visit.      has a past medical history of Anxiety, Arthritis, Bipolar disorder ([address] Bronchitis, chronic (HCC), Chronic back pain, Chronic pain, Claustrophobia, COPD (chronic obstructive pulmonary disease) ([address] Depression, Diabetes mellitus ([address] Emphysema of lung ([address] History of irregular heartbeat, Hyperlipidemia, Liver disease, MRSA (methicillin resistant staph aureus) culture positive, Myofacial muscle pain, Osteomyelitis ([address] Peripheral neuropathy, Pressure ulcer, Schizophrenia ([facility] Seizures ([facility] Sleep apnea, Spinal stenosis, and Substance abuse ([facility] has a past surgical history that includes Appendectomy; Stomach surgery; lumbar fusion; Septoplasty (01/13/2017); Turbinoplasties (01/13/2017); fracture surgery (01/28/2017); Cholecystectomy (04/24/2019); and Cholecystectomy (N/A, 4/24/2019). Restrictions  Restrictions/Precautions  Restrictions/Precautions: Up as Tolerated, Fall Risk  Required Braces or Orthoses?: No  Position Activity Restriction  Other position/activity restrictions: s/p cholecystectomy 4/24/19  Vision/Hearing  Vision: Impaired  Vision Exceptions: Wears glasses at all times  Hearing: Within functional limits     Subjective  General  Patient assessed for rehabilitation services?: Yes  Response To Previous Treatment: Not applicable  Family / Caregiver Present: Yes(two sisters present)  Follows Commands: Within Functional Limits  Subjective  Subjective: Pt supine in bed and agreeable to therapy this morning. Pt reports high levesl of pain in his abdomen- notes \"20/10\". Pain Screening  Patient Currently in Pain: Yes  Pain Assessment  Pain Assessment: 0-10  Pain Level: 10  Pain Type: Surgical pain  Pain Location: Abdomen  Pain Descriptors: Aching  Pain Frequency: Continuous  Functional Pain Assessment: Prevents or interferes some active activities and ADLs  Non-Pharmaceutical Pain Intervention(s): Ambulation/Increased Activity; Distraction;Repositioned  Response to Pain Intervention: Patient Satisfied       Orientation  Orientation  Overall Orientation Status: Within Functional Limits  Social/Functional History  Social/Functional History  Lives With: Other (comment)(Roommate who is home most of the time)  Type of Home: Apartment  Home Layout: One level  Home Access: Ramped entrance  Bathroom Shower/Tub: Tub/Shower unit(Pt reports he hasn't been into his tub/shower lately.)  Bathroom Toilet: Standard  Bathroom Equipment: Grab bars in shower, Grab bars around toilet  Home Equipment: Wheelchair-electric, Crutches(Hospital bed but it is broken, lift chair but it is broken, forearm cructhes)  ADL Assistance: [address]: Independent  Homemaking Responsibilities: Yes  Meal Prep Responsibility: Secondary  Laundry Responsibility: No(Pt reports washer is broken and he has been \"doing what he can with what he's got\")  Cleaning Responsibility: Secondary  Shopping Responsibility: No(Mobile meals)  Ambulation Assistance: Independent(ambulates short distances with loftstrand crutches- mostly just into restroom and out)  Transfer Assistance: Independent(Scoot over to electric w/c)  Active : No  Mode of Transportation: Cab  Occupation: On disability  Leisure & Hobbies: Likes tv  Additional Comments: Roommate is home and able to assist and family lives close by to assist as well. Objective          AROM RLE (degrees)  RLE AROM: WFL  AROM LLE (degrees)  LLE AROM : WFL  AROM RUE (degrees)  RUE AROM : WFL  RUE General AROM: limited by pain but able to complete  AROM LUE (degrees)  LUE AROM : WFL  LUE General AROM: limited by pain especially shoulder flexion  Strength RLE  Comment: grossly 4-/5  Strength LLE  Strength LLE: WNL  Comment: grossly 5/5  Strength RUE  Comment: Co evaluation with OT- see OT evaluation for details  Strength LUE  Comment: Co evaluation with OT- see OT evaluation for details        Bed mobility  Supine to Sit: Supervision(use of bed rail and increased time to complete)  Sit to Supine: Unable to assess(Pt left up to chair at end of session)  Scooting: Supervision  Comment: Pt denies any lightheaded or dizziness this date. Pt able to sit EOB x4-5 minutes SBA. Transfers  Sit to Stand: Stand by assistance  Stand to sit: Stand by assistance  Bed to Chair: Stand by assistance(Partial stand pivot- forward flexed in stance)  Comment: Pt stands prior to writer able to place gait belt and performs transfer.  Pt declines attempt of gait today secondary to pain. Ambulation  Ambulation?: No(Pt declines secondary to pain today. Pt states he only ambulates short distances at baseline- otherwise grossly uses electric w/c)  Stairs/Curb  Stairs?: No     Balance  Posture: Fair(forward flexed secondary to abdominal pain)  Sitting - Static: Good  Sitting - Dynamic: Good  Standing - Static: Good;-  Standing - Dynamic: Fair;+  Comments: standing balance assessed without device  Exercises  Comments: Educated on importance of ankle pumps and LAQ in seated     Plan   Plan  Times per week: 5-6x  Times per day: Daily  Specific instructions for Next Treatment: Short distance gait- pt ambulates into/out of bathroom only at home  Current Treatment Recommendations: Strengthening, Functional Mobility Training, Balance Training, Transfer Training, Endurance Training, Gait Training, Wheelchair Mobility Training, Home Exercise Program, Safety Education & Training, Patient/Caregiver Education & Training  Safety Devices  Type of devices: Call light within reach, Left in chair, Nurse notified  Restraints  Initially in place: No                          AM-PAC Score  AM-PAC Inpatient Mobility Raw Score : 14  AM-PAC Inpatient T-Scale Score : 38.1  Mobility Inpatient CMS 0-100% Score: 61.29  Mobility Inpatient CMS G-Code Modifier : CL          Goals  Short term goals  Time Frame for Short term goals: 14 visits  Short term goal 1: Pt to ambulate 30ft supervision with RW to allow for safe entrance/exit into his bathroom at home. Short term goal 2: Pt to sit <> stand and stand pivot to chair independently to allow for safe transfers to electric w/c.    Short term goal 3: Pt tolerate 30 minutes of therapy for endurance  Short term goal 4: Pt to demonstrate independent bed mobility to allow for increased independence at discharge  Short term goal 5: Pt to demonstrate good standing balance to decrease risk of falls  Patient Goals   Patient goals : Pt would like to get home

## 2019-04-25 NOTE — ANESTHESIA POSTPROCEDURE EVALUATION
Department of Anesthesiology  Postprocedure Note    Patient: Dipak Fay. MRN: [id number]  YOB: 1970  Date of evaluation: 4/25/2019  Time:  12:44 AM     Procedure Summary     Date:  04/24/19 Room / Location:  [address] OR    Anesthesia Start:  7372 Anesthesia Stop:  5631    Procedure:  OPEN CHOLECYSTECTOMY (N/A ) Diagnosis:  (CHOLECYSTITIS)    Surgeon:  Karey Davis MD Responsible Provider:  Angelina Stallings MD    Anesthesia Type:  general ASA Status:  4          Anesthesia Type: No value filed. Zaida Phase I: Zaida Score: 8    Zaida Phase II:      Last vitals: Reviewed and per EMR flowsheets.        Anesthesia Post Evaluation    Patient location during evaluation: PACU  Patient participation: complete - patient participated  Level of consciousness: awake  Pain score: 1  Airway patency: patent  Nausea & Vomiting: no nausea and no vomiting  Complications: no  Cardiovascular status: blood pressure returned to baseline and hemodynamically stable  Respiratory status: acceptable  Hydration status: euvolemic

## 2019-04-25 NOTE — ANESTHESIA POSTPROCEDURE EVALUATION
Department of Anesthesiology  Postprocedure Note    Patient: Jae Taylor MRN: [id number]  YOB: 1970  Date of evaluation: 4/25/2019  Time:  12:45 AM     Procedure Summary     Date:  04/24/19 Room / Location:  [address] OR    Anesthesia Start:  6803 Anesthesia Stop:  3277    Procedure:  OPEN CHOLECYSTECTOMY (N/A ) Diagnosis:  (CHOLECYSTITIS)    Surgeon:  Artie Hinkle MD Responsible Provider:  Beena Briggs MD    Anesthesia Type:  general ASA Status:  4          Anesthesia Type: No value filed. Zaida Phase I: Zaida Score: 8    Zaida Phase II:      Last vitals: Reviewed and per EMR flowsheets.        Anesthesia Post Evaluation    Patient location during evaluation: PACU  Patient participation: complete - patient participated  Level of consciousness: awake  Pain score: 1  Airway patency: patent  Nausea & Vomiting: no nausea and no vomiting  Complications: no  Cardiovascular status: blood pressure returned to baseline and hemodynamically stable  Respiratory status: acceptable  Hydration status: euvolemic

## 2019-04-25 NOTE — PROGRESS NOTES
ICU PROGRESS NOTE          PATIENT NAME: Robel Orellana. MEDICAL RECORD NO. [id number]  DATE: 2019    PRIMARY CARE PHYSICIAN: CHAD Young - CNP    HD: # 1    ASSESSMENT    1. Acute cholecystitis    MEDICAL DECISION MAKING AND PLAN    1. Neuro  1. Tylenol, motrin, continue lyrica and flexeril. 2. Avoid narcotic medications  2. CV  1. Hemodynamically stable  3. Pulm  1. Incentive spirometry  4. GI  1. CLD - will advance when passing flatus  5. Nephro  1.  cc/hr  2. Electrolytes wnl  6. ID  1. Zosyn x 4 days  2. WBC 17.1  7. Endo  1. Daily checks  8. Heme  1. 13.2  9. Prophylaxis  1. Lovenox   10. Dispo: PT/OT, transfer to floor    3 [facility] [place]. Seen and examined. Did relatively well overnight. Does have some pain at the incision site and when he moves, but tolerable. Pulling in great volumes on incentive spirometry. + belching, - flatus.       OBJECTIVE  VITALS: Temp: Temp: 98.2 °F (36.8 °C)Temp  Av.9 °F (37.2 °C)  Min: 97.5 °F (36.4 °C)  Max: 99.2 °F (19.8 °C) BP Systolic (57ZCK), QHE:249 , Min:89 , QDQ:966   Diastolic (81GRL), ZZZ:88, Min:47, Max:96   Pulse Pulse  Av.6  Min: 80  Max: 107 Resp Resp  Av.5  Min: 0  Max: 28 Pulse ox SpO2  Av %  Min: 95 %  Max: 100 %    GENERAL: alert, no distress  NEURO: no gross motor deficits  HEENT: Atraumatic, EOMI bilaterally  LUNGS: clear to ausculation, without wheezes, rales or rhonci  HEART: normal rate and regular rhythm  ABDOMEN: Soft, mildly distended, TTP in RUQ around incision - expected post operative pain  EXTERMITY: no cyanosis, clubbing or edema        Drain/tube output:      LAB:  CBC:   Recent Labs     19  0539 19  0553   WBC 18.2* 17.8*   HGB 14.3 13.2   HCT 45.0 42.4   MCV 88.4 90.2    210     BMP:   Recent Labs     19  0539 19  0553    139   K 4.1 4.1    105   CO2 23 22   BUN 8 9   CREATININE 0.63* 0.54*   GLUCOSE 169* 152*         RADIOLOGY:        Helena FRIEDMAN

## 2019-04-26 LAB
ALBUMIN SERPL-MCNC: 3.2 G/DL (ref 3.5–5.2)
ALBUMIN/GLOBULIN RATIO: 0.9 (ref 1–2.5)
ALP BLD-CCNC: 69 U/L (ref 40–129)
ALT SERPL-CCNC: 41 U/L (ref 5–41)
ANION GAP SERPL CALCULATED.3IONS-SCNC: 15 MMOL/L (ref 9–17)
AST SERPL-CCNC: 41 U/L
BILIRUB SERPL-MCNC: 0.22 MG/DL (ref 0.3–1.2)
BILIRUBIN DIRECT: 0.09 MG/DL
BILIRUBIN, INDIRECT: 0.13 MG/DL (ref 0–1)
BUN BLDV-MCNC: 10 MG/DL (ref 6–20)
BUN/CREAT BLD: ABNORMAL (ref 9–20)
CALCIUM SERPL-MCNC: 8.7 MG/DL (ref 8.6–10.4)
CHLORIDE BLD-SCNC: 108 MMOL/L (ref 98–107)
CO2: 18 MMOL/L (ref 20–31)
CREAT SERPL-MCNC: 0.74 MG/DL (ref 0.7–1.2)
GFR AFRICAN AMERICAN: >60 ML/MIN
GFR NON-AFRICAN AMERICAN: >60 ML/MIN
GFR SERPL CREATININE-BSD FRML MDRD: ABNORMAL ML/MIN/{1.73_M2}
GFR SERPL CREATININE-BSD FRML MDRD: ABNORMAL ML/MIN/{1.73_M2}
GLOBULIN: ABNORMAL G/DL (ref 1.5–3.8)
GLUCOSE BLD-MCNC: 106 MG/DL (ref 75–110)
GLUCOSE BLD-MCNC: 118 MG/DL (ref 70–99)
GLUCOSE BLD-MCNC: 118 MG/DL (ref 75–110)
GLUCOSE BLD-MCNC: 125 MG/DL (ref 75–110)
GLUCOSE BLD-MCNC: 95 MG/DL (ref 75–110)
HCT VFR BLD CALC: 39.4 % (ref 40.7–50.3)
HEMOGLOBIN: 12.1 G/DL (ref 13–17)
MCH RBC QN AUTO: 28.1 PG (ref 25.2–33.5)
MCHC RBC AUTO-ENTMCNC: 30.7 G/DL (ref 28.4–34.8)
MCV RBC AUTO: 91.4 FL (ref 82.6–102.9)
NRBC AUTOMATED: 0 PER 100 WBC
PDW BLD-RTO: 14.3 % (ref 11.8–14.4)
PLATELET # BLD: 204 K/UL (ref 138–453)
PMV BLD AUTO: 9.8 FL (ref 8.1–13.5)
POTASSIUM SERPL-SCNC: 4.1 MMOL/L (ref 3.7–5.3)
RBC # BLD: 4.31 M/UL (ref 4.21–5.77)
SODIUM BLD-SCNC: 141 MMOL/L (ref 135–144)
TOTAL PROTEIN: 6.6 G/DL (ref 6.4–8.3)
WBC # BLD: 11.2 K/UL (ref 3.5–11.3)

## 2019-04-26 PROCEDURE — 80048 BASIC METABOLIC PNL TOTAL CA: CPT

## 2019-04-26 PROCEDURE — 6360000002 HC RX W HCPCS: Performed by: STUDENT IN AN ORGANIZED HEALTH CARE EDUCATION/TRAINING PROGRAM

## 2019-04-26 PROCEDURE — 1200000000 HC SEMI PRIVATE

## 2019-04-26 PROCEDURE — 2580000003 HC RX 258: Performed by: STUDENT IN AN ORGANIZED HEALTH CARE EDUCATION/TRAINING PROGRAM

## 2019-04-26 PROCEDURE — 97530 THERAPEUTIC ACTIVITIES: CPT

## 2019-04-26 PROCEDURE — 99406 BEHAV CHNG SMOKING 3-10 MIN: CPT

## 2019-04-26 PROCEDURE — 85027 COMPLETE CBC AUTOMATED: CPT

## 2019-04-26 PROCEDURE — 82947 ASSAY GLUCOSE BLOOD QUANT: CPT

## 2019-04-26 PROCEDURE — 36415 COLL VENOUS BLD VENIPUNCTURE: CPT

## 2019-04-26 PROCEDURE — 6370000000 HC RX 637 (ALT 250 FOR IP): Performed by: STUDENT IN AN ORGANIZED HEALTH CARE EDUCATION/TRAINING PROGRAM

## 2019-04-26 PROCEDURE — 80076 HEPATIC FUNCTION PANEL: CPT

## 2019-04-26 PROCEDURE — 94640 AIRWAY INHALATION TREATMENT: CPT

## 2019-04-26 PROCEDURE — 2500000003 HC RX 250 WO HCPCS: Performed by: EMERGENCY MEDICINE

## 2019-04-26 RX ORDER — BUSPIRONE HYDROCHLORIDE 10 MG/1
20 TABLET ORAL 3 TIMES DAILY
Status: DISCONTINUED | OUTPATIENT
Start: 2019-04-26 | End: 2019-04-26

## 2019-04-26 RX ORDER — NICOTINE 21 MG/24HR
1 PATCH, TRANSDERMAL 24 HOURS TRANSDERMAL DAILY
Status: DISCONTINUED | OUTPATIENT
Start: 2019-04-26 | End: 2019-04-27 | Stop reason: HOSPADM

## 2019-04-26 RX ADMIN — ACETAMINOPHEN 1000 MG: 500 TABLET ORAL at 22:18

## 2019-04-26 RX ADMIN — PREGABALIN 400 MG: 100 CAPSULE ORAL at 20:41

## 2019-04-26 RX ADMIN — PANTOPRAZOLE SODIUM 40 MG: 40 TABLET, DELAYED RELEASE ORAL at 09:07

## 2019-04-26 RX ADMIN — BUSPIRONE HYDROCHLORIDE 20 MG: 10 TABLET ORAL at 09:07

## 2019-04-26 RX ADMIN — PREGABALIN 400 MG: 100 CAPSULE ORAL at 09:07

## 2019-04-26 RX ADMIN — DIAZEPAM 5 MG: 5 SOLUTION ORAL at 00:48

## 2019-04-26 RX ADMIN — Medication 10 ML: at 20:44

## 2019-04-26 RX ADMIN — TRAZODONE HYDROCHLORIDE 50 MG: 100 TABLET ORAL at 20:41

## 2019-04-26 RX ADMIN — KETOROLAC TROMETHAMINE 30 MG: 30 INJECTION, SOLUTION INTRAMUSCULAR at 13:45

## 2019-04-26 RX ADMIN — HEPARIN SODIUM 5000 UNITS: 5000 INJECTION INTRAVENOUS; SUBCUTANEOUS at 05:59

## 2019-04-26 RX ADMIN — BUSPIRONE HYDROCHLORIDE 20 MG: 10 TABLET ORAL at 20:42

## 2019-04-26 RX ADMIN — HEPARIN SODIUM 5000 UNITS: 5000 INJECTION INTRAVENOUS; SUBCUTANEOUS at 22:39

## 2019-04-26 RX ADMIN — DIAZEPAM 5 MG: 5 SOLUTION ORAL at 13:42

## 2019-04-26 RX ADMIN — ACETAMINOPHEN 1000 MG: 500 TABLET ORAL at 13:45

## 2019-04-26 RX ADMIN — MOMETASONE FUROATE AND FORMOTEROL FUMARATE DIHYDRATE 2 PUFF: 200; 5 AEROSOL RESPIRATORY (INHALATION) at 07:39

## 2019-04-26 RX ADMIN — KETOROLAC TROMETHAMINE 30 MG: 30 INJECTION, SOLUTION INTRAMUSCULAR at 07:28

## 2019-04-26 RX ADMIN — BUSPIRONE HYDROCHLORIDE 20 MG: 10 TABLET ORAL at 13:45

## 2019-04-26 RX ADMIN — DIVALPROEX SODIUM 500 MG: 500 TABLET, EXTENDED RELEASE ORAL at 09:07

## 2019-04-26 RX ADMIN — KETOROLAC TROMETHAMINE 30 MG: 30 INJECTION, SOLUTION INTRAMUSCULAR at 20:43

## 2019-04-26 RX ADMIN — SIMVASTATIN 40 MG: 40 TABLET, FILM COATED ORAL at 20:42

## 2019-04-26 RX ADMIN — POTASSIUM CHLORIDE, DEXTROSE MONOHYDRATE AND SODIUM CHLORIDE: 150; 5; 450 INJECTION, SOLUTION INTRAVENOUS at 03:42

## 2019-04-26 RX ADMIN — MOMETASONE FUROATE AND FORMOTEROL FUMARATE DIHYDRATE 2 PUFF: 200; 5 AEROSOL RESPIRATORY (INHALATION) at 19:51

## 2019-04-26 RX ADMIN — ONDANSETRON 4 MG: 2 INJECTION INTRAMUSCULAR; INTRAVENOUS at 19:41

## 2019-04-26 RX ADMIN — QUETIAPINE FUMARATE 200 MG: 200 TABLET ORAL at 20:44

## 2019-04-26 RX ADMIN — DIAZEPAM 5 MG: 5 SOLUTION ORAL at 05:56

## 2019-04-26 RX ADMIN — DIAZEPAM 5 MG: 5 SOLUTION ORAL at 20:44

## 2019-04-26 RX ADMIN — DIVALPROEX SODIUM 500 MG: 500 TABLET, EXTENDED RELEASE ORAL at 20:41

## 2019-04-26 RX ADMIN — ACETAMINOPHEN 1000 MG: 500 TABLET ORAL at 05:56

## 2019-04-26 RX ADMIN — HEPARIN SODIUM 5000 UNITS: 5000 INJECTION INTRAVENOUS; SUBCUTANEOUS at 13:46

## 2019-04-26 RX ADMIN — KETOROLAC TROMETHAMINE 30 MG: 30 INJECTION, SOLUTION INTRAMUSCULAR at 00:50

## 2019-04-26 ASSESSMENT — PAIN SCALES - GENERAL
PAINLEVEL_OUTOF10: 9
PAINLEVEL_OUTOF10: 5
PAINLEVEL_OUTOF10: 10
PAINLEVEL_OUTOF10: 9
PAINLEVEL_OUTOF10: 8

## 2019-04-26 ASSESSMENT — PAIN DESCRIPTION - ORIENTATION: ORIENTATION: MID;RIGHT

## 2019-04-26 ASSESSMENT — PAIN DESCRIPTION - PAIN TYPE: TYPE: ACUTE PAIN

## 2019-04-26 ASSESSMENT — PAIN DESCRIPTION - LOCATION: LOCATION: ABDOMEN

## 2019-04-26 NOTE — PLAN OF CARE
Problem: DAILY CARE  Goal: Daily care needs are met  4/26/2019 1051 by Alicia Saab RN  Outcome: Ongoing  4/26/2019 0518 by Jony Butler RN  Outcome: Ongoing     Problem: PAIN  Goal: Patient's pain/discomfort is manageable  4/26/2019 1051 by Alicia Saab RN  Outcome: Ongoing  4/26/2019 0518 by Jony Butler RN  Outcome: Ongoing

## 2019-04-26 NOTE — PROGRESS NOTES
Smoking Cessation - topics covered   [x]  Health Risks  [x]  Benefits of Quitting   [x]  Smoking Cessation  []  Patient has no history of tobacco use  []  Patient is former smoker. []  No need for tobacco cessation education. [x]  Booklet given  [x]  Patient verbalizes understanding. []  Patient denies need for tobacco cessation education. []  Unable to meet with patient today. Will follow up as able.   WALLY MILLER  1:49 PM
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
## 2019-04-26 NOTE — DISCHARGE INSTR - COC
Continuity of Care Form    Patient Name: Arron Cummings.    :  1970  MRN:  [id number]    Admit date:  2019  Discharge date:  19    Code Status Order: Full Code   Advance Directives:   Advance Care Flowsheet Documentation     Date/Time Healthcare Directive Type of Healthcare Directive Copy in [address] Agent's Name Healthcare Agent's Phone Number    19 1616  No, patient does not have an advance directive for healthcare treatment -- -- -- -- --          Admitting Physician:  Emani Garza MD  PCP: CHAD Yañez CNP    Discharging Nurse: NORMAN SANDY Unit/Room#: 6612/9260-36  Discharging Unit Phone Number: [phone]    Emergency Contact:   Extended Emergency Contact Information  Primary Emergency Contact: Joel Wright  Address: [place] Estr[place] [address] Phone: [phone]  Mobile Phone: [phone]  Relation: Child    Past Surgical History:  Past Surgical History:   Procedure Laterality Date    APPENDECTOMY      CHOLECYSTECTOMY  2019    CHOLECYSTECTOMY N/A 2019    OPEN CHOLECYSTECTOMY performed by Emani Garza MD at [address] [place] [address]  2017    closed reduction nasal fracture    LUMBAR FUSION      SEPTOPLASTY  2017    STOMACH SURGERY      TURBINOPLASTIES  2017       Immunization History:   Immunization History   Administered Date(s) Administered    Tdap (Boostrix, Adacel) 2015, 2017       Active Problems:  Patient Active Problem List   Diagnosis Code    Anxiety F41.9    Seizure ([address] R56.9    Myofascial muscle pain M79.18    Chronic pain G89.29    Spinal stenosis M48.00    Hyperlipidemia E78.5    Depression F32.9    Panlobular emphysema ([address] J43.1    Bipolar disorder ([address] F31.9    Chronic back pain M54.9, G89.29    Schizophrenia ([address] F20.9    Fibromyalgia M79.7    Type 2 diabetes mellitus with diabetic polyneuropathy, with long-term current use of insulin ([facility] E11.42, Z79.4    Onychomycosis B35.1    Pain in lower limb M79.606    Disc disease, degenerative, cervical M50.30    Cervical stenosis of spine M48.02    Generalized tonic-clonic seizure (HCC) G40.409    Superficial laceration of scalp S01. 01XA    Altered mental status R41.82    Decubitus ulcer of coccyx, stage 2 L89.152    Cocaine substance abuse ([facility] F14.10    Opiate abuse, episodic ([facility]) F11.10    Closed fracture of nasal bone S02. 2XXA    Drug overdose B69.917R    Uncomplicated opioid dependence ([facility] F11.20    Paraplegia ([facility] G82.20    Schizoaffective disorder ([facility] F25.9    Non-dose-related adverse reaction to medication wellbutrin T88. 7XXA    Cannabis abuse F12.10    Encephalopathy G93.40    Hyperammonemia ([facility]) E72.20    Constipation K59.00    Cellulitis and abscess of right leg L03.115, L02.415    Interstitial lung disease (HCC) J84.9    Vomiting R11.10    Bipolar 1 disorder ([facility]) F31.9    Acute cholecystitis K81.0    S/P kyra Z90.49       Isolation/Infection:   Isolation          No Isolation            Nurse Assessment:  Last Vital Signs: BP (!) 114/53   Pulse 94   Temp 97.5 °F (36.4 °C) (Oral)   Resp 23   Ht 6' 3\" (1.905 m)   Wt 189 lb 13.1 oz (86.1 kg)   SpO2 95%   BMI 23.73 kg/m²     Last documented pain score (0-10 scale): Pain Level: 9  Last Weight:   Wt Readings from Last 1 Encounters:   04/25/19 189 lb 13.1 oz (86.1 kg)     Mental Status:  oriented, alert, coherent and able to concentrate and follow conversation    IV Access:  - None    Nursing Mobility/ADLs:  Walking   assisted  Transfer  Independent  Bathing  Independent  Dressing  Independent  Toileting  Independent  Feeding  Independent  Med Admin  Assisted  Med Delivery   whole    Wound Care Documentation and Therapy:        Elimination:  Continence:   · Bowel:  Yes  · Bladder: Yes  Urinary Catheter: None   Colostomy/Ileostomy/Ileal Conduit: No       Date of Last BM: 4/25    Intake/Output Summary (Last 24 hours) at 4/26/2019 1618  Last data filed at 4/26/2019 0400  Gross per 24 hour   Intake 2129 ml   Output 750 ml   Net 1379 ml     I/O last 3 completed shifts: In: 2129 [P.O.:480; I.V.:1649]  Out: 750 [Urine:750]    Safety Concerns: At Risk for Falls    Impairments/Disabilities:      Paralysis -     Nutrition Therapy:  Current Nutrition Therapy:   - Oral Diet:  Carb Control 5 carbs/meal (2000kcals/day)    Routes of Feeding: Oral  Liquids: No Restrictions  Daily Fluid Restriction: no  Last Modified Barium Swallow with Video (Video Swallowing Test): not done    Treatments at the Time of Hospital Discharge:   Respiratory Treatments: albuterol, Dulera, Atrovent nebulizer  Oxygen Therapy:  is not on home oxygen therapy. Ventilator:    - No ventilator support    Rehab Therapies: Physical Therapy and Occupational Therapy  Weight Bearing Status/Restrictions: No weight bearing restirctions  Other Medical Equipment (for information only, NOT a DME order):  wheelchair, bath bench and bedside commode  Other Treatments: May wash surgical site with soap and water.  Dry dressing to cover as needed, skilled nursing assessment    Patient's personal belongings (please select all that are sent with patient):  Glasses    RN SIGNATURE:  Electronically signed by Prakash Herman RN on 4/27/19 at 11:20 AM    CASE MANAGEMENT/SOCIAL WORK SECTION    Inpatient Status Date: 4/24/19    Readmission Risk Assessment Score:  Readmission Risk              Risk of Unplanned Readmission:        26           Discharging to Facility/ Agency   · Name:      [facility]  · Address:  · Phone:      [phone]  · Fax:      [phone]    Dialysis Facility (if applicable)   · Name:  · Address:  · Dialysis Schedule:  · Phone:  · Fax:    / signature: Electronically signed by Radha Foss RN on 4/26/19 at 4:18 PM    PHYSICIAN SECTION    Prognosis: Good    Condition at Discharge: Stable    Rehab Potential (if transferring to Rehab):     Recommended Labs or Other Treatments After Discharge: ***    Physician Certification: I certify the above information and transfer of Jae Quick.  is necessary for the continuing treatment of the diagnosis listed and that he requires [address] for less 30 days.      Update Admission H&P: No change in H&P    PHYSICIAN SIGNATURE:  LUZ Ruggiero/ Yuan Arenas RN  4/27/19 1124am

## 2019-04-26 NOTE — PROGRESS NOTES
Physical Therapy  Facility/Department: [facility]  Daily Treatment Note  NAME: Joss Franklin : 1970  MRN: [id number]    Date of Service: 2019    Discharge Recommendations:  Further therapy recommended at discharge. Patient Diagnosis(es): The primary encounter diagnosis was Generalized abdominal pain. Diagnoses of Non-intractable vomiting with nausea, unspecified vomiting type and Chest pain, unspecified type were also pertinent to this visit. has a past medical history of Anxiety, Arthritis, Bipolar disorder ([address] Bronchitis, chronic (HCC), Chronic back pain, Chronic pain, Claustrophobia, COPD (chronic obstructive pulmonary disease) ([address] Depression, Diabetes mellitus ([address] Emphysema of lung ([address] History of irregular heartbeat, Hyperlipidemia, Liver disease, MRSA (methicillin resistant staph aureus) culture positive, Myofacial muscle pain, Osteomyelitis ([address] Peripheral neuropathy, Pressure ulcer, Schizophrenia ([address] Seizures ([address] Sleep apnea, Spinal stenosis, and Substance abuse ([address] has a past surgical history that includes Appendectomy; Stomach surgery; lumbar fusion; Septoplasty (2017); Turbinoplasties (2017); fracture surgery (2017); Cholecystectomy (2019); and Cholecystectomy (N/A, 2019). Restrictions  Restrictions/Precautions  Restrictions/Precautions: Up as Tolerated, Fall Risk  Required Braces or Orthoses?: No  Position Activity Restriction  Other position/activity restrictions: s/p cholecystectomy 19  Subjective   General  Chart Reviewed: Yes  Response To Previous Treatment: Patient with no complaints from previous session. Family / Caregiver Present: No  Subjective  Subjective: Per RN, patient okay for PT. Patient agreeable - difficult to keep patient on task. High pain but better than previous day.    *Abdominal binder in place     Orientation  Orientation  Overall Orientation Status: Within Functional Limits    Objective Bed mobility  Supine to Sit: Supervision  Sit to Supine: Supervision  Scooting: Supervision  Comment: HOB elevated, use of bedrails   Transfers  Sit to Stand: Stand by assistance  Stand to sit: Stand by assistance  Comment: Flexed posture with standing. Multiple trials of STS performed with emphasis on sequencing and controlled motion (altered d/t pain)  Ambulation  Ambulation?: Yes  Ambulation 1  Surface: level tile  Device: Rolling Walker  Assistance: Minimal assistance  Distance: 2 steps fwd/retro  Comments: L LE buckling - limited distance d/t safety concerns. Multiple trials of static standing. Balance  Posture: Fair  Sitting - Static: Good  Sitting - Dynamic: Good  Standing - Static: Good;-  Standing - Dynamic: Fair;+  Comments: Standing balance assessed with RW    Exercise  Seated EOB LE: marches (unilateral), LAQ, ankle pumps. Reps: 15x  * reported dizziness EOB   Assessment   Body structures, Functions, Activity limitations: Decreased functional mobility ; Decreased strength;Decreased endurance;Decreased balance; Increased Pain;Decreased safe awareness  Assessment: Patient spent time EOB d/t dizziness. Verbal cues for STS. Unsafe to attempt further mobility d/t buckling of L LE, patient reports L side is stronger. Concerns for safety with ambulation and potential lack of safety awareness. Prognosis: Good  REQUIRES PT FOLLOW UP: Yes  Activity Tolerance  Activity Tolerance: Patient Tolerated treatment well       Goals  Short term goals  Time Frame for Short term goals: 14 visits  Short term goal 1: Pt to ambulate 30ft supervision with RW to allow for safe entrance/exit into his bathroom at home. Short term goal 2: Pt to sit <> stand and stand pivot to chair independently to allow for safe transfers to electric w/c.    Short term goal 3: Pt tolerate 30 minutes of therapy for endurance  Short term goal 4: Pt to demonstrate independent bed mobility to allow for increased independence at discharge  Short term goal 5: Pt to demonstrate good standing balance to decrease risk of falls  Patient Goals   Patient goals : Pt would like to get home    Plan    Plan  Times per week: 5-6x  Times per day: Daily  Specific instructions for Next Treatment: Short distance gait- pt ambulates into/out of bathroom only at home  Current Treatment Recommendations: Strengthening, Functional Mobility Training, Balance Training, Transfer Training, Endurance Training, Gait Training, Wheelchair Mobility Training, Home Exercise Program, Safety Education & Training, Patient/Caregiver Education & Training  Safety Devices  Type of devices: Call light within reach, Left in bed, Gait belt, Nurse notified  Restraints  Initially in place: No     Therapy Time   Individual Concurrent Group Co-treatment   Time In 1507         Time Out 1537         Minutes [address] [place], [place]

## 2019-04-26 NOTE — PROGRESS NOTES
PROGRESS NOTE          PATIENT NAME: Young Pleitez MEDICAL RECORD NO. [id number]  DATE: 2019    PRIMARY CARE PHYSICIAN: Tremayne Chung, APRN - CNP    HD: # 2    ASSESSMENT    1. Acute cholecystitis    MEDICAL DECISION MAKING AND PLAN    1. Neuro  1. Tylenol, motrin, continue lyrica and flexeril, added valium yesterday which seems to be adequate. 2. Avoid narcotic medications  2. CV  1. Hemodynamically stable  3. Pulm  1. Incentive spirometry -- pulling in over 3000 mL  4. GI  1. Start low fat diet today  5. Nephro  1. Saline lock  2. Electrolytes wnl  6. ID  1. WBC 17.1  2. F/u wbc  7. Endo  1. Daily checks  8. Heme  1. F/u hbg  9. Prophylaxis  1. Lovenox   10. Dispo: D/c home tomorrow or  -- pending on safe place to go    [address] [place] [address] Road. seen and examined. Looked great this morning. No jaundice. Tolerating clear liquid diet. + flatus. Slight nausea overnight, but patient attributes that to \"Romansh ice\" which typically makes him nauseated. OBJECTIVE  VITALS: Temp: Temp: 97.5 °F (36.4 °C)Temp  Av °F (36.7 °C)  Min: 97.5 °F (36.4 °C)  Max: 98.4 °F (25.9 °C) BP Systolic (78ZTP), SIJ:854 , Min:90 , TREY:909   Diastolic (00UTX), LSP:68, Min:53, Max:72   Pulse Pulse  Av.3  Min: 78  Max: 94 Resp Resp  Av  Min: 15  Max: 23 Pulse ox SpO2  Av.7 %  Min: 93 %  Max: 97 %    GENERAL: alert, no distress  NEURO: no gross motor deficits  HEENT: Atraumatic, EOMI bilaterally  LUNGS: clear to ausculation, without wheezes, rales or rhonci  HEART: normal rate and regular rhythm  ABDOMEN: Soft, mildly distended, TTP in RUQ around incision - expected post operative pain. Incision is clean, dry and intact. No obvious signs of infection.   EXTERMITY: no cyanosis, clubbing or edema        Drain/tube output:      LAB:  CBC:   Recent Labs     19  0539 19  0553   WBC 18.2* 17.8*   HGB 14.3 13.2   HCT 45.0 42.4   MCV 88.4 90.2    210     BMP:   Recent Labs

## 2019-04-26 NOTE — OP NOTE
[facility]                                OPERATIVE REPORT    PATIENT NAME: Kaela Almeida                      :        1970  MED REC NO:   [id number]                             ROOM:       0125  ACCOUNT NO:   [de-identified]                           ADMIT DATE: 2019  PROVIDER:     Javier Power    DATE OF PROCEDURE:  2019    PREOPERATIVE DIAGNOSIS:  Acute cholecystitis. POSTOPERATIVE DIAGNOSIS:  Gallbladder hydrops with acute cholecystitis. PROCEDURE:  Open cholecystectomy. ANESTHESIA:  General.    SURGEON:  Maynor Zavaleta. Buck Goel MD    ASSISTANT:  Carrie Rivera, PGY-3    ESTIMATED BLOOD LOSS:  200 mL. COMPLICATIONS:  None. SPECIMEN:  The gallbladder and contents sent to pathology. WOUND CLASS:  2. INDICATIONS:  This is a 82-year-old male who presented to the ER with  complaints of a 3-day abdominal pain, nausea, vomiting. He states that  every time he tried to eat, he was unable to keep it down. He was  recently discharged from a psychiatric facility due to suicidal  thoughts. On presentation, he denied fevers or chills, but did have a  white count of 18 with a CT scan that showed a distended gallbladder  with thickening and pericholecystic fluid. The decision was made to  obtain ultrasound which did show layering sludge. The patient was taken  to the OR for an open cholecystectomy due to its previous surgical  history and the distention of the gallbladder on CT scan. CONSENT:  The procedure was explained in detail along with risks versus  benefits and alternatives. All questions and concerns were addressed  and a formal consent was obtained and placed in the patient's chart. DESCRIPTION OF PROCEDURE:  The patient was brought back to the OR and  placed on the OR table in a supine position.   General anesthesia was  induced by the Anesthesia team.  A formal time-out identifying the  patient, procedure, allergies, and antibiotics was performed. The  patient was given 2 gm of Ancef prior to incision. The abdomen was  clipped, prepped and draped in the usual sterile fashion. Using a skin marker, a Kocher incision was made 2 fingerbreadths below  the costal margin on the right side and crossing midline. Using a 10  blade, the gallbladder was easily palpable in the right upper quadrant,  and the incision was carried along the previously marked incision  roughly 4 cm greater than the palpated gallbladder on each side. Incision was carried down through the subcutaneous tissue with  electrocautery. The fascial layers of the abdominal wall were divided  with electrocautery ensuring hemostasis and the peritoneal cavity was  entered carefully. The gallbladder was noted to be adhesed to the  peritoneum and was carefully taken down using blunt dissection and  electrocautery. A 2-0 Vicryl stitch was pursestringed in the fundus of  the gallbladder and a 10 blade was used to make an incision within the  pursestring stitch where the gallbladder was decompressed. There was  evidence of clear/white fluid that escaped the gallbladder. Once the  gallbladder was decompressed, the suction was removed and the  pursestring stitch was tied down. Starting from the superior portion of  the gallbladder, the gallbladder was carefully dissected off of the  cystic bed from a top-down approach. Hemostasis was continued to be  obtained throughout the dissection. There was one point where a small  artery along the gallbladder wall was clipped and cut and the dissection  was continued. Dissection once was getting close to the cystic duct and  artery. The dissection was carried continued laterally to medially  until the cystic artery and duct were easily identified. The cystic  artery and duct were circumferentially dissected out and 2-0 silk ties  were placed to tie down the artery and the duct.   Medium

## 2019-04-27 VITALS
HEIGHT: 75 IN | WEIGHT: 189.82 LBS | SYSTOLIC BLOOD PRESSURE: 116 MMHG | DIASTOLIC BLOOD PRESSURE: 53 MMHG | RESPIRATION RATE: 18 BRPM | OXYGEN SATURATION: 97 % | BODY MASS INDEX: 23.6 KG/M2 | HEART RATE: 94 BPM | TEMPERATURE: 97.8 F

## 2019-04-27 LAB
GLUCOSE BLD-MCNC: 117 MG/DL (ref 75–110)
GLUCOSE BLD-MCNC: 88 MG/DL (ref 75–110)

## 2019-04-27 PROCEDURE — 6360000002 HC RX W HCPCS: Performed by: STUDENT IN AN ORGANIZED HEALTH CARE EDUCATION/TRAINING PROGRAM

## 2019-04-27 PROCEDURE — 6370000000 HC RX 637 (ALT 250 FOR IP): Performed by: STUDENT IN AN ORGANIZED HEALTH CARE EDUCATION/TRAINING PROGRAM

## 2019-04-27 PROCEDURE — 82947 ASSAY GLUCOSE BLOOD QUANT: CPT

## 2019-04-27 PROCEDURE — 2580000003 HC RX 258: Performed by: STUDENT IN AN ORGANIZED HEALTH CARE EDUCATION/TRAINING PROGRAM

## 2019-04-27 RX ORDER — ACETAMINOPHEN 500 MG
1000 TABLET ORAL EVERY 8 HOURS
Qty: 120 TABLET | Refills: 3 | Status: SHIPPED | OUTPATIENT
Start: 2019-04-27 | End: 2019-04-27 | Stop reason: SDUPTHER

## 2019-04-27 RX ORDER — DIAZEPAM 5 MG/1
5 TABLET ORAL EVERY 6 HOURS
Qty: 28 TABLET | Refills: 0 | Status: SHIPPED | OUTPATIENT
Start: 2019-04-27 | End: 2019-05-04

## 2019-04-27 RX ORDER — IBUPROFEN 200 MG
400 TABLET ORAL EVERY 6 HOURS
Qty: 120 TABLET | Refills: 3 | Status: SHIPPED | OUTPATIENT
Start: 2019-04-27 | End: 2019-04-27 | Stop reason: SDUPTHER

## 2019-04-27 RX ORDER — IBUPROFEN 200 MG
400 TABLET ORAL EVERY 6 HOURS
Qty: 120 TABLET | Refills: 3 | Status: SHIPPED | OUTPATIENT
Start: 2019-04-27 | End: 2019-04-30

## 2019-04-27 RX ORDER — ACETAMINOPHEN 500 MG
1000 TABLET ORAL EVERY 8 HOURS
Qty: 120 TABLET | Refills: 3 | Status: SHIPPED | OUTPATIENT
Start: 2019-04-27 | End: 2019-04-30

## 2019-04-27 RX ADMIN — DIAZEPAM 5 MG: 5 SOLUTION ORAL at 13:49

## 2019-04-27 RX ADMIN — KETOROLAC TROMETHAMINE 30 MG: 30 INJECTION, SOLUTION INTRAMUSCULAR at 07:56

## 2019-04-27 RX ADMIN — HEPARIN SODIUM 5000 UNITS: 5000 INJECTION INTRAVENOUS; SUBCUTANEOUS at 05:40

## 2019-04-27 RX ADMIN — Medication 10 ML: at 07:54

## 2019-04-27 RX ADMIN — DIAZEPAM 5 MG: 5 SOLUTION ORAL at 02:01

## 2019-04-27 RX ADMIN — BUSPIRONE HYDROCHLORIDE 20 MG: 10 TABLET ORAL at 07:54

## 2019-04-27 RX ADMIN — HEPARIN SODIUM 5000 UNITS: 5000 INJECTION INTRAVENOUS; SUBCUTANEOUS at 13:49

## 2019-04-27 RX ADMIN — BUSPIRONE HYDROCHLORIDE 20 MG: 10 TABLET ORAL at 13:48

## 2019-04-27 RX ADMIN — ACETAMINOPHEN 1000 MG: 500 TABLET ORAL at 13:48

## 2019-04-27 RX ADMIN — DIVALPROEX SODIUM 500 MG: 500 TABLET, EXTENDED RELEASE ORAL at 07:54

## 2019-04-27 RX ADMIN — DIAZEPAM 5 MG: 5 SOLUTION ORAL at 07:55

## 2019-04-27 RX ADMIN — PREGABALIN 400 MG: 100 CAPSULE ORAL at 07:55

## 2019-04-27 RX ADMIN — KETOROLAC TROMETHAMINE 30 MG: 30 INJECTION, SOLUTION INTRAMUSCULAR at 12:49

## 2019-04-27 RX ADMIN — ONDANSETRON 4 MG: 2 INJECTION INTRAMUSCULAR; INTRAVENOUS at 12:21

## 2019-04-27 RX ADMIN — PANTOPRAZOLE SODIUM 40 MG: 40 TABLET, DELAYED RELEASE ORAL at 07:55

## 2019-04-27 RX ADMIN — KETOROLAC TROMETHAMINE 30 MG: 30 INJECTION, SOLUTION INTRAMUSCULAR at 02:00

## 2019-04-27 RX ADMIN — ACETAMINOPHEN 1000 MG: 500 TABLET ORAL at 05:40

## 2019-04-27 ASSESSMENT — PAIN SCALES - GENERAL
PAINLEVEL_OUTOF10: 9
PAINLEVEL_OUTOF10: 7
PAINLEVEL_OUTOF10: 10
PAINLEVEL_OUTOF10: 4
PAINLEVEL_OUTOF10: 9

## 2019-04-27 NOTE — CARE COORDINATION
Met with pt again per pt and sister's request.  Pt wanting a new apartment, explained to pt and sister that we can not move him to a new apartment today. Pt's sister brought in a letter from [facility] that showed that he is approved for [facility] housing. Pt feels that we should be able to make his situation an emergency and find him an apt today. Explained to pt that [facility] will be working on getting an apt and it will not happen on a weekend or as an emergency. Pt stated that he was told by the doctor that he does not have to go home to his current apt. He stated that he was told that it is unsafe. Explained to pt that he can go to a family or friend's home or a shelter could be arranged. Explained, again, to pt that we cannot get him a new apt. He stated that he has bugs in his home and no hot water. Reminded him that yesterday he stated that he does not have bugs, and that he stated that he does have hot water but it comes out slow. Also explained to pt that he can warm water in a microwave or on a stove top. Pt's sister asked why he was not being placed in a nursing home. Explained to pt and sister that his insurance will not cover. Explained that he has no skilled need and that his needs can be met by home care. Sister understood, however, pt stated that his home environment is not safe. Explained that the insurance does not take his home situation into account as this situation is not new and again he has no skilled need. Pt stated that he wants to go to a homeless shelter. Explained to pt that there is [facility] and [facility]. Informed pt that he will need to leave during the day at both shelter's. Pt's sister told pt that he needs to go to his home as it is a better environment than a shelter. Pt agreed that he would go back home. Informed him that transport would be provided since he does not have his wheelchair.

## 2019-04-27 NOTE — PROGRESS NOTES
IV removed without complications. Pressure and dressing applied. Discharge instructions read and discussed with patient. Patient verbalized understanding of discharge instructions. Patient additionally instructed not to use the hotel pool or hot tub and was given a copy of low fat diet options as requested. Patient transported via ambulette to a hotel at [address] [place] [address] in [facility]. Patient discharged with glasses, cell phone and . Patient was also given foam cleanser for his wound as we were out of [facility]. Patient clothing was double bagged and placed in the trash as patient said he \"might have bugs\" and did not want them anymore. His sister brought him a pair of pants and we supplied him with a sweatshirt and socks. Wound was washed with foam cleanser per RN at 0900 this morning. Instructions were given at that time. Patient verbalized and demonstrated understanding of instructions.

## 2019-04-27 NOTE — PROGRESS NOTES
RN called to patient room. Patient wanted SW to come and see him regarding housing. SW notified and arrived at bedside while patient's sister was here. Both patient and his sister were educated regarding housing and discharge planning. Patient transport is being arranged. Patient's sister verbalized understanding. Patient may need repeated education as this was not the first time SW had discussed this with him. Even after yesterday's visit with patient, he told RN that he thought there was some way he could be discharged to somewhere other than his home from here.

## 2019-04-27 NOTE — PLAN OF CARE
Problem: SAFETY  Goal: Free from accidental physical injury  4/27/2019 1419 by Elba Friedman RN  Outcome: Met This Shift  4/27/2019 0217 by Reena Elder RN  Outcome: Ongoing   Patient is free from falls this shift. Fall risk assessment performed. Bed is in lowest position. Call light and telephone are within reach. Nonskid socks are applied. Call light used appropriately. Toilet offered ahead of need. Bed alarm applied if applicable. Problem: DAILY CARE  Goal: Daily care needs are met  4/27/2019 1419 by Elba Friedman RN  Outcome: Met This Shift  4/27/2019 0217 by Reena Elder RN  Outcome: Ongoing     Problem: PAIN  Goal: Patient's pain/discomfort is manageable  4/27/2019 1419 by Elba Friedman RN  Outcome: Met This Shift   Patient will remain free of pain or will keep pain to a manageable level. Patient will be satisfied with pain level. Patient will be given pain medications as scheduled. Pain will be managed by non-pharmacological means whenever possible.      Problem: SKIN INTEGRITY  Goal: Skin integrity is maintained or improved  4/27/2019 1419 by Elba Friedman RN  Outcome: Met This Shift     Problem: Falls - Risk of:  Goal: Will remain free from falls  Description  Will remain free from falls  4/27/2019 1419 by Elba Friedman RN  Outcome: Met This Shift     Problem: Falls - Risk of:  Goal: Absence of physical injury  Description  Absence of physical injury  4/27/2019 1419 by Elba Friedman RN  Outcome: Met This Shift     Problem: Pain:  Goal: Pain level will decrease  Description  Pain level will decrease  4/27/2019 1419 by Elba Friedman RN  Outcome: Met This Shift     Problem: Pain:  Goal: Control of acute pain  Description  Control of acute pain  4/27/2019 1419 by Elba Friedman RN  Outcome: Met This Shift     Problem: Pain:  Goal: Control of chronic pain  Description  Control of chronic pain  4/27/2019 1419 by Elba Friedman RN  Outcome: Met This Shift

## 2019-04-27 NOTE — PLAN OF CARE
Medicate for comfort  Problem: SAFETY  Goal: Free from accidental physical injury  4/27/2019 0217 by Alessia Hook RN  Outcome: Ongoing     Problem: DAILY CARE  Goal: Daily care needs are met  4/27/2019 0217 by Alessia Hook RN  Outcome: Ongoing     Problem: PAIN  Goal: Patient's pain/discomfort is manageable  4/27/2019 0217 by Alessia Hook RN  Outcome: Ongoing     Problem: SKIN INTEGRITY  Goal: Skin integrity is maintained or improved  4/27/2019 0217 by Alessia Hook RN  Outcome: Ongoing     Problem: Falls - Risk of:  Goal: Will remain free from falls  Description  Will remain free from falls  4/27/2019 0217 by Alessia Hook RN  Outcome: Ongoing     Problem: Falls - Risk of:  Goal: Will remain free from falls  Description  Will remain free from falls  4/27/2019 0217 by Alessia Hook RN  Outcome: Ongoing     Problem: Falls - Risk of:  Goal: Absence of physical injury  Description  Absence of physical injury  4/27/2019 0217 by Alessia Hook RN  Outcome: Ongoing     Problem: Pain:  Goal: Pain level will decrease  Description  Pain level will decrease  Outcome: Ongoing     Problem: Pain:  Goal: Control of acute pain  Description  Control of acute pain  4/27/2019 0217 by Alessia Hook RN  Outcome: Ongoing     Problem: Pain:  Goal: Control of chronic pain  Description  Control of chronic pain  Outcome: Ongoing     Problem: Risk for Impaired Skin Integrity  Goal: Tissue integrity - skin and mucous membranes  Description  Structural intactness and normal physiological function of skin and  mucous membranes.   Outcome: Ongoing     Problem: Musculor/Skeletal Functional Status  Goal: Highest potential functional level  Outcome: Ongoing     Problem: Nutrition  Goal: Optimal nutrition therapy  4/27/2019 0217 by Alessia Hook RN  Outcome: Ongoing

## 2019-04-29 ENCOUNTER — TELEPHONE (OUTPATIENT)
Dept: PRIMARY CARE CLINIC | Age: 49
End: 2019-04-29

## 2019-04-29 DIAGNOSIS — G82.20 PARAPLEGIA (HCC): Primary | ICD-10-CM

## 2019-04-29 LAB — SURGICAL PATHOLOGY REPORT: NORMAL

## 2019-04-29 RX ORDER — BLOOD-GLUCOSE METER
KIT MISCELLANEOUS
Qty: 1 DEVICE | Refills: 0 | Status: SHIPPED | OUTPATIENT
Start: 2019-04-29 | End: 2019-05-16 | Stop reason: SDUPTHER

## 2019-04-29 NOTE — TELEPHONE ENCOUNTER
Last visit: 04/12/2019      Next Visit Date:  Future Appointments   Date Time Provider Leora Trujillo   5/1/2019  4:00 PM CHAD Romero CNP [facility]   6/20/2019  1:00 PM [facility]AD Romero CNP [facility] [address] Maintenance   Topic Date Due    Pneumococcal 0-64 years Vaccine (1 of 1 - PPSV23) 08/13/1978    Diabetic retinal exam  08/13/1982    Diabetic microalbuminuria test  08/13/1990    Hepatitis B Vaccine (1 of 3 - Risk 3-dose series) 08/13/1991    Flu vaccine (Season Ended) 09/01/2019    Diabetic foot exam  04/15/2020    Lipid screen  04/16/2020    A1C test (Diabetic or Prediabetic)  04/25/2020    DTaP/Tdap/Td vaccine (3 - Td) 01/26/2027    HIV screen  Completed       Hemoglobin A1C (%)   Date Value   04/25/2019 5.5   04/16/2019 5.3   04/12/2019 5.3             ( goal A1C is < 7)   No results found for: LABMICR  LDL Cholesterol (mg/dL)   Date Value   04/16/2019 73   12/07/2017 61       (goal LDL is <100)   AST (U/L)   Date Value   04/26/2019 41 (H)     ALT (U/L)   Date Value   04/26/2019 41     BUN (mg/dL)   Date Value   04/26/2019 10     BP Readings from Last 3 Encounters:   04/27/19 (!) 116/53   04/24/19 (!) 135/96   04/15/19 100/65          (goal 120/80)    All Future Testing planned in [facility]  Lab Frequency Next Occurrence   Microalbumin, Ur Once 05/12/2019   Lipid, Fasting Once 05/12/2019               Patient Active Problem List:     Anxiety     Seizure ([address]     Myofascial muscle pain     Chronic pain     Spinal stenosis     Hyperlipidemia     Depression     Panlobular emphysema (HCC)     Bipolar disorder ([address]     Chronic back pain     Schizophrenia ([address]     Fibromyalgia     Type 2 diabetes mellitus with diabetic polyneuropathy, with long-term current use of insulin (HCC)     Onychomycosis     Pain in lower limb     Disc disease, degenerative, cervical     Cervical stenosis of spine     Generalized tonic-clonic seizure ([address]     Superficial laceration of scalp     Altered mental status     Decubitus ulcer of coccyx, stage 2     Cocaine substance abuse (HCC)     Opiate abuse, episodic (HCC)     Closed fracture of nasal bone     Drug overdose     Uncomplicated opioid dependence ([facility]     Paraplegia (HCC)     Schizoaffective disorder (HCC)     Non-dose-related adverse reaction to medication wellbutrin     Cannabis abuse     Encephalopathy     Hyperammonemia (HCC)     Constipation     Cellulitis and abscess of right leg     Interstitial lung disease (HCC)     Vomiting     Bipolar 1 disorder (HCC)     Acute cholecystitis     S/P kyra

## 2019-04-29 NOTE — TELEPHONE ENCOUNTER
Елена called back and the seated walker can go to [address] [place] [address] fax number [phone]

## 2019-04-29 NOTE — TELEPHONE ENCOUNTER
Елена from [facility] called #[phone] and asked if he can get a seated walker and not a wheelchair.  Please submit it as urgent

## 2019-04-29 NOTE — TELEPHONE ENCOUNTER
Patient called and had bed begs and everything was thrown out of his house needs a script for  a small wheelchair.  Motrin, all diabetic supplies sent to [address] [place] [address]

## 2019-04-29 NOTE — DISCHARGE SUMMARY
DISCHARGE SUMMARY:    PATIENT NAME:  Raisa Anne YOB: 1970  MEDICAL RECORD NO. [id number]  DATE: 04/29/19  PRIMARY CARE PHYSICIAN: CHAD Quinn CNP  ADMIT DATE: 4/24/2019  DISPOSITION:  Home  DISCHARGE DATE:   4/27/2019  ADMITTING DIAGNOSIS:   Acute cholecystitis    DIAGNOSIS:   Patient Active Problem List   Diagnosis    Anxiety    Seizure ([address]    Myofascial muscle pain    Chronic pain    Spinal stenosis    Hyperlipidemia    Depression    Panlobular emphysema ([address]    Bipolar disorder (HCC)    Chronic back pain    Schizophrenia ([address]    Fibromyalgia    Type 2 diabetes mellitus with diabetic polyneuropathy, with long-term current use of insulin (HCC)    Onychomycosis    Pain in lower limb    Disc disease, degenerative, cervical    Cervical stenosis of spine    Generalized tonic-clonic seizure (HCC)    Superficial laceration of scalp    Altered mental status    Decubitus ulcer of coccyx, stage 2    Cocaine substance abuse (HCC)    Opiate abuse, episodic (HCC)    Closed fracture of nasal bone    Drug overdose    Uncomplicated opioid dependence ([address]    Paraplegia ([address]    Schizoaffective disorder ([address]    Non-dose-related adverse reaction to medication wellbutrin    Cannabis abuse    Encephalopathy    Hyperammonemia (HCC)    Constipation    Cellulitis and abscess of right leg    Interstitial lung disease (HCC)    Vomiting    Bipolar 1 disorder (HCC)    Acute cholecystitis    S/P kyra       CONSULTANTS:  none    PROCEDURES:   open cholecystectomy     HOSPITAL COURSE:   Raisa Anne is a 57 y.o. male who was admitted on 4/24/019 with acute cholecystitis. Patient underwent open cholecystectomy on 6/85/6948 without complication. Labs were followed daily.       At time of discharge, Raisa Anne was tolerating a regular diet, having bowel movements, ambulating on his own accord, had adequate analgesia on oral pain medications, and had no signs of symptoms of complications. He was deemed medically stable and discharged to home on 4/27/ with instructions to follow up in [address] in 10-14 days. Pt expressed understanding of and agreement with DC plans. PHYSICAL EXAMINATION:        Discharge Vitals:  height is 6' 3\" (1.905 m) and weight is 189 lb 13.1 oz (86.1 kg). His oral temperature is 97.8 °F (36.6 °C). His blood pressure is 116/53 (abnormal) and his pulse is 94. His respiration is 18 and oxygen saturation is 97%. General appearance - alert, well appearing, and in no distress  Chest - clear to ausculation  Heart - normal rate and regular rhythm  Abdomen - soft, non tender, non distended, bowel sounds present, cholecystectomy incision site well healing   Neurological - motor and sensory grossly normal bilaterally  Musculoskeletal - full range of motion without pain  Extremities - peripheral pulses normal, no pedal edema, no clubbing or cyanosis    LABS:     Recent Labs     04/26/19  1614   WBC 11.2   HGB 12.1*   HCT 39.4*         K 4.1   *   CO2 18*   BUN 10   CREATININE 0.74       DIAGNOSTIC TESTS:    Ct Abdomen Pelvis W Iv Contrast Additional Contrast? None    Result Date: 4/24/2019  EXAMINATION: CT OF THE ABDOMEN AND PELVIS WITH CONTRAST 4/24/2019 11:18 am TECHNIQUE: CT of the abdomen and pelvis was performed with the administration of intravenous contrast. Multiplanar reformatted images are provided for review. Dose modulation, iterative reconstruction, and/or weight based adjustment of the mA/kV was utilized to reduce the radiation dose to as low as reasonably achievable. COMPARISON: 04/14/2019 HISTORY: Ordering Physician Provided Reason for Exam: abd pain Relevant Medical/Surgical History: mrsa, emphysema of lung, liver disease, chronic bronchitis, h/o stomach shikha, lumbar fusion , appy,  multiple allergies but not to IV contrast FINDINGS: Lower Chest: Normal heart size. Bibasilar atelectasis.  Organs: Liver, spleen, pancreas and adrenal glands appear unremarkable. Multiple small well-circumscribed hypodense lesions in the bilateral kidneys are too small to characterize but appear benign likely cysts. Interval development of diffuse gallbladder dilatation with mild wall thickening/edema and punctate layering calcification suggesting stones in the fundus. GI/Bowel: No evidence of bowel obstruction or inflammation. Pelvis: Bladder and prostate appear unremarkable Peritoneum/Retroperitoneum: Normal caliber abdominal aorta. Aorto bi-iliac atherosclerosis. No suspicious lymphadenopathy. No ascites or free air. Bones/Soft Tissues: Posterior L5-S1 disc space and right-sided neural foraminal narrowing. Postsurgical changes along the L5-S1 posterior elements. 1. Interval development of diffuse gallbladder dilatation with mild wall thickening/edema and punctate layering calcification suggesting stones in the fundus. Findings suggest cholecystitis in the appropriate clinical setting. Recommend further evaluation with HIDA scan. 2. Multiple incidental/chronic findings as detailed above. Us Gallbladder Ruq    Result Date: 4/24/2019  EXAMINATION: Gallbladder ultrasound 4/24/2019 1:40 pm COMPARISON: CT abdomen pelvis earlier same day HISTORY: ORDERING SYSTEM PROVIDED HISTORY: Concern for acute kyra FINDINGS: Gallbladder is dilated with mild to moderate layering sludge and diffuse wall thickening with mild edema. Sonographic Starkey sign is positive. Common bile duct measures 6 mm. Visualized liver and right kidney appear grossly unremarkable. Gallbladder findings suggesting acute cholecystitis. Recommend further evaluation with HIDA scan.      Xr Chest Portable    Result Date: 4/25/2019  EXAMINATION: SINGLE XRAY VIEW OF THE CHEST 4/24/2019 6:15 am COMPARISON: December 12, 2018 HISTORY: ORDERING SYSTEM PROVIDED HISTORY: right sided chest pain TECHNOLOGIST PROVIDED HISTORY: right sided chest pain FINDINGS: Low lung volumes. No focal consolidation. No cardiomegaly. No pulmonary edema. No acute findings. DISCHARGE INSTRUCTIONS     Discharge Medications:        Medication List      START taking these medications    diazepam 5 MG tablet  Commonly known as:  VALIUM  Take 1 tablet by mouth every 6 hours for 7 days. ibuprofen 200 MG tablet  Commonly known as:  ADVIL  Take 2 tablets by mouth every 6 hours        CHANGE how you take these medications    acetaminophen 500 MG tablet  Commonly known as:  APAP EXTRA STRENGTH  Take 2 tablets by mouth every 8 hours  What changed:    · medication strength  · how much to take  · when to take this  · reasons to take this     busPIRone 10 MG tablet  Commonly known as:  BUSPAR  What changed:  Another medication with the same name was removed. Continue taking this medication, and follow the directions you see here. CONTINUE taking these medications    * albuterol (2.5 MG/3ML) 0.083% nebulizer solution  Commonly known as:  PROVENTIL  Take 3 mLs by nebulization every 6 hours as needed for Wheezing     * VENTOLIN  (90 Base) MCG/ACT inhaler  Generic drug:  albuterol sulfate HFA  INHALE 2 PUFFS INTO THE LUNGS EVERY 6 HOURS AS NEEDED FOR WHEEZING     BOOST Liqd  Two a day. Chocolate lactose free nutritional drinks.      cyanocobalamin 500 MCG tablet     * divalproex 500 MG extended release tablet  Commonly known as:  DEPAKOTE ER  Take 1 tablet by mouth nightly     * divalproex 500 MG extended release tablet  Commonly known as:  DEPAKOTE ER  Take 1 tablet by mouth every morning     fluticasone-vilanterol 100-25 MCG/INH Aepb inhaler  Commonly known as:  BREO ELLIPTA  Inhale 1 puff into the lungs daily     FREESTYLE LITE strip  Generic drug:  blood glucose test strips  1 each by Other route 3 times daily     * Gel-Foam Cushion Misc  1 Units by Does not apply route daily     * Gel-Foam Cushion Misc  1 Units by Does not apply route daily     hydrOXYzine 25 MG tablet  Commonly known as:  ATARAX  Take 1 tablet by mouth 3 times daily as needed for Anxiety     * insulin lispro 100 UNIT/ML injection vial  Commonly known as:  HUMALOG  Inject 0-12 Units into the skin 3 times daily (with meals)     * insulin lispro 100 UNIT/ML injection vial  Commonly known as:  HUMALOG  Inject 0-6 Units into the skin nightly     Insulin Syringe-Needle U-100 31G X 5/16\" 0.5 ML Misc  1 each by Does not apply route 3 times daily     ipratropium 0.02 % nebulizer solution  Commonly known as:  ATROVENT  Take 2.5 mLs by nebulization 4 times daily as needed for Wheezing     lactulose 10 GM/15ML solution  Commonly known as:  CHRONULAC     lidocaine 4 % cream  Commonly known as:  LMX  Apply topically 3 times daily as needed for Pain     paliperidone 6 MG extended release tablet  Commonly known as:  INVEGA  Take 1 tablet by mouth daily for 7 days     paliperidone palmitate  MG/ML Isabel IM injection  Commonly known as:  INVEGA SUSTENNA  Inject 156 mg into the muscle every 30 days  Start taking on:  4/30/2019     pantoprazole 40 MG tablet  Commonly known as:  PROTONIX  Take 1 tablet by mouth daily     polyethylene glycol powder  Commonly known as:  GLYCOLAX     pregabalin 200 MG capsule  Commonly known as:  LYRICA  Take 2 capsules by mouth 2 times daily. PRO COMFORT ALCOHOL 70 % Pads     QUEtiapine 200 MG tablet  Commonly known as:  SEROQUEL  Take 1 tablet by mouth nightly     ranitidine 150 MG tablet  Commonly known as:  ZANTAC  Take 1 tablet by mouth 2 times daily as needed for Heartburn     SENNA-TIME 8.6 MG tablet  Generic drug:  senna     simvastatin 40 MG tablet  Commonly known as:  ZOCOR     traZODone 100 MG tablet  Commonly known as:  DESYREL  Take 2 tablets by mouth nightly as needed for Sleep     TRUEPLUS LANCETS 33G Misc         * This list has 8 medication(s) that are the same as other medications prescribed for you.  Read the directions carefully, and ask your doctor or other care provider to review them with you. STOP taking these medications    meloxicam 15 MG tablet  Commonly known as:  MOBIC           Where to Get Your Medications      These medications were sent to [address] [place] [address] [address] [place] [address] HUSAM Dailey  [address]    Phone:  [phone]   · acetaminophen 500 MG tablet  · ibuprofen 200 MG tablet     You can get these medications from any pharmacy    Bring a paper prescription for each of these medications  · diazepam 5 MG tablet       Diet: No diet orders on file diet as tolerated  Activity: - Avoid strenuous activity or exercise until cleared during follow-up appointment  - No driving or operating heavy machinery while taking narcotics   Wound Care: Daily and as needed  Follow-up:   1. Call [facility] to make appointment with Dr. Merlinda Bevels in:  2weeks  2. Follow up in the next few weeks with PCP: CHAD Awan CNP    Time Spent for discharge: 30 minutes    Teresa Joshi  4/29/2019, 9:06 AM               Trauma Attending Shun Oleary      I have reviewed the above [facility] note(s) and confirmed the key elements of the medical history and physical exam. I have seen and examined the pt. I have discussed the findings, established the care plan and recommendations with Resident, [facility] RN, bedside nurse.         Emerald Rene DO  4/29/2019  4:16 PM

## 2019-04-29 NOTE — TELEPHONE ENCOUNTER
What kind of wheelchair, as I know he had a motorized one previously. And [facility] not do medical supplies, so we need another option for his Wheelchair. Has he contacted his case manage to help facilitate this so I can send it to the right location?

## 2019-04-30 ENCOUNTER — APPOINTMENT (OUTPATIENT)
Dept: GENERAL RADIOLOGY | Age: 49
End: 2019-04-30
Payer: COMMERCIAL

## 2019-04-30 ENCOUNTER — HOSPITAL ENCOUNTER (EMERGENCY)
Age: 49
Discharge: HOME | End: 2019-04-30
Attending: EMERGENCY MEDICINE
Payer: COMMERCIAL

## 2019-04-30 ENCOUNTER — TELEPHONE (OUTPATIENT)
Dept: PRIMARY CARE CLINIC | Age: 49
End: 2019-04-30

## 2019-04-30 VITALS
SYSTOLIC BLOOD PRESSURE: 119 MMHG | RESPIRATION RATE: 16 BRPM | TEMPERATURE: 97.7 F | DIASTOLIC BLOOD PRESSURE: 77 MMHG | BODY MASS INDEX: 23.73 KG/M2 | HEART RATE: 52 BPM | OXYGEN SATURATION: 100 % | HEIGHT: 75 IN

## 2019-04-30 DIAGNOSIS — K59.00 CONSTIPATION, UNSPECIFIED CONSTIPATION TYPE: Primary | ICD-10-CM

## 2019-04-30 DIAGNOSIS — G89.18 POST-OP PAIN: ICD-10-CM

## 2019-04-30 LAB
ABSOLUTE EOS #: 0.66 K/UL (ref 0–0.44)
ABSOLUTE IMMATURE GRANULOCYTE: 0.22 K/UL (ref 0–0.3)
ABSOLUTE LYMPH #: 3.55 K/UL (ref 1.1–3.7)
ABSOLUTE MONO #: 0.83 K/UL (ref 0.1–1.2)
ALBUMIN SERPL-MCNC: 3.2 G/DL (ref 3.5–5.2)
ALBUMIN/GLOBULIN RATIO: 0.9 (ref 1–2.5)
ALP BLD-CCNC: 105 U/L (ref 40–129)
ALT SERPL-CCNC: 33 U/L (ref 5–41)
ANION GAP SERPL CALCULATED.3IONS-SCNC: 11 MMOL/L (ref 9–17)
AST SERPL-CCNC: 27 U/L
BASOPHILS # BLD: 1 % (ref 0–2)
BASOPHILS ABSOLUTE: 0.11 K/UL (ref 0–0.2)
BILIRUB SERPL-MCNC: <0.1 MG/DL (ref 0.3–1.2)
BUN BLDV-MCNC: 8 MG/DL (ref 6–20)
BUN/CREAT BLD: ABNORMAL (ref 9–20)
CALCIUM SERPL-MCNC: 8.9 MG/DL (ref 8.6–10.4)
CHLORIDE BLD-SCNC: 107 MMOL/L (ref 98–107)
CO2: 20 MMOL/L (ref 20–31)
CREAT SERPL-MCNC: 0.71 MG/DL (ref 0.7–1.2)
DIFFERENTIAL TYPE: ABNORMAL
EOSINOPHILS RELATIVE PERCENT: 5 % (ref 1–4)
GFR AFRICAN AMERICAN: >60 ML/MIN
GFR NON-AFRICAN AMERICAN: >60 ML/MIN
GFR SERPL CREATININE-BSD FRML MDRD: ABNORMAL ML/MIN/{1.73_M2}
GFR SERPL CREATININE-BSD FRML MDRD: ABNORMAL ML/MIN/{1.73_M2}
GLUCOSE BLD-MCNC: 114 MG/DL (ref 70–99)
HCT VFR BLD CALC: 40.5 % (ref 40.7–50.3)
HEMOGLOBIN: 12.9 G/DL (ref 13–17)
IMMATURE GRANULOCYTES: 2 %
LIPASE: 23 U/L (ref 13–60)
LYMPHOCYTES # BLD: 28 % (ref 24–43)
MCH RBC QN AUTO: 28.6 PG (ref 25.2–33.5)
MCHC RBC AUTO-ENTMCNC: 31.9 G/DL (ref 28.4–34.8)
MCV RBC AUTO: 89.8 FL (ref 82.6–102.9)
MONOCYTES # BLD: 7 % (ref 3–12)
NRBC AUTOMATED: 0 PER 100 WBC
PDW BLD-RTO: 14.6 % (ref 11.8–14.4)
PLATELET # BLD: 386 K/UL (ref 138–453)
PLATELET ESTIMATE: ABNORMAL
PMV BLD AUTO: 9.8 FL (ref 8.1–13.5)
POTASSIUM SERPL-SCNC: 4.3 MMOL/L (ref 3.7–5.3)
RBC # BLD: 4.51 M/UL (ref 4.21–5.77)
RBC # BLD: ABNORMAL 10*6/UL
SEG NEUTROPHILS: 58 % (ref 36–65)
SEGMENTED NEUTROPHILS ABSOLUTE COUNT: 7.25 K/UL (ref 1.5–8.1)
SODIUM BLD-SCNC: 138 MMOL/L (ref 135–144)
TOTAL PROTEIN: 6.8 G/DL (ref 6.4–8.3)
WBC # BLD: 12.6 K/UL (ref 3.5–11.3)
WBC # BLD: ABNORMAL 10*3/UL

## 2019-04-30 PROCEDURE — 80053 COMPREHEN METABOLIC PANEL: CPT

## 2019-04-30 PROCEDURE — 96374 THER/PROPH/DIAG INJ IV PUSH: CPT

## 2019-04-30 PROCEDURE — 74022 RADEX COMPL AQT ABD SERIES: CPT

## 2019-04-30 PROCEDURE — 2580000003 HC RX 258: Performed by: STUDENT IN AN ORGANIZED HEALTH CARE EDUCATION/TRAINING PROGRAM

## 2019-04-30 PROCEDURE — 6370000000 HC RX 637 (ALT 250 FOR IP): Performed by: STUDENT IN AN ORGANIZED HEALTH CARE EDUCATION/TRAINING PROGRAM

## 2019-04-30 PROCEDURE — 85025 COMPLETE CBC W/AUTO DIFF WBC: CPT

## 2019-04-30 PROCEDURE — 6360000002 HC RX W HCPCS: Performed by: STUDENT IN AN ORGANIZED HEALTH CARE EDUCATION/TRAINING PROGRAM

## 2019-04-30 PROCEDURE — 99284 EMERGENCY DEPT VISIT MOD MDM: CPT

## 2019-04-30 PROCEDURE — 83690 ASSAY OF LIPASE: CPT

## 2019-04-30 RX ORDER — DOCUSATE SODIUM 100 MG/1
100 CAPSULE, LIQUID FILLED ORAL 2 TIMES DAILY PRN
Qty: 30 CAPSULE | Refills: 0 | Status: SHIPPED | OUTPATIENT
Start: 2019-04-30

## 2019-04-30 RX ORDER — 0.9 % SODIUM CHLORIDE 0.9 %
1000 INTRAVENOUS SOLUTION INTRAVENOUS ONCE
Status: COMPLETED | OUTPATIENT
Start: 2019-04-30 | End: 2019-04-30

## 2019-04-30 RX ORDER — POLYETHYLENE GLYCOL 3350 17 G/17G
17 POWDER, FOR SOLUTION ORAL DAILY
Qty: 1 BOTTLE | Refills: 0 | Status: SHIPPED | OUTPATIENT
Start: 2019-04-30 | End: 2019-05-07

## 2019-04-30 RX ORDER — ACETAMINOPHEN 500 MG
1000 TABLET ORAL ONCE
Status: COMPLETED | OUTPATIENT
Start: 2019-04-30 | End: 2019-04-30

## 2019-04-30 RX ORDER — IBUPROFEN 600 MG/1
600 TABLET ORAL EVERY 6 HOURS PRN
Qty: 30 TABLET | Refills: 0 | Status: SHIPPED | OUTPATIENT
Start: 2019-04-30

## 2019-04-30 RX ORDER — ACETAMINOPHEN 500 MG
1000 TABLET ORAL EVERY 8 HOURS PRN
Qty: 60 TABLET | Refills: 0 | Status: SHIPPED | OUTPATIENT
Start: 2019-04-30 | End: 2021-12-29

## 2019-04-30 RX ORDER — KETOROLAC TROMETHAMINE 30 MG/ML
30 INJECTION, SOLUTION INTRAMUSCULAR; INTRAVENOUS ONCE
Status: COMPLETED | OUTPATIENT
Start: 2019-04-30 | End: 2019-04-30

## 2019-04-30 RX ADMIN — ACETAMINOPHEN 1000 MG: 500 TABLET ORAL at 15:26

## 2019-04-30 RX ADMIN — SODIUM CHLORIDE 1000 ML: 9 INJECTION, SOLUTION INTRAVENOUS at 11:36

## 2019-04-30 RX ADMIN — KETOROLAC TROMETHAMINE 30 MG: 30 INJECTION, SOLUTION INTRAMUSCULAR at 11:36

## 2019-04-30 ASSESSMENT — ENCOUNTER SYMPTOMS
ABDOMINAL DISTENTION: 0
NAUSEA: 1
CONSTIPATION: 1
BLOOD IN STOOL: 0
VOMITING: 0
RHINORRHEA: 0
EYE DISCHARGE: 0
ABDOMINAL PAIN: 1
SHORTNESS OF BREATH: 0
BACK PAIN: 0
DIARRHEA: 0
COUGH: 1

## 2019-04-30 ASSESSMENT — PAIN SCALES - GENERAL
PAINLEVEL_OUTOF10: 9
PAINLEVEL_OUTOF10: 10
PAINLEVEL_OUTOF10: 5
PAINLEVEL_OUTOF10: 10

## 2019-04-30 ASSESSMENT — PAIN DESCRIPTION - LOCATION: LOCATION: ABDOMEN

## 2019-04-30 ASSESSMENT — PAIN DESCRIPTION - PAIN TYPE: TYPE: ACUTE PAIN

## 2019-04-30 ASSESSMENT — PAIN DESCRIPTION - ORIENTATION: ORIENTATION: RIGHT

## 2019-04-30 NOTE — ED NOTES
General surgery at bedside. NAD noted. RR even and unlabored.  Will continue to monitor      Oksana Schwab RN  04/30/19 [id number]

## 2019-04-30 NOTE — ED PROVIDER NOTES
[facility]     Emergency Department     Faculty Attestation    I performed a history and physical examination of the patient and discussed management with the resident. I reviewed the residents note and agree with the documented findings and plan of care. Any areas of disagreement are noted on the chart. I was personally present for the key portions of any procedures. I have documented in the chart those procedures where I was not present during the key portions. I have reviewed the emergency nurses triage note. I agree with the chief complaint, past medical history, past surgical history, allergies, medications, social and family history as documented unless otherwise noted below. For Physician Assistant/ Nurse Practitioner cases/documentation I have personally evaluated this patient and have completed at least one if not all key elements of the E/M (history, physical exam, and MDM). Additional findings are as noted.       Primary Care Physician:  Tremayne Chung, APRN - DEE    CHIEF COMPLAINT       Chief Complaint   Patient presents with    Abdominal Pain    Post-op Problem       RECENT VITALS:   Temp: 97.7 °F (36.5 °C),  Pulse: 70, Resp: 16, BP: 90/62    LABS:  Labs Reviewed   CBC WITH AUTO DIFFERENTIAL - Abnormal; Notable for the following components:       Result Value    WBC 12.6 (*)     Hemoglobin 12.9 (*)     Hematocrit 40.5 (*)     RDW 14.6 (*)     Eosinophils % 5 (*)     Immature Granulocytes 2 (*)     Absolute Eos # 0.66 (*)     All other components within normal limits   COMPREHENSIVE METABOLIC PANEL   LIPASE         PERTINENT ATTENDING PHYSICIAN COMMENTS:    Patient here with increasing abdominal pain no bowel movements had an open cholecystectomy done at this facility, on exam his incision looks good and the staples are intact and is diffusely tender without peritoneal signs, we'll do abdominal pain workup and consult general surgery     Critical Care          Olga Dickey Jessi Lance MD, [facility]  Attending Emergency  Physician              Emely Morales MD  04/30/19 4721

## 2019-04-30 NOTE — ED NOTES
Patient in need of a ride home and has [employer] in place. Ride arranged and patient in ER Waiting Room. Radha Guerrero.  Catalina Medellin  04/30/19 0074

## 2019-04-30 NOTE — ED NOTES
Bed: 17  Expected date:   Expected time:   Means of arrival:   Comments:  300 Sergio Alvarez RN  04/30/19 [id number]

## 2019-04-30 NOTE — CONSULTS
Consult    PATIENT NAME: Elvira Leon. AGE: 57 y.o. MEDICAL RECORD NO. [id number]  DATE: 4/30/2019  PRIMARY CARE PHYSICIAN: CHAD Ramires CNP    Patient evaluated at the request of  Dr. Mercedes Black  Reason for evaluation: Post operative open cholecystectomy with abdominal distention    Patient information was obtained from patient. History/Exam limitations: none. IMPRESSION:     Patient Active Problem List   Diagnosis    Anxiety    Seizure ([address]    Myofascial muscle pain    Chronic pain    Spinal stenosis    Hyperlipidemia    Depression    Panlobular emphysema (HCC)    Bipolar disorder ([address]    Chronic back pain    Schizophrenia ([address]    Fibromyalgia    Type 2 diabetes mellitus with diabetic polyneuropathy, with long-term current use of insulin (HCC)    Onychomycosis    Pain in lower limb    Disc disease, degenerative, cervical    Cervical stenosis of spine    Generalized tonic-clonic seizure (HCC)    Superficial laceration of scalp    Altered mental status    Decubitus ulcer of coccyx, stage 2    Cocaine substance abuse ([address]    Opiate abuse, episodic (HCC)    Closed fracture of nasal bone    Drug overdose    Uncomplicated opioid dependence ([address]    Paraplegia ([address]    Schizoaffective disorder ([address]    Non-dose-related adverse reaction to medication wellbutrin    Cannabis abuse    Encephalopathy    Hyperammonemia (HCC)    Constipation    Cellulitis and abscess of right leg    Interstitial lung disease (HCC)    Vomiting    Bipolar 1 disorder (HCC)    Acute cholecystitis    S/P kyra   1. POD#6 s/p open cholecystectomy  2. Constipation      PLAN:   1. Recommend enema in the ED  2. Discharge home with follow up in clinic as scheduled  3. No acute surgical intervention at this time      HISTORY:   History of Chief Complaint:    Elvira Beaver is a 57 y.o. male who is POD#6 s/p open cholecystectomy.  He presents to the ED with complaints of abdominal pain and bloating. States this has been present since he was discharged. Reports no bowel movement since his discharge from the hospital. Located in the lower abdomen. Reports constant, dull pain. Reports he is hungry but feels nauseous and full after eating. Denies fevers, emesis, chest pain, shortness of breath. Past Medical History   has a past medical history of Anxiety, Arthritis, Bipolar disorder ([address] Bronchitis, chronic ([address] Chronic back pain, Chronic pain, Claustrophobia, COPD (chronic obstructive pulmonary disease) ([address] Depression, Diabetes mellitus ([address] Emphysema of lung ([address] History of irregular heartbeat, Hyperlipidemia, Liver disease, MRSA (methicillin resistant staph aureus) culture positive, Myofacial muscle pain, Osteomyelitis ([address] Peripheral neuropathy, Pressure ulcer, Schizophrenia ([address] Seizures ([address] Sleep apnea, Spinal stenosis, and Substance abuse ([address] Past Surgical History   has a past surgical history that includes Appendectomy; Stomach surgery; lumbar fusion; Septoplasty (01/13/2017); Turbinoplasties (01/13/2017); fracture surgery (01/28/2017); Cholecystectomy (04/24/2019); and Cholecystectomy (N/A, 4/24/2019). Medications  Prior to Admission medications    Medication Sig Start Date End Date Taking? Authorizing Provider   Blood Glucose Monitoring Suppl (FREESTYLE LITE) ILA USE AS DIRECTED FOUR TIMES A DAY 4/29/19   Kiara Mccormick, APRN - CNP   diazepam (VALIUM) 5 MG tablet Take 1 tablet by mouth every 6 hours for 7 days.  4/27/19 5/4/19  Teresa Joshi MD   ibuprofen (ADVIL) 200 MG tablet Take 2 tablets by mouth every 6 hours 4/27/19   Teresa Joshi MD   acetaminophen (APAP EXTRA STRENGTH) 500 MG tablet Take 2 tablets by mouth every 8 hours 4/27/19   Teresa Joshi MD   busPIRone (BUSPAR) 10 MG tablet Take 20 mg by mouth 3 times daily     Historical Provider, MD   lactulose (CHRONULAC) 10 GM/15ML solution Take 10 g by mouth 3 times daily as needed    Historical Provider, MD apply route daily 4/12/19   May , CHAD Kiran CNP   pregabalin (LYRICA) 200 MG capsule Take 2 capsules by mouth 2 times daily. 4/12/19 4/11/20  May , CHAD Kiran CNP   Nutritional Supplements (BOOST) LIQD Two a day. Chocolate lactose free nutritional drinks. 4/11/19   May , CHAD Kiran CNP   VENTOLIN  (90 Base) MCG/ACT inhaler INHALE 2 PUFFS INTO THE LUNGS EVERY 6 HOURS AS NEEDED FOR WHEEZING 3/27/19   May , APRN - CNP   TRUEPLUS LANCETS 33G MISC  2/8/19   Historical Provider, MD   fluticasone-vilanterol (BREO ELLIPTA) 100-25 MCG/INH AEPB inhaler Inhale 1 puff into the lungs daily 2/18/19   May , APRN - CNP   Insulin Syringe-Needle U-100 31G X 5/16\" 0.5 ML MISC 1 each by Does not apply route 3 times daily 1/23/19   May , CHAD Kiran CNP   cyanocobalamin 500 MCG tablet Take 500 mcg by mouth 10/26/18   Historical Provider, MD   senna (SENNA-TIME) 8.6 MG tablet twice daily as needed. 9/4/18   Historical Provider, MD   simvastatin (ZOCOR) 40 MG tablet Take 40 mg by mouth nightly     Historical Provider, MD   albuterol (PROVENTIL) (2.5 MG/3ML) 0.083% nebulizer solution Take 3 mLs by nebulization every 6 hours as needed for Wheezing 12/20/18   May , APRN - CNP   ipratropium (ATROVENT) 0.02 % nebulizer solution Take 2.5 mLs by nebulization 4 times daily as needed for Wheezing 12/20/18   May , CHAD Kiran CNP   polyethylene glycol (GLYCOLAX) powder Take 17 g by mouth daily as needed    Historical Provider, MD    Scheduled Meds:   sodium chloride  1,000 mL Intravenous Once     Continuous Infusions:  PRN Meds:. Allergies  is allergic to chantix [varenicline tartrate]; sulfa antibiotics; sulfasalazine; bactrim; levofloxacin; levofloxacin; phenobarbital; sulfamethoxazole-trimethoprim; sulfur; tessalon [benzonatate]; and wellbutrin [bupropion].   Family History  family history includes Coronary Art Dis in his maternal uncle; Diabetes in his mother; Seizures in his maternal cousin and sister. Social History   reports that he has been smoking cigarettes. He started smoking about 36 years ago. He has a 35.00 pack-year smoking history. He has never used smokeless tobacco.   reports that he does not drink alcohol. reports that he has current or past drug history. Drugs: Marijuana and Opiates . Review of Systems  General Denies any fever or chills  HEENT  Denies any diplopia, tinnitus or vertigo  Resp Denies any shortness of breath, cough or wheezing  Cardiac Denies any chest pain, palpitations, claudication or edema  GI Reports abdominal pain, bloating and constipation   Denies any frequency, urgency, hesitancy or incontinence  Heme Denies bruising or bleeding easily  Endocrine Denies any history of diabetes or thyroid disease  Neuro Denies any focal motor or sensory deficits    PHYSICAL:   VITALS:  height is 6' 3\" (1.905 m). His oral temperature is 97.7 °F (36.5 °C). His blood pressure is 98/67 and his pulse is 70. His respiration is 16 and oxygen saturation is 97%. CONSTITUTIONAL: Alert and oriented times 3, no acute distress and cooperative to examination. HEENT: Head is normocephalic, atraumatic. EOMI, PERRLA  NECK: Soft, trachea midline and straight  LUNGS: Chest expands equally bilaterally upon respiration, no accessory muscle used. Ausculation reveals no wheezes, rales or rhonchi. CARDIOVASCULAR: Heart sounds are normal.  Regular rate and rhythm without murmur, gallop or rub. ABDOMEN: softly distended, diffusely tender to palpation, greater in the lower quadrants, surgical incision well healing, C/D/I with staples intact  NEUROLOGIC: CN II-XII are grossly intact.  There are no focalizing motor or sensory deficits  EXTREMITIES: no cyanosis, clubbing or edema    LABS:     Recent Labs     04/30/19  1138   WBC 12.6*   HGB 12.9*   HCT 40.5*         K 4.3      CO2 20   BUN 8   CREATININE 0.71   CALCIUM 8.9   AST 27   ALT 33   BILITOT <0.10* Recent Labs     04/30/19  1138   ALKPHOS 105   ALT 33   AST 27   BILITOT <0.10*   LABALBU 3.2*   LIPASE 23     CBC with Differential:    Lab Results   Component Value Date    WBC 12.6 04/30/2019    RBC 4.51 04/30/2019    HGB 12.9 04/30/2019    HCT 40.5 04/30/2019     04/30/2019    MCV 89.8 04/30/2019    MCH 28.6 04/30/2019    MCHC 31.9 04/30/2019    RDW 14.6 04/30/2019    LYMPHOPCT 28 04/30/2019    MONOPCT 7 04/30/2019    BASOPCT 1 04/30/2019    MONOSABS 0.83 04/30/2019    LYMPHSABS 3.55 04/30/2019    EOSABS 0.66 04/30/2019    BASOSABS 0.11 04/30/2019    DIFFTYPE NOT REPORTED 04/30/2019     CMP:    Lab Results   Component Value Date     04/30/2019    K 4.3 04/30/2019     04/30/2019    CO2 20 04/30/2019    BUN 8 04/30/2019    CREATININE 0.71 04/30/2019    GFRAA >60 04/30/2019    LABGLOM >60 04/30/2019    GLUCOSE 114 04/30/2019    PROT 6.8 04/30/2019    LABALBU 3.2 04/30/2019    CALCIUM 8.9 04/30/2019    BILITOT <0.10 04/30/2019    ALKPHOS 105 04/30/2019    AST 27 04/30/2019    ALT 33 04/30/2019     LIPASE:    Lab Results   Component Value Date    LIPASE 23 04/30/2019       RADIOLOGY:   Xr Acute Abd Series Chest 1 Vw    Result Date: 4/30/2019  EXAMINATION: TWO XRAY VIEWS OF THE ABDOMEN AND SINGLE  XRAY VIEW OF THE CHEST 4/30/2019 11:41 am COMPARISON: 09/02/2018 HISTORY: ORDERING SYSTEM PROVIDED HISTORY: post op kyra, pain, no BM TECHNOLOGIST PROVIDED HISTORY: post op kyra, pain, no BM Ordering Physician Provided Reason for Exam: constipation/abd.pain/ post-op Gallbladder surgery Acuity: Unknown Type of Exam: Ongoing FINDINGS: Normal cardiomediastinal silhouette. There is interstitial prominence, nonspecific. No focal consolidation. No pleural effusion or pneumothorax. Normal bowel gas pattern. Large amount of stool in the descending colon suggests constipation. Cholecystectomy clips noted along with right subcostal skin staples. No free intraperitoneal air is demonstrated.      1. Status post recent open cholecystectomy. No free intraperitoneal air radiographically. 2. Large amount of stool in descending colon suggests constipation. Thank you for the interesting evaluation. Further recommendations to follow. Sandi Nobles  4/30/2019, 1:16 PM    Long complex history, recent open kyra. Labs normal.  Films consistent with constipation. Rec attempt at cathartics, if no improvement possible observation. Reviewed with residents.

## 2019-05-02 ENCOUNTER — TELEPHONE (OUTPATIENT)
Dept: PRIMARY CARE CLINIC | Age: 49
End: 2019-05-02

## 2019-05-03 NOTE — PLAN OF CARE
At bedside for fetal Corwin Iraheta  5:57 AM  5/3/2019 Problem: Altered Mood, Depressive Behavior  Goal: LTG-Able to verbalize acceptance of life and situations over which he or she has no control  Outcome: Ongoing  PSYCHOEDUCATION GROUP NOTE    Date: November 11, 2017  Start Time: 1330  End Time: 1400    Number Participants in Group:  8/11    Goal:  Patient will demonstrate increased interpersonal interaction   Topic: Ideal Self/Real Self/Public Self     Discipline Responsible:   OT  AT    Ns. x RT MHP Other       Participation Level:     None  Minimal   x Active Listener x Interactive    Monopolizing         Participation Quality:  x Appropriate  Inappropriate   x       Attentive        Intrusive   x       Sharing        Resistant          Supportive        Lethargic       Affective:   x Congruent  Incongruent  Blunted  Flat    Constricted  Anxious  Elated  Angry    Labile  Depressed  Other         Cognitive:  x Alert x Oriented PPTP     Concentration x G  F  P   Attention Span x G  F  P   Short-Term Memory x G  F  P   Long-Term Memory x G  F  P   ProblemSolving/  Decision Making x G  F  P   Ability to Process  Information x G  F  P      Contributing Factors             Delusional             Hallucinating             Flight of Ideas             Other:       Modes of Intervention:   Education  Support  Exploration    Clarifying x Problem Solving  Confrontation   x Socialization  Limit Setting x Reality Testing   x Activity  Movement  Media    Other:            Response to Learning:   Able to verbalize current knowledge/experience    Able to verbalize/acknowledge new learning    Able to retain information    Capable of insight    Able to change behavior   x Progressing to goal    Other:        Comments:

## 2019-05-06 ENCOUNTER — TELEPHONE (OUTPATIENT)
Dept: PRIMARY CARE CLINIC | Age: 49
End: 2019-05-06

## 2019-05-06 ENCOUNTER — HOSPITAL ENCOUNTER (EMERGENCY)
Age: 49
Discharge: HOME | End: 2019-05-06
Attending: EMERGENCY MEDICINE
Payer: COMMERCIAL

## 2019-05-06 DIAGNOSIS — Z48.02 ENCOUNTER FOR STAPLE REMOVAL: Primary | ICD-10-CM

## 2019-05-06 DIAGNOSIS — Z90.49 HX OF CHOLECYSTECTOMY: ICD-10-CM

## 2019-05-06 LAB
-: NORMAL
ABSOLUTE EOS #: 0.49 K/UL (ref 0–0.44)
ABSOLUTE IMMATURE GRANULOCYTE: 0.07 K/UL (ref 0–0.3)
ABSOLUTE LYMPH #: 2.86 K/UL (ref 1.1–3.7)
ABSOLUTE MONO #: 0.97 K/UL (ref 0.1–1.2)
ALBUMIN SERPL-MCNC: 3.4 G/DL (ref 3.5–5.2)
ALBUMIN/GLOBULIN RATIO: 1.1 (ref 1–2.5)
ALP BLD-CCNC: 90 U/L (ref 40–129)
ALT SERPL-CCNC: 28 U/L (ref 5–41)
ANION GAP SERPL CALCULATED.3IONS-SCNC: 9 MMOL/L (ref 9–17)
AST SERPL-CCNC: 50 U/L
BASOPHILS # BLD: 1 % (ref 0–2)
BASOPHILS ABSOLUTE: 0.08 K/UL (ref 0–0.2)
BILIRUB SERPL-MCNC: <0.1 MG/DL (ref 0.3–1.2)
BILIRUBIN DIRECT: <0.08 MG/DL
BILIRUBIN, INDIRECT: ABNORMAL MG/DL (ref 0–1)
BUN BLDV-MCNC: 12 MG/DL (ref 6–20)
BUN/CREAT BLD: ABNORMAL (ref 9–20)
CALCIUM SERPL-MCNC: 8.4 MG/DL (ref 8.6–10.4)
CHLORIDE BLD-SCNC: 100 MMOL/L (ref 98–107)
CO2: 21 MMOL/L (ref 20–31)
CREAT SERPL-MCNC: 0.65 MG/DL (ref 0.7–1.2)
DIFFERENTIAL TYPE: ABNORMAL
EOSINOPHILS RELATIVE PERCENT: 5 % (ref 1–4)
GFR AFRICAN AMERICAN: >60 ML/MIN
GFR NON-AFRICAN AMERICAN: >60 ML/MIN
GFR SERPL CREATININE-BSD FRML MDRD: ABNORMAL ML/MIN/{1.73_M2}
GFR SERPL CREATININE-BSD FRML MDRD: ABNORMAL ML/MIN/{1.73_M2}
GLOBULIN: ABNORMAL G/DL (ref 1.5–3.8)
GLUCOSE BLD-MCNC: 98 MG/DL (ref 70–99)
HCT VFR BLD CALC: 35.9 % (ref 40.7–50.3)
HEMOGLOBIN: 11.4 G/DL (ref 13–17)
IMMATURE GRANULOCYTES: 1 %
LIPASE: 23 U/L (ref 13–60)
LYMPHOCYTES # BLD: 26 % (ref 24–43)
MCH RBC QN AUTO: 28 PG (ref 25.2–33.5)
MCHC RBC AUTO-ENTMCNC: 31.8 G/DL (ref 28.4–34.8)
MCV RBC AUTO: 88.2 FL (ref 82.6–102.9)
MONOCYTES # BLD: 9 % (ref 3–12)
NRBC AUTOMATED: 0 PER 100 WBC
PDW BLD-RTO: 14.6 % (ref 11.8–14.4)
PLATELET # BLD: 353 K/UL (ref 138–453)
PLATELET ESTIMATE: ABNORMAL
PMV BLD AUTO: 8.9 FL (ref 8.1–13.5)
POTASSIUM SERPL-SCNC: 3.9 MMOL/L (ref 3.7–5.3)
POTASSIUM SERPL-SCNC: 5.4 MMOL/L (ref 3.7–5.3)
RBC # BLD: 4.07 M/UL (ref 4.21–5.77)
RBC # BLD: ABNORMAL 10*6/UL
REASON FOR REJECTION: NORMAL
SEG NEUTROPHILS: 58 % (ref 36–65)
SEGMENTED NEUTROPHILS ABSOLUTE COUNT: 6.36 K/UL (ref 1.5–8.1)
SODIUM BLD-SCNC: 130 MMOL/L (ref 135–144)
TOTAL PROTEIN: 6.5 G/DL (ref 6.4–8.3)
WBC # BLD: 10.8 K/UL (ref 3.5–11.3)
WBC # BLD: ABNORMAL 10*3/UL
ZZ NTE CLEAN UP: ORDERED TEST: NORMAL
ZZ NTE WITH NAME CLEAN UP: SPECIMEN SOURCE: NORMAL

## 2019-05-06 PROCEDURE — 85025 COMPLETE CBC W/AUTO DIFF WBC: CPT

## 2019-05-06 PROCEDURE — 84132 ASSAY OF SERUM POTASSIUM: CPT

## 2019-05-06 PROCEDURE — 80048 BASIC METABOLIC PNL TOTAL CA: CPT

## 2019-05-06 PROCEDURE — 83690 ASSAY OF LIPASE: CPT

## 2019-05-06 PROCEDURE — 6370000000 HC RX 637 (ALT 250 FOR IP): Performed by: EMERGENCY MEDICINE

## 2019-05-06 PROCEDURE — 99283 EMERGENCY DEPT VISIT LOW MDM: CPT

## 2019-05-06 PROCEDURE — 80076 HEPATIC FUNCTION PANEL: CPT

## 2019-05-06 RX ORDER — ACETAMINOPHEN 500 MG
1000 TABLET ORAL ONCE
Status: COMPLETED | OUTPATIENT
Start: 2019-05-06 | End: 2019-05-06

## 2019-05-06 RX ADMIN — ACETAMINOPHEN 1000 MG: 500 TABLET ORAL at 19:14

## 2019-05-06 NOTE — ED PROVIDER NOTES
[facility]     Emergency Department     Faculty Attestation    I performed a history and physical examination of the patient and discussed management with the resident. I reviewed the residents note and agree with the documented findings and plan of care. Any areas of disagreement are noted on the chart. I was personally present for the key portions of any procedures. I have documented in the chart those procedures where I was not present during the key portions. I have reviewed the emergency nurses triage note. I agree with the chief complaint, past medical history, past surgical history, allergies, medications, social and family history as documented unless otherwise noted below. For Physician Assistant/ Nurse Practitioner cases/documentation I have personally evaluated this patient and have completed at least one if not all key elements of the E/M (history, physical exam, and MDM). Additional findings are as noted. I have personally seen and evaluated the patient. I find the patient's history and physical exam are consistent with the NP/PA documentation. I agree with the care provided, treatment rendered, disposition and follow-up plan. HPI: Open cholecystectomy on 4/24. Out of cleaning foam (not on discharge list). Concerned he may have infection. No fever, chills. No drainage from wound. Exam: well healing surgical wound in RUQ with staples in place.     MDM/ED course:  Basic labs- initial hemolyzed, repeat K within normal limits  Consult trauma - staples removed by trauma service  Patient discharged home         Paxton Shaffer MD   Attending Emergency  Physician              Aurea Durán MD  05/07/19 [id number]

## 2019-05-06 NOTE — CONSULTS
History and Physical    PATIENT NAME: Raymundo Bartlett AGE: 57 y.o. MEDICAL RECORD NO. [id number]  DATE: 5/6/2019  SURGEON: Dr. Castillo Expose: CHAD Frances CNP    Patient evaluated at the request of  Dr. Zenia Tyler  Reason for evaluation: abdominal staple removal     Patient information was obtained from patient. History/Exam limitations: none. Patient presented to the Emergency Department by ambulance where the patient received see Ambulance Run Sheet prior to arrival.    IMPRESSION:     Patient Active Problem List   Diagnosis    Anxiety    Seizure ([address]    Myofascial muscle pain    Chronic pain    Spinal stenosis    Hyperlipidemia    Depression    Panlobular emphysema ([address]    Bipolar disorder ([address]    Chronic back pain    Schizophrenia ([address]    Fibromyalgia    Type 2 diabetes mellitus with diabetic polyneuropathy, with long-term current use of insulin (HCC)    Onychomycosis    Pain in lower limb    Disc disease, degenerative, cervical    Cervical stenosis of spine    Generalized tonic-clonic seizure ([address]    Superficial laceration of scalp    Altered mental status    Decubitus ulcer of coccyx, stage 2    Cocaine substance abuse ([address]    Opiate abuse, episodic ([address]    Closed fracture of nasal bone    Drug overdose    Uncomplicated opioid dependence ([address]    Paraplegia ([address]    Schizoaffective disorder ([address]    Non-dose-related adverse reaction to medication wellbutrin    Cannabis abuse    Encephalopathy    Hyperammonemia ([address]    Constipation    Cellulitis and abscess of right leg    Interstitial lung disease (HCC)    Vomiting    Bipolar 1 disorder (HCC)    Acute cholecystitis    S/P kyra   1. Normal Post operative incision healing      PLAN:   1. Staple Removal and Discharge      HISTORY:   History of Chief Complaint:    Raymundo Bartlett is a 57 y.o. male who presents with post operative concern.  Patient underwent open cholecystectomy for ibuprofen (ADVIL;MOTRIN) 600 MG tablet Take 1 tablet by mouth every 6 hours as needed for Pain 4/30/19   Immanuel Serrano,    Blood Glucose Monitoring Suppl (FREESTYLE LITE) ILA USE AS DIRECTED FOUR TIMES A DAY 4/29/19   CHAD Villatoro CNP   busPIRone (BUSPAR) 10 MG tablet Take 20 mg by mouth 3 times daily     Historical Provider, MD   lactulose (CHRONULAC) 10 GM/15ML solution Take 10 g by mouth 3 times daily as needed    Historical Provider, MD   hydrOXYzine (ATARAX) 25 MG tablet Take 1 tablet by mouth 3 times daily as needed for Anxiety 4/23/19 5/7/19  CHAD Mathias CNP   divalproex (DEPAKOTE ER) 500 MG extended release tablet Take 1 tablet by mouth nightly 4/23/19   CHAD Mathias CNP   divalproex (DEPAKOTE ER) 500 MG extended release tablet Take 1 tablet by mouth every morning 4/24/19   CHAD Mathias CNP   traZODone (DESYREL) 100 MG tablet Take 2 tablets by mouth nightly as needed for Sleep 4/23/19   CHAD Mathias CNP   insulin lispro (HUMALOG) 100 UNIT/ML injection vial Inject 0-12 Units into the skin 3 times daily (with meals) 4/23/19   CHAD Mathias CNP   insulin lispro (HUMALOG) 100 UNIT/ML injection vial Inject 0-6 Units into the skin nightly 4/23/19   CHAD Mathias CNP   paliperidone (INVEGA) 6 MG extended release tablet Take 1 tablet by mouth daily for 7 days 4/24/19 5/1/19  CHAD Mathias CNP   paliperidone palmitate ER (INVEGA SUSTENNA) 156 MG/ML LYUBOV IM injection Inject 156 mg into the muscle every 30 days 4/30/19   CHAD Mathias CNP   QUEtiapine (SEROQUEL) 200 MG tablet Take 1 tablet by mouth nightly 4/23/19   CHAD Mathias CNP   Alcohol Swabs (PRO COMFORT ALCOHOL) 70 % PADS  3/30/19   Historical Provider, MD [place].  Devices (GEL-FOAM CUSHION) MISC 1 Units by Does not apply route daily 4/12/19   Thanh León, CHAD - CNP   FREESTYLE LITE strip 1 each by Other route 3 times daily 4/12/19   Nelta CHAD Valiente CNP   lidocaine (LMX) 4 % cream Apply topically 3 times daily as needed for Pain 4/12/19   Franklina CHAD Valiente - CNP   pantoprazole (PROTONIX) 40 MG tablet Take 1 tablet by mouth daily 4/12/19   Franklina CHAD Valiente - CNP   ranitidine (ZANTAC) 150 MG tablet Take 1 tablet by mouth 2 times daily as needed for Heartburn 4/12/19   Nelta CHAD Valiente - CNP   Misc. Devices (GEL-FOAM CUSHION) MISC 1 Units by Does not apply route daily 4/12/19   Nelta CHAD Valiente - CNP   pregabalin (LYRICA) 200 MG capsule Take 2 capsules by mouth 2 times daily. 4/12/19 4/11/20  CHAD Ramires CNP   Nutritional Supplements (BOOST) LIQD Two a day. Chocolate lactose free nutritional drinks. 4/11/19   Nelta CHAD Valiente - CNP   VENTOLIN  (90 Base) MCG/ACT inhaler INHALE 2 PUFFS INTO THE LUNGS EVERY 6 HOURS AS NEEDED FOR WHEEZING 3/27/19   CHAD Ramires CNP   TRUEPLUS LANCETS 33G MISC  2/8/19   Historical Provider, MD   fluticasone-vilanterol (BREO ELLIPTA) 100-25 MCG/INH AEPB inhaler Inhale 1 puff into the lungs daily 2/18/19   Franklina CHAD Valiente CNP   Insulin Syringe-Needle U-100 31G X 5/16\" 0.5 ML MISC 1 each by Does not apply route 3 times daily 1/23/19   Nelta CHAD Valiente CNP   cyanocobalamin 500 MCG tablet Take 500 mcg by mouth 10/26/18   Historical Provider, MD   senna (SENNA-TIME) 8.6 MG tablet twice daily as needed.   9/4/18   Historical Provider, MD   simvastatin (ZOCOR) 40 MG tablet Take 40 mg by mouth nightly     Historical Provider, MD   albuterol (PROVENTIL) (2.5 MG/3ML) 0.083% nebulizer solution Take 3 mLs by nebulization every 6 hours as needed for Wheezing 12/20/18   Nelta CHAD Valiente CNP   ipratropium (ATROVENT) 0.02 % nebulizer solution Take 2.5 mLs by nebulization 4 times daily as needed for Wheezing 12/20/18   CHAD Ramires CNP    Scheduled Meds:   acetaminophen  1,000 mg Oral Once     Continuous Infusions:  PRN Meds:.  Allergies  is allergic to chantix [varenicline tartrate]; sulfa antibiotics; sulfasalazine; bactrim; levofloxacin; levofloxacin; phenobarbital; sulfamethoxazole-trimethoprim; sulfur; tessalon [benzonatate]; and wellbutrin [bupropion]. Family History  family history includes Coronary Art Dis in his maternal uncle; Diabetes in his mother; Seizures in his maternal cousin and sister. Social History   reports that he has been smoking cigarettes. He started smoking about 36 years ago. He has a 35.00 pack-year smoking history. He has never used smokeless tobacco.   reports that he does not drink alcohol. reports that he has current or past drug history. Drugs: Marijuana and Opiates . Review of Systems  General Denies any fever or chills  HEENT  Denies any diplopia, tinnitus or vertigo  Resp Denies any shortness of breath, cough or wheezing  Cardiac Denies any chest pain, palpitations, claudication or edema  GI Denies any melena, hematochezia, hematemesis or pyrosis   Denies any frequency, urgency, hesitancy or incontinence  Heme Denies bruising or bleeding easily  Endocrine Denies any history of diabetes or thyroid disease  Neuro Denies any focal motor or sensory deficits    PHYSICAL:   VITALS:  vitals were not taken for this visit. CONSTITUTIONAL: Alert and oriented times 3, no acute distress and cooperative to examination. HEENT: Head is normocephalic, atraumatic. EOMI, PERRLA  NECK: Soft, trachea midline and straight  LUNGS: Chest expands equally bilaterally upon respiration, no accessory muscle used. Ausculation reveals no wheezes, rales or rhonchi. CARDIOVASCULAR: Heart sounds are normal.  Regular rate and rhythm without murmur, gallop or rub. ABDOMEN: soft, nontender, nondistended, no masses or organomegaly, no hernias palpable, no guarding or peritoneal signs. Bowel sounds are present in all four quadrants Scars are consistent with previous surgical history.  No crepitus, induration, fluctuance, or blossoming erythema from incision sight or staples. NEUROLOGIC: CN II-XII are grossly intact. There are no focalizing motor or sensory deficits  EXTREMITIES: no cyanosis, clubbing or edema    LABS:   No results for input(s): WBC, HGB, HCT, PLT, NA, K, CL, CO2, BUN, CREATININE, MG, PHOS, CALCIUM, PTT, INR, AST, ALT, BILITOT, BILIDIR, NITRU, COLORU, BACTERIA in the last 72 hours. Invalid input(s): PT, WBCU, RBCU, LEUKOCYTESUA  No results for input(s): ALKPHOS, ALT, AST, BILITOT, BILIDIR, LABALBU, AMYLASE, LIPASE in the last 72 hours. CBC with Differential:    Lab Results   Component Value Date    WBC 12.6 04/30/2019    RBC 4.51 04/30/2019    HGB 12.9 04/30/2019    HCT 40.5 04/30/2019     04/30/2019    MCV 89.8 04/30/2019    MCH 28.6 04/30/2019    MCHC 31.9 04/30/2019    RDW 14.6 04/30/2019    LYMPHOPCT 28 04/30/2019    MONOPCT 7 04/30/2019    BASOPCT 1 04/30/2019    MONOSABS 0.83 04/30/2019    LYMPHSABS 3.55 04/30/2019    EOSABS 0.66 04/30/2019    BASOSABS 0.11 04/30/2019    DIFFTYPE NOT REPORTED 04/30/2019         RADIOLOGY:   CT scan abd/pelvis:   Abd flat plate:   US:   HIDA scan: Thank you for the interesting evaluation. Further recommendations to follow. Nathan Harper  5/6/2019, 7:53 PM                Trauma Attending Attestation      I have reviewed the above [facility] note(s) and confirmed the key elements of the medical history and physical exam. I have seen and examined the pt. I have discussed the findings, established the care plan and recommendations with Resident, [facility] RN, bedside nurse.         Mimi Schmidt DO  5/7/2019  4:29 PM

## 2019-05-06 NOTE — TELEPHONE ENCOUNTER
Sheldon Finley from [facility] is trying to help patient get admitted to [facility] in [facility]. She is asking if we can fax any office notes, medication list and record of hospitalization to them at [phone]. Please contact Sheldon Finley with any questions [phone].

## 2019-05-06 NOTE — ED PROVIDER NOTES
tablet Take 2 tablets by mouth nightly as needed for Sleep 4/23/19   CHAD Velazquez CNP   insulin lispro (HUMALOG) 100 UNIT/ML injection vial Inject 0-12 Units into the skin 3 times daily (with meals) 4/23/19   CHAD Velazquez CNP   insulin lispro (HUMALOG) 100 UNIT/ML injection vial Inject 0-6 Units into the skin nightly 4/23/19   CHAD Velazquez CNP   paliperidone (INVEGA) 6 MG extended release tablet Take 1 tablet by mouth daily for 7 days 4/24/19 5/1/19  CHAD Velazquez CNP   paliperidone palmitate ER (INVEGA SUSTENNA) 156 MG/ML LYUBOV IM injection Inject 156 mg into the muscle every 30 days 4/30/19   CHAD Velazquez CNP   QUEtiapine (SEROQUEL) 200 MG tablet Take 1 tablet by mouth nightly 4/23/19   CHAD Velazquez CNP   Alcohol Swabs (PRO COMFORT ALCOHOL) 70 % PADS  3/30/19   Historical Provider, MD Popec. Devices (GEL-FOAM CUSHION) MISC 1 Units by Does not apply route daily 4/12/19   Minta Killings, APRN - CNP   FREESTYLE LITE strip 1 each by Other route 3 times daily 4/12/19   Minta Killings, APRN - CNP   lidocaine (LMX) 4 % cream Apply topically 3 times daily as needed for Pain 4/12/19   Minta Killings, CHAD Kiran CNP   pantoprazole (PROTONIX) 40 MG tablet Take 1 tablet by mouth daily 4/12/19   Minta Killings, APRN - CNP   ranitidine (ZANTAC) 150 MG tablet Take 1 tablet by mouth 2 times daily as needed for Heartburn 4/12/19   Minta Killings, APRN - CNP   Misc. Devices (GEL-FOAM CUSHION) MISC 1 Units by Does not apply route daily 4/12/19   Minta Killings, APRN - CNP   pregabalin (LYRICA) 200 MG capsule Take 2 capsules by mouth 2 times daily. 4/12/19 4/11/20  Minta Killings, APRN - CNP   Nutritional Supplements (BOOST) LIQD Two a day. Chocolate lactose free nutritional drinks.  4/11/19   Vinnie Butcher, APRN - CNP   VENTOLIN  (90 Base) MCG/ACT inhaler INHALE 2 PUFFS INTO THE LUNGS EVERY 6 HOURS AS NEEDED FOR WHEEZING 3/27/19   William Gaines atraumatic. Eyes: Conjunctivae and EOM are normal.   Neck: Neck supple. No tracheal deviation present. Cardiovascular: Normal rate, regular rhythm, normal heart sounds and intact distal pulses. Exam reveals no gallop and no friction rub. No murmur heard. Pulmonary/Chest: Effort normal and breath sounds normal. No respiratory distress. He has no wheezes. He has no rales. Abdominal: Soft. He exhibits no distension and no mass. There is no tenderness. There is no rebound and no guarding. Surgical scar consistent with previous history. No significant surrounding erythema, drainage, or signs of infection. Musculoskeletal: He exhibits no edema or deformity. Neurological: He is alert and oriented to person, place, and time. Skin: Skin is warm and dry. No rash noted. He is not diaphoretic. DIFFERENTIAL  DIAGNOSIS     PLAN (LABS / IMAGING / EKG):  Orders Placed This Encounter   Procedures    BASIC METABOLIC PANEL    HEPATIC FUNCTION PANEL    LIPASE    SPECIMEN REJECTION    CBC Auto Differential    PREVIOUS SPECIMEN    Inpatient consult to Trauma Surgery    Inpatient consult to Social Work       MEDICATIONS ORDERED:  Orders Placed This Encounter   Medications    acetaminophen (TYLENOL) tablet 1,000 mg       DDX: Surgical scar, encounter for staple removal, postop infection    DIAGNOSTIC RESULTS / EMERGENCY DEPARTMENT COURSE / MDM     LABS:  Results for orders placed or performed during the hospital encounter of 05/06/19   SPECIMEN REJECTION   Result Value Ref Range    Specimen Source . BLOOD     Ordered Test CDP     Reason for Rejection Unable to perform testing: Specimen clotted.      - NOT REPORTED    CBC Auto Differential   Result Value Ref Range    WBC 10.8 3.5 - 11.3 k/uL    RBC 4.07 (L) 4.21 - 5.77 m/uL    Hemoglobin 11.4 (L) 13.0 - 17.0 g/dL    Hematocrit 35.9 (L) 40.7 - 50.3 %    MCV 88.2 82.6 - 102.9 fL    MCH 28.0 25.2 - 33.5 pg    MCHC 31.8 28.4 - 34.8 g/dL    RDW 14.6 (H) 11.8 - 14.4 %    Platelets 254 708 - 914 k/uL    MPV 8.9 8.1 - 13.5 fL    NRBC Automated 0.0 0.0 per 100 WBC    Differential Type NOT REPORTED     Seg Neutrophils 58 36 - 65 %    Lymphocytes 26 24 - 43 %    Monocytes 9 3 - 12 %    Eosinophils % 5 (H) 1 - 4 %    Basophils 1 0 - 2 %    Immature Granulocytes 1 (H) 0 %    Segs Absolute 6.36 1.50 - 8.10 k/uL    Absolute Lymph # 2.86 1.10 - 3.70 k/uL    Absolute Mono # 0.97 0.10 - 1.20 k/uL    Absolute Eos # 0.49 (H) 0.00 - 0.44 k/uL    Basophils # 0.08 0.00 - 0.20 k/uL    Absolute Immature Granulocyte 0.07 0.00 - 0.30 k/uL    WBC Morphology NOT REPORTED     RBC Morphology ANISOCYTOSIS PRESENT     Platelet Estimate NOT REPORTED        IMPRESSION: Patient evaluated by myself and the attending physician. Patient appears in no acute distress. Does have surgical scar consistent with history of open cholecystectomy. No significant surrounding erythema or warmth. No tenderness to palpation. Abdomen soft, nontender, nondistended. Heart rate normal with regular rhythm. Lungs clear to auscultation bilaterally. We'll continue basic labs and trauma surgery consult. Low concern for any infection. Patient just had bowel movement and low concern for any significant constipation. Unable to see what medication patient was playing ice scar. Discharge record. Spoke to general surgery and they are unsure. State the patient can use soap and water. RADIOLOGY:  None    EKG  None    All EKG's are interpreted by the Emergency Department Physician who either signs or Co-signs this chart in the absence of a cardiologist.    EMERGENCY DEPARTMENT COURSE:  8:08 PM: Patient seen by trauma surgery service who performed the procedure. They did remove his staples. Recommended follow-up in trauma clinic. No medications needed per trauma team.  We'll evaluate blood work and anticipate discharge.   We'll consult social work as patient states he needs help getting to a shelter. PROCEDURES:  None    CONSULTS:  IP CONSULT TO TRAUMA SURGERY  IP CONSULT TO SOCIAL WORK    CRITICAL CARE:  None    FINAL IMPRESSION      1. Encounter for staple removal          DISPOSITION / PLAN     DISPOSITION  Decision to discharge      PATIENT REFERRED TO:  No follow-up provider specified.     DISCHARGE MEDICATIONS:  New Prescriptions    No medications on file       Son Bejarano DO  Emergency Medicine Resident    (Please note that portions of thisnote were completed with a voice recognition program.  Efforts were made to edit the dictations but occasionally words are mis-transcribed.)      oSn Bejarano DO  05/07/19 0137

## 2019-05-07 ENCOUNTER — TELEPHONE (OUTPATIENT)
Dept: PRIMARY CARE CLINIC | Age: 49
End: 2019-05-07

## 2019-05-07 RX ORDER — ACETAMINOPHEN 650 MG
TABLET, EXTENDED RELEASE ORAL
Qty: 1 BOTTLE | Refills: 0 | Status: SHIPPED | OUTPATIENT
Start: 2019-05-07 | End: 2019-05-14

## 2019-05-07 ASSESSMENT — ENCOUNTER SYMPTOMS
EYE PAIN: 0
COUGH: 0
RHINORRHEA: 0
DIARRHEA: 0
VOMITING: 0
SHORTNESS OF BREATH: 0
CONSTIPATION: 0
NAUSEA: 0
ABDOMINAL PAIN: 1

## 2019-05-07 NOTE — ED NOTES
Spoke with [facility] staff who report that pt can return to their facility at any time with hospital discharge paperwork.       Bro[place]lyn Cline [place]  05/06/19 3537

## 2019-05-07 NOTE — ED NOTES
Per Teresa Public, pt's potassium level is being run right now     [facility]  05/06/19 7332

## 2019-05-08 ENCOUNTER — TELEPHONE (OUTPATIENT)
Dept: PRIMARY CARE CLINIC | Age: 49
End: 2019-05-08

## 2019-05-09 ENCOUNTER — HOSPITAL ENCOUNTER (EMERGENCY)
Age: 49
Discharge: HOME | End: 2019-05-09
Attending: EMERGENCY MEDICINE
Payer: COMMERCIAL

## 2019-05-09 VITALS
TEMPERATURE: 98.1 F | SYSTOLIC BLOOD PRESSURE: 108 MMHG | DIASTOLIC BLOOD PRESSURE: 65 MMHG | BODY MASS INDEX: 23.5 KG/M2 | RESPIRATION RATE: 18 BRPM | WEIGHT: 189 LBS | OXYGEN SATURATION: 97 % | HEIGHT: 75 IN | HEART RATE: 89 BPM

## 2019-05-09 DIAGNOSIS — G89.18 POST-OP PAIN: ICD-10-CM

## 2019-05-09 DIAGNOSIS — Z78.9 NEED FOR FOLLOW-UP BY SOCIAL WORKER: Primary | ICD-10-CM

## 2019-05-09 LAB
ALLEN TEST: ABNORMAL
ANION GAP: 10 MMOL/L (ref 7–16)
FIO2: ABNORMAL
GFR NON-AFRICAN AMERICAN: >60 ML/MIN
GFR SERPL CREATININE-BSD FRML MDRD: >60 ML/MIN
GFR SERPL CREATININE-BSD FRML MDRD: NORMAL ML/MIN/{1.73_M2}
GLUCOSE BLD-MCNC: 111 MG/DL (ref 74–100)
HCO3 VENOUS: 20.6 MMOL/L (ref 22–29)
MODE: ABNORMAL
NEGATIVE BASE EXCESS, VEN: 4 (ref 0–2)
O2 DEVICE/FLOW/%: ABNORMAL
O2 SAT, VEN: 75 % (ref 60–85)
PATIENT TEMP: ABNORMAL
PCO2, VEN: 34.3 MM HG (ref 41–51)
PH VENOUS: 7.39 (ref 7.32–7.43)
PO2, VEN: 39.9 MM HG (ref 30–50)
POC CHLORIDE: 109 MMOL/L (ref 98–107)
POC CREATININE: 0.83 MG/DL (ref 0.51–1.19)
POC HEMATOCRIT: 45 % (ref 41–53)
POC HEMOGLOBIN: 15.3 G/DL (ref 13.5–17.5)
POC IONIZED CALCIUM: 1.24 MMOL/L (ref 1.15–1.33)
POC LACTIC ACID: 0.72 MMOL/L (ref 0.56–1.39)
POC PCO2 TEMP: ABNORMAL MM HG
POC PH TEMP: ABNORMAL
POC PO2 TEMP: ABNORMAL MM HG
POC POTASSIUM: 3.7 MMOL/L (ref 3.5–4.5)
POC SODIUM: 140 MMOL/L (ref 138–146)
POSITIVE BASE EXCESS, VEN: ABNORMAL (ref 0–3)
SAMPLE SITE: ABNORMAL
TOTAL CO2, VENOUS: 22 MMOL/L (ref 23–30)

## 2019-05-09 PROCEDURE — 82435 ASSAY OF BLOOD CHLORIDE: CPT

## 2019-05-09 PROCEDURE — 85014 HEMATOCRIT: CPT

## 2019-05-09 PROCEDURE — 82330 ASSAY OF CALCIUM: CPT

## 2019-05-09 PROCEDURE — 83605 ASSAY OF LACTIC ACID: CPT

## 2019-05-09 PROCEDURE — 82803 BLOOD GASES ANY COMBINATION: CPT

## 2019-05-09 PROCEDURE — 6370000000 HC RX 637 (ALT 250 FOR IP): Performed by: STUDENT IN AN ORGANIZED HEALTH CARE EDUCATION/TRAINING PROGRAM

## 2019-05-09 PROCEDURE — 99283 EMERGENCY DEPT VISIT LOW MDM: CPT

## 2019-05-09 PROCEDURE — 82565 ASSAY OF CREATININE: CPT

## 2019-05-09 PROCEDURE — 84295 ASSAY OF SERUM SODIUM: CPT

## 2019-05-09 PROCEDURE — 82947 ASSAY GLUCOSE BLOOD QUANT: CPT

## 2019-05-09 PROCEDURE — 84132 ASSAY OF SERUM POTASSIUM: CPT

## 2019-05-09 RX ORDER — CYCLOBENZAPRINE HCL 5 MG
5 TABLET ORAL 2 TIMES DAILY PRN
Qty: 5 TABLET | Refills: 0 | Status: SHIPPED | OUTPATIENT
Start: 2019-05-09 | End: 2020-01-16 | Stop reason: SDUPTHER

## 2019-05-09 RX ORDER — ACETAMINOPHEN 500 MG
1000 TABLET ORAL ONCE
Status: COMPLETED | OUTPATIENT
Start: 2019-05-09 | End: 2019-05-09

## 2019-05-09 RX ADMIN — ACETAMINOPHEN 1000 MG: 500 TABLET ORAL at 18:07

## 2019-05-09 ASSESSMENT — ENCOUNTER SYMPTOMS
VOMITING: 1
WHEEZING: 0
COUGH: 0
BACK PAIN: 0
NAUSEA: 1
SHORTNESS OF BREATH: 0
DIARRHEA: 1
STRIDOR: 0
ABDOMINAL PAIN: 1
CONSTIPATION: 0

## 2019-05-09 ASSESSMENT — PAIN SCALES - GENERAL: PAINLEVEL_OUTOF10: 8

## 2019-05-09 NOTE — ED PROVIDER NOTES
and Substance abuse ([facility] [address] has a past surgical history that includes Appendectomy; Stomach surgery; lumbar fusion; Septoplasty (01/13/2017); Turbinoplasties (01/13/2017); fracture surgery (01/28/2017); Cholecystectomy (04/24/2019); and Cholecystectomy (N/A, 4/24/2019).      Social History     Socioeconomic History    Marital status:      Spouse name: Not on file    Number of children: Not on file    Years of education: Not on file    Highest education level: Not on file   Occupational History    Not on file   Social Needs    Financial resource strain: Not on file    Food insecurity:     Worry: Not on file     Inability: Not on file    Transportation needs:     Medical: Not on file     Non-medical: Not on file   Tobacco Use    Smoking status: Current Every Day Smoker     Packs/day: 1.00     Years: 35.00     Pack years: 35.00     Types: Cigarettes     Start date: 1/1/1983    Smokeless tobacco: Never Used    Tobacco comment: Not at the present time however   Substance and Sexual Activity    Alcohol use: No    Drug use: Yes     Types: Marijuana, Opiates      Comment: pt reported hx of drug abuse    Sexual activity: Never   Lifestyle    Physical activity:     Days per week: Not on file     Minutes per session: Not on file    Stress: Not on file   Relationships    Social connections:     Talks on phone: Not on file     Gets together: Not on file     Attends Yarsani service: Not on file     Active member of club or organization: Not on file     Attends meetings of clubs or organizations: Not on file     Relationship status: Not on file    Intimate partner violence:     Fear of current or ex partner: Not on file     Emotionally abused: Not on file     Physically abused: Not on file     Forced sexual activity: Not on file   Other Topics Concern    Not on file   Social History Narrative    Not on file       Family History   Problem Relation Age of Onset    Diabetes Mother         many family members with DM    Seizures Sister     Coronary Art Dis Maternal Uncle     Seizures Maternal Cousin         Allergies:  Chantix [varenicline tartrate]; Sulfa antibiotics; Sulfasalazine; Bactrim; Levofloxacin; Levofloxacin; Phenobarbital; Sulfamethoxazole-trimethoprim; Sulfur; Tessalon [benzonatate]; and Wellbutrin [bupropion]    Home Medications:  Prior to Admission medications    Medication Sig Start Date End Date Taking? Authorizing Provider   cyclobenzaprine (FLEXERIL) 5 MG tablet Take 1 tablet by mouth 2 times daily as needed for Muscle spasms 5/9/19  Yes Andreea Boyer,    povidone-iodine (ANTISEPTIC) 10 % external solution Apply topically as needed. 5/7/19 5/14/19  CHAD Fraser CNP   [facility]  Devices [facility]) MISC 1 each by Does not apply route daily Seated Walker 5/1/19   CHAD Fraser CNP   docusate sodium (COLACE) 100 MG capsule Take 1 capsule by mouth 2 times daily as needed for Constipation 4/30/19   Elizabeth Fox DO   acetaminophen (TYLENOL) 500 MG tablet Take 2 tablets by mouth every 8 hours as needed for Pain 4/30/19   Immanuel Serrano DO   ibuprofen (ADVIL;MOTRIN) 600 MG tablet Take 1 tablet by mouth every 6 hours as needed for Pain 4/30/19   Immanuel Serrano, DO   Blood Glucose Monitoring Suppl (FREESTYLE LITE) ILA USE AS DIRECTED FOUR TIMES A DAY 4/29/19   CHAD Fraser CNP   busPIRone (BUSPAR) 10 MG tablet Take 20 mg by mouth 3 times daily     Historical Provider, MD   lactulose (CHRONULAC) 10 GM/15ML solution Take 10 g by mouth 3 times daily as needed    Historical Provider, MD   divalproex (DEPAKOTE ER) 500 MG extended release tablet Take 1 tablet by mouth nightly 4/23/19   CHAD Tadeo CNP   divalproex (DEPAKOTE ER) 500 MG extended release tablet Take 1 tablet by mouth every morning 4/24/19   Roselyn CHAD Palacio CNP   traZODone (DESYREL) 100 MG tablet Take 2 tablets by mouth nightly as needed for Sleep 4/23/19   CHAD Tadeo CNP insulin lispro (HUMALOG) 100 UNIT/ML injection vial Inject 0-12 Units into the skin 3 times daily (with meals) 4/23/19   CHAD Croft CNP   insulin lispro (HUMALOG) 100 UNIT/ML injection vial Inject 0-6 Units into the skin nightly 4/23/19   CHAD Croft CNP   paliperidone (INVEGA) 6 MG extended release tablet Take 1 tablet by mouth daily for 7 days 4/24/19 5/1/19  CHAD Croft CNP   paliperidone palmitate ER (INVEGA SUSTENNA) 156 MG/ML LYUBOV IM injection Inject 156 mg into the muscle every 30 days 4/30/19   CHAD Croft CNP   QUEtiapine (SEROQUEL) 200 MG tablet Take 1 tablet by mouth nightly 4/23/19   CHAD Croft CNP   Alcohol Swabs (PRO COMFORT ALCOHOL) 70 % PADS  3/30/19   Historical Provider, MD   Misc. Devices (GEL-FOAM CUSHION) MISC 1 Units by Does not apply route daily 4/12/19   CHAD Young CNP   FREESTYLE LITE strip 1 each by Other route 3 times daily 4/12/19   CHAD Young CNP   lidocaine (LMX) 4 % cream Apply topically 3 times daily as needed for Pain 4/12/19   CHAD Young CNP   pantoprazole (PROTONIX) 40 MG tablet Take 1 tablet by mouth daily 4/12/19   CHAD Young CNP   ranitidine (ZANTAC) 150 MG tablet Take 1 tablet by mouth 2 times daily as needed for Heartburn 4/12/19   CHAD Young CNP. Devices (GEL-FOAM CUSHION) MISC 1 Units by Does not apply route daily 4/12/19   CHAD Young CNP   pregabalin (LYRICA) 200 MG capsule Take 2 capsules by mouth 2 times daily. 4/12/19 4/11/20  CHAD Young CNP   Nutritional Supplements (BOOST) LIQD Two a day. Chocolate lactose free nutritional drinks.  4/11/19   CHAD Young CNP   VENTOLIN  (90 Base) MCG/ACT inhaler INHALE 2 PUFFS INTO THE LUNGS EVERY 6 HOURS AS NEEDED FOR WHEEZING 3/27/19   Good Reyes, APRN - CNP   TRUEPLUS LANCETS 33G MISC  2/8/19   Historical Provider, MD fluticasone-vilanterol (BREO ELLIPTA) 100-25 MCG/INH AEPB inhaler Inhale 1 puff into the lungs daily 2/18/19   CHAD Klein CNP   Insulin Syringe-Needle U-100 31G X 5/16\" 0.5 ML MISC 1 each by Does not apply route 3 times daily 1/23/19   CHAD Klein CNP   cyanocobalamin 500 MCG tablet Take 500 mcg by mouth 10/26/18   Historical Provider, MD   senna (SENNA-TIME) 8.6 MG tablet twice daily as needed. 9/4/18   Historical Provider, MD   simvastatin (ZOCOR) 40 MG tablet Take 40 mg by mouth nightly     Historical Provider, MD   albuterol (PROVENTIL) (2.5 MG/3ML) 0.083% nebulizer solution Take 3 mLs by nebulization every 6 hours as needed for Wheezing 12/20/18   CHAD Klein CNP   ipratropium (ATROVENT) 0.02 % nebulizer solution Take 2.5 mLs by nebulization 4 times daily as needed for Wheezing 12/20/18   CHAD Klein CNP       REVIEW OFSYSTEMS    (2-9 systems for level 4, 10 or more for level 5)      Review of Systems   Constitutional: Negative for activity change, appetite change, chills, fatigue and fever. HENT: Negative for congestion. Respiratory: Negative for cough, shortness of breath, wheezing and stridor. Cardiovascular: Negative for chest pain, palpitations and leg swelling. Gastrointestinal: Positive for abdominal pain, diarrhea, nausea and vomiting. Negative for constipation. Endocrine: Negative for heat intolerance. Genitourinary: Negative for difficulty urinating, dysuria, frequency, hematuria and urgency. Musculoskeletal: Negative for back pain, joint swelling and myalgias. Skin: Negative for rash and wound. Neurological: Negative for dizziness, syncope, weakness, light-headedness, numbness and headaches.        PHYSICAL EXAM   (up to 7 for level 4, 8 or more forlevel 5)      INITIAL VITALS:   ED Triage Vitals   BP Temp Temp Source Pulse Resp SpO2 Height Weight   05/09/19 1710 05/09/19 1710 05/09/19 1710 05/09/19 1710 05/09/19 1710 05/09/19

## 2019-05-09 NOTE — ED NOTES
Pt resting in bed with resp easy and regular. Denies all needs. Call light in reach.       Gabriele Merchant RN  05/09/19 [id number]

## 2019-05-09 NOTE — ED PROVIDER NOTES
I performed a history and physical examination of the patient and discussed management with the resident. I reviewed the residents note and agree with the documented findings and plan of care. Any areas of disagreement are noted on the chart. I was personally present for the key portions of any procedures. I have documented in the chart those procedures where I was not present during the key portions. I have reviewed the emergency nurses triage note. I agree with the chief complaint, past medical history, past surgical history, allergies, medications, social and family history as documented unless otherwise noted below. Documentation of the HPI, Physical Exam and Medical Decision Making performed by medical students or scribes is based on my personal performance of the HPI, PE and MDM. For Phys Assistant/ Nurse Practitioner cases/documentation I have personally evaluated this patient and have completed at least one if not all key elements of the E/M (history, physical exam, and MDM). I find the patient's history and physical exam are consistent with the NP/PA documentation. I agree with the care provided, treatment rendered, disposition and followup plan. Additional findings are as noted. Frank Ortiz. Miguel Ángel Duvall MD  Attending Emergency  Physician    TWO WEEKS S/P OPEN CHOLECYSTECTOMY, PRESENTING WITH C/O PERSISTENT ABD PAIN, NAUSEA, DIARRHEA. NO FEVER, VOMITING, MELENA, HEMATOCHEZIA, DYSURIA, HEMATURIA, FLANK PAIN. ? POLYURIA VS. URINARY FREQUENCY. HX OF DM, SEIZURE DISORDER, HOMELESSNESS. UNABLE TO CHECK GLUCOSE DUE TO CONDITIONS AT [address] [place] [address]. REPORTS HE'S HERE TO ARRANGE ADMISSION TO CARE FACILITY. AWAKE, ALERT, COOP, RESP. LUNGS CLEAR GURINDER. NO RALES, RHONCHI, WHEEZES, STRIDOR, RETRACTIONS. CARDIAC-S1S2, RRR, NO MRG. ABD SOFT, NONDISTENDED, MILD DIFFUSE/VARIABLE TENDERNESS. SURGICAL INCISION HEALING WELL WITHOUT SIGNS OF INFECTION OR DEHISCENCE. NORMAL BOWEL SOUNDS. VITAL SIGNS REVIEWED-STABLE.   IMP-POSTOP ABD PAIN. PLAN-POCT CHEM PANEL. REASSESS. LSW TO SEE.             Po Saleh MD  05/09/19 4041

## 2019-05-16 ENCOUNTER — OFFICE VISIT (OUTPATIENT)
Dept: PRIMARY CARE CLINIC | Age: 49
End: 2019-05-16
Payer: COMMERCIAL

## 2019-05-16 VITALS
OXYGEN SATURATION: 97 % | SYSTOLIC BLOOD PRESSURE: 97 MMHG | TEMPERATURE: 98.1 F | HEART RATE: 78 BPM | DIASTOLIC BLOOD PRESSURE: 61 MMHG

## 2019-05-16 DIAGNOSIS — E11.42 TYPE 2 DIABETES MELLITUS WITH DIABETIC POLYNEUROPATHY, WITH LONG-TERM CURRENT USE OF INSULIN (HCC): ICD-10-CM

## 2019-05-16 DIAGNOSIS — F31.9 BIPOLAR 1 DISORDER (HCC): ICD-10-CM

## 2019-05-16 DIAGNOSIS — F20.9 SCHIZOPHRENIA, UNSPECIFIED TYPE (HCC): ICD-10-CM

## 2019-05-16 DIAGNOSIS — K81.0 ACUTE CHOLECYSTITIS: Primary | ICD-10-CM

## 2019-05-16 DIAGNOSIS — Z79.4 TYPE 2 DIABETES MELLITUS WITH DIABETIC POLYNEUROPATHY, WITH LONG-TERM CURRENT USE OF INSULIN (HCC): ICD-10-CM

## 2019-05-16 LAB
CHP ED QC CHECK: NO
GLUCOSE BLD-MCNC: 95 MG/DL

## 2019-05-16 PROCEDURE — 2022F DILAT RTA XM EVC RTNOPTHY: CPT | Performed by: NURSE PRACTITIONER

## 2019-05-16 PROCEDURE — 1111F DSCHRG MED/CURRENT MED MERGE: CPT | Performed by: NURSE PRACTITIONER

## 2019-05-16 PROCEDURE — 4004F PT TOBACCO SCREEN RCVD TLK: CPT | Performed by: NURSE PRACTITIONER

## 2019-05-16 PROCEDURE — 82962 GLUCOSE BLOOD TEST: CPT | Performed by: NURSE PRACTITIONER

## 2019-05-16 PROCEDURE — 99214 OFFICE O/P EST MOD 30 MIN: CPT | Performed by: NURSE PRACTITIONER

## 2019-05-16 PROCEDURE — G8420 CALC BMI NORM PARAMETERS: HCPCS | Performed by: NURSE PRACTITIONER

## 2019-05-16 PROCEDURE — G8427 DOCREV CUR MEDS BY ELIG CLIN: HCPCS | Performed by: NURSE PRACTITIONER

## 2019-05-16 PROCEDURE — 3044F HG A1C LEVEL LT 7.0%: CPT | Performed by: NURSE PRACTITIONER

## 2019-05-16 RX ORDER — BLOOD-GLUCOSE METER
KIT MISCELLANEOUS
Qty: 1 DEVICE | Refills: 0 | Status: SHIPPED | OUTPATIENT
Start: 2019-05-16 | End: 2020-01-08 | Stop reason: SDUPTHER

## 2019-05-16 RX ORDER — BLOOD-GLUCOSE METER
1 KIT MISCELLANEOUS 3 TIMES DAILY
Qty: 100 EACH | Refills: 5 | Status: SHIPPED | OUTPATIENT
Start: 2019-05-16 | End: 2020-01-08 | Stop reason: SDUPTHER

## 2019-05-16 RX ORDER — LANCETS 28 GAUGE
1 EACH MISCELLANEOUS 3 TIMES DAILY
Qty: 100 EACH | Refills: 3 | Status: SHIPPED | OUTPATIENT
Start: 2019-05-16 | End: 2020-01-08 | Stop reason: SDUPTHER

## 2019-05-16 RX ORDER — CLONIDINE HYDROCHLORIDE 0.1 MG/1
0.1 TABLET ORAL 2 TIMES DAILY
COMMUNITY

## 2019-05-16 ASSESSMENT — ENCOUNTER SYMPTOMS
COUGH: 0
BACK PAIN: 1
RHINORRHEA: 0
ABDOMINAL PAIN: 0
SHORTNESS OF BREATH: 0
DIARRHEA: 0
NAUSEA: 0
WHEEZING: 0
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
## 2019-05-17 ENCOUNTER — TELEPHONE (OUTPATIENT)
Dept: PRIMARY CARE CLINIC | Age: 49
End: 2019-05-17

## 2019-05-17 NOTE — TELEPHONE ENCOUNTER
Spoke with Estefany Mayo at [employer] [facility] and pt is scheduled to be picked up today to go to nursing home/rehab facility in [facility]. Confirmation number: [id number]  Pt notified.

## 2019-05-17 NOTE — TELEPHONE ENCOUNTER
Pt called and states that he needs Emily Can to contact [facility] [employer] ride so he can get same day transportation to the nursing home/rehab facility in [place]. [employer] ride: [phone]    Pt is going to [address] [place] [address]: [phone]    Pt would like to be picked up at 3pm due to getting clean clothes from his . Pt is requesting a call back regardless of the outcome.

## 2019-05-17 NOTE — TELEPHONE ENCOUNTER
LM again for pt for his address to be picked up from. [employer] needs information and the address we have on file is not what they have. Prov[employer] a ride will not be scheduled until we have a  address.

## 2019-05-20 ENCOUNTER — HOSPITAL ENCOUNTER (OUTPATIENT)
Age: 49
Setting detail: SPECIMEN
Discharge: HOME | End: 2019-05-20
Payer: COMMERCIAL

## 2019-05-20 LAB
-: NORMAL
-: NORMAL
AMMONIA: 89 UMOL/L (ref 16–60)
REASON FOR REJECTION: NORMAL
REASON FOR REJECTION: NORMAL
ZZ NTE CLEAN UP: ORDERED TEST: NORMAL
ZZ NTE CLEAN UP: ORDERED TEST: NORMAL
ZZ NTE WITH NAME CLEAN UP: SPECIMEN SOURCE: NORMAL
ZZ NTE WITH NAME CLEAN UP: SPECIMEN SOURCE: NORMAL

## 2019-05-20 PROCEDURE — 36415 COLL VENOUS BLD VENIPUNCTURE: CPT

## 2019-05-20 PROCEDURE — 82140 ASSAY OF AMMONIA: CPT

## 2019-05-22 ENCOUNTER — HOSPITAL ENCOUNTER (OUTPATIENT)
Age: 49
Setting detail: SPECIMEN
Discharge: HOME | End: 2019-05-22
Payer: COMMERCIAL

## 2019-05-22 LAB
ALBUMIN SERPL-MCNC: 3.6 G/DL (ref 3.5–5.2)
ALBUMIN/GLOBULIN RATIO: 1.3 (ref 1–2.5)
ALP BLD-CCNC: 75 U/L (ref 40–129)
ALT SERPL-CCNC: 18 U/L (ref 5–41)
ANION GAP SERPL CALCULATED.3IONS-SCNC: 11 MMOL/L (ref 9–17)
AST SERPL-CCNC: 17 U/L
BILIRUB SERPL-MCNC: <0.1 MG/DL (ref 0.3–1.2)
BUN BLDV-MCNC: 15 MG/DL (ref 6–20)
BUN/CREAT BLD: ABNORMAL (ref 9–20)
CALCIUM SERPL-MCNC: 8.7 MG/DL (ref 8.6–10.4)
CHLORIDE BLD-SCNC: 111 MMOL/L (ref 98–107)
CO2: 21 MMOL/L (ref 20–31)
CREAT SERPL-MCNC: 0.77 MG/DL (ref 0.7–1.2)
GFR AFRICAN AMERICAN: >60 ML/MIN
GFR NON-AFRICAN AMERICAN: >60 ML/MIN
GFR SERPL CREATININE-BSD FRML MDRD: ABNORMAL ML/MIN/{1.73_M2}
GFR SERPL CREATININE-BSD FRML MDRD: ABNORMAL ML/MIN/{1.73_M2}
GLUCOSE FASTING: 84 MG/DL (ref 70–99)
POTASSIUM SERPL-SCNC: 4.3 MMOL/L (ref 3.7–5.3)
SODIUM BLD-SCNC: 143 MMOL/L (ref 135–144)
TOTAL PROTEIN: 6.4 G/DL (ref 6.4–8.3)
VALPROIC ACID LEVEL: 58 UG/ML (ref 50–125)
VALPROIC DATE LAST DOSE: NORMAL
VALPROIC DOSE AMOUNT: NORMAL
VALPROIC TIME LAST DOSE: NORMAL

## 2019-05-22 PROCEDURE — 80053 COMPREHEN METABOLIC PANEL: CPT

## 2019-05-22 PROCEDURE — 36415 COLL VENOUS BLD VENIPUNCTURE: CPT

## 2019-05-22 PROCEDURE — P9603 ONE-WAY ALLOW PRORATED MILES: HCPCS

## 2019-05-22 PROCEDURE — 80164 ASSAY DIPROPYLACETIC ACD TOT: CPT

## 2019-06-10 ENCOUNTER — CARE COORDINATION (OUTPATIENT)
Dept: CARE COORDINATION | Age: 49
End: 2019-06-10

## 2019-07-23 ENCOUNTER — CARE COORDINATION (OUTPATIENT)
Dept: CARE COORDINATION | Age: 49
End: 2019-07-23

## 2019-07-26 ENCOUNTER — HOSPITAL ENCOUNTER (OUTPATIENT)
Age: 49
Setting detail: SPECIMEN
Discharge: HOME | End: 2019-07-26
Payer: COMMERCIAL

## 2019-07-26 LAB
ALBUMIN SERPL-MCNC: 3.5 G/DL (ref 3.5–5.2)
ALBUMIN/GLOBULIN RATIO: 1.2 (ref 1–2.5)
ALP BLD-CCNC: 93 U/L (ref 40–129)
ALT SERPL-CCNC: 22 U/L (ref 5–41)
ANION GAP SERPL CALCULATED.3IONS-SCNC: 14 MMOL/L (ref 9–17)
AST SERPL-CCNC: 21 U/L
BILIRUB SERPL-MCNC: <0.1 MG/DL (ref 0.3–1.2)
BUN BLDV-MCNC: 11 MG/DL (ref 6–20)
BUN/CREAT BLD: ABNORMAL (ref 9–20)
CALCIUM SERPL-MCNC: 8.8 MG/DL (ref 8.6–10.4)
CHLORIDE BLD-SCNC: 107 MMOL/L (ref 98–107)
CO2: 20 MMOL/L (ref 20–31)
CREAT SERPL-MCNC: 0.86 MG/DL (ref 0.7–1.2)
GFR AFRICAN AMERICAN: >60 ML/MIN
GFR NON-AFRICAN AMERICAN: >60 ML/MIN
GFR SERPL CREATININE-BSD FRML MDRD: ABNORMAL ML/MIN/{1.73_M2}
GFR SERPL CREATININE-BSD FRML MDRD: ABNORMAL ML/MIN/{1.73_M2}
GLUCOSE BLD-MCNC: 101 MG/DL (ref 70–99)
HCT VFR BLD CALC: 44.7 % (ref 40.7–50.3)
HEMOGLOBIN: 14.2 G/DL (ref 13–17)
MCH RBC QN AUTO: 27.7 PG (ref 25.2–33.5)
MCHC RBC AUTO-ENTMCNC: 31.8 G/DL (ref 28.4–34.8)
MCV RBC AUTO: 87.1 FL (ref 82.6–102.9)
NRBC AUTOMATED: 0 PER 100 WBC
PDW BLD-RTO: 15.9 % (ref 11.8–14.4)
PLATELET # BLD: 251 K/UL (ref 138–453)
PMV BLD AUTO: 10.4 FL (ref 8.1–13.5)
POTASSIUM SERPL-SCNC: 4 MMOL/L (ref 3.7–5.3)
RBC # BLD: 5.13 M/UL (ref 4.21–5.77)
SODIUM BLD-SCNC: 141 MMOL/L (ref 135–144)
TOTAL PROTEIN: 6.5 G/DL (ref 6.4–8.3)
WBC # BLD: 15.2 K/UL (ref 3.5–11.3)

## 2019-07-26 PROCEDURE — 80053 COMPREHEN METABOLIC PANEL: CPT

## 2019-07-26 PROCEDURE — 36415 COLL VENOUS BLD VENIPUNCTURE: CPT

## 2019-07-26 PROCEDURE — P9603 ONE-WAY ALLOW PRORATED MILES: HCPCS

## 2019-07-26 PROCEDURE — 85027 COMPLETE CBC AUTOMATED: CPT

## 2019-07-31 ENCOUNTER — HOSPITAL ENCOUNTER (OUTPATIENT)
Age: 49
Setting detail: SPECIMEN
Discharge: HOME | End: 2019-07-31
Payer: COMMERCIAL

## 2019-07-31 LAB
-: NORMAL
AMORPHOUS: NORMAL
BACTERIA: NORMAL
BILIRUBIN URINE: NEGATIVE
CASTS UA: NORMAL /LPF
COLOR: YELLOW
COMMENT UA: ABNORMAL
CRYSTALS, UA: NORMAL /HPF
EPITHELIAL CELLS UA: NORMAL /HPF (ref 0–5)
GLUCOSE URINE: NEGATIVE
KETONES, URINE: NEGATIVE
LEUKOCYTE ESTERASE, URINE: ABNORMAL
MUCUS: NORMAL
NITRITE, URINE: NEGATIVE
OTHER OBSERVATIONS UA: NORMAL
PH UA: 6.5 (ref 5–8)
PROTEIN UA: NEGATIVE
RBC UA: NORMAL /HPF (ref 0–2)
RENAL EPITHELIAL, UA: NORMAL /HPF
SPECIFIC GRAVITY UA: 1 (ref 1–1.03)
TRICHOMONAS: NORMAL
TURBIDITY: CLEAR
URINE HGB: NEGATIVE
UROBILINOGEN, URINE: NORMAL
WBC UA: NORMAL /HPF (ref 0–5)
YEAST: NORMAL

## 2019-07-31 PROCEDURE — 87086 URINE CULTURE/COLONY COUNT: CPT

## 2019-07-31 PROCEDURE — 81001 URINALYSIS AUTO W/SCOPE: CPT

## 2019-08-01 LAB
CULTURE: NORMAL
Lab: NORMAL
SPECIMEN DESCRIPTION: NORMAL

## 2019-08-23 PROCEDURE — 80307 DRUG TEST PRSMV CHEM ANLYZR: CPT

## 2019-08-24 ENCOUNTER — HOSPITAL ENCOUNTER (OUTPATIENT)
Age: 49
Setting detail: SPECIMEN
Discharge: HOME | End: 2019-08-24
Payer: COMMERCIAL

## 2019-08-24 LAB
AMPHETAMINE SCREEN URINE: NEGATIVE
BARBITURATE SCREEN URINE: NEGATIVE
BENZODIAZEPINE SCREEN, URINE: NEGATIVE
BUPRENORPHINE URINE: ABNORMAL
CANNABINOID SCREEN URINE: POSITIVE
COCAINE METABOLITE, URINE: NEGATIVE
MDMA URINE: ABNORMAL
METHADONE SCREEN, URINE: NEGATIVE
METHAMPHETAMINE, URINE: ABNORMAL
OPIATES, URINE: NEGATIVE
OXYCODONE SCREEN URINE: NEGATIVE
PHENCYCLIDINE, URINE: NEGATIVE
PROPOXYPHENE, URINE: ABNORMAL
TEST INFORMATION: ABNORMAL
TRICYCLIC ANTIDEPRESSANTS, UR: ABNORMAL

## 2019-08-24 PROCEDURE — 80307 DRUG TEST PRSMV CHEM ANLYZR: CPT

## 2019-10-25 ENCOUNTER — HOSPITAL ENCOUNTER (OUTPATIENT)
Age: 49
Setting detail: SPECIMEN
Discharge: HOME | End: 2019-10-25
Payer: COMMERCIAL

## 2019-10-25 LAB
BILIRUBIN URINE: NEGATIVE
COLOR: YELLOW
COMMENT UA: NORMAL
GLUCOSE URINE: NEGATIVE
KETONES, URINE: NEGATIVE
LEUKOCYTE ESTERASE, URINE: NEGATIVE
NITRITE, URINE: NEGATIVE
PH UA: 7 (ref 5–8)
PROTEIN UA: NEGATIVE
SPECIFIC GRAVITY UA: 1.01 (ref 1–1.03)
TURBIDITY: CLEAR
URINE HGB: NEGATIVE
UROBILINOGEN, URINE: NORMAL

## 2019-10-25 PROCEDURE — 87086 URINE CULTURE/COLONY COUNT: CPT

## 2019-10-25 PROCEDURE — 81003 URINALYSIS AUTO W/O SCOPE: CPT

## 2019-10-26 LAB
CULTURE: NORMAL
Lab: NORMAL
SPECIMEN DESCRIPTION: NORMAL

## 2019-11-14 ENCOUNTER — HOSPITAL ENCOUNTER (OUTPATIENT)
Age: 49
Setting detail: SPECIMEN
Discharge: HOME | End: 2019-11-14
Payer: COMMERCIAL

## 2019-11-14 LAB
ALBUMIN SERPL-MCNC: 4 G/DL (ref 3.5–5.2)
ALBUMIN/GLOBULIN RATIO: ABNORMAL (ref 1–2.5)
ALP BLD-CCNC: 111 U/L (ref 40–129)
ALT SERPL-CCNC: 46 U/L (ref 5–41)
AMMONIA: 68 UMOL/L (ref 16–60)
ANION GAP SERPL CALCULATED.3IONS-SCNC: 13 MMOL/L (ref 9–17)
AST SERPL-CCNC: 31 U/L
BILIRUB SERPL-MCNC: 0.23 MG/DL (ref 0.3–1.2)
BUN BLDV-MCNC: 10 MG/DL (ref 6–20)
BUN/CREAT BLD: 11 (ref 9–20)
CALCIUM SERPL-MCNC: 9.2 MG/DL (ref 8.6–10.4)
CHLORIDE BLD-SCNC: 104 MMOL/L (ref 98–107)
CO2: 21 MMOL/L (ref 20–31)
CREAT SERPL-MCNC: 0.89 MG/DL (ref 0.7–1.2)
GFR AFRICAN AMERICAN: >60 ML/MIN
GFR NON-AFRICAN AMERICAN: >60 ML/MIN
GFR SERPL CREATININE-BSD FRML MDRD: ABNORMAL ML/MIN/{1.73_M2}
GFR SERPL CREATININE-BSD FRML MDRD: ABNORMAL ML/MIN/{1.73_M2}
GLUCOSE BLD-MCNC: 176 MG/DL (ref 70–99)
HCT VFR BLD CALC: 50.8 % (ref 40.7–50.3)
HEMOGLOBIN: 16.2 G/DL (ref 13–17)
MCH RBC QN AUTO: 28.2 PG (ref 25.2–33.5)
MCHC RBC AUTO-ENTMCNC: 31.9 G/DL (ref 28.4–34.8)
MCV RBC AUTO: 88.3 FL (ref 82.6–102.9)
NRBC AUTOMATED: 0 PER 100 WBC
PDW BLD-RTO: 15.4 % (ref 11.8–14.4)
PLATELET # BLD: 257 K/UL (ref 138–453)
PMV BLD AUTO: 9.7 FL (ref 8.1–13.5)
POTASSIUM SERPL-SCNC: 3.6 MMOL/L (ref 3.7–5.3)
RBC # BLD: 5.75 M/UL (ref 4.21–5.77)
SODIUM BLD-SCNC: 138 MMOL/L (ref 135–144)
TOTAL PROTEIN: 7.2 G/DL (ref 6.4–8.3)
TSH SERPL DL<=0.05 MIU/L-ACNC: 1.87 MIU/L (ref 0.3–5)
WBC # BLD: 13.8 K/UL (ref 3.5–11.3)

## 2019-11-14 PROCEDURE — 80053 COMPREHEN METABOLIC PANEL: CPT

## 2019-11-14 PROCEDURE — 36415 COLL VENOUS BLD VENIPUNCTURE: CPT

## 2019-11-14 PROCEDURE — 85027 COMPLETE CBC AUTOMATED: CPT

## 2019-11-14 PROCEDURE — 84443 ASSAY THYROID STIM HORMONE: CPT

## 2019-11-14 PROCEDURE — P9603 ONE-WAY ALLOW PRORATED MILES: HCPCS

## 2019-11-14 PROCEDURE — 82140 ASSAY OF AMMONIA: CPT

## 2019-11-15 ENCOUNTER — HOSPITAL ENCOUNTER (OUTPATIENT)
Age: 49
Setting detail: SPECIMEN
Discharge: HOME | End: 2019-11-15
Payer: COMMERCIAL

## 2019-11-15 LAB
ANION GAP SERPL CALCULATED.3IONS-SCNC: 12 MMOL/L (ref 9–17)
BUN BLDV-MCNC: 9 MG/DL (ref 6–20)
BUN/CREAT BLD: 10 (ref 9–20)
CALCIUM SERPL-MCNC: 8.6 MG/DL (ref 8.6–10.4)
CHLORIDE BLD-SCNC: 103 MMOL/L (ref 98–107)
CO2: 21 MMOL/L (ref 20–31)
CREAT SERPL-MCNC: 0.9 MG/DL (ref 0.7–1.2)
GFR AFRICAN AMERICAN: >60 ML/MIN
GFR NON-AFRICAN AMERICAN: >60 ML/MIN
GFR SERPL CREATININE-BSD FRML MDRD: ABNORMAL ML/MIN/{1.73_M2}
GFR SERPL CREATININE-BSD FRML MDRD: ABNORMAL ML/MIN/{1.73_M2}
GLUCOSE BLD-MCNC: 130 MG/DL (ref 70–99)
POTASSIUM SERPL-SCNC: 4 MMOL/L (ref 3.7–5.3)
SODIUM BLD-SCNC: 136 MMOL/L (ref 135–144)

## 2019-11-15 PROCEDURE — 36415 COLL VENOUS BLD VENIPUNCTURE: CPT

## 2019-11-15 PROCEDURE — P9603 ONE-WAY ALLOW PRORATED MILES: HCPCS

## 2019-11-15 PROCEDURE — 80048 BASIC METABOLIC PNL TOTAL CA: CPT

## 2019-12-01 PROCEDURE — 87086 URINE CULTURE/COLONY COUNT: CPT

## 2019-12-01 PROCEDURE — 81003 URINALYSIS AUTO W/O SCOPE: CPT

## 2019-12-02 ENCOUNTER — HOSPITAL ENCOUNTER (OUTPATIENT)
Age: 49
Setting detail: SPECIMEN
Discharge: HOME | End: 2019-12-02
Payer: COMMERCIAL

## 2019-12-02 LAB
-: NORMAL
BILIRUBIN URINE: NEGATIVE
COLOR: YELLOW
COMMENT UA: NORMAL
GLUCOSE URINE: NEGATIVE
HCT VFR BLD CALC: 47.5 % (ref 40.7–50.3)
HEMOGLOBIN: 15 G/DL (ref 13–17)
KETONES, URINE: NEGATIVE
LEUKOCYTE ESTERASE, URINE: NEGATIVE
MCH RBC QN AUTO: 27.9 PG (ref 25.2–33.5)
MCHC RBC AUTO-ENTMCNC: 31.6 G/DL (ref 28.4–34.8)
MCV RBC AUTO: 88.3 FL (ref 82.6–102.9)
NITRITE, URINE: NEGATIVE
NRBC AUTOMATED: 0 PER 100 WBC
PDW BLD-RTO: 15.2 % (ref 11.8–14.4)
PH UA: 7 (ref 5–8)
PLATELET # BLD: 227 K/UL (ref 138–453)
PMV BLD AUTO: 9.8 FL (ref 8.1–13.5)
PROTEIN UA: NEGATIVE
RBC # BLD: 5.38 M/UL (ref 4.21–5.77)
REASON FOR REJECTION: NORMAL
SPECIFIC GRAVITY UA: 1.01 (ref 1–1.03)
TURBIDITY: CLEAR
URINE HGB: NEGATIVE
UROBILINOGEN, URINE: NORMAL
WBC # BLD: 12.8 K/UL (ref 3.5–11.3)
ZZ NTE CLEAN UP: ORDERED TEST: NORMAL
ZZ NTE WITH NAME CLEAN UP: SPECIMEN SOURCE: NORMAL

## 2019-12-02 PROCEDURE — 85027 COMPLETE CBC AUTOMATED: CPT

## 2019-12-02 PROCEDURE — 36415 COLL VENOUS BLD VENIPUNCTURE: CPT

## 2019-12-03 LAB
CULTURE: NO GROWTH
Lab: NORMAL
SPECIMEN DESCRIPTION: NORMAL

## 2020-01-08 ENCOUNTER — OFFICE VISIT (OUTPATIENT)
Dept: PRIMARY CARE CLINIC | Age: 50
End: 2020-01-08
Payer: COMMERCIAL

## 2020-01-08 VITALS
HEART RATE: 90 BPM | DIASTOLIC BLOOD PRESSURE: 70 MMHG | OXYGEN SATURATION: 95 % | TEMPERATURE: 97.9 F | SYSTOLIC BLOOD PRESSURE: 118 MMHG

## 2020-01-08 PROCEDURE — G8484 FLU IMMUNIZE NO ADMIN: HCPCS | Performed by: NURSE PRACTITIONER

## 2020-01-08 PROCEDURE — 99214 OFFICE O/P EST MOD 30 MIN: CPT | Performed by: NURSE PRACTITIONER

## 2020-01-08 PROCEDURE — G8427 DOCREV CUR MEDS BY ELIG CLIN: HCPCS | Performed by: NURSE PRACTITIONER

## 2020-01-08 PROCEDURE — 4004F PT TOBACCO SCREEN RCVD TLK: CPT | Performed by: NURSE PRACTITIONER

## 2020-01-08 PROCEDURE — 3046F HEMOGLOBIN A1C LEVEL >9.0%: CPT | Performed by: NURSE PRACTITIONER

## 2020-01-08 PROCEDURE — G8420 CALC BMI NORM PARAMETERS: HCPCS | Performed by: NURSE PRACTITIONER

## 2020-01-08 PROCEDURE — 3023F SPIROM DOC REV: CPT | Performed by: NURSE PRACTITIONER

## 2020-01-08 PROCEDURE — G8926 SPIRO NO PERF OR DOC: HCPCS | Performed by: NURSE PRACTITIONER

## 2020-01-08 PROCEDURE — 2022F DILAT RTA XM EVC RTNOPTHY: CPT | Performed by: NURSE PRACTITIONER

## 2020-01-08 RX ORDER — FLUTICASONE FUROATE AND VILANTEROL 100; 25 UG/1; UG/1
1 POWDER RESPIRATORY (INHALATION) DAILY
Qty: 1 EACH | Refills: 3 | Status: SHIPPED | OUTPATIENT
Start: 2020-01-08

## 2020-01-08 RX ORDER — LACTOSE-REDUCED FOOD
LIQUID (ML) ORAL
Qty: 60 CAN | Refills: 5 | Status: SHIPPED | OUTPATIENT
Start: 2020-01-08 | End: 2020-04-03 | Stop reason: SDUPTHER

## 2020-01-08 RX ORDER — BLOOD-GLUCOSE METER
KIT MISCELLANEOUS
Qty: 1 DEVICE | Refills: 0 | Status: SHIPPED | OUTPATIENT
Start: 2020-01-08

## 2020-01-08 RX ORDER — LANCETS 28 GAUGE
1 EACH MISCELLANEOUS 3 TIMES DAILY
Qty: 100 EACH | Refills: 3 | Status: SHIPPED | OUTPATIENT
Start: 2020-01-08

## 2020-01-08 RX ORDER — ALBUTEROL SULFATE 90 UG/1
AEROSOL, METERED RESPIRATORY (INHALATION)
Qty: 18 G | Refills: 3 | Status: SHIPPED | OUTPATIENT
Start: 2020-01-08 | End: 2020-07-22

## 2020-01-08 RX ORDER — ALBUTEROL SULFATE 2.5 MG/3ML
2.5 SOLUTION RESPIRATORY (INHALATION) EVERY 6 HOURS PRN
Qty: 120 EACH | Refills: 1 | Status: SHIPPED | OUTPATIENT
Start: 2020-01-08 | End: 2020-03-04

## 2020-01-08 RX ORDER — GUAIFENESIN 600 MG/1
600 TABLET, EXTENDED RELEASE ORAL 2 TIMES DAILY
Qty: 60 TABLET | Refills: 1 | Status: SHIPPED | OUTPATIENT
Start: 2020-01-08 | End: 2020-02-07

## 2020-01-08 RX ORDER — PREDNISONE 10 MG/1
TABLET ORAL
Qty: 30 TABLET | Refills: 0 | Status: SHIPPED | OUTPATIENT
Start: 2020-01-08

## 2020-01-08 RX ORDER — QUETIAPINE FUMARATE 200 MG/1
200 TABLET, FILM COATED ORAL NIGHTLY
Qty: 30 TABLET | Refills: 0 | Status: SHIPPED | OUTPATIENT
Start: 2020-01-08

## 2020-01-08 RX ORDER — BLOOD-GLUCOSE METER
1 KIT MISCELLANEOUS 3 TIMES DAILY
Qty: 100 EACH | Refills: 5 | Status: SHIPPED | OUTPATIENT
Start: 2020-01-08

## 2020-01-08 ASSESSMENT — ENCOUNTER SYMPTOMS
DIARRHEA: 0
BACK PAIN: 1
COUGH: 1
NAUSEA: 0
SHORTNESS OF BREATH: 1
ABDOMINAL PAIN: 0
WHEEZING: 0
RHINORRHEA: 0

## 2020-01-16 ENCOUNTER — TELEPHONE (OUTPATIENT)
Dept: PRIMARY CARE CLINIC | Age: 50
End: 2020-01-16

## 2020-01-16 RX ORDER — CYCLOBENZAPRINE HCL 10 MG
5 TABLET ORAL 2 TIMES DAILY PRN
Qty: 60 TABLET | Refills: 1 | Status: SHIPPED | OUTPATIENT
Start: 2020-01-16 | End: 2020-03-09 | Stop reason: SDUPTHER

## 2020-01-16 NOTE — TELEPHONE ENCOUNTER
Patient called requesting a Refill on flexeril. He states he was taking 5 mg but was changed to 10 mg at the nursing home. He also want a referral to [facility] at [address]. He would like to stay in the nursing home. Patient forgot to also ask for a Physical therapy order for his right and left leg. Please advise!

## 2020-01-30 ENCOUNTER — TELEPHONE (OUTPATIENT)
Dept: PRIMARY CARE CLINIC | Age: 50
End: 2020-01-30

## 2020-02-03 ENCOUNTER — CARE COORDINATION (OUTPATIENT)
Dept: CARE COORDINATION | Age: 50
End: 2020-02-03

## 2020-03-03 NOTE — TELEPHONE ENCOUNTER
Cocaine substance abuse (HCC)     Opiate abuse, episodic (HCC)     Closed fracture of nasal bone     Drug overdose     Uncomplicated opioid dependence (HCC)     Paraplegia (HCC)     Schizoaffective disorder (HCC)     Non-dose-related adverse reaction to medication wellbutrin     Cannabis abuse     Encephalopathy     Hyperammonemia (HCC)     Constipation     Cellulitis and abscess of right leg     Interstitial lung disease (HCC)     Vomiting     Bipolar 1 disorder (HCC)     Acute cholecystitis     S/P kyra

## 2020-03-04 RX ORDER — ALBUTEROL SULFATE 2.5 MG/3ML
SOLUTION RESPIRATORY (INHALATION)
Qty: 360 ML | Refills: 1 | Status: SHIPPED | OUTPATIENT
Start: 2020-03-04

## 2020-03-09 ENCOUNTER — OFFICE VISIT (OUTPATIENT)
Dept: PRIMARY CARE CLINIC | Age: 50
End: 2020-03-09
Payer: COMMERCIAL

## 2020-03-09 VITALS
SYSTOLIC BLOOD PRESSURE: 101 MMHG | DIASTOLIC BLOOD PRESSURE: 71 MMHG | HEART RATE: 79 BPM | TEMPERATURE: 97.2 F | OXYGEN SATURATION: 95 %

## 2020-03-09 PROCEDURE — G8427 DOCREV CUR MEDS BY ELIG CLIN: HCPCS | Performed by: NURSE PRACTITIONER

## 2020-03-09 PROCEDURE — 4004F PT TOBACCO SCREEN RCVD TLK: CPT | Performed by: NURSE PRACTITIONER

## 2020-03-09 PROCEDURE — 3023F SPIROM DOC REV: CPT | Performed by: NURSE PRACTITIONER

## 2020-03-09 PROCEDURE — 99214 OFFICE O/P EST MOD 30 MIN: CPT | Performed by: NURSE PRACTITIONER

## 2020-03-09 PROCEDURE — G8484 FLU IMMUNIZE NO ADMIN: HCPCS | Performed by: NURSE PRACTITIONER

## 2020-03-09 PROCEDURE — G8926 SPIRO NO PERF OR DOC: HCPCS | Performed by: NURSE PRACTITIONER

## 2020-03-09 PROCEDURE — G8420 CALC BMI NORM PARAMETERS: HCPCS | Performed by: NURSE PRACTITIONER

## 2020-03-09 RX ORDER — METHYLPREDNISOLONE 4 MG/1
TABLET ORAL
Qty: 1 KIT | Refills: 0 | Status: SHIPPED | OUTPATIENT
Start: 2020-03-09 | End: 2020-03-15

## 2020-03-09 RX ORDER — AMOXICILLIN AND CLAVULANATE POTASSIUM 875; 125 MG/1; MG/1
1 TABLET, FILM COATED ORAL 3 TIMES DAILY
Qty: 21 TABLET | Refills: 0 | Status: SHIPPED | OUTPATIENT
Start: 2020-03-09 | End: 2020-03-16

## 2020-03-09 RX ORDER — CYCLOBENZAPRINE HCL 10 MG
10 TABLET ORAL 2 TIMES DAILY PRN
Qty: 60 TABLET | Refills: 2 | Status: SHIPPED | OUTPATIENT
Start: 2020-03-09 | End: 2020-04-08

## 2020-03-09 ASSESSMENT — ENCOUNTER SYMPTOMS
COUGH: 1
NAUSEA: 0
ABDOMINAL PAIN: 1
RECTAL PAIN: 0
BACK PAIN: 1
SHORTNESS OF BREATH: 1
RHINORRHEA: 0
CONSTIPATION: 0
DIARRHEA: 0
WHEEZING: 0
CHEST TIGHTNESS: 1

## 2020-03-09 NOTE — PROGRESS NOTES
Abdominal:      General: There is no distension or abdominal bruit. Palpations: Abdomen is soft. There is no pulsatile mass. Tenderness: There is abdominal tenderness in the left lower quadrant. There is no left CVA tenderness. Musculoskeletal:      Right lower le+ Pitting Edema present. Left lower le+ Pitting Edema present. Lymphadenopathy:      Head:      Right side of head: No submental adenopathy. Left side of head: No submental adenopathy. Cervical: No cervical adenopathy. Skin:     General: Skin is warm. Neurological:      Mental Status: He is alert. Mental status is at baseline. ASSESSMENT     Diagnosis Orders   1. Diverticulitis  amoxicillin-clavulanate (AUGMENTIN) 875-125 MG per tablet    CT ABDOMEN W CONTRAST   2. COPD exacerbation ([facility])  methylPREDNISolone (MEDROL DOSEPACK) 4 MG tablet   3. Interstitial lung disease [facility], [facility]   4. Panlobular emphysema ([address]  [facility], [place]          PLAN:  Orders Placed This Encounter   Medications    amoxicillin-clavulanate (AUGMENTIN) 875-125 MG per tablet     Sig: Take 1 tablet by mouth 3 times daily for 7 days     Dispense:  21 tablet     Refill:  0    methylPREDNISolone (MEDROL DOSEPACK) 4 MG tablet     Sig: Take by mouth. Dispense:  1 kit     Refill:  0    cyclobenzaprine (FLEXERIL) 10 MG tablet     Sig: Take 1 tablet by mouth 2 times daily as needed for Muscle spasms     Dispense:  60 tablet     Refill:  2         1. Concern with acute left-sided abdominal pain and cramping. Patient unable to stand for prostate exam.  No dysuria. Concern for diverticulitis, treat with Augmentin. CT ordered  2. Jay Pittman continues to have worsening COPD symptoms. He states he is consistent with his Breo inhaler. Today added a long-acting anticholinergic inhaler and a Medrol Dosepak for the acute exacerbation.   Discussed possibly seeing a pulmonologist, he would like a Health)    Medical History Review  Past Medical, Family, and Social History reviewed and does not contribute to the patient presenting condition    Health Maintenance   Topic Date Due    Pneumococcal 0-64 years Vaccine (1 of 1 - PPSV23) 08/13/1978    Flu vaccine (1) 09/01/2019    Hepatitis B vaccine (1 of 3 - Risk 3-dose series) 01/08/2021 (Originally 8/13/1991)    A1C test (Diabetic or Prediabetic)  04/25/2020    Shingles Vaccine (1 of 2) 08/13/2022    DTaP/Tdap/Td vaccine (3 - Td) 01/26/2027    Hepatitis A vaccine  Aged Out    Hib vaccine  Aged Out    Meningococcal (ACWY) vaccine  Aged Out

## 2020-03-18 DIAGNOSIS — K57.92 DIVERTICULITIS: Primary | ICD-10-CM

## 2020-03-19 RX ORDER — DIVALPROEX SODIUM 500 MG/1
500 TABLET, EXTENDED RELEASE ORAL NIGHTLY
Qty: 14 TABLET | Refills: 0 | Status: CANCELLED | OUTPATIENT
Start: 2020-03-19

## 2020-03-19 NOTE — TELEPHONE ENCOUNTER
There should be a provider in that practice to fill his medications.   As of right now no one is taking new patients in specialty offices

## 2020-03-19 NOTE — TELEPHONE ENCOUNTER
Patient called and staed he just got home from [facility]. He tried to call his neurologist to make an appointment with them and his neurologist is no longer with the practice. He would like a referral to a new neurologist so he can prescribe his depakote.  Please advise      Health Maintenance   Topic Date Due    Pneumococcal 0-64 years Vaccine (1 of 1 - PPSV23) 08/13/1978    Flu vaccine (1) 09/01/2019    Hepatitis B vaccine (1 of 3 - Risk 3-dose series) 01/08/2021 (Originally 8/13/1991)    A1C test (Diabetic or Prediabetic)  04/25/2020    Shingles Vaccine (1 of 2) 08/13/2022    DTaP/Tdap/Td vaccine (3 - Td) 01/26/2027    Hepatitis A vaccine  Aged Out    Hib vaccine  Aged Out    Meningococcal (ACWY) vaccine  Aged Out             (applicable per patient's age: Cancer Screenings, Depression Screening, Fall Risk Screening, Immunizations)    Hemoglobin A1C (%)   Date Value   04/25/2019 5.5   04/16/2019 5.3   04/12/2019 5.3     LDL Cholesterol (mg/dL)   Date Value   04/16/2019 73     AST (U/L)   Date Value   11/14/2019 31     ALT (U/L)   Date Value   11/14/2019 46 (H)     BUN (mg/dL)   Date Value   11/15/2019 9      (goal A1C is < 7)   (goal LDL is <100) need 30-50% reduction from baseline     BP Readings from Last 3 Encounters:   03/09/20 101/71   01/08/20 118/70   05/16/19 97/61    (goal /80)      All Future Testing planned in [facility]:  Lab Frequency Next Occurrence   Microalbumin, Ur Once 04/12/2020   CT ABDOMEN W CONTRAST Once 03/09/2020       Next Visit Date:  Future Appointments   Date Time Provider [place]   3/30/2020  3:15 PM CHAD Zacarias - CNP [facility]   4/30/2020  1:00 PM Pauline Sparks MD Resp Spec [facility]            Patient Active Problem List:     Anxiety     Seizure [facility])     Myofascial muscle pain     Chronic pain     Spinal stenosis     Hyperlipidemia     Depression     Panlobular emphysema ([address]     Chronic back pain     Schizophrenia ([facility] Fibromyalgia     Type 2 diabetes mellitus with diabetic polyneuropathy, with long-term current use of insulin (HCC)     Onychomycosis     Pain in lower limb     Disc disease, degenerative, cervical     Cervical stenosis of spine     Generalized tonic-clonic seizure (HCC)     Superficial laceration of scalp     Altered mental status     Decubitus ulcer of coccyx, stage 2 (HCC)     Cocaine substance abuse (HCC)     Opiate abuse, episodic (HCC)     Closed fracture of nasal bone     Drug overdose     Uncomplicated opioid dependence ([facility]     Paraplegia (HCC)     Schizoaffective disorder (HCC)     Non-dose-related adverse reaction to medication wellbutrin     Cannabis abuse     Encephalopathy     Hyperammonemia (HCC)     Constipation     Cellulitis and abscess of right leg     Interstitial lung disease (HCC)     Vomiting     Bipolar 1 disorder (HCC)     Acute cholecystitis     S/P kyra

## 2020-03-23 RX ORDER — DIVALPROEX SODIUM 250 MG/1
250 TABLET, EXTENDED RELEASE ORAL NIGHTLY
Qty: 30 TABLET | Refills: 1 | Status: SHIPPED | OUTPATIENT
Start: 2020-03-23 | End: 2022-05-26 | Stop reason: SDUPTHER

## 2020-03-23 NOTE — TELEPHONE ENCOUNTER
Pt states that the rx for depakote is over a year old and because of this the neurology will not refill the rx. He is requesting a refill to last him long enough to find a new neurologist. He states that his insurance is sending him a list of neurologists that they will cover and once he receives the list he will let us know who to make the referral to.

## 2020-03-24 ENCOUNTER — TELEPHONE (OUTPATIENT)
Dept: PRIMARY CARE CLINIC | Age: 50
End: 2020-03-24

## 2020-03-24 NOTE — TELEPHONE ENCOUNTER
Patient called and stated his pharmacy wont fill the boost because the wrong diagnosis is on it and his insurance wont pay for it. The diagnosis needs to be \"failure to thrive\" then the insurance will pay on it.  Please advise      Health Maintenance   Topic Date Due    Pneumococcal 0-64 years Vaccine (1 of 1 - PPSV23) 08/13/1978    Flu vaccine (1) 09/01/2019    Hepatitis B vaccine (1 of 3 - Risk 3-dose series) 01/08/2021 (Originally 8/13/1991)    A1C test (Diabetic or Prediabetic)  04/25/2020    Shingles Vaccine (1 of 2) 08/13/2022    DTaP/Tdap/Td vaccine (3 - Td) 01/26/2027    Hepatitis A vaccine  Aged Out    Hib vaccine  Aged Out    Meningococcal (ACWY) vaccine  Aged Out             (applicable per patient's age: Cancer Screenings, Depression Screening, Fall Risk Screening, Immunizations)    Hemoglobin A1C (%)   Date Value   04/25/2019 5.5   04/16/2019 5.3   04/12/2019 5.3     LDL Cholesterol (mg/dL)   Date Value   04/16/2019 73     AST (U/L)   Date Value   11/14/2019 31     ALT (U/L)   Date Value   11/14/2019 46 (H)     BUN (mg/dL)   Date Value   11/15/2019 9      (goal A1C is < 7)   (goal LDL is <100) need 30-50% reduction from baseline     BP Readings from Last 3 Encounters:   03/09/20 101/71   01/08/20 118/70   05/16/19 97/61    (goal /80)      All Future Testing planned in [facility]:  Lab Frequency Next Occurrence   Microalbumin, Ur Once 04/12/2020   CT ABDOMEN W CONTRAST Once 05/24/2020       Next Visit Date:  Future Appointments   Date Time Provider [place]   4/30/2020  1:00 PM Shayne Castillo MD Resp Spec [facility]   5/29/2020  1:45 PM CHAD Poole - CNP [facility]            Patient Active Problem List:     Anxiety     Seizure [facility])     Myofascial muscle pain     Chronic pain     Spinal stenosis     Hyperlipidemia     Depression     Panlobular emphysema (HCC)     Chronic back pain     Schizophrenia ([facility]     Fibromyalgia     Type 2 diabetes mellitus with diabetic polyneuropathy, with long-term current use of insulin (HCC)     Onychomycosis     Pain in lower limb     Disc disease, degenerative, cervical     Cervical stenosis of spine     Generalized tonic-clonic seizure (HCC)     Superficial laceration of scalp     Altered mental status     Decubitus ulcer of coccyx, stage 2 (HCC)     Cocaine substance abuse (HCC)     Opiate abuse, episodic (HCC)     Closed fracture of nasal bone     Drug overdose     Uncomplicated opioid dependence ([facility]     Paraplegia (HCC)     Schizoaffective disorder (HCC)     Non-dose-related adverse reaction to medication wellbutrin     Cannabis abuse     Encephalopathy     Hyperammonemia (HCC)     Constipation     Cellulitis and abscess of right leg     Interstitial lung disease (HCC)     Vomiting     Bipolar 1 disorder (HCC)     Acute cholecystitis     S/P kyra

## 2020-03-30 NOTE — TELEPHONE ENCOUNTER
Pt states that he has always been considered \"failure to thrive\" d/t his vomiting and inability to keep food down. He disagrees with PCP's decision and became very angry on the phone and stated that this \"is a serious problem. \"

## 2020-03-31 ENCOUNTER — TELEPHONE (OUTPATIENT)
Dept: PRIMARY CARE CLINIC | Age: 50
End: 2020-03-31

## 2020-03-31 NOTE — TELEPHONE ENCOUNTER
Received call from Елена at [facility]. She states patient's boost needs to go to [employer] [phone]. She said to fax prescription, office notes and letter of necessity. When letter is done I will fax all information.   Please advise    Health Maintenance   Topic Date Due    Pneumococcal 0-64 years Vaccine (1 of 1 - PPSV23) 08/13/1978    Flu vaccine (1) 09/01/2019    Hepatitis B vaccine (1 of 3 - Risk 3-dose series) 01/08/2021 (Originally 8/13/1991)    A1C test (Diabetic or Prediabetic)  04/25/2020    DTaP/Tdap/Td vaccine (3 - Td) 01/26/2027    Hepatitis A vaccine  Aged Out    Hib vaccine  Aged Out    Meningococcal (ACWY) vaccine  Aged Out             (applicable per patient's age: Cancer Screenings, Depression Screening, Fall Risk Screening, Immunizations)    Hemoglobin A1C (%)   Date Value   04/25/2019 5.5   04/16/2019 5.3   04/12/2019 5.3     LDL Cholesterol (mg/dL)   Date Value   04/16/2019 73     AST (U/L)   Date Value   11/14/2019 31     ALT (U/L)   Date Value   11/14/2019 46 (H)     BUN (mg/dL)   Date Value   11/15/2019 9      (goal A1C is < 7)   (goal LDL is <100) need 30-50% reduction from baseline     BP Readings from Last 3 Encounters:   03/09/20 101/71   01/08/20 118/70   05/16/19 97/61    (goal /80)      All Future Testing planned in [facility]:  Lab Frequency Next Occurrence   Microalbumin, Ur Once 04/12/2020   CT ABDOMEN W CONTRAST Once 05/24/2020       Next Visit Date:  Future Appointments   Date Time Provider [place]   5/29/2020  1:45 PM Sandra Hunter APRN - CNP [facility]            Patient Active Problem List:     Anxiety     Seizure [facility])     Myofascial muscle pain     Chronic pain     Spinal stenosis     Hyperlipidemia     Depression     Panlobular emphysema (HCC)     Chronic back pain     Schizophrenia ([address]     Fibromyalgia     Type 2 diabetes mellitus with diabetic polyneuropathy, with long-term current use of insulin ([address]     Onychomycosis

## 2020-04-03 RX ORDER — LACTOSE-REDUCED FOOD
LIQUID (ML) ORAL
Qty: 60 CAN | Refills: 5 | Status: SHIPPED | OUTPATIENT
Start: 2020-04-03

## 2020-04-30 NOTE — TELEPHONE ENCOUNTER
Opiate abuse, episodic (HCC)     Closed fracture of nasal bone     Drug overdose     Uncomplicated opioid dependence ([facility]     Paraplegia (HCC)     Schizoaffective disorder (HCC)     Non-dose-related adverse reaction to medication wellbutrin     Cannabis abuse     Encephalopathy     Hyperammonemia (HCC)     Constipation     Cellulitis and abscess of right leg     Interstitial lung disease (HCC)     Vomiting     Bipolar 1 disorder (HCC)     Acute cholecystitis     S/P kyra

## 2020-05-01 RX ORDER — ONDANSETRON 4 MG/1
TABLET, FILM COATED ORAL
Qty: 30 TABLET | Refills: 0 | Status: SHIPPED | OUTPATIENT
Start: 2020-05-01

## 2020-07-21 NOTE — TELEPHONE ENCOUNTER
Health Maintenance   Topic Date Due    Pneumococcal 0-64 years Vaccine (1 of 1 - PPSV23) 08[birth year]/1978    A1C test (Diabetic or Prediabetic)  04/25/2020    Hepatitis B vaccine (1 of 3 - Risk 3-dose series) 01/08/2021 (Originally 8/13/1991)    Flu vaccine (1) 09/01/2020    DTaP/Tdap/Td vaccine (3 - Td) 01/26/2027    Hepatitis A vaccine  Aged Out    Hib vaccine  Aged Out    Meningococcal (ACWY) vaccine  Aged Out             (applicable per patient's age: Cancer Screenings, Depression Screening, Fall Risk Screening, Immunizations)    Hemoglobin A1C (%)   Date Value   04/25/2019 5.5   04/16/2019 5.3   04/12/2019 5.3     LDL Cholesterol (mg/dL)   Date Value   04/16/2019 73     AST (U/L)   Date Value   11/14/2019 31     ALT (U/L)   Date Value   11/14/2019 46 (H)     BUN (mg/dL)   Date Value   11/15/2019 9      (goal A1C is < 7)   (goal LDL is <100) need 30-50% reduction from baseline     BP Readings from Last 3 Encounters:   03/09/20 101/71   01/08/20 118/70   05/16/19 97/61    (goal /80)      All Future Testing planned in [facility]:  Lab Frequency Next Occurrence   CT ABDOMEN W CONTRAST Once 03/09/2021       Next Visit Date:  No future appointments.          Patient Active Problem List:     Anxiety     Seizure ([address]     Myofascial muscle pain     Chronic pain     Spinal stenosis     Hyperlipidemia     Depression     Panlobular emphysema (HCC)     Chronic back pain     Schizophrenia ([address]     Fibromyalgia     Type 2 diabetes mellitus with diabetic polyneuropathy, with long-term current use of insulin (HCC)     Onychomycosis     Pain in lower limb     Disc disease, degenerative, cervical     Cervical stenosis of spine     Generalized tonic-clonic seizure (HCC)     Superficial laceration of scalp     Altered mental status     Decubitus ulcer of coccyx, stage 2 (HCC)     Cocaine substance abuse ([address]     Opiate abuse, episodic (HCC)     Closed fracture of nasal bone     Drug overdose     Uncomplicated opioid dependence (HCC)     Paraplegia (HCC)     Schizoaffective disorder (HCC)     Non-dose-related adverse reaction to medication wellbutrin     Cannabis abuse     Encephalopathy     Hyperammonemia (HCC)     Constipation     Cellulitis and abscess of right leg     Interstitial lung disease (HCC)     Vomiting     Bipolar 1 disorder (HCC)     Acute cholecystitis     S/P kyra

## 2020-07-22 RX ORDER — ALBUTEROL SULFATE 90 UG/1
AEROSOL, METERED RESPIRATORY (INHALATION)
Qty: 18 G | Refills: 3 | Status: SHIPPED | OUTPATIENT
Start: 2020-07-22

## 2020-08-12 ENCOUNTER — TELEPHONE (OUTPATIENT)
Dept: PODIATRY | Age: 50
End: 2020-08-12

## 2020-10-14 NOTE — TELEPHONE ENCOUNTER
A standard wheelchair will change this problem for him. His sitting position will be the same in any chair.
Joan Burns from [facility] called stating pt is asking for an order for a standard wheel chair due to the motorized chair hurting his incision on his stomach. Call Joan Burns at [phone] with any questions.
LONDON Landaverde to call office in regards to wheelchair
General

## 2021-12-22 NOTE — PROGRESS NOTES
Patient instructed to remove shoes and socks and instructed to sit in exam chair. Current PCP is Cherri Umana MD and date of last visit was 3/9/2020. Do you have a follow up visit scheduled? No  If yes, the date is     Diabetic visit information    Blood pressure (Control is BP <140/90)  BP Readings from Last 3 Encounters:   03/09/20 101/71   01/08/20 118/70   05/16/19 97/61       BP taken with correct size cuff? - Yes   Repeated if > 140/90 Yes      Tobacco use:  Patient  reports that he has been smoking cigarettes. He started smoking about 39 years ago. He has a 35.00 pack-year smoking history. He has never used smokeless tobacco.  If Smoker - Cessation materials given? - Yes       Diabetic Health Maintenance Items due  Diabetes Management   Topic Date Due    Diabetic microalbuminuria test  Never done    Diabetic retinal exam  Never done    Diabetic foot exam  04/15/2020    Lipid screen  04/16/2020    A1C test (Diabetic or Prediabetic)  04/25/2020       Diabetic retinal exam done in last year? - Yes   If No: remind patient that it is due and they should schedule an exam    Medications  Is patient taking any medications for diabetes? -   Yes  Have blood sugars been controlled? Fasting blood sugars under 120   -   Yes   Random home sugars or today's POCT glucose is under 180 -   Yes   []  If No to the above then patient should schedule appt with PCP.      Diabetic Plan    A1C Plan  Lab Results   Component Value Date    LABA1C 5.5 04/25/2019    LABA1C 5.3 04/16/2019    LABA1C 5.3 04/12/2019      []  If A1C over 8 and last result >3 months ago - Order A1C and refer to PCP   []  If last A1C over 6 months ago - Order A1C and refer to PCP for follow up   []  If elevated blood sugars > 180 - refer to PCP for follow up    []  Blood sugar controlled - A1C under 8 and last check was < 6 months      Cholesterol Plan   Lab Results   Component Value Date    LDLCHOLESTEROL 73 04/16/2019      []  If LDL > 100 and last result >3 months ago - order Fasting lipids and refer to PCP for follow up   []  If LDL < 100 and over 1 year ago - Order Fasting lipids and refer to PCP for follow up   [] LDL is controlled. LDL < 100 and checked within the last year     Blood Pressure  BP Readings from Last 3 Encounters:   03/09/20 101/71   01/08/20 118/70   05/16/19 97/61      []  If SBP >140 mmhg - refer to PCP for follow up   []  If DBP > 90 mmhg - refer to PCP for follow up   [] BP is controlled <140/90     Order labs as PCP ordered.   (ie: Lipids, A1C, CMP)

## 2021-12-29 ENCOUNTER — OFFICE VISIT (OUTPATIENT)
Dept: PODIATRY | Age: 51
End: 2021-12-29
Payer: MEDICAID

## 2021-12-29 VITALS
DIASTOLIC BLOOD PRESSURE: 85 MMHG | WEIGHT: 223 LBS | SYSTOLIC BLOOD PRESSURE: 127 MMHG | HEIGHT: 75 IN | HEART RATE: 86 BPM | BODY MASS INDEX: 27.73 KG/M2

## 2021-12-29 DIAGNOSIS — Z79.4 TYPE 2 DIABETES MELLITUS WITH DIABETIC POLYNEUROPATHY, WITH LONG-TERM CURRENT USE OF INSULIN (HCC): Primary | ICD-10-CM

## 2021-12-29 DIAGNOSIS — E11.42 TYPE 2 DIABETES MELLITUS WITH DIABETIC POLYNEUROPATHY, WITH LONG-TERM CURRENT USE OF INSULIN (HCC): Primary | ICD-10-CM

## 2021-12-29 DIAGNOSIS — B35.1 ONYCHOMYCOSIS: ICD-10-CM

## 2021-12-29 DIAGNOSIS — M24.573 EQUINUS CONTRACTURE OF ANKLE: ICD-10-CM

## 2021-12-29 PROCEDURE — 99203 OFFICE O/P NEW LOW 30 MIN: CPT | Performed by: PODIATRIST

## 2021-12-29 PROCEDURE — 11056 PARNG/CUTG B9 HYPRKR LES 2-4: CPT | Performed by: PODIATRIST

## 2021-12-29 PROCEDURE — 11721 DEBRIDE NAIL 6 OR MORE: CPT | Performed by: PODIATRIST

## 2021-12-29 PROCEDURE — 99202 OFFICE O/P NEW SF 15 MIN: CPT

## 2021-12-29 NOTE — PROGRESS NOTES
[address],  [place]  Tel: [phone]   Fax: [phone]    Subjective     CC: Pain in bilateral toenails    HPI:  Enedina Heredia is a 54y.o. year old male who presents to clinic today pain in bilateral toenails 1 through 5 he has been struggling with ambulation for the past 29 years. He just recently received a brace and sports his right side and he has been ambulating with less distress and focusing on building up his muscle structure. Pains patient sees a pain management doctor and is taking care of the lower back pain that has been contributing to the patient's shooting pain down both legs. He denies any nausea vomiting fever shortness of breath. Pt is diabetic. The condition is controlled with insulin. Primary care physician is Mindy Stockton MD.    ROS:    Constitutional: Denies nausea, vomiting, fever, chills. Neurologic: Admits numbness, tingling, and burning in the feet and legs. Vascular: Denies symptoms of lower extremity claudication. Skin: Denies open wounds. Otherwise negative except as noted in the HPI.      PMH:  Past Medical History:   Diagnosis Date    Anxiety     Arthritis     osteoarthritis    Bipolar disorder (HCC)     Bronchitis, chronic (HCC)     Chronic back pain     Chronic pain     Claustrophobia     COPD (chronic obstructive pulmonary disease) (HCC)     Depression     Diabetes mellitus (HCC)     Emphysema of lung (HCC)     History of irregular heartbeat     Hyperlipidemia     Liver disease     MRSA (methicillin resistant staph aureus) culture positive     Myofacial muscle pain     Osteomyelitis (HCC)     Peripheral neuropathy     bilateral feet    Pressure ulcer     Schizophrenia (HCC)     Seizures ([address]     Sleep apnea     no machine    Spinal stenosis     Substance abuse ([facility]        Surgical History:   Past Surgical History:   Procedure Laterality Date    APPENDECTOMY      CHOLECYSTECTOMY  04/24/2019    CHOLECYSTECTOMY N/A 4/24/2019    OPEN CHOLECYSTECTOMY performed by Hayes Wu MD at [address]  01/28/2017    closed reduction nasal fracture    LUMBAR FUSION      SEPTOPLASTY  01/13/2017    STOMACH SURGERY      TURBINOPLASTIES  01/13/2017       Social History:  Social History     Tobacco Use    Smoking status: Current Every Day Smoker     Packs/day: 1.00     Years: 35.00     Pack years: 35.00     Types: Cigarettes     Start date: 1/1/1983    Smokeless tobacco: Never Used    Tobacco comment: Not at the present time however   Substance Use Topics    Alcohol use: No    Drug use: Yes     Types: Marijuana ([place]), Opiates      Comment: pt reported hx of drug abuse       Medications:  Prior to Admission medications    Medication Sig Start Date End Date Taking? Authorizing Provider   albuterol sulfate  (90 Base) MCG/ACT inhaler INHALE 2 PUFFS INTO THE LUNGS EVERY 6 HOURS AS NEEDED FOR WHEEZING 7/22/20  Yes CHAD Bhat CNP   ondansetron (ZOFRAN) 4 MG tablet TAKE 1 TABLET EVERY 8 HOURS AS NEEDED FOR NAUSEA OR VOMITING 5/1/20  Yes CHAD Bhat CNP   Nutritional Supplements (BOOST) LIQD Two a day. Chocolate lactose free nutritional drinks.  4/3/20  Yes CHAD Bhat CNP   divalproex (DEPAKOTE ER) 250 MG extended release tablet Take 1 tablet by mouth nightly 3/23/20 12/29/21 Yes CHAD Bhat CNP   tiotropium (SPIRIVA RESPIMAT) 2.5 MCG/ACT AERS inhaler Inhale 2 puffs into the lungs daily 3/9/20  Yes CHAD Bhat CNP   albuterol (PROVENTIL) (2.5 MG/3ML) 0.083% nebulizer solution USE 1 VIAL IN NEBULIZER EVERY SIX HOURS AS NEEDED 3/4/20  Yes CHAD Bhat CNP   fluticasone-vilanterol (BREO ELLIPTA) 100-25 MCG/INH AEPB inhaler Inhale 1 puff into the lungs daily 1/8/20  Yes CHAD Bhat CNP   Blood Glucose Monitoring Suppl (FREESTYLE LITE) ILA USE AS DIRECTED FOUR TIMES A DAY 1/8/20  Yes CHAD Medina CNP   FREESTYLE LITE strip 1 each by Other route 3 times daily 1/8/20  Yes CHAD Medina CNP   FREESTYLE LANCETS MISC 1 each by Does not apply route 3 times daily 1/8/20  Yes CHAD Medina CNP   Respiratory Therapy Supplies (NEBULIZER COMPRESSOR) KIT 1 kit by Does not apply route once for 1 dose 1/8/20 12/29/21 Yes CHAD Medina CNP   predniSONE (DELTASONE) 10 MG tablet 4 pills for 3 days, then 3 pills for 3 days, then 2 pills for 3 days then 1 pill for 3 days. 30 pills in 12 days 1/8/20  Yes CHAD Medina CNP   QUEtiapine (SEROQUEL) 200 MG tablet Take 1 tablet by mouth nightly 1/8/20  Yes CHAD Medina CNP   cloNIDine (CATAPRES) 0.1 MG tablet Take 0.1 mg by mouth 2 times daily   Yes Historical Provider, MD   metFORMIN (GLUCOPHAGE) 500 MG tablet Take 1 tablet by mouth daily (with breakfast) 5/16/19  Yes CHAD Medina CNP   Misc.  [facility]) MISC 1 each by Does not apply route daily Seated Walker 5/1/19  Yes CHAD Medina CNP   docusate sodium (COLACE) 100 MG capsule Take 1 capsule by mouth 2 times daily as needed for Constipation 4/30/19  Yes Immanuel Serrano DO   ibuprofen (ADVIL;MOTRIN) 600 MG tablet Take 1 tablet by mouth every 6 hours as needed for Pain 4/30/19  Yes Immanuel Serrano DO   busPIRone (BUSPAR) 10 MG tablet Take 20 mg by mouth 3 times daily    Yes Historical Provider, MD   lactulose (CHRONULAC) 10 GM/15ML solution Take 10 g by mouth 3 times daily as needed   Yes Historical Provider, MD   divalproex (DEPAKOTE ER) 500 MG extended release tablet Take 1 tablet by mouth every morning 4/24/19  Yes CHAD Hemphill CNP   traZODone (DESYREL) 100 MG tablet Take 2 tablets by mouth nightly as needed for Sleep 4/23/19  Yes CHAD Hemphill CNP   insulin lispro (HUMALOG) 100 UNIT/ML injection vial Inject 0-12 Units into the skin 3 times daily (with meals) 4/23/19  Yes Darshana Winter, APRN - CNP insulin lispro (HUMALOG) 100 UNIT/ML injection vial Inject 0-6 Units into the skin nightly 4/23/19  Yes CHAD Conner CNP   Alcohol Swabs (PRO COMFORT ALCOHOL) 70 % PADS  3/30/19  Yes Historical Provider, MD   lidocaine (LMX) 4 % cream Apply topically 3 times daily as needed for Pain 4/12/19  Yes CHAD Feldman CNP   pantoprazole (PROTONIX) 40 MG tablet Take 1 tablet by mouth daily 4/12/19  Yes CHAD Feldman CNP   ranitidine (ZANTAC) 150 MG tablet Take 1 tablet by mouth 2 times daily as needed for Heartburn 4/12/19  Yes CHAD Feldman CNP   Misc. Devices (GEL-FOAM CUSHION) MISC 1 Units by Does not apply route daily 4/12/19  Yes CHAD Feldman CNP   pregabalin (LYRICA) 200 MG capsule Take 2 capsules by mouth 2 times daily. 4/12/19 12/29/21 Yes CHAD Feldman CNP   TRUEPLUS LANCETS 33G [address]  2/8/19  Yes Historical Provider, MD   Insulin Syringe-Needle U-100 31G X 5/16\" 0.5 ML MISC 1 each by Does not apply route 3 times daily 1/23/19  Yes CHAD Feldman CNP   cyanocobalamin 500 MCG tablet Take 500 mcg by mouth 10/26/18  Yes Historical Provider, MD   senna (SENNA-TIME) 8.6 MG tablet twice daily as needed. 9/4/18  Yes Historical Provider, MD   simvastatin (ZOCOR) 40 MG tablet Take 40 mg by mouth nightly    Yes Historical Provider, MD   ipratropium (ATROVENT) 0.02 % nebulizer solution Take 2.5 mLs by nebulization 4 times daily as needed for Wheezing 12/20/18  Yes CHAD Feldman CNP       Objective     Vitals:    12/29/21 1309   BP: (!) 139/95   Pulse: 86       Lab Results   Component Value Date    LABA1C 5.5 04/25/2019       Lab Results   Component Value Date    SEDRATE 29 (H) 12/11/2018           Physical Exam:  General:  Alert and oriented x3. In no acute distress. Lower Extremity Physical Exam:    Vascular: DP and PT pulses are palpable. CFT <3 seconds to all digits. Nonpitting bilateral lower extremity edema.   Hair growth is present to the level of all pedal digits. Neuro: Saph/sural/SP/DP/plantar sensation present to light touch. Protective sensation is intact to 7 out of 10 sites as tested with a 5.07g SWMF to both feet. Musculoskeletal: EHL/FHL/GS/TA gross motor intact to the right and left LE. Palpation of bilateral lower extremity elicits limited pain mostly related to dermatomes. Gross deformity is absent. Dermatologic: No open lesions, Bilateral.  Interdigital maceration absent, Bilateral.  Nails 1-10 are thickened, elongated, dystrophic, crumbly. Biomechanical Exam:    R Hallux Dflex: 65 Pflex: >30   R 1st Ray D/Pflex: 5mm      R Ankle DF knee extended: 6  Right foot: Ankle DF knee flexed: >10  L Hallux Dflex: 65 Pflex: >30  L 1st Ray D/Pflex: 5mm  L Ankle DF knee extended: 5  L Ankle DF knee flexed: >10  Gait Analysis: Early heel off  Tx plan: Equinus stretching to alleviate gastroc contracture    Imaging: Patient did not want radiographs taken  No orders to display       Assessment   Humberto Finley. is a 54 y.o. male with     Diagnosis Orders   1. Type 2 diabetes mellitus with diabetic polyneuropathy, with long-term current use of insulin ([facility]     2. Onychomycosis     3. Equinus contracture of ankle          Plan   · Patient examined and evaluated  · Diagnosis and treatment options discussed in detail. · Discussed equinus stretching with patient to limit his gastroc contracture including daily exercise and reviewing them with them  · Educated pt diabetes importance of a daily pedal exam, never going barefoot, taking medication to control his glycemic load  · Nails 1 through 5 bilateral debrided sharply down to healthy pink tissue while removing all mycotic tissue distal to the toes  · Patient to RTC in 6 months  · Please, call the office with any questions or concerns     No orders of the defined types were placed in this encounter. No orders of the defined types were placed in this encounter.       Obinna Valladares CASSI Gavin   Podiatric Medicine & Surgery   12/29/2021 at 1:39 PM    This note is created with the assistance of a speech recognition program.  While intending to generate a document that actually reflects the content of the visit, the document can still have some errors including those of syntax and sound a like substitutions which may escape proof reading. In such instances, actual meaning can be extrapolated by contextual diversion.

## 2022-03-23 NOTE — PLAN OF CARE
Problem: Altered Mood, Depressive Behavior  Goal: LTG-Able to verbalize acceptance of life and situations over which he or she has no control  Outcome: Ongoing  PSYCHOEDUCATION GROUP NOTE    Date: November 11, 2017  Start Time: 0900  End Time: 0930    Number Participants in Group:  8/13    Goal:  Patient will demonstrate increased interpersonal interaction   Topic: Community Meeting/Stretching     Discipline Responsible:   OT  AT  [facility]. x RT [facility] Other       Participation Level:     None x Minimal   x Active Listener  Interactive    Monopolizing         Participation Quality:  x Appropriate  Inappropriate   x       Attentive        Intrusive   x       Sharing        Resistant          Supportive        Lethargic       Affective:    Congruent  Incongruent x Blunted  Flat    Constricted  Anxious  Elated  Angry    Labile  Depressed  Other         Cognitive:  x Alert x Oriented PPTP     Concentration  G x F  P   Attention Span  G x F  P   Short-Term Memory x G  F  P   Long-Term Memory x G  F  P   ProblemSolving/  Decision Making x G  F  P   Ability to Process  Information x G  F  P      Contributing Factors             Delusional             Hallucinating             Flight of Ideas             Other:       Modes of Intervention:  x Education  Support  Exploration   x Clarifying x Problem Solving  Confrontation   x Socialization  Limit Setting x Reality Testing   x Activity x Movement x Media    Other:            Response to Learning:   Able to verbalize current knowledge/experience    Able to verbalize/acknowledge new learning    Able to retain information    Capable of insight    Able to change behavior   x Progressing to goal    Other:        Comments: No

## 2022-05-26 ENCOUNTER — OFFICE VISIT (OUTPATIENT)
Dept: NEUROLOGY | Age: 52
End: 2022-05-26
Payer: MEDICAID

## 2022-05-26 VITALS — SYSTOLIC BLOOD PRESSURE: 109 MMHG | DIASTOLIC BLOOD PRESSURE: 75 MMHG | HEART RATE: 93 BPM

## 2022-05-26 DIAGNOSIS — G40.409 GENERALIZED TONIC-CLONIC SEIZURE (HCC): Chronic | ICD-10-CM

## 2022-05-26 DIAGNOSIS — G89.29 OTHER CHRONIC PAIN: Primary | ICD-10-CM

## 2022-05-26 DIAGNOSIS — G43.019 INTRACTABLE MIGRAINE WITHOUT AURA AND WITHOUT STATUS MIGRAINOSUS: ICD-10-CM

## 2022-05-26 DIAGNOSIS — R25.1 TREMOR: ICD-10-CM

## 2022-05-26 DIAGNOSIS — R56.9 SEIZURE (HCC): ICD-10-CM

## 2022-05-26 PROCEDURE — 99204 OFFICE O/P NEW MOD 45 MIN: CPT | Performed by: NURSE PRACTITIONER

## 2022-05-26 RX ORDER — ROPINIROLE 1 MG/1
TABLET, FILM COATED ORAL
COMMUNITY
Start: 2022-05-16

## 2022-05-26 RX ORDER — TOPIRAMATE 100 MG/1
TABLET, FILM COATED ORAL
COMMUNITY
Start: 2022-03-15

## 2022-05-26 RX ORDER — FLUTICASONE PROPIONATE 50 MCG
SPRAY, SUSPENSION (ML) NASAL
COMMUNITY
Start: 2022-05-03

## 2022-05-26 RX ORDER — DULOXETIN HYDROCHLORIDE 30 MG/1
CAPSULE, DELAYED RELEASE ORAL
COMMUNITY
Start: 2022-05-23

## 2022-05-26 RX ORDER — DIVALPROEX SODIUM 500 MG/1
500 TABLET, EXTENDED RELEASE ORAL EVERY MORNING
Qty: 60 TABLET | Refills: 5 | Status: SHIPPED | OUTPATIENT
Start: 2022-05-26

## 2022-05-26 RX ORDER — DIVALPROEX SODIUM 250 MG/1
250 TABLET, EXTENDED RELEASE ORAL 2 TIMES DAILY
Qty: 60 TABLET | Refills: 5 | Status: SHIPPED | OUTPATIENT
Start: 2022-05-26 | End: 2022-06-25

## 2022-05-26 RX ORDER — ERENUMAB-AOOE 70 MG/ML
70 INJECTION SUBCUTANEOUS
Qty: 1 PEN | Refills: 5 | Status: SHIPPED | OUTPATIENT
Start: 2022-05-26

## 2022-05-26 NOTE — PROGRESS NOTES
[facility]            [address]          [place], [address] [facility]          Dept: [phone]          Dept Fax: [phone]      E. Hartford Mantel, MD Coolidge Nageotte, MD Ahmed B. Kathlene Fritter, MD Stefanie Brookes, MD Glennda Musty, DEE         New Patient Consultation    5/26/2022    HISTORY OF PRESENT ILLNESS:       I had the pleasure of seeing Smitha Early. who presents to establish neurologic care. The patient presents for evaluation of seizure disorder and migraine headaches. Patient is a 44-year-old man with multiple previous medical problems who has had primary epilepsy since birth. Previous medications include: Lamotrigine, levetiracetam, lacosamide, all of which were poorly tolerated. Current seizure medications include: Topamax 150 mg twice daily, Depakote 750 mg in the morning and 500 mg at bedtime, and Lyrica 400 mg twice daily, which is used to treat his neuropathy and chronic back pain. The patient states that he had been seizure-free for 3-1/2 years until last July when he had a breakthrough seizure upon hearing of the death of his psychiatrist.  It was a generalized tonic-clonic seizure. He has not seen a neurologist since the beginning of 2021 and has not followed up. He has some trust issues with his providers. He states that he has not had a seizure since last July, however he thinks he is having staring spells. The patient also has migraine headaches that are holoacranial and describes as a pressure feeling. They are accompanied by photophobia, phonophobia, and nausea. He states that they can be an 11/10 in intensity at times. Currently, Topamax, and Depakote are prescribed for seizures, however they are both migraine prophylactics. Previous medications are listed above including other antiseizure medications which are used for migraine prophylaxis.     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 He had diminished sensation in his feet bilaterally. Treated with Lyrica 400 mg twice daily    Patient also has tremor in his hands bilaterally which is likely associated with Depakote administration.     Patient also has significant psychiatric disorders including PTSD, schizoaffective disorder and a borderline personality disorder    Testing reviewed:    - EM[facility] 5/11-15/15 at [facility]: reviewed reports; no seizure when was monitored; normal interictal EEG            PAST MEDICAL HISTORY:         Diagnosis Date    Anxiety     Arthritis     osteoarthritis    Bipolar disorder ([address]     Bronchitis, chronic (HCC)     Chronic back pain     Chronic pain     Claustrophobia     COPD (chronic obstructive pulmonary disease) ([address]     Depression     Diabetes mellitus ([address]     Emphysema of lung ([address]     History of irregular heartbeat     Hyperlipidemia     Liver disease     MRSA (methicillin resistant staph aureus) culture positive     Myofacial muscle pain     Osteomyelitis ([address]     Peripheral neuropathy     bilateral feet    Pressure ulcer     Schizophrenia ([address]     Seizures ([address]     Sleep apnea     no machine    Spinal stenosis     Substance abuse ([address]         PAST SURGICAL HISTORY:         Procedure Laterality Date    APPENDECTOMY      CHOLECYSTECTOMY  04/24/2019    CHOLECYSTECTOMY N/A 4/24/2019    OPEN CHOLECYSTECTOMY performed by Moshe Rowland MD at [place] [id number]  01/28/2017    closed reduction nasal fracture    LUMBAR FUSION      SEPTOPLASTY  01/13/2017    STOMACH SURGERY      TURBINOPLASTIES  01/13/2017        SOCIAL HISTORY:     Social History     Socioeconomic History    Marital status:      Spouse name: Not on file    Number of children: Not on file    Years of education: Not on file    Highest education level: Not on file   Occupational History    Not on file   Tobacco Use    Smoking status: Current Every Day Smoker     Packs/day: 1.00     Years: 35.00     Pack years: 35.00     Types: Cigarettes     Start date: 1/1/1983    Smokeless tobacco: Never Used    Tobacco comment: Not at the present time however   Substance and Sexual Activity    Alcohol use: No    Drug use: Yes     Types: Marijuana ([place]), Opiates      Comment: pt reported hx of drug abuse    Sexual activity: Never   Other Topics Concern    Not on file   Social History Narrative    Not on file     Social Determinants of Health     Financial Resource Strain:     Difficulty of Paying Living Expenses: Not on file   Food Insecurity:     Worried About [address] [employer] in the Last Year: Not on file    [address] in the Last Year: Not on file   Transportation Needs:     Lack of Transportation (Medical): Not on file    Lack of Transportation (Non-Medical):  Not on file   Physical Activity:     Days of Exercise per Week: Not on file    Minutes of Exercise per Session: Not on file   Stress:     Feeling of Stress : Not on file   Social Connections:     Frequency of Communication with Friends and Family: Not on file    Frequency of Social Gatherings with Friends and Family: Not on file    Attends Mosque Services: Not on file    Active Member of Clubs or Organizations: Not on file    Attends Club or Organization Meetings: Not on file    Marital Status: Not on file   Intimate Partner Violence:     Fear of Current or Ex-Partner: Not on file    Emotionally Abused: Not on file    Physically Abused: Not on file   [employer] Sexually Abused: Not on file   Housing Stability:     Unable to Pay for Housing in the Last Year: Not on file    Number of Arianne in the Last Year: Not on file    Unstable Housing in the Last Year: Not on file       CURRENT MEDICATIONS:     Current Outpatient Medications   Medication Sig Dispense Refill    DULoxetine (CYMBALTA) 30 MG extended release capsule       fluticasone (FLONASE) 50 MCG/ACT nasal spray       rOPINIRole (REQUIP) 1 MG tablet       topiramate (TOPAMAX) 100 MG tablet       divalproex (DEPAKOTE ER) 250 MG extended release tablet Take 1 tablet by mouth in the morning and at bedtime 60 tablet 5    divalproex (DEPAKOTE ER) 500 MG extended release tablet Take 1 tablet by mouth every morning 60 tablet 5    Erenumab-aooe (AIMOVIG) 70 MG/ML SOAJ Inject 70 mg into the skin every 30 days 1 pen 5    albuterol sulfate  (90 Base) MCG/ACT inhaler INHALE 2 PUFFS INTO THE LUNGS EVERY 6 HOURS AS NEEDED FOR WHEEZING 18 g 3    ondansetron (ZOFRAN) 4 MG tablet TAKE 1 TABLET EVERY 8 HOURS AS NEEDED FOR NAUSEA OR VOMITING 30 tablet 0    tiotropium (SPIRIVA RESPIMAT) 2.5 MCG/ACT AERS inhaler Inhale 2 puffs into the lungs daily 1 Inhaler 2    albuterol (PROVENTIL) (2.5 MG/3ML) 0.083% nebulizer solution USE 1 VIAL IN NEBULIZER EVERY SIX HOURS AS NEEDED 360 mL 1    predniSONE (DELTASONE) 10 MG tablet 4 pills for 3 days, then 3 pills for 3 days, then 2 pills for 3 days then 1 pill for 3 days. 30 pills in 12 days (Patient taking differently: 2.5 mg TID) 30 tablet 0    lactulose (CHRONULAC) 10 GM/15ML solution Take 10 g by mouth 3 times daily as needed      traZODone (DESYREL) 100 MG tablet Take 2 tablets by mouth nightly as needed for Sleep 14 tablet 0    lidocaine (LMX) 4 % cream Apply topically 3 times daily as needed for Pain 30 g 5    pantoprazole (PROTONIX) 40 MG tablet Take 1 tablet by mouth daily 30 tablet 3    pregabalin (LYRICA) 200 MG capsule Take 2 capsules by mouth 2 times daily.  [address] capsule 2    Nutritional Supplements (BOOST) LIQD Two a day. Chocolate lactose free nutritional drinks. 60 Can 5    fluticasone-vilanterol (BREO ELLIPTA) 100-25 MCG/INH AEPB inhaler Inhale 1 puff into the lungs daily (Patient not taking: Reported on 5/26/2022) 1 each 3    Blood Glucose Monitoring Suppl (FREESTYLE LITE) ILA USE AS DIRECTED FOUR TIMES A DAY 1 Device 0    FREESTYLE LITE strip 1 each by Other route 3 times daily 100 each 5    FREESTYLE LANCETS MISC 1 each by Does not apply route 3 times daily 100 each 3    Respiratory Therapy Supplies (NEBULIZER COMPRESSOR) KIT 1 kit by Does not apply route once for 1 dose 1 kit 0    QUEtiapine (SEROQUEL) 200 MG tablet Take 1 tablet by mouth nightly (Patient not taking: Reported on 5/26/2022) 30 tablet 0    cloNIDine (CATAPRES) 0.1 MG tablet Take 0.1 mg by mouth 2 times daily (Patient not taking: Reported on 5/26/2022)      metFORMIN (GLUCOPHAGE) 500 MG tablet Take 1 tablet by mouth daily (with breakfast) (Patient not taking: Reported on 5/26/2022) 30 tablet 5    Misc.  Devices [facility]) MISC 1 each by Does not apply route daily Seated Walker (Patient not taking: Reported on 5/26/2022) 1 each 0    docusate sodium (COLACE) 100 MG capsule Take 1 capsule by mouth 2 times daily as needed for Constipation (Patient not taking: Reported on 5/26/2022) 30 capsule 0    ibuprofen (ADVIL;MOTRIN) 600 MG tablet Take 1 tablet by mouth every 6 hours as needed for Pain (Patient not taking: Reported on 5/26/2022) 30 tablet 0    busPIRone (BUSPAR) 10 MG tablet Take 20 mg by mouth 3 times daily  (Patient not taking: Reported on 5/26/2022)      insulin lispro (HUMALOG) 100 UNIT/ML injection vial Inject 0-12 Units into the skin 3 times daily (with meals) (Patient not taking: Reported on 5/26/2022) 1 vial 0    insulin lispro (HUMALOG) 100 UNIT/ML injection vial Inject 0-6 Units into the skin nightly (Patient not taking: Reported on 5/26/2022) 1 vial 0    Alcohol Swabs (PRO COMFORT ALCOHOL) 70 % PADS   11    ranitidine (ZANTAC) 150 MG tablet Take 1 tablet by mouth 2 times daily as needed for Heartburn (Patient not taking: Reported on 5/26/2022) 60 tablet 3    Misc. Devices (GEL-FOAM CUSHION) MISC 1 Units by Does not apply route daily 1 each 0    TRUEPLUS LANCETS 33G MISC       Insulin Syringe-Needle U-100 31G X 5/16\" 0.5 ML MISC 1 each by Does not apply route 3 times daily 100 each 3    cyanocobalamin 500 MCG tablet Take 500 mcg by mouth (Patient not taking: Reported on 5/26/2022)      senna (SENNA-TIME) 8.6 MG tablet twice daily as needed.  simvastatin (ZOCOR) 40 MG tablet Take 40 mg by mouth nightly  (Patient not taking: Reported on 5/26/2022)      ipratropium (ATROVENT) 0.02 % nebulizer solution Take 2.5 mLs by nebulization 4 times daily as needed for Wheezing (Patient not taking: Reported on 5/26/2022) 100 mL 3     No current facility-administered medications for this visit. ALLERGIES:     Allergies   Allergen Reactions    Chantix [Varenicline Tartrate] Anaphylaxis    Sulfa Antibiotics Anaphylaxis    Sulfasalazine Anaphylaxis    Bactrim Swelling    Elemental Sulfur     Levofloxacin Swelling    Levofloxacin Swelling    Phenobarbital Swelling    Sulfamethoxazole-Trimethoprim     Tessalon [Benzonatate] Swelling    Wellbutrin [Bupropion] Other (See Comments)     Delirium                            All items selected indicate a positive finding. Those items not selected are negative.   Constitutional [] Weight loss/gain   [] Fatigue  [] Fever/Chills   HEENT [] Hearing Loss  [] Visual Disturbance  [] Tinnitus  [] Eye pain   Respiratory [] Shortness of Breath  [] Cough  [] Snoring   Cardiovascular [] Chest Pain  [] Palpitations  [] Lightheaded   GI [] Constipation  [] Diarrhea  [] Swallowing change  [] Nausea/vomiting    [] Urinary Frequency  [] Urinary Urgency   Musculoskeletal [] Neck pain  [x] Back pain  [x] Muscle pain  [] Restless legs Dermatologic [] Skin changes   Neurologic [] Memory loss/confusion  [x] Seizures  [x] Trouble walking or imbalance  [] Dizziness  [] Sleep disturbance  [x] Weakness  [x] Numbness  [] Tremors  [] Speech Difficulty  [x] Headaches  [x] Light Sensitivity  [x] Sound Sensitivity   Endocrinology []Excessive thirst  []Excessive hunger   Psychiatric [x] Anxiety/Depression  [] Hallucination   Allergy/immunology []Hives/environmental allergies   Hematologic/lymph [] Abnormal bleeding  [] Abnormal bruising                   PHYSICAL EXAMINATION:       Vitals:    05/26/22 1449   BP: 109/75   Pulse: 93                                              .                                                                                                    General Appearance:  Alert, cooperative, no signs of distress, appears stated age   Head:  Normocephalic, no signs of trauma   Eyes:  Conjunctiva/corneas clear;  eyelids intact   Ears:  Normal external ear and canals   Nose: Nares normal, mucosa normal, no drainage    Throat: Lips and tongue normal; teeth normal;  gums normal   Neck: Supple, intact flexion, extension and rotation;   trachea midline;  no adenopathy;   thyroid: not enlarged;   no carotid pulse abnormality   Back:   Symmetric, no curvature, ROM adequate   Lungs:   Respirations unlabored   Heart:  Regular rate and rhythm           Extremities: Extremities normal, no cyanosis, no edema   Pulses: Symmetric over head and neck   Skin: Skin color, texture normal, no rashes, no lesions                                     NEUROLOGIC EXAMINATION    Neurologic Exam    Mental status    Alert and oriented x 3; intact memory with no confusion, speech or language problems; no hallucinations or delusions  Fund of information appropriate for level of education    Cranial nerves    II - visual fields intact to confrontation  III, IV, VI - extra-ocular muscles full: no pupillary defect; no BRYANT, no nystagmus, no ptosis   V - normal facial sensation                                                               VII - normal facial symmetry                                                             VIII - intact hearing                                                                             IX, X - symmetrical palate                                                                  XI - symmetrical shoulder shrug                                                       XII - tongue midline without atrophy or fasciculation      Motor function  Normal muscle bulk and tone; strength 5/5 on all 4 extremities, no pronator drift      Sensory function  initially touch, pinprick, and vibratory sensation in his legs bilaterally      Cerebellar Intact fine motor movement. No involuntary movements or tremors. No ataxia or dysmetria on finger to nose or heel to shin testing      Reflex function DTR 2+ on bilateral UE and LE, symmetric. Down going toes bilaterally      Gait                   not tested              Medical Decision Making: Thank you very much for the kind referral of Hugh Zenon. .  I look forward to working with you in the neurological care of your patient. 1. Generalized seizure disorder. No generalized seizure since July 2021. The patient believes that he is having staring spells at this time  1. Increase Depakote to 750 mg twice daily primarily to treat his seizures, but also his breakthrough migraines  2. Continue Topamax 150 mg twice daily  3. Continue Lyrica 400 mg twice daily which is prescribed for pain  2. Chronic intractable migraine headaches experiencing greater than 15 headaches per month. Current medications include Depakote and Topamax for migraine prophylaxis. Concomitant medications include Lyrica  1. Continue Depakote and Topamax for migraine prophylaxis  2. Add Aimovig 70 mg every 30 days for migraine prevention  3. Tremor likely associated with Depakote  1. Continue to monitor  4.  Chronic back pain and neuropathy  1. Lyrica 400 mg twice daily  5. PTSD and other psychiatric disorders treated by psychiatry  1. The patient has a appointment with a psychiatrist tomorrow morning to establish care    Signed: Loren Jordan CNP  Please note that this chart was generated using voice recognition Dragon dictation software. Although every effort was made to ensure the accuracy of this automated transcription, some errors in transcription may have occurred. Provider Attestation: The documentation recorded by the scribe accurately reflects the service I personally performed and the decisions made by myself. Portions of this note were transcribed by a scribe. I personally performed the history, physical exam, and medical decision-making and confirm the accuracy of the information in the transcribed note. Scribe Attestation:   By signing my name below, I, Croa , CHAD Kiran CNP, attest that this documentation has been prepared under the direction and in the presence of Loren Jordan CNP.

## 2022-06-09 ENCOUNTER — TELEPHONE (OUTPATIENT)
Dept: NEUROLOGY | Age: 52
End: 2022-06-09

## 2022-11-30 DIAGNOSIS — R56.9 SEIZURE (HCC): ICD-10-CM

## 2022-11-30 NOTE — TELEPHONE ENCOUNTER
NOTE: In patient's most recent visit he was increased to 750mg of Depakote BID. Previous orders were for 500mg in am and 250mg BID. Orders have been updated to the 750mg BID as noted on 05/26/2022. Call placed to patient to clarify the dose he has been on. Pharmacy requesting refill of Depakote ER 250mg, Added 500mg as patient should be out.       Medication active on med list yes      Date of last fill: 05/26/2022    verified on 11/30/2022     verified by [address] [place] [address]      Date of last appointment 05/26/2022    Next Visit Date:  Visit date not found

## 2022-12-13 DIAGNOSIS — R56.9 SEIZURE (HCC): Primary | ICD-10-CM

## 2022-12-13 RX ORDER — DIVALPROEX SODIUM 250 MG/1
250 TABLET, EXTENDED RELEASE ORAL 2 TIMES DAILY
Qty: 60 TABLET | Refills: 5 | Status: SHIPPED | OUTPATIENT
Start: 2022-12-13 | End: 2023-01-12

## 2022-12-13 RX ORDER — DIVALPROEX SODIUM 500 MG/1
500 TABLET, EXTENDED RELEASE ORAL EVERY MORNING
Qty: 60 TABLET | Refills: 5 | Status: SHIPPED | OUTPATIENT
Start: 2022-12-13

## 2022-12-13 NOTE — TELEPHONE ENCOUNTER
I called the number on file for Miah Ramirez, the number is not currently in service. Last login to [employer] was 1/10/2013. Per last dictation 05/26/2022 Topamax was 150 mg twice per day. I tried to call Magidel Elliott (son listed on contact info) phone continuously rang then dial tone. Unable to leave a message.

## 2022-12-14 RX ORDER — TOPIRAMATE 100 MG/1
150 TABLET, FILM COATED ORAL 2 TIMES DAILY
Qty: 90 TABLET | Refills: 3 | Status: SHIPPED | OUTPATIENT
Start: 2022-12-14 | End: 2022-12-29

## 2023-02-14 ENCOUNTER — TELEPHONE (OUTPATIENT)
Dept: NEUROLOGY | Age: 53
End: 2023-02-14

## 2023-02-14 ENCOUNTER — OFFICE VISIT (OUTPATIENT)
Dept: NEUROLOGY | Age: 53
End: 2023-02-14
Payer: MEDICAID

## 2023-02-14 VITALS
WEIGHT: 225 LBS | DIASTOLIC BLOOD PRESSURE: 71 MMHG | HEIGHT: 75 IN | SYSTOLIC BLOOD PRESSURE: 105 MMHG | BODY MASS INDEX: 27.98 KG/M2 | HEART RATE: 97 BPM

## 2023-02-14 DIAGNOSIS — G40.409 GENERALIZED TONIC-CLONIC SEIZURE (HCC): Chronic | ICD-10-CM

## 2023-02-14 DIAGNOSIS — R25.1 TREMOR: ICD-10-CM

## 2023-02-14 DIAGNOSIS — Z79.4 TYPE 2 DIABETES MELLITUS WITH DIABETIC POLYNEUROPATHY, WITH LONG-TERM CURRENT USE OF INSULIN (HCC): ICD-10-CM

## 2023-02-14 DIAGNOSIS — G43.019 INTRACTABLE MIGRAINE WITHOUT AURA AND WITHOUT STATUS MIGRAINOSUS: Primary | ICD-10-CM

## 2023-02-14 DIAGNOSIS — E11.42 TYPE 2 DIABETES MELLITUS WITH DIABETIC POLYNEUROPATHY, WITH LONG-TERM CURRENT USE OF INSULIN (HCC): ICD-10-CM

## 2023-02-14 PROCEDURE — 99214 OFFICE O/P EST MOD 30 MIN: CPT | Performed by: NURSE PRACTITIONER

## 2023-02-14 NOTE — PROGRESS NOTES
[facility]            [address]          [facility], [address] [facility]          Dept: [phone]          Dept Fax: [phone]    MD Zenaida Weaver MD Leann Broody, MD Imogene Rhodes, DEE            2/14/2023      HISTORY OF PRESENT ILLNESS:       I had the pleasure of seeing Domonique Valdes, who returns for continuing neurologic care. The patient was seen last on May 26, 2022 for treatment of a generalized seizure disorder, chronic intractable migraine headaches, tremor, chronic back pain and PTSD. The patient has a generalized seizure disorder and for seizure prophylaxis he was prescribed depakote 750 mg twice daily, topamax 150 mg twice daily and lyrica 400 mg twice daily. He reports today that he has recently been experiencing breakthrough tonic clonic seizures. He recently had pneumonia which he reports caused two breakthrough seizures. He is currently taking depakote at 750 mg twice daily and topamax at 200 mg twice daily at the recommendations of psychiatry. His breakthrough seizures occurred approximately one week ago, associated with an episode of pneumonia. For migraine prophylaxis he was prescribed depakote 750 mg twice daily, topamax 150 mg twice daily and aimovig 70 mg injection every 30 days. He is here today reporting that he has not received aimovig injections. He has continued having 4-5 headaches/week and is agreeable to start aimovig injections after today's visit.      Headache location: Holoacranial  Headache quality: Pressure  Associated factors:  nausea yes, vomiting yes, Photophobia yes, Phonophobia yes  Intensity: 11/10  Headache chronicity: Lifelong  Headache frequency: 4-5 days/week which can last for more multiple days  Aggravating factors : Lights and sounds  Relieving factors: Nothing  Prophylactic medications: Topamax, Depakote           Abortive medications: None  Previously used medications: Depakote, Lyrica, lamotrigine, Lyrica, Topamax    He also has a tremor likely secondary to depakote administration. He has continued having tremor of his bilateral hands. For management of his chronic back pain and neuropathy he was prescribed lyrica 400 mg twice daily. He follows with psychiatry for management of his PTSD and other psychiatric disorders.          Testing reviewed:    - EMU 5/11-15/15 at [facility]: reviewed reports; no seizure when was monitored; normal interictal EEG      PAST MEDICAL HISTORY:         Diagnosis Date    Anxiety     Arthritis     osteoarthritis    Bipolar disorder ([address]     Bronchitis, chronic (HCC)     Chronic back pain     Chronic pain     Claustrophobia     COPD (chronic obstructive pulmonary disease) ([facility])     Depression     Diabetes mellitus ([address]     Emphysema of lung ([address]     History of irregular heartbeat     Hyperlipidemia     Liver disease     MRSA (methicillin resistant staph aureus) culture positive     Myofacial muscle pain     Osteomyelitis (HCC)     Peripheral neuropathy     bilateral feet    Pressure ulcer     Schizophrenia ([address]     Seizures ([address]     Sleep apnea     no machine    Spinal stenosis     Substance abuse ([address]         PAST SURGICAL HISTORY:         Procedure Laterality Date    APPENDECTOMY      CHOLECYSTECTOMY  04/24/2019    CHOLECYSTECTOMY N/A 4/24/2019    OPEN CHOLECYSTECTOMY performed by Carmen Moses MD at [address] [place] [address]  01/28/2017    closed reduction nasal fracture    LUMBAR FUSION      SEPTOPLASTY  01/13/2017    STOMACH SURGERY      TURBINOPLASTIES  01/13/2017        SOCIAL HISTORY:     Social History     Socioeconomic History    Marital status:      Spouse name: Not on file    Number of children: Not on file    Years of education: Not on file    Highest education level: Not on file   Occupational History    Not on file   Tobacco Use    Smoking status: Every Day     Packs/day: 1.00     Years: 35.00     Pack years: 35.00 Types: Cigarettes     Start date: 1/1/1983    Smokeless tobacco: Never    Tobacco comments:     Not at the present time however   Substance and Sexual Activity    Alcohol use: No    Drug use: Yes     Types: Marijuana (Ethelyn Boeck), Opiates      Comment: pt reported hx of drug abuse    Sexual activity: Never   Other Topics Concern    Not on file   Social History Narrative    Not on file     Social Determinants of Health     Financial Resource Strain: Not on file   Food Insecurity: Not on file   Transportation Needs: Not on file   Physical Activity: Not on file   Stress: Not on file   Social Connections: Not on file   Intimate Partner Violence: Not on file   Housing Stability: Not on file       CURRENT MEDICATIONS:     Current Outpatient Medications   Medication Sig Dispense Refill    topiramate (TOPAMAX) 100 MG tablet Take 1.5 tablets by mouth 2 times daily for 30 doses (Patient taking differently: Take 200 mg by mouth 2 times daily) 90 tablet 3    divalproex (DEPAKOTE ER) 250 MG extended release tablet TAKE 1 TABLET BY MOUTH IN THE MORNING AND AT BEDTIME 60 tablet 5    divalproex (DEPAKOTE ER) 500 MG extended release tablet Take 1 tablet by mouth every morning (Patient taking differently: Take 500 mg by mouth in the morning and at bedtime) 60 tablet 5    DULoxetine (CYMBALTA) 60 MG extended release capsule 60 mg      rOPINIRole (REQUIP) 1 MG tablet       albuterol sulfate  (90 Base) MCG/ACT inhaler INHALE 2 PUFFS INTO THE LUNGS EVERY 6 HOURS AS NEEDED FOR WHEEZING 18 g 3    ondansetron (ZOFRAN) 4 MG tablet TAKE 1 TABLET EVERY 8 HOURS AS NEEDED FOR NAUSEA OR VOMITING 30 tablet 0    Nutritional Supplements (BOOST) LIQD Two a day. Chocolate lactose free nutritional drinks.  60 Can 5    albuterol (PROVENTIL) (2.5 MG/3ML) 0.083% nebulizer solution USE 1 VIAL IN NEBULIZER EVERY SIX HOURS AS NEEDED 360 mL 1    Blood Glucose Monitoring Suppl (FREESTYLE LITE) ILA USE AS DIRECTED FOUR TIMES A DAY 1 Device 0    FREESTYLE LITE strip 1 each by Other route 3 times daily 100 each 5    FREESTYLE LANCETS MISC 1 each by Does not apply route 3 times daily 100 each 3    Respiratory Therapy Supplies (NEBULIZER COMPRESSOR) KIT 1 kit by Does not apply route once for 1 dose 1 kit 0    lactulose (CHRONULAC) 10 GM/15ML solution Take 10 g by mouth 3 times daily as needed      Alcohol Swabs (PRO COMFORT ALCOHOL) 70 % PADS   11    lidocaine (LMX) 4 % cream Apply topically 3 times daily as needed for Pain 30 g 5    pantoprazole (PROTONIX) 40 MG tablet Take 1 tablet by mouth daily 30 tablet 3    Misc. Devices (GEL-FOAM CUSHION) MISC 1 Units by Does not apply route daily 1 each 0    pregabalin (LYRICA) 200 MG capsule Take 2 capsules by mouth 2 times daily. 120 capsule 2    TRUEPLUS LANCETS 33G MISC       Insulin Syringe-Needle U-100 31G X 5/16\" 0.5 ML MISC 1 each by Does not apply route 3 times daily 100 each 3    senna (SENOKOT) 8.6 MG tablet twice daily as needed. fluticasone (FLONASE) 50 MCG/ACT nasal spray  (Patient not taking: Reported on 2/14/2023)      Erenumab-aooe (AIMOVIG) 70 MG/ML SOAJ Inject 70 mg into the skin every 30 days (Patient not taking: Reported on 2/14/2023) 1 pen 5    tiotropium (SPIRIVA RESPIMAT) 2.5 MCG/ACT AERS inhaler Inhale 2 puffs into the lungs daily (Patient not taking: Reported on 2/14/2023) 1 Inhaler 2    fluticasone-vilanterol (BREO ELLIPTA) 100-25 MCG/INH AEPB inhaler Inhale 1 puff into the lungs daily (Patient not taking: No sig reported) 1 each 3    predniSONE (DELTASONE) 10 MG tablet 4 pills for 3 days, then 3 pills for 3 days, then 2 pills for 3 days then 1 pill for 3 days. 30 pills in 12 days (Patient not taking: Reported on 2/14/2023) 30 tablet 0    QUEtiapine (SEROQUEL) 200 MG tablet Take 1 tablet by mouth nightly (Patient not taking: No sig reported) 30 tablet 0    metFORMIN (GLUCOPHAGE) 500 MG tablet Take 1 tablet by mouth daily (with breakfast) (Patient not taking: No sig reported) 30 tablet 5    Misc. Devices (WALKER) MISC 1 each by Does not apply route daily Seated Walker (Patient not taking: No sig reported) 1 each 0    docusate sodium (COLACE) 100 MG capsule Take 1 capsule by mouth 2 times daily as needed for Constipation (Patient not taking: No sig reported) 30 capsule 0    ibuprofen (ADVIL;MOTRIN) 600 MG tablet Take 1 tablet by mouth every 6 hours as needed for Pain (Patient not taking: No sig reported) 30 tablet 0    traZODone (DESYREL) 100 MG tablet Take 2 tablets by mouth nightly as needed for Sleep (Patient not taking: Reported on 2/14/2023) 14 tablet 0    insulin lispro (HUMALOG) 100 UNIT/ML injection vial Inject 0-12 Units into the skin 3 times daily (with meals) (Patient not taking: No sig reported) 1 vial 0    insulin lispro (HUMALOG) 100 UNIT/ML injection vial Inject 0-6 Units into the skin nightly (Patient not taking: No sig reported) 1 vial 0    ranitidine (ZANTAC) 150 MG tablet Take 1 tablet by mouth 2 times daily as needed for Heartburn (Patient not taking: No sig reported) 60 tablet 3    ipratropium (ATROVENT) 0.02 % nebulizer solution Take 2.5 mLs by nebulization 4 times daily as needed for Wheezing (Patient not taking: No sig reported) 100 mL 3     No current facility-administered medications for this visit. ALLERGIES:     Allergies   Allergen Reactions    Chantix [Varenicline Tartrate] Anaphylaxis    Sulfa Antibiotics Anaphylaxis    Sulfasalazine Anaphylaxis    Bactrim Swelling    Elemental Sulfur     Levofloxacin Swelling    Levofloxacin Swelling    Phenobarbital Swelling    Sulfamethoxazole-Trimethoprim     Tessalon [Benzonatate] Swelling    Wellbutrin [Bupropion] Other (See Comments)     Delirium                                   REVIEW OF SYSTEMS        All items selected indicate a positive finding. Those items not selected are negative.   Constitutional [] Weight loss/gain   [] Fatigue  [] Fever/Chills   HEENT [] Hearing Loss  [] Visual Disturbance  [] Tinnitus  [] Eye pain Respiratory [] Shortness of Breath  [] Cough  [] Snoring   Cardiovascular [] Chest Pain  [] Palpitations  [] Lightheaded   GI [] Constipation  [] Diarrhea  [] Swallowing change  [x] Nausea/vomiting    [] Urinary Frequency  [] Urinary Urgency   Musculoskeletal [] Neck pain  [x] Back pain  [] Muscle pain  [] Restless legs   Dermatologic [] Skin changes   Neurologic [] Memory loss/confusion  [x] Seizures  [x] Trouble walking or imbalance  [] Dizziness  [] Sleep disturbance  [] Weakness  [x] Numbness  [x] Tremors  [] Speech Difficulty  [x] Headaches  [x] Light Sensitivity  [x] Sound Sensitivity   Endocrinology []Excessive thirst  []Excessive hunger   Psychiatric [] Anxiety/Depression  [] Hallucination   Allergy/immunology []Hives/environmental allergies   Hematologic/lymph [] Abnormal bleeding  [] Abnormal bruising         PHYSICAL EXAMINATION:       Vitals:    02/14/23 0828   BP: 105/71   Pulse: 97                                              .                                                                                                    General Appearance:  Alert, cooperative, no signs of distress, appears stated age   Head:  Normocephalic, no signs of trauma   Eyes:  Conjunctiva/corneas clear;  eyelids intact   Ears:  Normal external ear and canals   Nose: Nares normal, mucosa normal, no drainage    Throat: Lips and tongue normal; teeth normal;  gums normal   Neck: Supple, intact flexion, extension and rotation;   trachea midline;  no adenopathy;   thyroid: not enlarged;   no carotid pulse abnormality   Back:   Symmetric, no curvature, ROM adequate   Lungs:   Respirations unlabored   Heart:  Regular rate and rhythm           Extremities: Extremities normal, no cyanosis, no edema   Pulses: Symmetric over head and neck   Skin: Skin color, texture normal, no rashes, no lesions                                     NEUROLOGIC EXAMINATION    Neurologic Exam  Mental status    Alert and oriented x 3; intact memory with no confusion, speech or language problems; no hallucinations or delusions  Fund of information appropriate for level of education    Cranial nerves    II - visual fields intact to confrontation bilaterally  III, IV, VI - extra-ocular muscles full: no pupillary defect; no BRYANT, no nystagmus, no ptosis   V - normal facial sensation                                                               VII - normal facial symmetry                                                             VIII - intact hearing                                                                             IX, X - symmetrical palate                                                                  XI - symmetrical shoulder shrug                                                       XII - tongue midline without atrophy or fasciculation      Motor function  Normal muscle bulk and tone; strength 5/5 on all 4 extremities, no pronator drift      Sensory function Intact to light touch, pinprick, vibration, proprioception on all 4 extremities      Cerebellar Intact fine motor movement. No involuntary movements or tremors. No ataxia or dysmetria on finger to nose or heel to shin testing      Reflex function DTR 2+ on bilateral UE and LE, symmetric. Down going toes bilaterally      Gait                   normal base and arm swing                  Medical Decision Making: In summary, your patient, Poppy Mooney. exhibits the following, with associated plan:    Generalized seizure disorder. The patient had two breakthrough seizures approximately one week ago likely secondary to a pneumonia infection. Continue depakote 750 mg twice daily  Continue topamax 200 mg twice daily   Continue Lyrica 400 mg twice daily which is prescribed for pain  Chronic intractable migraine headaches experiencing greater than 15 headaches per month. Current medications include Depakote and Topamax for migraine prophylaxis.   Concomitant medications include Lyrica  Continue Depakote and Topamax for migraine prophylaxis  Encouraged patient to obtain Aimovig 70 mg injections every 30 days from his pharmacy. He was informed this medication was approved and instructed him to contact the office if he has any difficulties obtaining from his pharmacy. Tremor likely associated with Depakote  Continue to monitor  Chronic back pain and neuropathy  Continue lyrica 400 mg twice daily  PTSD and other psychiatric disorders treated by psychiatry  Return for follow up visit in 4 months with Dr. Ezekiel Jacobs             Signed: Kulwinder Hayes CNP      *Please note that portions of this note were completed with a voice recognition program.  Although every effort was made to insure the accuracy of this automated transcription, some errors in transcription may have occurred, occasionally words and are mis-transcribed    Provider Attestation: The documentation recorded by the scribe accurately reflects the service I personally performed and the decisions made by myself. Portions of this note were transcribed by a scribe. I personally performed the history, physical exam, and medical decision-making and confirm the accuracy of the information in the transcribed note. Scribe Attestation:   By signing my name below, Will Schaffer, attest that this documentation has been prepared under the direction and in the presence of Kulwinder Hayes CNP.

## 2023-02-14 NOTE — TELEPHONE ENCOUNTER
Please call and fax approval for aimovig to Sandor Golden  It is approved until [employer]

## 2023-05-03 DIAGNOSIS — R56.9 SEIZURE (HCC): ICD-10-CM

## 2023-05-03 RX ORDER — TOPIRAMATE 100 MG/1
200 TABLET, FILM COATED ORAL 2 TIMES DAILY
Qty: 120 TABLET | Refills: 0 | Status: SHIPPED | OUTPATIENT
Start: 2023-05-03 | End: 2023-08-01

## 2023-06-01 ENCOUNTER — TELEPHONE (OUTPATIENT)
Dept: NEUROLOGY | Age: 53
End: 2023-06-01

## 2023-06-05 NOTE — TELEPHONE ENCOUNTER
Received fax requesting additional information of whether Kailyn Valle is helping the patient. Call placed to patient and received a message that patient's service was disconnected. Writer received the same message when trying to call emergency contact. Will try again at a later time.

## 2023-06-29 DIAGNOSIS — R56.9 SEIZURE (HCC): ICD-10-CM

## 2023-07-03 ENCOUNTER — OFFICE VISIT (OUTPATIENT)
Dept: NEUROLOGY | Age: 53
End: 2023-07-03
Payer: MEDICAID

## 2023-07-03 VITALS
HEART RATE: 80 BPM | DIASTOLIC BLOOD PRESSURE: 75 MMHG | BODY MASS INDEX: 26.11 KG/M2 | SYSTOLIC BLOOD PRESSURE: 110 MMHG | HEIGHT: 75 IN | WEIGHT: 210 LBS

## 2023-07-03 DIAGNOSIS — G43.709 CHRONIC MIGRAINE WITHOUT AURA WITHOUT STATUS MIGRAINOSUS, NOT INTRACTABLE: ICD-10-CM

## 2023-07-03 DIAGNOSIS — S09.90XS INJURY OF HEAD, SEQUELA: ICD-10-CM

## 2023-07-03 DIAGNOSIS — G89.29 CHRONIC LOW BACK PAIN WITH SCIATICA, SCIATICA LATERALITY UNSPECIFIED, UNSPECIFIED BACK PAIN LATERALITY: ICD-10-CM

## 2023-07-03 DIAGNOSIS — G40.909 SEIZURE DISORDER (HCC): Primary | ICD-10-CM

## 2023-07-03 DIAGNOSIS — M54.40 CHRONIC LOW BACK PAIN WITH SCIATICA, SCIATICA LATERALITY UNSPECIFIED, UNSPECIFIED BACK PAIN LATERALITY: ICD-10-CM

## 2023-07-03 PROCEDURE — 99215 OFFICE O/P EST HI 40 MIN: CPT | Performed by: PSYCHIATRY & NEUROLOGY

## 2023-07-03 RX ORDER — ERENUMAB-AOOE 70 MG/ML
INJECTION SUBCUTANEOUS
Qty: 1 ADJUSTABLE DOSE PRE-FILLED PEN SYRINGE | Refills: 5 | Status: SHIPPED | OUTPATIENT
Start: 2023-07-03

## 2023-07-03 RX ORDER — TOPIRAMATE 100 MG/1
200 TABLET, FILM COATED ORAL 2 TIMES DAILY
Qty: 120 TABLET | Refills: 0 | Status: SHIPPED | OUTPATIENT
Start: 2023-07-03 | End: 2023-07-03 | Stop reason: SDUPTHER

## 2023-07-03 RX ORDER — DIVALPROEX SODIUM 250 MG/1
250 TABLET, EXTENDED RELEASE ORAL 2 TIMES DAILY
Qty: 60 TABLET | Refills: 11 | Status: SHIPPED | OUTPATIENT
Start: 2023-07-03 | End: 2024-08-02

## 2023-07-03 RX ORDER — TOPIRAMATE 100 MG/1
200 TABLET, FILM COATED ORAL 2 TIMES DAILY
Qty: 120 TABLET | Refills: 11 | Status: SHIPPED | OUTPATIENT
Start: 2023-07-03 | End: 2024-10-01

## 2023-07-03 RX ORDER — SUMATRIPTAN 100 MG/1
TABLET, FILM COATED ORAL
Qty: 18 TABLET | Refills: 5 | Status: SHIPPED | OUTPATIENT
Start: 2023-07-03

## 2023-07-03 RX ORDER — DIVALPROEX SODIUM 500 MG/1
TABLET, EXTENDED RELEASE ORAL
Qty: 60 TABLET | Refills: 11 | Status: SHIPPED | OUTPATIENT
Start: 2023-07-03

## 2023-07-03 ASSESSMENT — ENCOUNTER SYMPTOMS
ALLERGIC/IMMUNOLOGIC NEGATIVE: 1
GASTROINTESTINAL NEGATIVE: 1
RESPIRATORY NEGATIVE: 1
EYES NEGATIVE: 1

## 2023-07-03 NOTE — PROGRESS NOTES
ML MISC 1 each by Does not apply route 3 times daily (Patient not taking: Reported on 7/3/2023) 100 each 3    senna (SENOKOT) 8.6 MG tablet twice daily as needed. (Patient not taking: Reported on 7/3/2023)       No current facility-administered medications for this visit. Allergies   Allergen Reactions    Chantix [Varenicline Tartrate] Anaphylaxis    Sulfa Antibiotics Anaphylaxis    Sulfasalazine Anaphylaxis    Bactrim Swelling    Elemental Sulfur     Levofloxacin Swelling    Levofloxacin Swelling    Phenobarbital Swelling    Sulfamethoxazole-Trimethoprim     Tessalon [Benzonatate] Swelling    Wellbutrin [Bupropion] Other (See Comments)     Delirium           Review of Systems     Vitals:    07/03/23 1049   BP: 110/75   Pulse: 80     weight: 210 lb (95.3 kg)      Review of Systems   Constitutional: Negative. HENT: Negative. Eyes: Negative. Respiratory: Negative. Cardiovascular: Negative. Gastrointestinal: Negative. Endocrine: Negative. Genitourinary: Negative. Musculoskeletal: Negative. Skin: Negative. Allergic/Immunologic: Negative. Neurological:  Positive for headaches. Hematological: Negative. Psychiatric/Behavioral: Negative. Neurological Examination  Constitutional .General exam well groomed   Head/Ears /Nose/Throat: external ear . Normal exam  Neck and thyroid . Normal size. No bruits  Respiratory . Breathsounds clear bilaterally  Cardiovascular: Auscultation of heart with regular rate and rhythm  Musculoskeletal. Muscle bulk and tone normal                                                           Muscle strength 5/5 strength throughout                                                                                No dysmetria or dysdiadokinesis  No tremor   Normal fine motor  Gait minor imbalance   Orientation Alert and oriented x 3 . July 3 , 2023 , [facility]  [place] ? . WORLD able to spel forwards and backwards .  Serial 7 to 79   Attention and

## 2023-07-12 DIAGNOSIS — G40.909 SEIZURE DISORDER (HCC): ICD-10-CM

## 2023-07-14 DIAGNOSIS — G40.909 SEIZURE DISORDER (HCC): ICD-10-CM

## 2023-08-07 ENCOUNTER — TELEPHONE (OUTPATIENT)
Dept: NEUROLOGY | Age: 53
End: 2023-08-07

## 2023-08-26 NOTE — PLAN OF CARE
Problem: Altered Mood, Depressive Behavior  Goal: LTG-Able to verbalize acceptance of life and situations over which he or she has no control  Outcome: Ongoing  1:1 with patient for 10 min. Pt denies SI/HI, agreeable to remain free of harm and will seek out staff if he should become suicidal. Q 15 min safety checks continue per the unit protocol. Encouraged patient to participate in plan of treatment. Thersia Oar will continue to provide support and reassurance during the remainder of the shift while on the unit. Goal: STG-Able to verbalize suicidal ideations  Outcome: Ongoing  1:1 with patient for 10 min. Pt denies SI/HI, agreeable to remain free of harm and will seek out staff if he should become suicidal. Q 15 min safety checks continue per the unit protocol. Encouraged patient to participate in plan of treatment. Thersia Oar will continue to provide support and reassurance during the remainder of the shift while on the unit. EKG

## 2023-09-26 ENCOUNTER — OFFICE VISIT (OUTPATIENT)
Dept: NEUROLOGY | Age: 53
End: 2023-09-26
Payer: MEDICAID

## 2023-09-26 VITALS
HEART RATE: 88 BPM | HEIGHT: 75 IN | WEIGHT: 204 LBS | SYSTOLIC BLOOD PRESSURE: 106 MMHG | BODY MASS INDEX: 25.36 KG/M2 | DIASTOLIC BLOOD PRESSURE: 69 MMHG

## 2023-09-26 DIAGNOSIS — G43.709 CHRONIC MIGRAINE WITHOUT AURA WITHOUT STATUS MIGRAINOSUS, NOT INTRACTABLE: ICD-10-CM

## 2023-09-26 DIAGNOSIS — G40.409 GENERALIZED TONIC-CLONIC SEIZURE (HCC): ICD-10-CM

## 2023-09-26 DIAGNOSIS — G21.19 DRUG-INDUCED PARKINSONISM (HCC): Primary | ICD-10-CM

## 2023-09-26 DIAGNOSIS — S09.90XS INJURY OF HEAD, SEQUELA: ICD-10-CM

## 2023-09-26 PROCEDURE — 99214 OFFICE O/P EST MOD 30 MIN: CPT | Performed by: PSYCHIATRY & NEUROLOGY

## 2023-09-26 RX ORDER — LURASIDONE HYDROCHLORIDE 20 MG/1
TABLET, FILM COATED ORAL
COMMUNITY
Start: 2023-09-21

## 2023-09-26 RX ORDER — NICOTINE 21 MG/24HR
PATCH, TRANSDERMAL 24 HOURS TRANSDERMAL
COMMUNITY
Start: 2023-09-08

## 2023-09-26 RX ORDER — CYCLOBENZAPRINE HCL 10 MG
TABLET ORAL
COMMUNITY
Start: 2023-09-18

## 2023-09-26 RX ORDER — ALFUZOSIN HYDROCHLORIDE 10 MG/1
TABLET, EXTENDED RELEASE ORAL
COMMUNITY
Start: 2023-09-13

## 2023-09-26 RX ORDER — MIRTAZAPINE 30 MG/1
TABLET, FILM COATED ORAL
COMMUNITY
Start: 2023-09-22

## 2023-09-26 RX ORDER — IPRATROPIUM BROMIDE AND ALBUTEROL SULFATE 2.5; .5 MG/3ML; MG/3ML
SOLUTION RESPIRATORY (INHALATION)
COMMUNITY

## 2023-09-26 RX ORDER — TRAZODONE HYDROCHLORIDE 50 MG/1
100 TABLET ORAL NIGHTLY
COMMUNITY
Start: 2023-09-21

## 2023-09-26 RX ORDER — ONDANSETRON 4 MG/1
TABLET, ORALLY DISINTEGRATING ORAL
COMMUNITY
Start: 2023-09-11

## 2023-09-26 NOTE — PROGRESS NOTES
[facility] NEUROLOGY SPECIALIST  [facility] [address]  Dept: [phone]    PATIENT NAME: Santa Meyers. PATIENT MRN: [id number]  PRIMARY CARE PHYSICIAN: Vicky Field PA-C    HPI:      Santa Meyers. is a 48 y.o. male who presents to clinic today for evaluation of chronic migraine and seizure disorder as consequence of 2021 traumatic brain injury. Chronic TBI symptoms also include memory changes and poor concentration. He also has bilateral hand tremor, believed to be medication side effect. He is managed on Aimovig 70 mg for migraine prevention; Topamax 200 mg BID and Depakote 750 mg BID for headache and seizure prevention. At last appointment he was also prescribed Imitrex 100 mg for migraine rescue. He takes Lyrica 400 mg daily for generalized pain. He reports he is not taking Aimovig. He reports that he started Sumatriptan and it has been helpful for rescue and so he stopped Aimovig. He is taking Imitrex 3 days a week and has not run out, but reports he received 30 pills for his first fill- unsure if this was a 90 day prescription. Denies side effect from Imitrex. He reports that he is not comfortable with injectable products because of his history of heroin use; will not take Aimovig or a similar product. Patient feels that his tremor is more disruptive than in the past. If he feels stressed or emotional, the tremor in his hands is worse. It is upsetting to him and he becomes tearful when discussing it. Also, there have been potential abnormal movements of his tongue because he feels his lips are raw. There is occasional tremor of his lips. No overt night tongue-biting, no pain/ wounds of his cheeks. Patient does not have teeth and does not wear dentures. He discussed these symptoms with his PCP and subsequently, patient reports Agustin Acre has been decreased and there are plans to return to [facility]. He does not see his psychiatrist until October.  The patient also

## 2023-10-26 ENCOUNTER — OFFICE VISIT (OUTPATIENT)
Dept: NEUROLOGY | Age: 53
End: 2023-10-26
Payer: MEDICAID

## 2023-10-26 VITALS
WEIGHT: 207 LBS | DIASTOLIC BLOOD PRESSURE: 76 MMHG | BODY MASS INDEX: 25.74 KG/M2 | SYSTOLIC BLOOD PRESSURE: 109 MMHG | HEIGHT: 75 IN | HEART RATE: 98 BPM

## 2023-10-26 DIAGNOSIS — G40.409 GENERALIZED TONIC-CLONIC SEIZURE (HCC): ICD-10-CM

## 2023-10-26 DIAGNOSIS — G21.19 DRUG-INDUCED PARKINSONISM (HCC): Primary | ICD-10-CM

## 2023-10-26 DIAGNOSIS — S09.90XS INJURY OF HEAD, SEQUELA: ICD-10-CM

## 2023-10-26 DIAGNOSIS — G43.709 CHRONIC MIGRAINE WITHOUT AURA WITHOUT STATUS MIGRAINOSUS, NOT INTRACTABLE: ICD-10-CM

## 2023-10-26 PROCEDURE — 99214 OFFICE O/P EST MOD 30 MIN: CPT | Performed by: PHYSICIAN ASSISTANT

## 2023-10-26 NOTE — PROGRESS NOTES
[facility] NEUROLOGY SPECIALIST  [facility] [address]  Dept: [phone]    PATIENT NAME: Robby Lazar. PATIENT MRN: [id number]  PRIMARY CARE PHYSICIAN: Ayesha Cramer    HPI:      Robby Lazar. is a 48 y.o. male who presents to clinic today for evaluation of chronic migraine and seizure disorder as consequence of 2021 traumatic brain injury; more recent concerns for drug-induced Parkinsonism vs tardive dyskinesia. Chronic TBI symptoms also include memory changes and poor concentration. He is managed on Aimovig 70 mg for migraine prevention; Topamax 200 mg BID and Depakote 750 mg BID for headache and seizure prevention; Imitrex 100 mg for migraine rescue. He takes Lyrica 400 mg daily for generalized pain. This patient had plans to transition off [facility] [address] and return to [facility] through PCP, pyschiatry,  and PCP also prescribed Austedo for presumed tardive dyskinesia per records which were received after last appointment. Austedo was not covered and so he was started on Benztropine which caused profound constipation and it was stopped. It has been 3 days since last bowl movement. He did feel that there was immense improvement in his coordination and movements with this medication. He felt improvements within the first week. With stopping the medication, his movements worsened after about a week. Admits continued migraines 3 days per week; treats with Imitrex with resolution. No staring, focal weakness, convulsion, tongue-biting, sudden incontinence. Reports compliance with seizure medication. No falls, dizziness improved with stopping Latuda. Prior:    He reports that he is not comfortable with injectable products because of his history of heroin use; will not take Aimovig or a similar product. He does admit to frequent tearfulness, but also occasional random bouts of laughter. He cries multiple times a day. Watching the news can trigger these symptoms.  It

## 2023-11-15 ENCOUNTER — TELEPHONE (OUTPATIENT)
Dept: NEUROLOGY | Age: 53
End: 2023-11-15

## 2023-11-15 NOTE — TELEPHONE ENCOUNTER
Many possibilities due to starting Amitriptyline at the same time as Sinemet. If he passes out again, he should go to the ER. If he has a blood pressure cuff at home or available, he should check his BP and make an appointment with his PCP.  If the Sinemet has not been helpful for his tremors, he can stop it

## 2023-11-15 NOTE — TELEPHONE ENCOUNTER
Call placed to the patient and this information was given. Raman Gillette verbally stated his understanding. He did say that the Sinemet hasn't helped.

## 2024-01-10 ENCOUNTER — OFFICE VISIT (OUTPATIENT)
Dept: NEUROLOGY | Age: 54
End: 2024-01-10

## 2024-01-10 VITALS
HEIGHT: 75 IN | SYSTOLIC BLOOD PRESSURE: 101 MMHG | HEART RATE: 78 BPM | DIASTOLIC BLOOD PRESSURE: 66 MMHG | WEIGHT: 206 LBS | BODY MASS INDEX: 25.61 KG/M2

## 2024-01-10 DIAGNOSIS — H91.8X2 OTHER SPECIFIED HEARING LOSS OF LEFT EAR, UNSPECIFIED HEARING STATUS ON CONTRALATERAL SIDE: Primary | ICD-10-CM

## 2024-01-10 DIAGNOSIS — G40.409 GENERALIZED TONIC-CLONIC SEIZURE (HCC): ICD-10-CM

## 2024-01-10 DIAGNOSIS — R41.3 MEMORY CHANGE: ICD-10-CM

## 2024-01-10 DIAGNOSIS — G21.19 DRUG-INDUCED PARKINSONISM (HCC): ICD-10-CM

## 2024-01-10 DIAGNOSIS — S09.90XS INJURY OF HEAD, SEQUELA: ICD-10-CM

## 2024-01-10 DIAGNOSIS — G43.709 CHRONIC MIGRAINE WITHOUT AURA WITHOUT STATUS MIGRAINOSUS, NOT INTRACTABLE: ICD-10-CM

## 2024-01-10 RX ORDER — AMITRIPTYLINE HYDROCHLORIDE 50 MG/1
50 TABLET, FILM COATED ORAL NIGHTLY
COMMUNITY
Start: 2024-01-04

## 2024-01-10 RX ORDER — ROSUVASTATIN CALCIUM 40 MG/1
40 TABLET, COATED ORAL NIGHTLY
COMMUNITY
Start: 2024-01-04

## 2024-01-10 RX ORDER — PRAZOSIN HYDROCHLORIDE 1 MG/1
2 CAPSULE ORAL NIGHTLY
COMMUNITY
Start: 2023-12-18

## 2024-01-10 RX ORDER — OLANZAPINE 20 MG/1
20 TABLET ORAL NIGHTLY
COMMUNITY
Start: 2023-12-15

## 2024-01-10 NOTE — PROGRESS NOTES
[facility] NEUROLOGY SPECIALIST  [address]  [place] [address]  Dept: [phone]    PATIENT NAME: Julien Wright Jr.  PATIENT MRN: [id number]  PRIMARY CARE PHYSICIAN: Landen Lomeli PA-C    HPI:      Julien Wright Jr. is a 53 y.o. male who presents to clinic today for evaluation of chronic migraine and seizure disorder as consequence of 2021 traumatic brain injury; more recent concerns for drug-induced Parkinsonism vs tardive dyskinesia. Chronic TBI symptoms also include memory changes and poor concentration. He is managed on Aimovig 70 mg for migraine prevention; Topamax 200 mg BID and Depakote 750 mg BID for headache and seizure prevention; Imitrex 100 mg for migraine rescue. He takes Lyrica 400 mg daily for generalized pain.    He feels tremors are improved with Sinemet  TID. He notices significantly less tremor when sitting and watching TV, conversing with others. At first, he felt some lightheadedness, but this resolved as he stopped Amitriptyline which was started at the same time as Sinemet. He feels slightly more tearful since the addition, but is unsure if these are related given family stressors around the holidays.    No recent seizures. No convulsion, tongue-biting, witnessed staring, overt incontinence, focal weakness.    Reports some forehead pain, sudden onset, in the location of surgical scar. Frequency is 1-2 two per week lasting a few seconds. It always seems associated with hearing loss and generally he feels the hearing in left ear is gradually diminishing. Denies drainage, over ear pain/ pressure. He may have headache pain superior to the ear.    Prior:  Benztropine helped tremor, but caused severe constipation.    Austedo was prescribed by PCP, denied by insurance.    Admits continued migraines 3 days per week; treats with Imitrex with resolution.    He reports that he is not comfortable with injectable products because of his history of heroin use; will not take

## 2024-05-16 RX ORDER — TOPIRAMATE 100 MG/1
200 TABLET, FILM COATED ORAL 2 TIMES DAILY
Qty: 120 TABLET | Refills: 11 | OUTPATIENT
Start: 2024-05-16 | End: 2025-08-15

## 2024-07-09 RX ORDER — DIVALPROEX SODIUM 250 MG/1
250 TABLET, EXTENDED RELEASE ORAL 2 TIMES DAILY
Qty: 60 TABLET | Refills: 11 | Status: SHIPPED | OUTPATIENT
Start: 2024-07-09 | End: 2025-08-09

## 2024-07-09 RX ORDER — DIVALPROEX SODIUM 500 MG/1
TABLET, EXTENDED RELEASE ORAL
Qty: 60 TABLET | Refills: 11 | Status: SHIPPED | OUTPATIENT
Start: 2024-07-09

## 2024-07-09 NOTE — TELEPHONE ENCOUNTER
Pharmacy requesting refill of divalproex (DEPAKOTE ER) 500 MG extended release tablet / divalproex (DEPAKOTE ER) 250 MG extended release tablet      Medication active on med list yes      Date of last Rx: 7/3/2023 with 11 refills          verified by GODWIN LAUGHLIN      Date of last appointment 5/15/2024    Next Visit Date:  Visit date not found

## 2024-07-18 ENCOUNTER — TELEPHONE (OUTPATIENT)
Dept: NEUROLOGY | Age: 54
End: 2024-07-18

## 2024-07-18 NOTE — TELEPHONE ENCOUNTER
Julien left a message on the clinical VM that he was having new symptoms to call him.  Writer placed a call back to the patient.  Julien stated that for the last couple of weeks he has been having \"dizzy spells\" when he stands up and starts to walk.  He admits to having some dizziness even when he is sitting.  He also stated \"I'm losing chunks of time, falling asleep without knowing I'm falling asleep until I wake up\".  Julien denied that these were seizures.  Writer asked if he had started or stopped any new medications lately.  Julien stated that he has been taking Mucinex for quite some time for congestion.  Writer advised that Phoebe was out of the office until October and directed him to contact his PCP regarding these issues.  Patient verbally stated his understanding.

## 2024-09-24 RX ORDER — TOPIRAMATE 100 MG/1
200 TABLET, FILM COATED ORAL 2 TIMES DAILY
Qty: 120 TABLET | Refills: 11 | Status: SHIPPED | OUTPATIENT
Start: 2024-09-24 | End: 2025-09-24

## 2025-04-03 RX ORDER — SUMATRIPTAN SUCCINATE 100 MG/1
TABLET ORAL
Qty: 9 TABLET | Refills: 5 | OUTPATIENT
Start: 2025-04-03

## 2025-04-22 RX ORDER — DIVALPROEX SODIUM 500 MG/1
TABLET, FILM COATED, EXTENDED RELEASE ORAL
Qty: 60 TABLET | Refills: 11 | OUTPATIENT
Start: 2025-04-22

## 2025-04-22 RX ORDER — DIVALPROEX SODIUM 250 MG/1
250 TABLET, FILM COATED, EXTENDED RELEASE ORAL 2 TIMES DAILY
Qty: 60 TABLET | Refills: 11 | OUTPATIENT
Start: 2025-04-22

## 2025-05-07 ENCOUNTER — TELEPHONE (OUTPATIENT)
Dept: NEUROLOGY | Age: 55
End: 2025-05-07

## 2025-05-07 NOTE — TELEPHONE ENCOUNTER
Can we find out from the pharmacy the dispense dates? Per JOYCE I don't see pregablin since 6/24/24 and I haven't seen him since Jan 2024

## 2025-05-07 NOTE — TELEPHONE ENCOUNTER
Pt called in stating that he started seeing a new psych at [facility]. They came over and told him to throw out all of his extra medications, because [facility] has been sending him meds too soon. When they threw them away, his Lyrica was in this mix. He has been out of his Lyrica now for about 2 weeks and has been having withdrawal effects. He said that he has been having sweating, can't eat, vomiting, is having issues sleeping at night. He is asking if we would be able to fill his Lyrica because he can now not get in touch with anyone at [facility].    He was taking Lyrica 200 mg TID.    This would need to be sent to [facility] pharmacy.    Please advise, thanks.

## 2025-05-07 NOTE — TELEPHONE ENCOUNTER
I contacted [facility] and spoke with Tim. They stated that it was last filled on 6/24/2024. Patient states that he kept receiving early refills and that is why he has not needed any refills since then. Per pharmacy the prescription was last sent by Dr. Landen Lomeli for 200mg QID. This was discussed with Phoebe who stated she would like to see him before she fills this medication again. Patient notified and appointment on 5/19/2025 at 8:20a was placed on hold. Patient states he will call the office tomorrow morning after he speaks with his daughter tonight.

## 2025-05-09 NOTE — TELEPHONE ENCOUNTER
I contacted the patient, he states that he has not been able to check with his daughter yet. He states that he will speak with her today.

## 2025-05-12 NOTE — TELEPHONE ENCOUNTER
I contacted the patient and she states that he keeps forgetting to speak with his daughter. He states that he will speak with her tonight when she comes over, and he will call the office first thing in the morning.

## 2025-05-19 ENCOUNTER — TELEPHONE (OUTPATIENT)
Dept: NEUROLOGY | Age: 55
End: 2025-05-19

## 2025-05-19 NOTE — TELEPHONE ENCOUNTER
Patient showed for an appointment which had been rescheduled.  Has an upcoming appointment on 06/5.      Please refill Lyrica.  Pt. Deescribing extreme pain.

## 2025-05-19 NOTE — TELEPHONE ENCOUNTER
Per last note, 5/7/25, pt needs to be seen prior to getting a refill. We have not filled this for patient. It was last filled 6/24/24 per [facility] and per [facility]. Pt stated he was getting early fills and had enough to last for a year.

## 2025-05-23 NOTE — PROGRESS NOTES
Please notify findings of limited exam, hepatomegaly, hepatic steatosis, and cholelithiasis.  Thanks   abdominal pain, diarrhea and nausea. Genitourinary: Negative for dysuria and frequency. Musculoskeletal: Positive for arthralgias and back pain. Neurological: Negative for seizures. Psychiatric/Behavioral: Positive for dysphoric mood. Negative for hallucinations. All other systems reviewed and are negative. Prior to Visit Medications    Medication Sig Taking? Authorizing Provider   fluticasone-vilanterol (BREO ELLIPTA) 100-25 MCG/INH AEPB inhaler Inhale 1 puff into the lungs daily Yes CHAD Degroot CNP   Blood Glucose Monitoring Suppl (FREESTYLE LITE) ILA USE AS DIRECTED FOUR TIMES A DAY Yes CHAD Degroot CNP   FREESTYLE LITE strip 1 each by Other route 3 times daily Yes CHAD Degroot CNP   FREESTYLE LANCETS MISC 1 each by Does not apply route 3 times daily Yes CHAD Degroot CNP   Nutritional Supplements (BOOST) LIQD Two a day. Chocolate lactose free nutritional drinks. Yes CHAD Degroot CNP   Respiratory Therapy Supplies (NEBULIZER COMPRESSOR) KIT 1 kit by Does not apply route once for 1 dose Yes CHAD Degroot CNP   predniSONE (DELTASONE) 10 MG tablet 4 pills for 3 days, then 3 pills for 3 days, then 2 pills for 3 days then 1 pill for 3 days.  30 pills in 12 days Yes CHAD Degroot CNP   guaiFENesin (MUCINEX) 600 MG extended release tablet Take 1 tablet by mouth 2 times daily Yes CHAD Degroot CNP   QUEtiapine (SEROQUEL) 200 MG tablet Take 1 tablet by mouth nightly Yes CHAD Degroot CNP   albuterol sulfate HFA (VENTOLIN HFA) 108 (90 Base) MCG/ACT inhaler INHALE 2 PUFFS INTO THE LUNGS EVERY 6 HOURS AS NEEDED FOR WHEEZING Yes CHAD Degroot CNP   albuterol (PROVENTIL) (2.5 MG/3ML) 0.083% nebulizer solution Take 3 mLs by nebulization every 6 hours as needed for Wheezing Yes CHAD Degroot CNP   cloNIDine (CATAPRES) 0.1 MG tablet Take 0.1 mg by mouth 2 times daily Yes Historical Provider, MD of 5/9/19: 189 lb (85.7 kg). DIAGNOSTIC FINDINGS:  CBC:  Lab Results   Component Value Date    WBC 12.8 12/02/2019    HGB 15.0 12/02/2019     12/02/2019       BMP:    Lab Results   Component Value Date     11/15/2019    K 4.0 11/15/2019     11/15/2019    CO2 21 11/15/2019    BUN 9 11/15/2019    CREATININE 0.90 11/15/2019    GLUCOSE 130 11/15/2019       HEMOGLOBIN A1C:   Lab Results   Component Value Date    LABA1C 5.5 04/25/2019       FASTING LIPID PANEL:  Lab Results   Component Value Date    CHOL 137 04/16/2019    HDL 32 (L) 04/16/2019    TRIG 162 (H) 04/16/2019       Physical Exam  Vitals signs reviewed. Constitutional:       General: He is not in acute distress. Appearance: He is well-developed. He is not ill-appearing. Comments: In wheelchair   HENT:      Head: Normocephalic. Right Ear: External ear normal.      Left Ear: External ear normal.      Nose: No rhinorrhea. Mouth/Throat:      Mouth: Mucous membranes are not dry. Pharynx: Uvula midline. Eyes:      General:         Right eye: No discharge. Left eye: No discharge. Conjunctiva/sclera: Conjunctivae normal.   Neck:      Musculoskeletal: Neck supple. Cardiovascular:      Rate and Rhythm: Normal rate and regular rhythm. Heart sounds: No murmur. Pulmonary:      Effort: Pulmonary effort is normal. No respiratory distress. Breath sounds: Wheezing (all fields) present. Abdominal:      Palpations: Abdomen is soft. Tenderness: There is no tenderness. Musculoskeletal:      Right lower leg: No edema. Left lower leg: No edema. Lymphadenopathy:      Head:      Right side of head: No submental adenopathy. Left side of head: No submental adenopathy. Cervical: No cervical adenopathy. Skin:     General: Skin is warm. Neurological:      Mental Status: He is alert. Mental status is at baseline. ASSESSMENT     Diagnosis Orders   1.  COPD exacerbation ([address] predniSONE (DELTASONE) 10 MG tablet    guaiFENesin (MUCINEX) 600 MG extended release tablet   2. Panlobular emphysema ([facility])  fluticasone-vilanterol (BREO ELLIPTA) 100-25 MCG/INH AEPB inhaler    Nutritional Supplements (BOOST) LIQD    Respiratory Therapy Supplies (NEBULIZER COMPRESSOR) KIT    guaiFENesin (MUCINEX) 600 MG extended release tablet    albuterol (PROVENTIL) (2.5 MG/3ML) 0.083% nebulizer solution   3. Type 2 diabetes mellitus with diabetic polyneuropathy, with long-term current use of insulin ([facility])  Blood Glucose Monitoring Suppl (FREESTYLE LITE) ILA    FREESTYLE LITE strip    FREESTYLE LANCETS MISC   4. Paraplegia ([facility])  Nutritional Supplements (BOOST) LIQD          PLAN:  Orders Placed This Encounter   Medications    fluticasone-vilanterol (BREO ELLIPTA) 100-25 MCG/INH AEPB inhaler     Sig: Inhale 1 puff into the lungs daily     Dispense:  1 each     Refill:  3    Blood Glucose Monitoring Suppl (FREESTYLE LITE) ILA     Sig: USE AS DIRECTED FOUR TIMES A DAY     Dispense:  1 Device     Refill:  0    FREESTYLE LITE strip     Si each by Other route 3 times daily     Dispense:  100 each     Refill:  5    FREESTYLE LANCETS MISC     Si each by Does not apply route 3 times daily     Dispense:  100 each     Refill:  3    Nutritional Supplements (BOOST) LIQD     Sig: Two a day. Chocolate lactose free nutritional drinks. Dispense:  60 Can     Refill:  5     High caloire    Respiratory Therapy Supplies (NEBULIZER COMPRESSOR) KIT     Si kit by Does not apply route once for 1 dose     Dispense:  1 kit     Refill:  0    predniSONE (DELTASONE) 10 MG tablet     Si pills for 3 days, then 3 pills for 3 days, then 2 pills for 3 days then 1 pill for 3 days.  30 pills in 12 days     Dispense:  30 tablet     Refill:  0    guaiFENesin (MUCINEX) 600 MG extended release tablet     Sig: Take 1 tablet by mouth 2 times daily     Dispense:  60 tablet     Refill:  1    QUEtiapine (SEROQUEL) 200 MG tablet Sig: Take 1 tablet by mouth nightly     Dispense:  30 tablet     Refill:  0    albuterol sulfate HFA (VENTOLIN HFA) 108 (90 Base) MCG/ACT inhaler     Sig: INHALE 2 PUFFS INTO THE LUNGS EVERY 6 HOURS AS NEEDED FOR WHEEZING     Dispense:  18 g     Refill:  3    albuterol (PROVENTIL) (2.5 MG/3ML) 0.083% nebulizer solution     Sig: Take 3 mLs by nebulization every 6 hours as needed for Wheezing     Dispense:  120 each     Refill:  1         1. DM supplies and Nebulizer ordered, all were left at [facility]  2. Pt reports worsening cough with wheezing on exam, steroid taper and inhalers refilled. Advised on need for smoking cessation  3. Hold on insulin until he has meters and can monitor his sugars, A1C's all have been below 8    FOLLOW UP AND INSTRUCTIONS:  Return in about 2 months (around 3/8/2020) for Diabetes, copd. · Saunders Opitz received counseling on the following healthy behaviors:nutrition, medication adherence and tobacco cessation    · Discussed use, benefit, and side effects of prescribed medications. Barriers to  medication compliance addressed. All patient questions answered. Pt  verbalized understanding of all instructions given. · Patient given educational materials - see patient instructions      · Patient advised to contact scheduling offices for any referrals or imaging orders  placed today if they have not been contacted in 48 hours. Return in about 2 months (around 3/8/2020) for Diabetes, copd. An electronic signature was used to authenticate this note. --CHAD Nicholson - CNP on 1/8/2020 at 8:01 PM  Visit Information    Have you changed or started any medications since your last visit including any over-the-counter medicines, vitamins, or herbal medicines? no   Are you having any side effects from any of your medications? -  no  Have you stopped taking any of your medications? Is so, why? -  no    Have you seen any other physician or provider since your last visit?

## 2025-07-03 NOTE — ED PROVIDER NOTES
ORTHOPEDIC PREOP H and P      HPI / Chief Complaint  Stephanie Vega is a 67 y.o. female who presents for her preop visit regarding her right shoulder. she has been dealing with pain for quite some time now and has failed attempts at conservative management with a couple of US guided biceps tendon sheath injections both of which did provide significant pain relief only for her pain to the end return.    Past Medical History  April  has a past medical history of Acid reflux, Arthritis, Asthma, Asthma, Cancer ([facility]), COPD (chronic obstructive pulmonary disease) ([facility]), Emphysema, Headache(784.0), Heart abnormality, Osteoarthritis, Osteoporosis, Seborrheic keratoses, and TIA (transient ischemic attack).    Past Surgical History  April  has a past surgical history that includes  section, low transverse; Dilation and curettage of uterus; Tonsillectomy and adenoidectomy; Tubal ligation; other surgical history; Acromioplasty (Left, 2016); Knee arthroscopy (Right); Colonoscopy; Knee arthroscopy (Right, 2022); Endoscopy, colon, diagnostic; Pain management procedure; Knee arthroscopy (Right, 2024); and Leg biopsy excision (Left, 2024).    Current Medications  Current Outpatient Medications   Medication Sig Dispense Refill    HYDROcodone-acetaminophen (NORCO) 5-325 MG per tablet Take 1 tablet by mouth every 4 hours as needed for Pain for up to 7 days. Intended supply: 7 days. Take lowest dose possible to manage pain Max Daily Amount: 6 tablets 42 tablet 0    ondansetron (ZOFRAN) 4 MG tablet Take 1 tablet by mouth daily as needed for Nausea or Vomiting 20 tablet 0    warfarin (COUMADIN) 6 MG tablet Take 1 tablet by mouth Six times weekly AND 1.5 tablets every 7 days. 32 tablet 1    pregabalin (LYRICA) 25 MG capsule Take 1 capsule by mouth daily AND 2 capsules nightly. Do all this for 60 days. Max Daily Amount: 75 mg. 90 capsule 1    albuterol sulfate HFA (PROVENTIL;VENTOLIN;PROAIR) 108 (90 Base)  cousin is unknown. SOCIAL HISTORY      reports that he has been smoking Cigarettes. He has a 26.25 pack-year smoking history. He has never used smokeless tobacco. He reports that he uses drugs, including Marijuana and Opiates . He reports that he does not drink alcohol. PHYSICAL EXAM     INITIAL VITALS: /71   Pulse 72   Temp 98.7 °F (37.1 °C) (Oral)   Resp 16   Ht 6' (1.829 m)   Wt 150 lb (68 kg)   SpO2 95%   BMI 20.34 kg/m²    Physical Exam   Constitutional: No distress. Cachectic in appearance   HENT:   Head: Normocephalic. Nose: Nose normal.   Mouth/Throat: Oropharynx is clear and moist.   3 cm laceration over left eyebrow, appears not to be recent injury   Eyes: Conjunctivae and EOM are normal. Pupils are equal, round, and reactive to light. Neck: Normal range of motion. Neck supple. Cardiovascular: Normal rate, regular rhythm, normal heart sounds and intact distal pulses. Exam reveals no gallop and no friction rub. No murmur heard. Pulmonary/Chest: Effort normal and breath sounds normal. No stridor. No respiratory distress. He has no wheezes. He has no rales. He exhibits no tenderness. Abdominal: Soft. Bowel sounds are normal. He exhibits no distension. There is no tenderness. There is no rebound and no guarding. Musculoskeletal: Normal range of motion. He exhibits no edema, tenderness or deformity. Poor muscle bulk   Neurological: He is alert. No cranial nerve deficit. Pt is alert, responds to his name but mumbles afterwards and appears confused   Skin: Skin is warm and dry. No rash noted. He is not diaphoretic. No erythema. Nursing note and vitals reviewed. MEDICAL DECISION MAKING:   [facility]  3:43 PM  Patient is more alert and awake. Patient is able state that he was sitting in his wheelchair when he slipped off fell striking his head on the seat. Patient has lost consciousness. Patient is back to his normal baseline.   After cleaning his eyebrow wound it was noted

## 2025-07-15 RX ORDER — TOPIRAMATE 100 MG/1
200 TABLET, FILM COATED ORAL 2 TIMES DAILY
Qty: 120 TABLET | Refills: 11 | OUTPATIENT
Start: 2025-07-15 | End: 2026-07-15

## 2025-07-15 NOTE — TELEPHONE ENCOUNTER
Patient states he has plenty of topamax at home. He did schedule a follow up with Phoebe on 8/21/2025 at 0840.

## 2025-08-04 ENCOUNTER — APPOINTMENT (OUTPATIENT)
Dept: GENERAL RADIOLOGY | Age: 55
End: 2025-08-04
Payer: MEDICAID

## 2025-08-04 ENCOUNTER — APPOINTMENT (OUTPATIENT)
Dept: CT IMAGING | Age: 55
End: 2025-08-04
Payer: MEDICAID

## 2025-08-04 ENCOUNTER — HOSPITAL ENCOUNTER (EMERGENCY)
Age: 55
Discharge: HOME | End: 2025-08-04
Attending: EMERGENCY MEDICINE
Payer: MEDICAID

## 2025-08-04 VITALS
OXYGEN SATURATION: 98 % | TEMPERATURE: 97.5 F | SYSTOLIC BLOOD PRESSURE: 115 MMHG | HEART RATE: 80 BPM | RESPIRATION RATE: 14 BRPM | DIASTOLIC BLOOD PRESSURE: 79 MMHG

## 2025-08-04 DIAGNOSIS — R40.4 TRANSIENT ALTERATION OF AWARENESS: Primary | ICD-10-CM

## 2025-08-04 LAB
ALBUMIN SERPL-MCNC: 3.8 G/DL (ref 3.5–5.2)
ALBUMIN/GLOB SERPL: 1.2 {RATIO} (ref 1–2.5)
ALP SERPL-CCNC: 93 U/L (ref 40–129)
ALT SERPL-CCNC: 5 U/L (ref 10–50)
AMMONIA PLAS-SCNC: 50 UMOL/L (ref 16–60)
AMPHET UR QL SCN: NEGATIVE
ANION GAP SERPL CALCULATED.3IONS-SCNC: 13 MMOL/L (ref 9–16)
APAP SERPL-MCNC: <5 UG/ML (ref 10–30)
AST SERPL-CCNC: 24 U/L (ref 10–50)
BARBITURATES UR QL SCN: NEGATIVE
BASOPHILS # BLD: 0.07 K/UL (ref 0–0.2)
BASOPHILS NFR BLD: 1 % (ref 0–2)
BENZODIAZ UR QL: NEGATIVE
BILIRUB SERPL-MCNC: 0.2 MG/DL (ref 0–1.2)
BILIRUB UR QL STRIP: NEGATIVE
BODY TEMPERATURE: 37
BUN SERPL-MCNC: 10 MG/DL (ref 6–20)
CALCIUM SERPL-MCNC: 8.7 MG/DL (ref 8.6–10.4)
CANNABINOIDS UR QL SCN: POSITIVE
CHLORIDE SERPL-SCNC: 107 MMOL/L (ref 98–107)
CLARITY UR: CLEAR
CO2 SERPL-SCNC: 19 MMOL/L (ref 20–31)
COCAINE UR QL SCN: NEGATIVE
COHGB MFR BLD: 4.8 % (ref 0–5)
COLOR UR: YELLOW
COMMENT: ABNORMAL
CREAT SERPL-MCNC: 1 MG/DL (ref 0.7–1.2)
EOSINOPHIL # BLD: 0.14 K/UL (ref 0–0.44)
EOSINOPHILS RELATIVE PERCENT: 2 % (ref 1–4)
ERYTHROCYTE [DISTWIDTH] IN BLOOD BY AUTOMATED COUNT: 14.7 % (ref 11.8–14.4)
ETHANOL PERCENT: <0.01 %
ETHANOLAMINE SERPL-MCNC: <10 MG/DL (ref 0–0.08)
FENTANYL UR QL: NEGATIVE
FIO2 ON VENT: ABNORMAL %
GFR, ESTIMATED: >90 ML/MIN/1.73M2
GLUCOSE SERPL-MCNC: 96 MG/DL (ref 74–99)
GLUCOSE UR STRIP-MCNC: NEGATIVE MG/DL
HCO3 VENOUS: 19.7 MMOL/L (ref 24–30)
HCT VFR BLD AUTO: 46.4 % (ref 40.7–50.3)
HGB BLD-MCNC: 15.2 G/DL (ref 13–17)
HGB UR QL STRIP.AUTO: NEGATIVE
IMM GRANULOCYTES # BLD AUTO: 0.03 K/UL (ref 0–0.3)
IMM GRANULOCYTES NFR BLD: 0 %
INR PPP: 1
KETONES UR STRIP-MCNC: ABNORMAL MG/DL
LACTIC ACID, WHOLE BLOOD: 1.8 MMOL/L (ref 0.7–2.1)
LEUKOCYTE ESTERASE UR QL STRIP: NEGATIVE
LYMPHOCYTES NFR BLD: 1.78 K/UL (ref 1.1–3.7)
LYMPHOCYTES RELATIVE PERCENT: 19 % (ref 24–43)
MCH RBC QN AUTO: 29.2 PG (ref 25.2–33.5)
MCHC RBC AUTO-ENTMCNC: 32.8 G/DL (ref 28.4–34.8)
MCV RBC AUTO: 89.2 FL (ref 82.6–102.9)
METHADONE UR QL: NEGATIVE
MONOCYTES NFR BLD: 0.68 K/UL (ref 0.1–1.2)
MONOCYTES NFR BLD: 7 % (ref 3–12)
NEGATIVE BASE EXCESS, VEN: 6.4 MMOL/L (ref 0–2)
NEUTROPHILS NFR BLD: 71 % (ref 36–65)
NEUTS SEG NFR BLD: 6.89 K/UL (ref 1.5–8.1)
NITRITE UR QL STRIP: NEGATIVE
NRBC BLD-RTO: 0 PER 100 WBC
O2 SAT, VEN: 86.9 % (ref 60–85)
OPIATES UR QL SCN: NEGATIVE
OXYCODONE UR QL SCN: NEGATIVE
PARTIAL THROMBOPLASTIN TIME: 31.5 SEC (ref 23–36.5)
PCO2 VENOUS: 41.2 MM HG (ref 39–55)
PCP UR QL SCN: NEGATIVE
PH UR STRIP: 7 [PH] (ref 5–8)
PH VENOUS: 7.3 (ref 7.32–7.42)
PLATELET # BLD AUTO: 233 K/UL (ref 138–453)
PMV BLD AUTO: 9.3 FL (ref 8.1–13.5)
PO2 VENOUS: 57.1 MM HG (ref 30–50)
POTASSIUM SERPL-SCNC: 3.5 MMOL/L (ref 3.7–5.3)
PROT SERPL-MCNC: 6.9 G/DL (ref 6.6–8.7)
PROT UR STRIP-MCNC: NEGATIVE MG/DL
PROTHROMBIN TIME: 13.6 SEC (ref 11.7–14.9)
RBC # BLD AUTO: 5.2 M/UL (ref 4.21–5.77)
RBC # BLD: ABNORMAL 10*6/UL
SALICYLATES SERPL-MCNC: <0.5 MG/DL (ref 0–10)
SODIUM SERPL-SCNC: 139 MMOL/L (ref 136–145)
SP GR UR STRIP: 1.01 (ref 1–1.03)
TEST INFORMATION: ABNORMAL
TSH SERPL DL<=0.05 MIU/L-ACNC: 1.25 UIU/ML (ref 0.27–4.2)
UROBILINOGEN UR STRIP-ACNC: NORMAL EU/DL (ref 0–1)
VALPROATE SERPL-MCNC: 77 UG/ML (ref 50–125)
WBC OTHER # BLD: 9.6 K/UL (ref 3.5–11.3)

## 2025-08-04 PROCEDURE — 93005 ELECTROCARDIOGRAM TRACING: CPT | Performed by: EMERGENCY MEDICINE

## 2025-08-04 PROCEDURE — G0480 DRUG TEST DEF 1-7 CLASSES: HCPCS

## 2025-08-04 PROCEDURE — 83605 ASSAY OF LACTIC ACID: CPT

## 2025-08-04 PROCEDURE — 80307 DRUG TEST PRSMV CHEM ANLYZR: CPT

## 2025-08-04 PROCEDURE — 85025 COMPLETE CBC W/AUTO DIFF WBC: CPT

## 2025-08-04 PROCEDURE — 71045 X-RAY EXAM CHEST 1 VIEW: CPT

## 2025-08-04 PROCEDURE — 72125 CT NECK SPINE W/O DYE: CPT

## 2025-08-04 PROCEDURE — 80179 DRUG ASSAY SALICYLATE: CPT

## 2025-08-04 PROCEDURE — 36415 COLL VENOUS BLD VENIPUNCTURE: CPT

## 2025-08-04 PROCEDURE — 85610 PROTHROMBIN TIME: CPT

## 2025-08-04 PROCEDURE — 81003 URINALYSIS AUTO W/O SCOPE: CPT

## 2025-08-04 PROCEDURE — 80164 ASSAY DIPROPYLACETIC ACD TOT: CPT

## 2025-08-04 PROCEDURE — 85730 THROMBOPLASTIN TIME PARTIAL: CPT

## 2025-08-04 PROCEDURE — 2580000003 HC RX 258

## 2025-08-04 PROCEDURE — 6370000000 HC RX 637 (ALT 250 FOR IP)

## 2025-08-04 PROCEDURE — 82140 ASSAY OF AMMONIA: CPT

## 2025-08-04 PROCEDURE — 99284 EMERGENCY DEPT VISIT MOD MDM: CPT

## 2025-08-04 PROCEDURE — 82805 BLOOD GASES W/O2 SATURATION: CPT

## 2025-08-04 PROCEDURE — 80053 COMPREHEN METABOLIC PANEL: CPT

## 2025-08-04 PROCEDURE — 84443 ASSAY THYROID STIM HORMONE: CPT

## 2025-08-04 PROCEDURE — 70450 CT HEAD/BRAIN W/O DYE: CPT

## 2025-08-04 PROCEDURE — 80143 DRUG ASSAY ACETAMINOPHEN: CPT

## 2025-08-04 RX ORDER — 0.9 % SODIUM CHLORIDE 0.9 %
1000 INTRAVENOUS SOLUTION INTRAVENOUS ONCE
Status: COMPLETED | OUTPATIENT
Start: 2025-08-04 | End: 2025-08-04

## 2025-08-04 RX ADMIN — POTASSIUM BICARBONATE 40 MEQ: 782 TABLET, EFFERVESCENT ORAL at 11:27

## 2025-08-04 RX ADMIN — SODIUM CHLORIDE 1000 ML: 9 INJECTION, SOLUTION INTRAVENOUS at 15:00

## 2025-08-06 LAB
EKG ATRIAL RATE: 86 BPM
EKG P AXIS: 58 DEGREES
EKG P-R INTERVAL: 172 MS
EKG Q-T INTERVAL: 400 MS
EKG QRS DURATION: 118 MS
EKG QTC CALCULATION (BAZETT): 478 MS
EKG R AXIS: 88 DEGREES
EKG T AXIS: 57 DEGREES
EKG VENTRICULAR RATE: 86 BPM

## 2025-08-06 PROCEDURE — 93010 ELECTROCARDIOGRAM REPORT: CPT | Performed by: INTERNAL MEDICINE

## (undated) DEVICE — PAD GEN USE BORDERED ADH 14IN 2IN AND 12IN 4IN GZ UNIV ST

## (undated) DEVICE — SPONGE,PEANUT,XRAY,ST,SM,3/8",5/CARD: Brand: MEDLINE INDUSTRIES, INC.

## (undated) DEVICE — GLOVE ORANGE PI 7   MSG9070

## (undated) DEVICE — TOWEL,OR,DSP,ST,NATURAL,DLX,4/PK,20PK/CS: Brand: MEDLINE

## (undated) DEVICE — APPLIER CLP L L13IN TI MULT RNG HNDL 20 CLP STR LIGACLP

## (undated) DEVICE — GOWN,AURORA,NONRNF,XL,30/CS: Brand: MEDLINE

## (undated) DEVICE — MITT PREP W575XL775IN POVIDONE IOD HAIR REMV

## (undated) DEVICE — TOTAL TRAY, 16FR 10ML SIL FOLEY, URN: Brand: MEDLINE

## (undated) DEVICE — APPLIER LIG CLP M L11IN TI STR RNG HNDL FOR 20 CLP DISP

## (undated) DEVICE — SUTURE PDS II SZ 0 L60IN ABSRB VLT L65MM TP-1 1/2 CIR Z991G

## (undated) DEVICE — GARMENT,MEDLINE,DVT,INT,CALF,MED, GEN2: Brand: MEDLINE

## (undated) DEVICE — TIP APPL L35CM RIG FOR SEAL EVICEL

## (undated) DEVICE — BLADE CLIPPER GEN PURP NS

## (undated) DEVICE — ELECTROSURGICAL PENCIL BUTTON SWITCH E-Z CLEAN COATED BLADE ELECTRODE 10 FT (3 M) CORD HOLSTER: Brand: MEGADYNE

## (undated) DEVICE — CHLORAPREP 26ML ORANGE

## (undated) DEVICE — GLOVE SURG SZ 65 THK91MIL LTX FREE SYN POLYISOPRENE

## (undated) DEVICE — PACK SURG ABD SVMMC

## (undated) DEVICE — STERILE POLYISOPRENE POWDER-FREE SURGICAL GLOVES WITH EMOLLIENT COATING: Brand: PROTEXIS

## (undated) DEVICE — SPONGE LAP W18XL18IN WHT COT 4 PLY FLD STRUNG RADPQ DISP ST

## (undated) DEVICE — GLOVE ORANGE PI 7 1/2   MSG9075

## (undated) DEVICE — GOWN,AURORA,NONREINFORCED,LARGE: Brand: MEDLINE

## (undated) DEVICE — SUTURE VCRL SZ 2-0 L27IN ABSRB VLT L26MM SH 1/2 CIR J317H

## (undated) DEVICE — HYPODERMIC SAFETY NEEDLE: Brand: MAGELLAN

## (undated) DEVICE — SUTURE VCRL SZ 3-0 L27IN ABSRB UD L26MM SH 1/2 CIR J416H

## (undated) DEVICE — SUTURE PERMAHAND SZ 3-0 L30IN NONABSORBABLE BLK SILK BRAID A304H

## (undated) DEVICE — E-Z CLEAN, NON-STICK, PTFE COATED, ELECTROSURGICAL BLADE ELECTRODE, 6.5 INCH (16.5 CM): Brand: MEGADYNE

## (undated) DEVICE — SUTURE PERMAHAND SZ 2-0 L30IN NONABSORBABLE BLK SILK W/O A305H